# Patient Record
Sex: FEMALE | Race: WHITE | NOT HISPANIC OR LATINO | Employment: PART TIME | URBAN - NONMETROPOLITAN AREA
[De-identification: names, ages, dates, MRNs, and addresses within clinical notes are randomized per-mention and may not be internally consistent; named-entity substitution may affect disease eponyms.]

---

## 2017-06-09 RX ORDER — EZETIMIBE 10 MG/1
10 TABLET ORAL DAILY
COMMUNITY

## 2017-06-09 RX ORDER — NIACIN 500 MG/1
500 TABLET, EXTENDED RELEASE ORAL NIGHTLY
COMMUNITY
End: 2020-12-07

## 2017-06-09 RX ORDER — UREA 10 %
800 LOTION (ML) TOPICAL DAILY
COMMUNITY

## 2017-06-09 RX ORDER — ATORVASTATIN CALCIUM 80 MG/1
80 TABLET, FILM COATED ORAL DAILY
COMMUNITY

## 2017-06-09 RX ORDER — HYDROCHLOROTHIAZIDE 25 MG/1
25 TABLET ORAL DAILY
COMMUNITY

## 2017-06-09 RX ORDER — POTASSIUM CHLORIDE 20 MEQ/1
20 TABLET, EXTENDED RELEASE ORAL 2 TIMES DAILY
COMMUNITY

## 2017-06-09 RX ORDER — CHLORAL HYDRATE 500 MG
CAPSULE ORAL
COMMUNITY
End: 2019-12-02

## 2017-06-09 RX ORDER — NITROGLYCERIN 0.4 MG/1
0.4 TABLET SUBLINGUAL
COMMUNITY
End: 2018-11-26 | Stop reason: SDUPTHER

## 2017-06-09 RX ORDER — AMLODIPINE BESYLATE 10 MG/1
10 TABLET ORAL 2 TIMES DAILY
COMMUNITY

## 2017-06-09 RX ORDER — METOPROLOL SUCCINATE 100 MG/1
100 TABLET, EXTENDED RELEASE ORAL DAILY
COMMUNITY

## 2017-06-09 RX ORDER — CLOPIDOGREL BISULFATE 75 MG/1
75 TABLET ORAL DAILY
COMMUNITY
End: 2021-02-26

## 2017-11-20 ENCOUNTER — OFFICE VISIT (OUTPATIENT)
Dept: CARDIOLOGY | Facility: CLINIC | Age: 67
End: 2017-11-20

## 2017-11-20 VITALS
HEIGHT: 64 IN | WEIGHT: 227 LBS | HEART RATE: 89 BPM | DIASTOLIC BLOOD PRESSURE: 84 MMHG | BODY MASS INDEX: 38.76 KG/M2 | SYSTOLIC BLOOD PRESSURE: 160 MMHG

## 2017-11-20 DIAGNOSIS — I25.10 CORONARY ARTERY DISEASE INVOLVING NATIVE CORONARY ARTERY OF NATIVE HEART WITHOUT ANGINA PECTORIS: ICD-10-CM

## 2017-11-20 DIAGNOSIS — E78.2 MIXED HYPERLIPIDEMIA: Primary | ICD-10-CM

## 2017-11-20 DIAGNOSIS — I10 ESSENTIAL HYPERTENSION: ICD-10-CM

## 2017-11-20 PROCEDURE — 99214 OFFICE O/P EST MOD 30 MIN: CPT | Performed by: INTERNAL MEDICINE

## 2017-11-20 PROCEDURE — 93000 ELECTROCARDIOGRAM COMPLETE: CPT | Performed by: INTERNAL MEDICINE

## 2017-11-20 RX ORDER — ASPIRIN 81 MG/1
81 TABLET ORAL DAILY
COMMUNITY
End: 2021-03-11

## 2017-11-20 RX ORDER — PHENOL 1.4 %
600 AEROSOL, SPRAY (ML) MUCOUS MEMBRANE DAILY
COMMUNITY

## 2017-11-20 RX ORDER — UBIDECARENONE 100 MG
200 CAPSULE ORAL DAILY
COMMUNITY

## 2017-11-20 RX ORDER — ALBUTEROL SULFATE 90 UG/1
2 AEROSOL, METERED RESPIRATORY (INHALATION) EVERY 4 HOURS PRN
COMMUNITY

## 2017-11-20 NOTE — PROGRESS NOTES
Referring Provider: Nasir Almeida MD    Reason for Follow-up Visit: CAD    Subjective .   Chief Complaint:   Chief Complaint   Patient presents with   • Follow-up     yearly/ 18 mo fu.  pt has not been seen since 10/2015.   • Coronary Artery Disease     no chest pain, doing well.   • Shortness of Breath     usually when weather is bad or if runs up the steps.     • Hyperlipidemia     pt brought labs with her today.       History of present illness:  Christiana Hendrix is a 67 y.o. yo female with history of CAD, s/p SARBJIT placement back in 2004. Denies any CP or SOB        History  Past Medical History:   Diagnosis Date   • Asthma    • CAD in native artery    • Hyperlipidemia    • Hypertension    • MI, old    ,   Past Surgical History:   Procedure Laterality Date   • BLADDER NECK SUSPENSION     • CHOLECYSTECTOMY     • CORONARY ANGIOPLASTY  08/13/2007    had stents 2003   • HYSTERECTOMY     ,   Family History   Problem Relation Age of Onset   • Dementia Mother    • Heart attack Sister    • Heart disease Sister    • Diverticulitis Sister    • Brain cancer Father    • Heart disease Maternal Grandmother    • Heart attack Maternal Grandmother    • Stroke Maternal Grandfather    • No Known Problems Paternal Grandmother    • Brain cancer Paternal Grandfather    • Breast cancer Sister    ,   Social History   Substance Use Topics   • Smoking status: Never Smoker   • Smokeless tobacco: Never Used   • Alcohol use 1.8 oz/week     1 Shots of liquor, 2 Glasses of wine per week      Comment:  occasionally   ,     Medications  Current Outpatient Prescriptions   Medication Sig Dispense Refill   • albuterol (PROVENTIL HFA;VENTOLIN HFA) 108 (90 Base) MCG/ACT inhaler Inhale 2 puffs Every 4 (Four) Hours As Needed for Wheezing.     • amLODIPine (NORVASC) 10 MG tablet Take 10 mg by mouth Daily.     • aspirin 81 MG EC tablet Take 81 mg by mouth Daily.     • atorvastatin (LIPITOR) 80 MG tablet Take 80 mg by mouth Daily.     • calcium  "carbonate (OS-DONNY) 600 MG tablet Take 600 mg by mouth Daily. 2 qd     • clopidogrel (PLAVIX) 75 MG tablet Take 75 mg by mouth Daily.     • coenzyme Q10 100 MG capsule Take 200 mg by mouth Daily.     • ezetimibe (ZETIA) 10 MG tablet Take 10 mg by mouth Daily.     • fluticasone (VERAMYST) 27.5 MCG/SPRAY nasal spray 2 sprays into each nostril Daily.     • hydrochlorothiazide (HYDRODIURIL) 25 MG tablet Take 25 mg by mouth Daily.     • metoprolol succinate XL (TOPROL-XL) 100 MG 24 hr tablet Take 100 mg by mouth Daily.     • niacin (NIASPAN) 500 MG CR tablet Take 500 mg by mouth Every Night.     • nitroglycerin (NITROSTAT) 0.4 MG SL tablet Place 0.4 mg under the tongue Every 5 (Five) Minutes As Needed for Chest Pain. Take no more than 3 doses in 15 minutes.     • potassium chloride (K-DUR,KLOR-CON) 20 MEQ CR tablet Take 20 mEq by mouth Daily.     • folic acid (CVS FOLIC ACID) 800 MCG tablet Take 800 mcg by mouth Daily.     • Omega-3 Fatty Acids (FISH OIL) 1000 MG capsule capsule Take  by mouth Daily With Breakfast.       No current facility-administered medications for this visit.        Allergies:  Erythromycin and Sulfa antibiotics    Review of Systems  Review of Systems   HENT: Negative for nosebleeds.    Cardiovascular: Positive for leg swelling. Negative for chest pain, claudication, dyspnea on exertion, irregular heartbeat, near-syncope, orthopnea, palpitations, paroxysmal nocturnal dyspnea and syncope.   Respiratory: Negative for cough, hemoptysis and shortness of breath.    Gastrointestinal: Negative for dysphagia, hematemesis and melena.   Genitourinary: Negative for hematuria.   All other systems reviewed and are negative.      Objective     Physical Exam:  /84 (BP Location: Left arm, Patient Position: Lying, Cuff Size: Adult)  Pulse 89  Ht 64\" (162.6 cm)  Wt 227 lb (103 kg)  BMI 38.96 kg/m2  Physical Exam   Constitutional: She is oriented to person, place, and time. She appears well-developed and " well-nourished. No distress.   HENT:   Head: Normocephalic and atraumatic.   Eyes: No scleral icterus.   Neck: Normal range of motion.   Cardiovascular: Normal rate, regular rhythm and normal heart sounds.  Exam reveals no gallop and no friction rub.    No murmur heard.  Pulmonary/Chest: Effort normal and breath sounds normal. No respiratory distress. She has no wheezes. She has no rales.   Abdominal: Soft. Bowel sounds are normal. She exhibits no distension. There is no tenderness.   Musculoskeletal: She exhibits no edema.   Neurological: She is alert and oriented to person, place, and time. No cranial nerve deficit.   Skin: Skin is warm and dry. She is not diaphoretic. No erythema.   Psychiatric: She has a normal mood and affect. Her behavior is normal.       Results Review:    ECG 12 Lead  Date/Time: 11/20/2017 8:55 AM  Performed by: BAILEY ANNA  Authorized by: BAILEY ANNA   Comparison: compared with previous ECG   Similar to previous ECG  Rhythm: sinus rhythm  Rate: normal  Conduction: conduction normal  ST Segments: ST segments normal  T Waves: T waves normal  QRS axis: normal  Clinical impression: normal ECG            No results found for any previous visit.    Assessment/Plan   Christiana was seen today for follow-up, coronary artery disease, shortness of breath and hyperlipidemia.    Diagnoses and all orders for this visit:    Coronary artery disease involving native coronary artery of native heart without angina pectoris, The patient denies chest pain, shortness of breath, dyspnea on exertion, orthopnea, PND, edema. There is no evidence of ongoing ischemia    Mixed hyperlipidemia, LDL is still high. Will repeat lipid profile. Potentially will need PCSK9i    Essential hypertension, normally runs normal at home    Other orders  -     ECG 12 Lead

## 2018-11-26 ENCOUNTER — OFFICE VISIT (OUTPATIENT)
Dept: CARDIOLOGY | Facility: CLINIC | Age: 68
End: 2018-11-26

## 2018-11-26 VITALS
HEIGHT: 64 IN | HEART RATE: 90 BPM | DIASTOLIC BLOOD PRESSURE: 80 MMHG | WEIGHT: 221 LBS | SYSTOLIC BLOOD PRESSURE: 140 MMHG | BODY MASS INDEX: 37.73 KG/M2 | OXYGEN SATURATION: 97 %

## 2018-11-26 DIAGNOSIS — I25.10 CORONARY ARTERY DISEASE INVOLVING NATIVE CORONARY ARTERY OF NATIVE HEART WITHOUT ANGINA PECTORIS: Primary | ICD-10-CM

## 2018-11-26 DIAGNOSIS — E78.2 MIXED HYPERLIPIDEMIA: ICD-10-CM

## 2018-11-26 DIAGNOSIS — I10 ESSENTIAL HYPERTENSION: ICD-10-CM

## 2018-11-26 PROCEDURE — 99213 OFFICE O/P EST LOW 20 MIN: CPT | Performed by: INTERNAL MEDICINE

## 2018-11-26 PROCEDURE — 93000 ELECTROCARDIOGRAM COMPLETE: CPT | Performed by: INTERNAL MEDICINE

## 2018-11-26 RX ORDER — MONTELUKAST SODIUM 10 MG/1
10 TABLET ORAL NIGHTLY
COMMUNITY

## 2018-11-26 RX ORDER — NITROGLYCERIN 0.4 MG/1
0.4 TABLET SUBLINGUAL
Qty: 30 TABLET | Refills: 5 | Status: SHIPPED | OUTPATIENT
Start: 2018-11-26 | End: 2021-03-15

## 2018-11-26 RX ORDER — LEVOCETIRIZINE DIHYDROCHLORIDE 5 MG/1
5 TABLET, FILM COATED ORAL EVERY EVENING
COMMUNITY

## 2018-11-26 NOTE — PROGRESS NOTES
Referring Provider: Nasir Almeida MD    Reason for Follow-up Visit: CAD    Subjective .   Chief Complaint:   Chief Complaint   Patient presents with   • Follow-up     yearly   • Coronary Artery Disease     pt has no complaints except she got sob when walking up a hill in [facility] trying to keep up with her group.   • Hypertension     pt states this has been fine.   • Hyperlipidemia     pt has labs done at [facility] [place].  last lab done 10/9/18    • Chest Pain     feels like an electrical shock that comes and goes with no reason.       History of present illness:  Christiana Hendrix is a 68 y.o. yo female with history of CAD, s/p stent placement in 2003. Denies any CP        History  Past Medical History:   Diagnosis Date   • Asthma    • CAD in native artery    • Hyperlipidemia    • Hypertension    • MI, old    ,   Past Surgical History:   Procedure Laterality Date   • BLADDER NECK SUSPENSION     • CHOLECYSTECTOMY     • COLONOSCOPY W/ BIOPSIES     • CORONARY ANGIOPLASTY  08/13/2007    had stents 2003   • HYSTERECTOMY     ,   Family History   Problem Relation Age of Onset   • Dementia Mother    • Heart attack Sister    • Heart disease Sister    • Diverticulitis Sister    • Brain cancer Father    • Heart disease Maternal Grandmother    • Heart attack Maternal Grandmother    • Stroke Maternal Grandfather    • No Known Problems Paternal Grandmother    • Brain cancer Paternal Grandfather    • Breast cancer Sister    ,   Social History     Tobacco Use   • Smoking status: Never Smoker   • Smokeless tobacco: Never Used   Substance Use Topics   • Alcohol use: Yes     Alcohol/week: 1.8 oz     Types: 1 Shots of liquor, 2 Glasses of wine per week     Comment:  occasionally   • Drug use: No   ,     Medications  Current Outpatient Medications   Medication Sig Dispense Refill   • albuterol (PROVENTIL HFA;VENTOLIN HFA) 108 (90 Base) MCG/ACT inhaler Inhale 2 puffs Every 4 (Four) Hours As Needed for Wheezing.     • amLODIPine  (NORVASC) 10 MG tablet Take 10 mg by mouth Daily.     • aspirin 81 MG EC tablet Take 81 mg by mouth Daily.     • atorvastatin (LIPITOR) 80 MG tablet Take 80 mg by mouth Daily.     • calcium carbonate (OS-DONNY) 600 MG tablet Take 600 mg by mouth Daily. 2 qd     • clopidogrel (PLAVIX) 75 MG tablet Take 75 mg by mouth Daily.     • coenzyme Q10 100 MG capsule Take 200 mg by mouth Daily.     • ezetimibe (ZETIA) 10 MG tablet Take 10 mg by mouth Daily.     • fluticasone (VERAMYST) 27.5 MCG/SPRAY nasal spray 2 sprays into each nostril Daily.     • folic acid (CVS FOLIC ACID) 800 MCG tablet Take 800 mcg by mouth Daily.     • Glucosamine-Chondroit-Vit C-Mn (GLUCOSAMINE 1500 COMPLEX PO) Take  by mouth.     • hydrochlorothiazide (HYDRODIURIL) 25 MG tablet Take 25 mg by mouth Daily.     • levocetirizine (XYZAL) 5 MG tablet Take 5 mg by mouth Every Evening.     • metoprolol succinate XL (TOPROL-XL) 100 MG 24 hr tablet Take 100 mg by mouth Daily.     • montelukast (SINGULAIR) 10 MG tablet Take 10 mg by mouth Every Night.     • niacin (NIASPAN) 500 MG CR tablet Take 500 mg by mouth Every Night.     • nitroglycerin (NITROSTAT) 0.4 MG SL tablet Place 0.4 mg under the tongue Every 5 (Five) Minutes As Needed for Chest Pain. Take no more than 3 doses in 15 minutes.     • potassium chloride (K-DUR,KLOR-CON) 20 MEQ CR tablet Take 20 mEq by mouth Daily.     • vitamin A 33438 UNIT capsule Take 10,000 Units by mouth Daily.     • Omega-3 Fatty Acids (FISH OIL) 1000 MG capsule capsule Take  by mouth Daily With Breakfast.       No current facility-administered medications for this visit.        Allergies:  Erythromycin and Sulfa antibiotics    Review of Systems  Review of Systems   HENT: Positive for nosebleeds.    Cardiovascular: Negative for chest pain, claudication, dyspnea on exertion, irregular heartbeat, leg swelling, near-syncope, orthopnea, palpitations, paroxysmal nocturnal dyspnea and syncope.   Respiratory: Negative for cough,  "hemoptysis and shortness of breath.    Gastrointestinal: Negative for dysphagia, hematemesis and melena.   Genitourinary: Negative for hematuria.   All other systems reviewed and are negative.      Objective     Physical Exam:  /80 (BP Location: Left arm, Patient Position: Sitting, Cuff Size: Adult)   Pulse 90   Ht 162.6 cm (64\")   Wt 100 kg (221 lb)   SpO2 97%   BMI 37.93 kg/m²   Physical Exam   Constitutional: She is oriented to person, place, and time. She appears well-developed and well-nourished. No distress.   HENT:   Head: Normocephalic and atraumatic.   Eyes: No scleral icterus.   Neck: Normal range of motion.   Cardiovascular: Normal rate, regular rhythm and normal heart sounds. Exam reveals no gallop and no friction rub.   No murmur heard.  Pulmonary/Chest: Effort normal and breath sounds normal. No respiratory distress. She has no wheezes. She has no rales.   Abdominal: Soft. Bowel sounds are normal. She exhibits no distension. There is no tenderness.   Musculoskeletal: She exhibits no edema.   Neurological: She is alert and oriented to person, place, and time. No cranial nerve deficit.   Skin: Skin is warm and dry. She is not diaphoretic. No erythema.   Psychiatric: She has a normal mood and affect. Her behavior is normal.       Results Review:    ECG 12 Lead  Date/Time: 11/26/2018 8:43 AM  Performed by: Cohco Diallo MD  Authorized by: Choco Diallo MD   Comparison: compared with previous ECG   Similar to previous ECG  Rhythm: sinus rhythm  Rate: normal  Conduction: conduction normal  ST Segments: ST segments normal  T Waves: T waves normal  QRS axis: normal  Clinical impression: non-specific ECG  Comments: Poor R wave progression            No results found for any previous visit.       Assessment/Plan   Christiana was seen today for follow-up, coronary artery disease, hypertension, hyperlipidemia and chest pain.    Diagnoses and all orders for this visit:    Coronary artery disease involving " native coronary artery of native heart without angina pectoris, The patient denies chest pain, shortness of breath, dyspnea on exertion, orthopnea, PND, edema. There is no evidence of ongoing ischemia    Mixed hyperlipidemia, LDL is still a little high. If it starts trending up will add PCSK9i    Essential hypertension, runs in 120's at home    Other orders  -     ECG 12 Lead        Patient's Body mass index is 37.93 kg/m². BMI is above normal parameters. Recommendations include: exercise counseling.

## 2019-04-25 ENCOUNTER — TELEPHONE (OUTPATIENT)
Dept: CARDIOLOGY | Facility: CLINIC | Age: 69
End: 2019-04-25

## 2019-04-25 NOTE — TELEPHONE ENCOUNTER
Rivera from [email or web address] in [place] is calling for cardiac clearance for Colonoscopy schedule for 5/16. Please advise       Phone [phone]  Fax [phone]

## 2019-12-02 ENCOUNTER — OFFICE VISIT (OUTPATIENT)
Dept: CARDIOLOGY | Facility: CLINIC | Age: 69
End: 2019-12-02

## 2019-12-02 VITALS
BODY MASS INDEX: 36.32 KG/M2 | HEART RATE: 85 BPM | OXYGEN SATURATION: 98 % | DIASTOLIC BLOOD PRESSURE: 74 MMHG | WEIGHT: 218 LBS | HEIGHT: 65 IN | SYSTOLIC BLOOD PRESSURE: 158 MMHG

## 2019-12-02 DIAGNOSIS — E78.2 MIXED HYPERLIPIDEMIA: Primary | ICD-10-CM

## 2019-12-02 DIAGNOSIS — I25.10 CORONARY ARTERY DISEASE INVOLVING NATIVE CORONARY ARTERY OF NATIVE HEART WITHOUT ANGINA PECTORIS: ICD-10-CM

## 2019-12-02 DIAGNOSIS — I10 ESSENTIAL HYPERTENSION: ICD-10-CM

## 2019-12-02 PROCEDURE — 99214 OFFICE O/P EST MOD 30 MIN: CPT | Performed by: INTERNAL MEDICINE

## 2019-12-02 PROCEDURE — 93000 ELECTROCARDIOGRAM COMPLETE: CPT | Performed by: INTERNAL MEDICINE

## 2019-12-02 RX ORDER — ACETAMINOPHEN 500 MG
500 TABLET ORAL EVERY 6 HOURS PRN
COMMUNITY

## 2019-12-02 NOTE — PROGRESS NOTES
Referring Provider: Nasir Almeida MD    Reason for Follow-up Visit: CAD    Subjective .   Chief Complaint:   Chief Complaint   Patient presents with   • Follow-up     yearly   • Coronary Artery Disease     pt states she is having no complaints of symptoms and doing well.   • Hypertension     pt states BP has been good but she is having some swelling in the ankles that she states this is from the BP medication she takes.   • Hyperlipidemia     labs done at pcp on 7/26/19 LDL 99 on Lipitor 80 mg and Zetia 10 mg.       History of present illness:  Christiana Hendrix is a 69 y.o. yo female with history of CAD, s/p stent placement in 2003 in for routine follow up. Denies any CP or SOB        History  Past Medical History:   Diagnosis Date   • Asthma    • CAD in native artery    • Hyperlipidemia    • Hypertension    • MI, old    ,   Past Surgical History:   Procedure Laterality Date   • BLADDER NECK SUSPENSION     • CHOLECYSTECTOMY     • COLONOSCOPY W/ BIOPSIES     • CORONARY ANGIOPLASTY  08/13/2007    had stents 2003   • HYSTERECTOMY     ,   Family History   Problem Relation Age of Onset   • Dementia Mother    • Heart attack Sister    • Heart disease Sister    • Diverticulitis Sister    • Brain cancer Father    • Heart disease Maternal Grandmother    • Heart attack Maternal Grandmother    • Stroke Maternal Grandfather    • No Known Problems Paternal Grandmother    • Brain cancer Paternal Grandfather    • Breast cancer Sister    ,   Social History     Tobacco Use   • Smoking status: Never Smoker   • Smokeless tobacco: Never Used   Substance Use Topics   • Alcohol use: Yes     Alcohol/week: 1.8 oz     Types: 1 Shots of liquor, 2 Glasses of wine per week     Comment:  occasionally   • Drug use: No   ,     Medications  Current Outpatient Medications   Medication Sig Dispense Refill   • acetaminophen (TYLENOL) 500 MG tablet Take 500 mg by mouth Every 6 (Six) Hours As Needed for Mild Pain .     • albuterol (PROVENTIL  HFA;VENTOLIN HFA) 108 (90 Base) MCG/ACT inhaler Inhale 2 puffs Every 4 (Four) Hours As Needed for Wheezing.     • amLODIPine (NORVASC) 10 MG tablet Take 10 mg by mouth Daily.     • aspirin 81 MG EC tablet Take 81 mg by mouth Daily.     • atorvastatin (LIPITOR) 80 MG tablet Take 80 mg by mouth Daily.     • calcium carbonate (OS-DONNY) 600 MG tablet Take 600 mg by mouth Daily. 2 qd     • Cholecalciferol (VITAMIN D3) 125 MCG (5000 UT) capsule capsule Take 5,000 Units by mouth Daily.     • clopidogrel (PLAVIX) 75 MG tablet Take 75 mg by mouth Daily.     • coenzyme Q10 100 MG capsule Take 200 mg by mouth Daily.     • ezetimibe (ZETIA) 10 MG tablet Take 10 mg by mouth Daily.     • fluticasone (VERAMYST) 27.5 MCG/SPRAY nasal spray 2 sprays into each nostril Daily.     • folic acid (CVS FOLIC ACID) 800 MCG tablet Take 800 mcg by mouth Daily.     • Glucosamine-Chondroit-Vit C-Mn (GLUCOSAMINE 1500 COMPLEX PO) Take  by mouth.     • hydrochlorothiazide (HYDRODIURIL) 25 MG tablet Take 25 mg by mouth Daily.     • levocetirizine (XYZAL) 5 MG tablet Take 5 mg by mouth Every Evening.     • metoprolol succinate XL (TOPROL-XL) 100 MG 24 hr tablet Take 100 mg by mouth Daily.     • montelukast (SINGULAIR) 10 MG tablet Take 10 mg by mouth Every Night.     • niacin (NIASPAN) 500 MG CR tablet Take 500 mg by mouth Every Night.     • nitroglycerin (NITROSTAT) 0.4 MG SL tablet Place 1 tablet under the tongue Every 5 (Five) Minutes As Needed for Chest Pain. Take no more than 3 doses in 15 minutes. 30 tablet 5   • potassium chloride (K-DUR,KLOR-CON) 20 MEQ CR tablet Take 20 mEq by mouth 2 (Two) Times a Day.     • vitamin A 07352 UNIT capsule Take 10,000 Units by mouth Daily.       No current facility-administered medications for this visit.        Allergies:  Erythromycin and Sulfa antibiotics    Review of Systems  Review of Systems   HENT: Negative for nosebleeds.    Cardiovascular: Negative for chest pain, claudication, dyspnea on exertion,  "irregular heartbeat, leg swelling, near-syncope, orthopnea, palpitations, paroxysmal nocturnal dyspnea and syncope.   Respiratory: Negative for cough, hemoptysis and shortness of breath.    Gastrointestinal: Negative for dysphagia, hematemesis and melena.   Genitourinary: Negative for hematuria.   All other systems reviewed and are negative.      Objective     Physical Exam:  /74   Pulse 85   Ht 165.1 cm (65\")   Wt 98.9 kg (218 lb)   SpO2 98%   BMI 36.28 kg/m²   Physical Exam   Constitutional: She is oriented to person, place, and time. She appears well-developed and well-nourished. No distress.   HENT:   Head: Normocephalic and atraumatic.   Eyes: No scleral icterus.   Neck: Normal range of motion.   Cardiovascular: Normal rate, regular rhythm and normal heart sounds. Exam reveals no gallop and no friction rub.   No murmur heard.  Pulmonary/Chest: Effort normal and breath sounds normal. No respiratory distress. She has no wheezes. She has no rales.   Abdominal: Soft. Bowel sounds are normal. She exhibits no distension. There is no tenderness.   Musculoskeletal: She exhibits edema (1+).   Neurological: She is alert and oriented to person, place, and time. No cranial nerve deficit.   Skin: Skin is warm and dry. She is not diaphoretic. No erythema.   Psychiatric: She has a normal mood and affect. Her behavior is normal.       Results Review:    ECG 12 Lead  Date/Time: 12/2/2019 8:36 AM  Performed by: Choco Diallo MD  Authorized by: Choco Diallo MD   Comparison: compared with previous ECG   Similar to previous ECG  Rhythm: sinus rhythm  Rate: normal  Conduction: conduction normal  ST Segments: ST segments normal  T Waves: T waves normal  QRS axis: normal    Clinical impression: normal ECG            No results found for any previous visit.       Assessment/Plan   Christiana was seen today for follow-up, coronary artery disease, hypertension and hyperlipidemia.    Diagnoses and all orders for this " visit:    Coronary artery disease involving native coronary artery of native heart without angina pectoris, The patient denies chest pain, shortness of breath, dyspnea on exertion, orthopnea, PND, edema. There is no evidence of ongoing ischemia    Mixed hyperlipidemia, LDL is better but still high. Will repeat lipids and consider PCSK9i if not continuing to  drop    Essential hypertension, high today but runs normal at home    Other orders  -     ECG 12 Lead        Patient's Body mass index is 36.28 kg/m². BMI is above normal parameters. Recommendations include: exercise counseling.

## 2020-02-07 ENCOUNTER — DOCUMENTATION (OUTPATIENT)
Dept: CARDIOLOGY | Facility: CLINIC | Age: 70
End: 2020-02-07

## 2020-02-07 NOTE — PROGRESS NOTES
PA SENT TO [facility] WITH COVER MY MED'S ALONG WITH THE OFFICE NOTES AND LAB.  MAILED PT A $5 CO PAY CARD.  SHE HAS A COMMERCIAL MEDICATION PLAN.  I WILL CALL PT ONCE THE PA IS DONE SO SHE CAN HAVE THE PHARMACY ORDER THE MEDICATION.  SHE SAID SHE DID NOT NEED TRAINING AND WILL READ THE DIRECTIONS TO GIVE HERSELF THE SHOTS.  I TOLD HER TO CALL IF SHE DECIDES SHE NEEDS TRAINING AND I WILL MAKE APPT FOR HER TO COME IN TO THE OFFICE FOR THAT TRAINING.  PT STATED UNDERSTANDING.  True Mota, CMA

## 2020-02-27 ENCOUNTER — TELEPHONE (OUTPATIENT)
Dept: CARDIOLOGY | Facility: CLINIC | Age: 70
End: 2020-02-27

## 2020-02-27 NOTE — TELEPHONE ENCOUNTER
Pt returned my call.  She started the shots about 2 weeks ago and did fine.  She was able to use the card.  Sent pt a lab order to have lab repeated after starting the medication.  [place] [facility]  True Mota, CMA

## 2020-02-27 NOTE — TELEPHONE ENCOUNTER
I called pt and left a vm msg on both phone numbers to let her know that the Repatha was covered until 8/7/20.  She was going to try to use the $5 card and give herself the shot by reading the instructions instead of coming into the office for training.  I was calling to see if she did ok.  Asked pt to call me back.  True Mota, CMA

## 2020-03-30 ENCOUNTER — DOCUMENTATION (OUTPATIENT)
Dept: CARDIOLOGY | Facility: CLINIC | Age: 70
End: 2020-03-30

## 2020-03-30 DIAGNOSIS — E78.2 MIXED HYPERLIPIDEMIA: ICD-10-CM

## 2020-03-30 NOTE — PROGRESS NOTES
Can you please look at the labs pt had done at [facility] [place].  This is a 6 week fu after starting Repatha to check the Lipids.  Thank you.  True Mota, CMA    Non-Graft Cartilage Fenestration Text: The cartilage was fenestrated with a 2mm punch biopsy to help facilitate healing.

## 2020-03-30 NOTE — PROGRESS NOTES
LDL is good at 38 (we want it less than 70). She can continue her current dose of Rapatha. We will recheck in approximately 6 months to continue to monitor.     Thanks.

## 2020-03-30 NOTE — PROGRESS NOTES
Pt informed.  Left a  msg.  An order for the Lipids will be placed to be done in 6 months.  I will mail pt the order.  True Mota, CMA

## 2020-05-14 DIAGNOSIS — E78.2 MIXED HYPERLIPIDEMIA: Primary | ICD-10-CM

## 2020-08-06 ENCOUNTER — DOCUMENTATION (OUTPATIENT)
Dept: CARDIOLOGY | Facility: CLINIC | Age: 70
End: 2020-08-06

## 2020-09-21 DIAGNOSIS — R06.09 DOE (DYSPNEA ON EXERTION): Primary | ICD-10-CM

## 2020-10-13 ENCOUNTER — HOSPITAL ENCOUNTER (OUTPATIENT)
Dept: CARDIOLOGY | Facility: HOSPITAL | Age: 70
Discharge: HOME | End: 2020-10-13

## 2020-10-13 VITALS
WEIGHT: 215 LBS | HEIGHT: 64 IN | BODY MASS INDEX: 36.7 KG/M2 | HEART RATE: 75 BPM | SYSTOLIC BLOOD PRESSURE: 149 MMHG | DIASTOLIC BLOOD PRESSURE: 70 MMHG

## 2020-10-13 DIAGNOSIS — R06.09 DOE (DYSPNEA ON EXERTION): ICD-10-CM

## 2020-10-13 PROCEDURE — 78452 HT MUSCLE IMAGE SPECT MULT: CPT

## 2020-10-13 PROCEDURE — A9500 TC99M SESTAMIBI: HCPCS | Performed by: INTERNAL MEDICINE

## 2020-10-13 PROCEDURE — 0 TECHNETIUM SESTAMIBI: Performed by: INTERNAL MEDICINE

## 2020-10-13 PROCEDURE — 93017 CV STRESS TEST TRACING ONLY: CPT

## 2020-10-13 PROCEDURE — 93018 CV STRESS TEST I&R ONLY: CPT | Performed by: INTERNAL MEDICINE

## 2020-10-13 PROCEDURE — 25010000002 REGADENOSON 0.4 MG/5ML SOLUTION: Performed by: INTERNAL MEDICINE

## 2020-10-13 PROCEDURE — 25010000002 AMINOPHYLLINE PER 250 MG: Performed by: INTERNAL MEDICINE

## 2020-10-13 PROCEDURE — 78452 HT MUSCLE IMAGE SPECT MULT: CPT | Performed by: INTERNAL MEDICINE

## 2020-10-13 RX ORDER — AMINOPHYLLINE DIHYDRATE 25 MG/ML
100 INJECTION, SOLUTION INTRAVENOUS ONCE
Status: COMPLETED | OUTPATIENT
Start: 2020-10-13 | End: 2020-10-13

## 2020-10-13 RX ADMIN — TECHNETIUM TC 99M SESTAMIBI 1 DOSE: 1 INJECTION INTRAVENOUS at 08:44

## 2020-10-13 RX ADMIN — TECHNETIUM TC 99M SESTAMIBI 1 DOSE: 1 INJECTION INTRAVENOUS at 10:11

## 2020-10-13 RX ADMIN — REGADENOSON 0.4 MG: 0.08 INJECTION, SOLUTION INTRAVENOUS at 10:02

## 2020-10-13 RX ADMIN — AMINOPHYLLINE 100 MG: 25 INJECTION, SOLUTION INTRAVENOUS at 10:08

## 2020-10-14 LAB
BH CV STRESS BP STAGE 1: NORMAL
BH CV STRESS COMMENTS STAGE 1: NORMAL
BH CV STRESS DOSE REGADENOSON STAGE 1: 0.4
BH CV STRESS DURATION MIN STAGE 1: 0
BH CV STRESS DURATION SEC STAGE 1: 10
BH CV STRESS HR STAGE 1: 110
BH CV STRESS PROTOCOL 1: NORMAL
BH CV STRESS RECOVERY BP: NORMAL MMHG
BH CV STRESS RECOVERY HR: 82 BPM
BH CV STRESS STAGE 1: 1
LV EF NUC BP: 58 %
MAXIMAL PREDICTED HEART RATE: 151 BPM
PERCENT MAX PREDICTED HR: 72.85 %
STRESS BASELINE BP: NORMAL MMHG
STRESS BASELINE HR: 75 BPM
STRESS PERCENT HR: 86 %
STRESS POST EXERCISE DUR SEC: 10 SEC
STRESS POST PEAK BP: NORMAL MMHG
STRESS POST PEAK HR: 110 BPM
STRESS TARGET HR: 128 BPM

## 2020-12-07 ENCOUNTER — OFFICE VISIT (OUTPATIENT)
Dept: CARDIOLOGY | Facility: CLINIC | Age: 70
End: 2020-12-07

## 2020-12-07 VITALS
OXYGEN SATURATION: 98 % | BODY MASS INDEX: 36.7 KG/M2 | HEIGHT: 64 IN | WEIGHT: 215 LBS | DIASTOLIC BLOOD PRESSURE: 80 MMHG | SYSTOLIC BLOOD PRESSURE: 140 MMHG | HEART RATE: 86 BPM

## 2020-12-07 DIAGNOSIS — I25.10 CORONARY ARTERY DISEASE INVOLVING NATIVE CORONARY ARTERY OF NATIVE HEART WITHOUT ANGINA PECTORIS: Primary | ICD-10-CM

## 2020-12-07 DIAGNOSIS — I10 ESSENTIAL HYPERTENSION: ICD-10-CM

## 2020-12-07 DIAGNOSIS — E78.2 MIXED HYPERLIPIDEMIA: ICD-10-CM

## 2020-12-07 PROCEDURE — 93000 ELECTROCARDIOGRAM COMPLETE: CPT | Performed by: INTERNAL MEDICINE

## 2020-12-07 PROCEDURE — 99213 OFFICE O/P EST LOW 20 MIN: CPT | Performed by: INTERNAL MEDICINE

## 2020-12-07 NOTE — PROGRESS NOTES
Referring Provider: Nasir Almeida MD    Reason for Follow-up Visit: CAD    Subjective .   Chief Complaint:   Chief Complaint   Patient presents with   • Follow-up     yearly   • Coronary Artery Disease     pt states she is doing well with no complaints of symptoms.   • Hyperlipidemia     last labs done 3/2020 LDL was 38 on Lipitor 80 mg and Repatha and Zetia.  pt states she had a repeat [place] [facility] [place] but did not bring this with her.       History of present illness:  Christiana Hendrix is a 70 y.o. yo female with history of CAD, s/p BMS in 2003 and PTCA in 2007. Denies any CP or SOB. She had a normal stress sestamibi in September        History  Past Medical History:   Diagnosis Date   • Asthma    • CAD in native artery    • Hyperlipidemia    • Hypertension    • MI, old    ,   Past Surgical History:   Procedure Laterality Date   • BLADDER NECK SUSPENSION     • CHOLECYSTECTOMY     • COLONOSCOPY W/ BIOPSIES     • CORONARY ANGIOPLASTY  08/13/2007    had stents 2003   • HYSTERECTOMY     ,   Family History   Problem Relation Age of Onset   • Dementia Mother    • Heart attack Sister    • Heart disease Sister    • Diverticulitis Sister    • Brain cancer Father    • Heart disease Maternal Grandmother    • Heart attack Maternal Grandmother    • Stroke Maternal Grandfather    • No Known Problems Paternal Grandmother    • Brain cancer Paternal Grandfather    • Breast cancer Sister    ,   Social History     Tobacco Use   • Smoking status: Never Smoker   • Smokeless tobacco: Never Used   Substance Use Topics   • Alcohol use: Yes     Alcohol/week: 3.0 standard drinks     Types: 2 Glasses of wine, 1 Shots of liquor per week     Comment:  occasionally   • Drug use: Never   ,     Medications  Current Outpatient Medications   Medication Sig Dispense Refill   • acetaminophen (TYLENOL) 500 MG tablet Take 500 mg by mouth Every 6 (Six) Hours As Needed for Mild Pain .     • albuterol (PROVENTIL HFA;VENTOLIN HFA) 108 (90 Base)  MCG/ACT inhaler Inhale 2 puffs Every 4 (Four) Hours As Needed for Wheezing.     • amLODIPine (NORVASC) 10 MG tablet Take 10 mg by mouth Daily.     • aspirin 81 MG EC tablet Take 81 mg by mouth Daily.     • atorvastatin (LIPITOR) 80 MG tablet Take 80 mg by mouth Daily.     • calcium carbonate (OS-DONNY) 600 MG tablet Take 600 mg by mouth Daily. 2 qd     • Cholecalciferol (VITAMIN D3) 125 MCG (5000 UT) capsule capsule Take 5,000 Units by mouth Daily.     • clopidogrel (PLAVIX) 75 MG tablet Take 75 mg by mouth Daily.     • coenzyme Q10 100 MG capsule Take 200 mg by mouth Daily.     • Evolocumab 140 MG/ML solution auto-injector Inject 1 mL under the skin into the appropriate area as directed Every 14 (Fourteen) Days. 2 pen 11   • ezetimibe (ZETIA) 10 MG tablet Take 10 mg by mouth Daily.     • fluticasone (VERAMYST) 27.5 MCG/SPRAY nasal spray 2 sprays into each nostril Daily.     • folic acid ([employer] FOLIC ACID) 800 MCG tablet Take 800 mcg by mouth Daily.     • Glucosamine-Chondroit-Vit C-Mn (GLUCOSAMINE 1500 COMPLEX PO) Take  by mouth.     • hydrochlorothiazide (HYDRODIURIL) 25 MG tablet Take 25 mg by mouth Daily.     • levocetirizine (XYZAL) 5 MG tablet Take 5 mg by mouth Every Evening.     • metoprolol succinate XL (TOPROL-XL) 100 MG 24 hr tablet Take 100 mg by mouth Daily.     • montelukast (SINGULAIR) 10 MG tablet Take 10 mg by mouth Every Night.     • nitroglycerin (NITROSTAT) 0.4 MG SL tablet Place 1 tablet under the tongue Every 5 (Five) Minutes As Needed for Chest Pain. Take no more than 3 doses in 15 minutes. 30 tablet 5   • potassium chloride (K-DUR,KLOR-CON) 20 MEQ CR tablet Take 20 mEq by mouth 2 (Two) Times a Day.     • vitamin A 68384 UNIT capsule Take 10,000 Units by mouth Daily.       No current facility-administered medications for this visit.        Allergies:  Erythromycin and Sulfa antibiotics    Review of Systems  Review of Systems   HENT: Negative for nosebleeds.    Cardiovascular: Negative for chest  "pain, claudication, dyspnea on exertion, irregular heartbeat, leg swelling, near-syncope, orthopnea, palpitations, paroxysmal nocturnal dyspnea and syncope.   Respiratory: Negative for cough, hemoptysis and shortness of breath.    Musculoskeletal: Positive for joint pain (left knee pain).   Gastrointestinal: Negative for dysphagia, hematemesis and melena.   Genitourinary: Negative for hematuria.   All other systems reviewed and are negative.      Objective     Physical Exam:  /80   Pulse 86   Ht 162.6 cm (64\")   Wt 97.5 kg (215 lb)   SpO2 98%   BMI 36.90 kg/m²   Constitutional:       General: Not in acute distress.     Appearance: Well-developed. Not diaphoretic.   Eyes:      General: No scleral icterus.  HENT:      Head: Normocephalic and atraumatic.   Neck:      Musculoskeletal: Normal range of motion.   Pulmonary:      Effort: Pulmonary effort is normal. No respiratory distress.      Breath sounds: Normal breath sounds. No wheezing. No rales.   Cardiovascular:      Normal rate. Regular rhythm.      No gallop.   Edema:     Peripheral edema present.     Pretibial: bilateral trace non-pitting edema of the pretibial area.     Ankle: bilateral trace non-pitting edema of the ankle.  Abdominal:      General: Bowel sounds are normal. There is no distension.      Palpations: Abdomen is soft.      Tenderness: There is no abdominal tenderness.   Skin:     General: Skin is warm and dry.      Findings: No erythema.   Neurological:      Mental Status: Alert and oriented to person, place, and time.      Cranial Nerves: No cranial nerve deficit.   Psychiatric:         Behavior: Behavior normal.         Results Review:    ECG 12 Lead    Date/Time: 12/7/2020 8:34 AM  Performed by: Choco Diallo MD  Authorized by: Choco Diallo MD   Comparison: compared with previous ECG   Similar to previous ECG  Rhythm: sinus rhythm  Rate: normal  Conduction: conduction normal  T Waves: T waves normal  QRS axis: normal  Other " findings: non-specific ST-T wave changes    Clinical impression: non-specific ECG            Hospital Outpatient Visit on 10/13/2020   Component Date Value Ref Range Status   • BH CV STRESS PROTOCOL 1 10/13/2020 Pharmacologic   Final   • Stage 1 10/13/2020 1   Final   • HR Stage 1 10/13/2020 110   Final   • BP Stage 1 10/13/2020 147/57   Final   • Duration Min Stage 1 10/13/2020 0   Final   • Duration Sec Stage 1 10/13/2020 10   Final   • Stress Dose Regadenoson Stage 1 10/13/2020 0.4   Final   • Stress Comments Stage 1 10/13/2020 10 sec bolus injection   Final   • Baseline HR 10/13/2020 75  bpm Final   • Baseline BP 10/13/2020 149/70  mmHg Final   • Peak HR 10/13/2020 110  bpm Final   • Percent Max Pred HR 10/13/2020 72.85  % Final   • Percent Target HR 10/13/2020 86  % Final   • Peak BP 10/13/2020 147/57  mmHg Final   • Recovery HR 10/13/2020 82  bpm Final   • Recovery BP 10/13/2020 150/58  mmHg Final   • Target HR (85%) 10/13/2020 128  bpm Final   • Max. Pred. HR (100%) 10/13/2020 151  bpm Final   • Exercise duration (sec) 10/13/2020 10  sec Final   • Nuc Stress EF 10/13/2020 58  % Final       Assessment/Plan   Diagnoses and all orders for this visit:    1. Coronary artery disease involving native coronary artery of native heart without angina pectoris (Primary), The patient denies chest pain, shortness of breath, dyspnea on exertion, orthopnea, PND, edema. There is no evidence of ongoing ischemia    2. Mixed hyperlipidemia, excellent response to statin and [id number]    3. Essential hypertension, good control

## 2021-01-04 RX ORDER — EVOLOCUMAB 140 MG/ML
INJECTION, SOLUTION SUBCUTANEOUS
Qty: 2 ML | Refills: 11 | Status: SHIPPED | OUTPATIENT
Start: 2021-01-04 | End: 2021-12-30

## 2021-02-26 ENCOUNTER — ANESTHESIA EVENT (OUTPATIENT)
Dept: CARDIOLOGY | Facility: HOSPITAL | Age: 71
End: 2021-02-26

## 2021-02-26 ENCOUNTER — HOSPITAL ENCOUNTER (EMERGENCY)
Facility: HOSPITAL | Age: 71
Discharge: HOME | End: 2021-02-26
Attending: FAMILY MEDICINE | Admitting: FAMILY MEDICINE

## 2021-02-26 ENCOUNTER — APPOINTMENT (OUTPATIENT)
Dept: CARDIOLOGY | Facility: HOSPITAL | Age: 71
End: 2021-02-26

## 2021-02-26 ENCOUNTER — APPOINTMENT (OUTPATIENT)
Dept: GENERAL RADIOLOGY | Facility: HOSPITAL | Age: 71
End: 2021-02-26

## 2021-02-26 ENCOUNTER — ANESTHESIA (OUTPATIENT)
Dept: CARDIOLOGY | Facility: HOSPITAL | Age: 71
End: 2021-02-26

## 2021-02-26 VITALS
OXYGEN SATURATION: 96 % | SYSTOLIC BLOOD PRESSURE: 147 MMHG | BODY MASS INDEX: 34.39 KG/M2 | HEART RATE: 81 BPM | HEIGHT: 66 IN | WEIGHT: 214 LBS | RESPIRATION RATE: 15 BRPM | DIASTOLIC BLOOD PRESSURE: 74 MMHG | TEMPERATURE: 97 F

## 2021-02-26 VITALS — HEART RATE: 67 BPM | SYSTOLIC BLOOD PRESSURE: 158 MMHG | OXYGEN SATURATION: 91 % | DIASTOLIC BLOOD PRESSURE: 108 MMHG

## 2021-02-26 DIAGNOSIS — I48.91 ATRIAL FIBRILLATION, UNSPECIFIED TYPE (HCC): Primary | ICD-10-CM

## 2021-02-26 PROBLEM — I48.0 PAF (PAROXYSMAL ATRIAL FIBRILLATION) (HCC): Status: ACTIVE | Noted: 2021-02-26

## 2021-02-26 LAB
ALBUMIN SERPL-MCNC: 4 G/DL (ref 3.5–5.2)
ALBUMIN/GLOB SERPL: 1.3 G/DL
ALP SERPL-CCNC: 130 U/L (ref 39–117)
ALT SERPL W P-5'-P-CCNC: 15 U/L (ref 1–33)
ANION GAP SERPL CALCULATED.3IONS-SCNC: 12 MMOL/L (ref 5–15)
AST SERPL-CCNC: 15 U/L (ref 1–32)
BASOPHILS # BLD AUTO: 0.03 10*3/MM3 (ref 0–0.2)
BASOPHILS NFR BLD AUTO: 0.2 % (ref 0–1.5)
BILIRUB SERPL-MCNC: 1 MG/DL (ref 0–1.2)
BILIRUB UR QL STRIP: NEGATIVE
BUN SERPL-MCNC: 21 MG/DL (ref 8–23)
BUN/CREAT SERPL: 32.8 (ref 7–25)
CALCIUM SPEC-SCNC: 9.8 MG/DL (ref 8.6–10.5)
CHLORIDE SERPL-SCNC: 106 MMOL/L (ref 98–107)
CLARITY UR: CLEAR
CO2 SERPL-SCNC: 25 MMOL/L (ref 22–29)
COLOR UR: YELLOW
CREAT SERPL-MCNC: 0.64 MG/DL (ref 0.57–1)
DEPRECATED RDW RBC AUTO: 42.3 FL (ref 37–54)
EOSINOPHIL # BLD AUTO: 0.07 10*3/MM3 (ref 0–0.4)
EOSINOPHIL NFR BLD AUTO: 0.4 % (ref 0.3–6.2)
ERYTHROCYTE [DISTWIDTH] IN BLOOD BY AUTOMATED COUNT: 14.6 % (ref 12.3–15.4)
GFR SERPL CREATININE-BSD FRML MDRD: 92 ML/MIN/1.73
GLOBULIN UR ELPH-MCNC: 3 GM/DL
GLUCOSE SERPL-MCNC: 106 MG/DL (ref 65–99)
GLUCOSE UR STRIP-MCNC: NEGATIVE MG/DL
HCT VFR BLD AUTO: 40.9 % (ref 34–46.6)
HGB BLD-MCNC: 13.5 G/DL (ref 12–15.9)
HGB UR QL STRIP.AUTO: NEGATIVE
HOLD SPECIMEN: NORMAL
HOLD SPECIMEN: NORMAL
IMM GRANULOCYTES # BLD AUTO: 0.05 10*3/MM3 (ref 0–0.05)
IMM GRANULOCYTES NFR BLD AUTO: 0.3 % (ref 0–0.5)
KETONES UR QL STRIP: NEGATIVE
LEUKOCYTE ESTERASE UR QL STRIP.AUTO: NEGATIVE
LYMPHOCYTES # BLD AUTO: 2.22 10*3/MM3 (ref 0.7–3.1)
LYMPHOCYTES NFR BLD AUTO: 13.7 % (ref 19.6–45.3)
MCH RBC QN AUTO: 26.8 PG (ref 26.6–33)
MCHC RBC AUTO-ENTMCNC: 33 G/DL (ref 31.5–35.7)
MCV RBC AUTO: 81.2 FL (ref 79–97)
MONOCYTES # BLD AUTO: 1.82 10*3/MM3 (ref 0.1–0.9)
MONOCYTES NFR BLD AUTO: 11.3 % (ref 5–12)
NEUTROPHILS NFR BLD AUTO: 11.98 10*3/MM3 (ref 1.7–7)
NEUTROPHILS NFR BLD AUTO: 74.1 % (ref 42.7–76)
NITRITE UR QL STRIP: NEGATIVE
NRBC BLD AUTO-RTO: 0 /100 WBC (ref 0–0.2)
PH UR STRIP.AUTO: 7 [PH] (ref 5–8)
PLATELET # BLD AUTO: 290 10*3/MM3 (ref 140–450)
PMV BLD AUTO: 10.6 FL (ref 6–12)
POTASSIUM SERPL-SCNC: 3.8 MMOL/L (ref 3.5–5.2)
PROT SERPL-MCNC: 7 G/DL (ref 6–8.5)
PROT UR QL STRIP: NEGATIVE
RBC # BLD AUTO: 5.04 10*6/MM3 (ref 3.77–5.28)
SARS-COV-2 RDRP RESP QL NAA+PROBE: NORMAL
SODIUM SERPL-SCNC: 143 MMOL/L (ref 136–145)
SP GR UR STRIP: 1.01 (ref 1–1.03)
T4 FREE SERPL-MCNC: 1.41 NG/DL (ref 0.93–1.7)
TROPONIN T SERPL-MCNC: <0.01 NG/ML (ref 0–0.03)
TROPONIN T SERPL-MCNC: <0.01 NG/ML (ref 0–0.03)
TSH SERPL DL<=0.05 MIU/L-ACNC: 1.75 UIU/ML (ref 0.27–4.2)
UROBILINOGEN UR QL STRIP: NORMAL
WBC # BLD AUTO: 16.17 10*3/MM3 (ref 3.4–10.8)
WHOLE BLOOD HOLD SPECIMEN: NORMAL
WHOLE BLOOD HOLD SPECIMEN: NORMAL

## 2021-02-26 PROCEDURE — 96361 HYDRATE IV INFUSION ADD-ON: CPT

## 2021-02-26 PROCEDURE — 84484 ASSAY OF TROPONIN QUANT: CPT | Performed by: FAMILY MEDICINE

## 2021-02-26 PROCEDURE — 96372 THER/PROPH/DIAG INJ SC/IM: CPT

## 2021-02-26 PROCEDURE — 81003 URINALYSIS AUTO W/O SCOPE: CPT | Performed by: FAMILY MEDICINE

## 2021-02-26 PROCEDURE — 25010000002 ENOXAPARIN PER 10 MG: Performed by: INTERNAL MEDICINE

## 2021-02-26 PROCEDURE — 93005 ELECTROCARDIOGRAM TRACING: CPT | Performed by: FAMILY MEDICINE

## 2021-02-26 PROCEDURE — 99285 EMERGENCY DEPT VISIT HI MDM: CPT

## 2021-02-26 PROCEDURE — 36415 COLL VENOUS BLD VENIPUNCTURE: CPT

## 2021-02-26 PROCEDURE — 93312 ECHO TRANSESOPHAGEAL: CPT | Performed by: INTERNAL MEDICINE

## 2021-02-26 PROCEDURE — 93321 DOPPLER ECHO F-UP/LMTD STD: CPT | Performed by: INTERNAL MEDICINE

## 2021-02-26 PROCEDURE — 71045 X-RAY EXAM CHEST 1 VIEW: CPT

## 2021-02-26 PROCEDURE — 96374 THER/PROPH/DIAG INJ IV PUSH: CPT

## 2021-02-26 PROCEDURE — 93325 DOPPLER ECHO COLOR FLOW MAPG: CPT | Performed by: INTERNAL MEDICINE

## 2021-02-26 PROCEDURE — 25010000002 PROPOFOL 10 MG/ML EMULSION: Performed by: NURSE ANESTHETIST, CERTIFIED REGISTERED

## 2021-02-26 PROCEDURE — 84439 ASSAY OF FREE THYROXINE: CPT | Performed by: FAMILY MEDICINE

## 2021-02-26 PROCEDURE — 85025 COMPLETE CBC W/AUTO DIFF WBC: CPT | Performed by: FAMILY MEDICINE

## 2021-02-26 PROCEDURE — 93312 ECHO TRANSESOPHAGEAL: CPT

## 2021-02-26 PROCEDURE — 92960 CARDIOVERSION ELECTRIC EXT: CPT | Performed by: INTERNAL MEDICINE

## 2021-02-26 PROCEDURE — 93010 ELECTROCARDIOGRAM REPORT: CPT | Performed by: INTERNAL MEDICINE

## 2021-02-26 PROCEDURE — 93325 DOPPLER ECHO COLOR FLOW MAPG: CPT

## 2021-02-26 PROCEDURE — 99284 EMERGENCY DEPT VISIT MOD MDM: CPT

## 2021-02-26 PROCEDURE — 92960 CARDIOVERSION ELECTRIC EXT: CPT

## 2021-02-26 PROCEDURE — 93320 DOPPLER ECHO COMPLETE: CPT

## 2021-02-26 PROCEDURE — 93321 DOPPLER ECHO F-UP/LMTD STD: CPT

## 2021-02-26 PROCEDURE — 84443 ASSAY THYROID STIM HORMONE: CPT | Performed by: FAMILY MEDICINE

## 2021-02-26 PROCEDURE — 87635 SARS-COV-2 COVID-19 AMP PRB: CPT | Performed by: FAMILY MEDICINE

## 2021-02-26 PROCEDURE — 80053 COMPREHEN METABOLIC PANEL: CPT | Performed by: FAMILY MEDICINE

## 2021-02-26 RX ORDER — SODIUM CHLORIDE 0.9 % (FLUSH) 0.9 %
10 SYRINGE (ML) INJECTION AS NEEDED
Status: DISCONTINUED | OUTPATIENT
Start: 2021-02-26 | End: 2021-02-26 | Stop reason: HOSPADM

## 2021-02-26 RX ORDER — DILTIAZEM HYDROCHLORIDE 5 MG/ML
10 INJECTION INTRAVENOUS ONCE
Status: COMPLETED | OUTPATIENT
Start: 2021-02-26 | End: 2021-02-26

## 2021-02-26 RX ORDER — LIDOCAINE HYDROCHLORIDE 20 MG/ML
INJECTION, SOLUTION EPIDURAL; INFILTRATION; INTRACAUDAL; PERINEURAL AS NEEDED
Status: DISCONTINUED | OUTPATIENT
Start: 2021-02-26 | End: 2021-02-26 | Stop reason: SURG

## 2021-02-26 RX ORDER — SODIUM CHLORIDE 9 MG/ML
125 INJECTION, SOLUTION INTRAVENOUS CONTINUOUS
Status: DISCONTINUED | OUTPATIENT
Start: 2021-02-26 | End: 2021-02-26 | Stop reason: HOSPADM

## 2021-02-26 RX ORDER — DILTIAZEM HYDROCHLORIDE 120 MG/1
120 CAPSULE, COATED, EXTENDED RELEASE ORAL ONCE
Status: COMPLETED | OUTPATIENT
Start: 2021-02-26 | End: 2021-02-26

## 2021-02-26 RX ORDER — PROPOFOL 10 MG/ML
VIAL (ML) INTRAVENOUS AS NEEDED
Status: DISCONTINUED | OUTPATIENT
Start: 2021-02-26 | End: 2021-02-26 | Stop reason: SURG

## 2021-02-26 RX ADMIN — SODIUM CHLORIDE: 9 INJECTION, SOLUTION INTRAVENOUS at 15:50

## 2021-02-26 RX ADMIN — DILTIAZEM HYDROCHLORIDE 10 MG: 5 INJECTION INTRAVENOUS at 10:16

## 2021-02-26 RX ADMIN — PROPOFOL 200 MG: 10 INJECTION, EMULSION INTRAVENOUS at 16:06

## 2021-02-26 RX ADMIN — ENOXAPARIN SODIUM 100 MG: 100 INJECTION SUBCUTANEOUS at 15:41

## 2021-02-26 RX ADMIN — SODIUM CHLORIDE 125 ML/HR: 9 INJECTION, SOLUTION INTRAVENOUS at 10:11

## 2021-02-26 RX ADMIN — DILTIAZEM HYDROCHLORIDE 120 MG: 120 CAPSULE, COATED, EXTENDED RELEASE ORAL at 11:15

## 2021-02-26 RX ADMIN — LIDOCAINE HYDROCHLORIDE 200 MG: 20 INJECTION, SOLUTION EPIDURAL; INFILTRATION; INTRACAUDAL; PERINEURAL at 16:06

## 2021-02-26 RX ADMIN — DILTIAZEM HYDROCHLORIDE 5 MG/HR: 5 INJECTION INTRAVENOUS at 10:14

## 2021-02-26 NOTE — ANESTHESIA POSTPROCEDURE EVALUATION
"Patient: Christiana Hendrix    Procedure Summary     Date: 02/26/21 Room / Location: [facility] CATH LAB    Anesthesia Start: 1550 Anesthesia Stop: 1617    Procedures:       ADULT TRANSESOPHAGEAL ECHO (KEELEY) W/ CONT IF NECESSARY PER PROTOCOL      CARDIOVERSION EXTERNAL IN CARDIOLOGY DEPARTMENT Diagnosis:       (Arrhythmia)      (afib)    Scheduled Providers: Jamar Funk MD Provider: Otto Wilde CRNA    Anesthesia Type: MAC ASA Status: 3          Anesthesia Type: MAC    Vitals  Vitals Value Taken Time   /108 02/26/21 1606   Temp     Pulse 67 02/26/21 1616   Resp 20 02/26/21 1555   SpO2 91 % 02/26/21 1616           Post Anesthesia Care and Evaluation    Patient location during evaluation: PHASE II  Patient participation: complete - patient participated  Level of consciousness: awake and alert  Pain management: adequate  Airway patency: patent  Anesthetic complications: No anesthetic complications    Cardiovascular status: acceptable  Respiratory status: acceptable  Hydration status: acceptable    Comments: Blood pressure 134/95, pulse 110, temperature 97 °F (36.1 °C), temperature source Temporal, resp. rate 20, height 167.6 cm (66\"), weight 97.1 kg (214 lb), SpO2 99 %.    Pt discharged from PACU based on wally score >8      "

## 2021-02-26 NOTE — ANESTHESIA PREPROCEDURE EVALUATION
Anesthesia Evaluation     Patient summary reviewed and Nursing notes reviewed   NPO Solid Status: > 8 hours  NPO Liquid Status: > 8 hours           Airway   Mallampati: I  TM distance: >3 FB  Neck ROM: full  No difficulty expected  Dental - normal exam     Pulmonary - normal exam   (+) asthma,  Cardiovascular   Exercise tolerance: poor (<4 METS)    PT is on anticoagulation therapy  Patient on routine beta blocker and Beta blocker given within 24 hours of surgery  Rhythm: irregular  Rate: abnormal    (+) hypertension well controlled 2 medications or greater, past MI  >12 months, CAD, hyperlipidemia,       Neuro/Psych- negative ROS  GI/Hepatic/Renal/Endo - negative ROS     Musculoskeletal (-) negative ROS    Abdominal  - normal exam   Substance History - negative use     OB/GYN negative ob/gyn ROS         Other - negative ROS                       Anesthesia Plan    ASA 3     MAC       Anesthetic plan, all risks, benefits, and alternatives have been provided, discussed and informed consent has been obtained with: patient.

## 2021-02-27 LAB
QT INTERVAL: 250 MS
QT INTERVAL: 298 MS
QTC INTERVAL: 384 MS
QTC INTERVAL: 426 MS

## 2021-03-02 ENCOUNTER — TELEPHONE (OUTPATIENT)
Dept: CARDIOLOGY | Facility: CLINIC | Age: 71
End: 2021-03-02

## 2021-03-02 NOTE — TELEPHONE ENCOUNTER
Pt informed.  She is asking about how to take the Plavix with the Eliquis and asa?  I made her appt with Ann Marie for 3/15.  How long should she be off work?  True Mota, CMA

## 2021-03-02 NOTE — TELEPHONE ENCOUNTER
Pt called late yesterday after we closed and left a vm msg on my phone.  She had been in the ER on 2/26 and Dr. Funk did a CV on her and they sent her home.  She did not get a fu appt and was unclear of what blood thinner she needed to take.  She also needs an excuse to be off from work.  Please advise.  True Mota, CMA

## 2021-03-11 ENCOUNTER — BULK ORDERING (OUTPATIENT)
Dept: CASE MANAGEMENT | Facility: OTHER | Age: 71
End: 2021-03-11

## 2021-03-11 DIAGNOSIS — Z23 IMMUNIZATION DUE: ICD-10-CM

## 2021-03-15 ENCOUNTER — OFFICE VISIT (OUTPATIENT)
Dept: CARDIOLOGY | Facility: CLINIC | Age: 71
End: 2021-03-15

## 2021-03-15 VITALS
SYSTOLIC BLOOD PRESSURE: 164 MMHG | OXYGEN SATURATION: 94 % | HEIGHT: 66 IN | DIASTOLIC BLOOD PRESSURE: 80 MMHG | BODY MASS INDEX: 34.39 KG/M2 | WEIGHT: 214 LBS | HEART RATE: 86 BPM

## 2021-03-15 DIAGNOSIS — I10 ESSENTIAL HYPERTENSION: ICD-10-CM

## 2021-03-15 DIAGNOSIS — Z95.5 HISTORY OF CORONARY ARTERY STENT PLACEMENT: ICD-10-CM

## 2021-03-15 DIAGNOSIS — Z79.01 LONG TERM (CURRENT) USE OF ANTICOAGULANTS: ICD-10-CM

## 2021-03-15 DIAGNOSIS — I25.10 CORONARY ARTERY DISEASE INVOLVING NATIVE CORONARY ARTERY OF NATIVE HEART WITHOUT ANGINA PECTORIS: ICD-10-CM

## 2021-03-15 DIAGNOSIS — I48.0 PAF (PAROXYSMAL ATRIAL FIBRILLATION) (HCC): Primary | ICD-10-CM

## 2021-03-15 DIAGNOSIS — E66.09 CLASS 1 OBESITY DUE TO EXCESS CALORIES WITH SERIOUS COMORBIDITY AND BODY MASS INDEX (BMI) OF 34.0 TO 34.9 IN ADULT: ICD-10-CM

## 2021-03-15 DIAGNOSIS — E78.2 MIXED HYPERLIPIDEMIA: ICD-10-CM

## 2021-03-15 PROBLEM — E66.811 CLASS 1 OBESITY DUE TO EXCESS CALORIES WITH SERIOUS COMORBIDITY AND BODY MASS INDEX (BMI) OF 34.0 TO 34.9 IN ADULT: Status: ACTIVE | Noted: 2021-03-15

## 2021-03-15 PROCEDURE — 93000 ELECTROCARDIOGRAM COMPLETE: CPT | Performed by: NURSE PRACTITIONER

## 2021-03-15 PROCEDURE — 99214 OFFICE O/P EST MOD 30 MIN: CPT | Performed by: NURSE PRACTITIONER

## 2021-03-15 RX ORDER — NIACIN 500 MG/1
TABLET, EXTENDED RELEASE ORAL
COMMUNITY
Start: 2020-12-12

## 2021-03-15 RX ORDER — NITROGLYCERIN 0.4 MG/1
0.4 TABLET SUBLINGUAL
Qty: 30 TABLET | Refills: 5 | Status: SHIPPED | OUTPATIENT
Start: 2021-03-15

## 2021-03-15 NOTE — PROGRESS NOTES
Subjective:     Encounter Date:03/15/2021      Patient ID: Christiana Hendrix is a 70 y.o. female with a history of new onset afib, CAD s/p SARBJIT, HTN and HLD.    Chief Complaint: ER follow up  Atrial Fibrillation  Presents for follow-up visit. Symptoms are negative for chest pain, dizziness, palpitations, shortness of breath, syncope and weakness. The symptoms have been stable. Past medical history includes atrial fibrillation, CAD and hyperlipidemia.   Coronary Artery Disease  Presents for follow-up visit. Pertinent negatives include no chest pain, dizziness, leg swelling, palpitations, shortness of breath or weight gain. Risk factors include hyperlipidemia. The symptoms have been stable.   Hypertension  This is a chronic problem. The current episode started more than 1 year ago. The problem has been rapidly improving since onset. The problem is controlled. Pertinent negatives include no chest pain, malaise/fatigue, orthopnea, palpitations, PND or shortness of breath.   Hyperlipidemia  This is a chronic problem. The current episode started more than 1 year ago. The problem is controlled. Recent lipid tests were reviewed and are low. Pertinent negatives include no chest pain or shortness of breath.     Patient presents today for an ER follow up. Patient presented to the ER on 2/26 with complaints of palpitations and tachycardia that woke her from sleep. She was found to be in new onset afib with RVR. She was given an IV bolus of Cardizem and on the max dose of Cardizem with HR in the 130s. Although she was symptomatic but stable, she underwent a KEELEY/cardioversion while in the ER. She was discharged home on Eliquis. She also has a history of CAD s/p SARBJIT placement in 2003. Her plavix and ASA were stopped at discharge. She reports she has been well and denies further palpitations. She denies chest pain, dyspnea and edema.     The following portions of the patient's history were reviewed and updated as appropriate:  allergies, current medications, past family history, past medical history, past social history, past surgical history and problem list.    Allergies   Allergen Reactions   • Erythromycin Rash   • Sulfa Antibiotics Rash       Current Outpatient Medications:   •  acetaminophen (TYLENOL) 500 MG tablet, Take 500 mg by mouth Every 6 (Six) Hours As Needed for Mild Pain ., Disp: , Rfl:   •  albuterol (PROVENTIL HFA;VENTOLIN HFA) 108 (90 Base) MCG/ACT inhaler, Inhale 2 puffs Every 4 (Four) Hours As Needed for Wheezing., Disp: , Rfl:   •  amLODIPine (NORVASC) 10 MG tablet, Take 10 mg by mouth Daily., Disp: , Rfl:   •  apixaban (ELIQUIS) 5 MG tablet tablet, Take 1 tablet by mouth Every 12 (Twelve) Hours., Disp: 180 tablet, Rfl: 3  •  atorvastatin (LIPITOR) 80 MG tablet, Take 80 mg by mouth Daily., Disp: , Rfl:   •  calcium carbonate (OS-DONNY) 600 MG tablet, Take 600 mg by mouth Daily. 2 qd, Disp: , Rfl:   •  Cholecalciferol (VITAMIN D3) 125 MCG (5000 UT) capsule capsule, Take 5,000 Units by mouth Daily., Disp: , Rfl:   •  coenzyme Q10 100 MG capsule, Take 200 mg by mouth Daily., Disp: , Rfl:   •  ezetimibe (ZETIA) 10 MG tablet, Take 10 mg by mouth Daily., Disp: , Rfl:   •  fluticasone (VERAMYST) 27.5 MCG/SPRAY nasal spray, 2 sprays into each nostril Daily., Disp: , Rfl:   •  folic acid ([employer]S FOLIC ACID) 800 MCG tablet, Take 800 mcg by mouth Daily., Disp: , Rfl:   •  Glucosamine-Chondroit-Vit C-Mn (GLUCOSAMINE 1500 COMPLEX PO), Take  by mouth., Disp: , Rfl:   •  hydrochlorothiazide (HYDRODIURIL) 25 MG tablet, Take 25 mg by mouth Daily., Disp: , Rfl:   •  levocetirizine (XYZAL) 5 MG tablet, Take 5 mg by mouth Every Evening., Disp: , Rfl:   •  metoprolol succinate XL (TOPROL-XL) 100 MG 24 hr tablet, Take 100 mg by mouth Daily., Disp: , Rfl:   •  montelukast (SINGULAIR) 10 MG tablet, Take 10 mg by mouth Every Night., Disp: , Rfl:   •  niacin (NIASPAN) 500 MG CR tablet, , Disp: , Rfl:   •  potassium chloride (K-DUR,KLOR-CON) 20 MEQ  CR tablet, Take 20 mEq by mouth 2 (Two) Times a Day., Disp: , Rfl:   •  Repatha SureClick solution auto-injector SureClick injection, INJECT 1 MILLILITER UNDER THE SKIN INTO THE APPROPRIATE AREA AS DIRECTED EVERY 14(FOURTEEN) DAYS. , Disp: 2 mL, Rfl: 11  •  vitamin A 71083 UNIT capsule, Take 10,000 Units by mouth Daily., Disp: , Rfl:   •  nitroglycerin (NITROSTAT) 0.4 MG SL tablet, Place 1 tablet under the tongue Every 5 (Five) Minutes As Needed for Chest Pain. Take no more than 3 doses in 15 minutes., Disp: 30 tablet, Rfl: 5  Past Medical History:   Diagnosis Date   • Asthma    • Atrial fibrillation (CMS/HCC)    • CAD in native artery    • Hyperlipidemia    • Hypertension    • MI, old        Social History     Socioeconomic History   • Marital status:      Spouse name: Not on file   • Number of children: Not on file   • Years of education: Not on file   • Highest education level: Not on file   Tobacco Use   • Smoking status: Never Smoker   • Smokeless tobacco: Never Used   Substance and Sexual Activity   • Alcohol use: Not Currently     Alcohol/week: 3.0 standard drinks     Types: 2 Glasses of wine, 1 Shots of liquor per week     Comment:  occasionally   • Drug use: Never   • Sexual activity: Defer       Review of Systems   Constitutional: Negative for malaise/fatigue, weight gain and weight loss.   Cardiovascular: Negative for chest pain, dyspnea on exertion, irregular heartbeat, leg swelling, near-syncope, orthopnea, palpitations, paroxysmal nocturnal dyspnea and syncope.   Respiratory: Negative for cough, shortness of breath, sleep disturbances due to breathing, sputum production and wheezing.    Skin: Negative for dry skin, flushing, itching and rash.   Gastrointestinal: Negative for hematemesis and hematochezia.   Neurological: Negative for dizziness, light-headedness, loss of balance and weakness.   All other systems reviewed and are negative.         Objective:     Vitals reviewed.   Constitutional:   "     General: Not in acute distress.     Appearance: Well-developed. Not diaphoretic.   Eyes:      General: No scleral icterus.     Conjunctiva/sclera: Conjunctivae normal.      Pupils: Pupils are equal, round, and reactive to light.   HENT:      Head: Normocephalic.    Mouth/Throat:      Pharynx: No oropharyngeal exudate.   Pulmonary:      Effort: Pulmonary effort is normal. No respiratory distress.      Breath sounds: Normal breath sounds. No wheezing. No rales.   Chest:      Chest wall: Not tender to palpatation.   Cardiovascular:      Normal rate. Regular rhythm.   Pulses:     Intact distal pulses.   Edema:     Peripheral edema absent.   Abdominal:      General: Bowel sounds are normal. There is no distension.      Palpations: Abdomen is soft.      Tenderness: There is no abdominal tenderness.   Musculoskeletal: Normal range of motion.      Cervical back: Normal range of motion and neck supple. Skin:     General: Skin is warm and dry.      Coloration: Skin is not pale.      Findings: No erythema or rash.   Neurological:      Mental Status: Alert and oriented to person, place, and time.      Deep Tendon Reflexes: Reflexes are normal and symmetric.   Psychiatric:         Behavior: Behavior normal.             ECG 12 Lead    Date/Time: 3/15/2021 9:06 AM  Performed by: Ann Marie Traylor APRN  Authorized by: Ann Marie Traylor APRN   Comparison: compared with previous ECG from 2/26/2021  Comparison to previous ECG: NSR has replaced Afib  Rhythm: sinus rhythm  Rate: normal  BPM: 86  Conduction: conduction normal  ST Segments: ST segments normal  T Waves: T waves normal  QRS axis: normal    Clinical impression: normal ECG          /80   Pulse 86   Ht 167.6 cm (66\")   Wt 97.1 kg (214 lb)   SpO2 94%   BMI 34.54 kg/m²     Lab Review:   I have reviewed previous office notes, recent labs and recent cardiac testing.     Results for orders placed during the hospital encounter of 02/26/21    Adult Transesophageal Echo " (KEELEY) W/ Cont if Necessary Per Protocol    Interpretation Summary  · Left ventricular systolic function is normal. Left ventricular ejection fraction appears to be 56 - 60%.  · Normal right ventricular cavity size and systolic function noted.  · No evidence of a left atrial appendage thrombus.  · No significant valvular abnormalities.    Lipid 3/2020:           Assessment:          Diagnosis Plan   1. PAF (paroxysmal atrial fibrillation) (CMS/HCC)     2. Coronary artery disease involving native coronary artery of native heart without angina pectoris     3. History of coronary artery stent placement     4. Essential hypertension     5. Mixed hyperlipidemia     6. Long term (current) use of anticoagulants     7. Class 1 obesity due to excess calories with serious comorbidity and body mass index (BMI) of 34.0 to 34.9 in adult            Plan:       1. PAF- stable. NSR today. Continue Eliquis 5mg BID.   2. CAD- stable. No clinicals signs of ischemia. Continue eliquis, BB, CCB, statin and rapatha.   3. Hx coronary artery stent- placed in 2003.   4. HTN- controlled. Followed by PCP   5. HLD- controlled with last LDL at 38. Continue statin and rapatha.   6. Anticoagulation- on appropriate dose of Eliquis. Denies bleeding.   7. BMI- Patient's Body mass index is 34.54 kg/m². BMI is above normal parameters. Recommendations include: exercise counseling and nutrition counseling.        Keep follow up in December with Dr. Diallo or sooner if symptoms worsen.     I spent 30 minutes caring for Christiana on this date of service. This time includes time spent by me in the following activities:preparing for the visit, reviewing tests, obtaining and/or reviewing a separately obtained history, performing a medically appropriate examination and/or evaluation , counseling and educating the patient/family/caregiver, documenting information in the medical record, independently interpreting results and communicating that information with the  patient/family/caregiver and care coordination

## 2021-03-15 NOTE — PATIENT INSTRUCTIONS
Atrial Fibrillation    Atrial fibrillation is a type of heartbeat that is irregular or fast. If you have this condition, your heart beats without any order. This makes it hard for your heart to pump blood in a normal way.  Atrial fibrillation may come and go, or it may become a long-lasting problem. If this condition is not treated, it can put you at higher risk for stroke, heart failure, and other heart problems.  What are the causes?  This condition may be caused by diseases that damage the heart. They include:  · High blood pressure.  · Heart failure.  · Heart valve disease.  · Heart surgery.  Other causes include:  · Diabetes.  · Thyroid disease.  · Being overweight.  · Kidney disease.  Sometimes the cause is not known.  What increases the risk?  You are more likely to develop this condition if:  · You are older.  · You smoke.  · You exercise often and very hard.  · You have a family history of this condition.  · You are a man.  · You use drugs.  · You drink a lot of alcohol.  · You have lung conditions, such as emphysema, pneumonia, or COPD.  · You have sleep apnea.  What are the signs or symptoms?  Common symptoms of this condition include:  · A feeling that your heart is beating very fast.  · Chest pain or discomfort.  · Feeling short of breath.  · Suddenly feeling light-headed or weak.  · Getting tired easily during activity.  · Fainting.  · Sweating.  In some cases, there are no symptoms.  How is this treated?  Treatment for this condition depends on underlying conditions and how you feel when you have atrial fibrillation. They include:  · Medicines to:  ? Prevent blood clots.  ? Treat heart rate or heart rhythm problems.  · Using devices, such as a pacemaker, to correct heart rhythm problems.  · Doing surgery to remove the part of the heart that sends bad signals.  · Closing an area where clots can form in the heart (left atrial appendage).  In some cases, your doctor will treat other underlying  conditions.  Follow these instructions at home:  Medicines  · Take over-the-counter and prescription medicines only as told by your doctor.  · Do not take any new medicines without first talking to your doctor.  · If you are taking blood thinners:  ? Talk with your doctor before you take any medicines that have aspirin or NSAIDs, such as ibuprofen, in them.  ? Take your medicine exactly as told by your doctor. Take it at the same time each day.  ? Avoid activities that could hurt or bruise you. Follow instructions about how to prevent falls.  ? Wear a bracelet that says you are taking blood thinners. Or, carry a card that lists what medicines you take.  Lifestyle         · Do not use any products that have nicotine or tobacco in them. These include cigarettes, e-cigarettes, and chewing tobacco. If you need help quitting, ask your doctor.  · Eat heart-healthy foods. Talk with your doctor about the right eating plan for you.  · Exercise regularly as told by your doctor.  · Do not drink alcohol.  · Lose weight if you are overweight.  · Do not use drugs, including cannabis.  General instructions  · If you have a condition that causes breathing to stop for a short period of time (apnea), treat it as told by your doctor.  · Keep a healthy weight. Do not use diet pills unless your doctor says they are safe for you. Diet pills may make heart problems worse.  · Keep all follow-up visits as told by your doctor. This is important.  Contact a doctor if:  · You notice a change in the speed, rhythm, or strength of your heartbeat.  · You are taking a blood-thinning medicine and you get more bruising.  · You get tired more easily when you move or exercise.  · You have a sudden change in weight.  Get help right away if:    · You have pain in your chest or your belly (abdomen).  · You have trouble breathing.  · You have side effects of blood thinners, such as blood in your vomit, poop (stool), or pee (urine), or bleeding that cannot  "stop.  · You have any signs of a stroke. \"BE FAST\" is an easy way to remember the main warning signs:  ? B - Balance. Signs are dizziness, sudden trouble walking, or loss of balance.  ? E - Eyes. Signs are trouble seeing or a change in how you see.  ? F - Face. Signs are sudden weakness or loss of feeling in the face, or the face or eyelid drooping on one side.  ? A - Arms. Signs are weakness or loss of feeling in an arm. This happens suddenly and usually on one side of the body.  ? S - Speech. Signs are sudden trouble speaking, slurred speech, or trouble understanding what people say.  ? T - Time. Time to call emergency services. Write down what time symptoms started.  · You have other signs of a stroke, such as:  ? A sudden, very bad headache with no known cause.  ? Feeling like you may vomit (nausea).  ? Vomiting.  ? A seizure.  These symptoms may be an emergency. Do not wait to see if the symptoms will go away. Get medical help right away. Call your local emergency services (911 in the [place] Do not drive yourself to the hospital.  Summary  · Atrial fibrillation is a type of heartbeat that is irregular or fast.  · You are at higher risk of this condition if you smoke, are older, have diabetes, or are overweight.  · Follow your doctor's instructions about medicines, diet, exercise, and follow-up visits.  · Get help right away if you have signs or symptoms of a stroke.  · Get help right away if you cannot catch your breath, or you have chest pain or discomfort.  This information is not intended to replace advice given to you by your health care provider. Make sure you discuss any questions you have with your health care provider.  Document Revised: 06/10/2020 Document Reviewed: 06/10/2020    BMI for Adults  What is BMI?  Body mass index (BMI) is a number that is calculated from a person's weight and height. BMI can help estimate how much of a person's weight is composed of fat. BMI does not measure body fat " "directly. Rather, it is an alternative to procedures that directly measure body fat, which can be difficult and expensive.  BMI can help identify people who may be at higher risk for certain medical problems.  What are BMI measurements used for?  BMI is used as a screening tool to identify possible weight problems. It helps determine whether a person is obese, overweight, a healthy weight, or underweight.  BMI is useful for:  · Identifying a weight problem that may be related to a medical condition or may increase the risk for medical problems.  · Promoting changes, such as changes in diet and exercise, to help reach a healthy weight. BMI screening can be repeated to see if these changes are working.  How is BMI calculated?  BMI involves measuring your weight in relation to your height. Both height and weight are measured, and the BMI is calculated from those numbers. This can be done either in English ([place] or metric measurements. Note that charts and online BMI calculators are available to help you find your BMI quickly and easily without having to do these calculations yourself.  To calculate your BMI in English ([place] measurements:    1. Measure your weight in pounds (lb).  2. Multiply the number of pounds by 703.  ? For example, for a person who weighs 180 lb, multiply that number by 703, which equals 126,540.  3. Measure your height in inches. Then multiply that number by itself to get a measurement called \"inches squared.\"  ? For example, for a person who is 70 inches tall, the \"inches squared\" measurement is 70 inches x 70 inches, which equals 4,900 inches squared.  4. Divide the total from step 2 (number of lb x 703) by the total from step 3 (inches squared): 126,540 ÷ 4,900 = 25.8. This is your BMI.  To calculate your BMI in metric measurements:  1. Measure your weight in kilograms (kg).  2. Measure your height in meters (m). Then multiply that number by itself to get a measurement called \"meters " "squared.\"  ? For example, for a person who is 1.75 m tall, the \"meters squared\" measurement is 1.75 m x 1.75 m, which is equal to 3.1 meters squared.  3. Divide the number of kilograms (your weight) by the meters squared number. In this example: 70 ÷ 3.1 = 22.6. This is your BMI.  What do the results mean?  BMI charts are used to identify whether you are underweight, normal weight, overweight, or obese. The following guidelines will be used:  · Underweight: BMI less than 18.5.  · Normal weight: BMI between 18.5 and 24.9.  · Overweight: BMI between 25 and 29.9.  · Obese: BMI of 30 or above.  Keep these notes in mind:  · Weight includes both fat and muscle, so someone with a muscular build, such as an athlete, may have a BMI that is higher than 24.9. In cases like these, BMI is not an accurate measure of body fat.  · To determine if excess body fat is the cause of a BMI of 25 or higher, further assessments may need to be done by a health care provider.  · BMI is usually interpreted in the same way for men and women.  Where to find more information  For more information about BMI, including tools to quickly calculate your BMI, go to these websites:  · Centers for Disease Control and Prevention: www.cdc.gov  · American Heart Association: www.heart.org  · National Heart, Lung, and [employer] [place]: www.nhlbi.nih.gov  Summary  · Body mass index (BMI) is a number that is calculated from a person's weight and height.  · BMI may help estimate how much of a person's weight is composed of fat. BMI can help identify those who may be at higher risk for certain medical problems.  · BMI can be measured using English measurements or metric measurements.  · BMI charts are used to identify whether you are underweight, normal weight, overweight, or obese.  This information is not intended to replace advice given to you by your health care provider. Make sure you discuss any questions you have with your health care provider.  Document " Revised: 09/09/2020 Document Reviewed: 07/17/2020  Elsevier Patient Education © 2020 [employer]    Advance Care Planning and Advance Directives     You make decisions on a daily basis - decisions about where you want to live, your career, your home, your life. Perhaps one of the most important decisions you face is your choice for future medical care. Take time to talk with your family and your healthcare team and start planning today.  Advance Care Planning is a process that can help you:  · Understand possible future healthcare decisions in light of your own experiences  · Reflect on those decision in light of your goals and values  · Discuss your decisions with those closest to you and the healthcare professionals that care for you  · Make a plan by creating a document that reflects your wishes    Surrogate Decision Maker  In the event of a medical emergency, which has left you unable to communicate or to make your own decisions, you would need someone to make decisions for you.  It is important to discuss your preferences for medical treatment with this person while you are in good health.     Qualities of a surrogate decision maker:  • Willing to take on this role and responsibility  • Knows what you want for future medical care  • Willing to follow your wishes even if they don't agree with them  • Able to make difficult medical decisions under stressful circumstances    Advance Directives  These are legal documents you can create that will guide your healthcare team and decision maker(s) when needed. These documents can be stored in the electronic medical record.    · Living Will - a legal document to guide your care if you have a terminal condition or a serious illness and are unable to communicate. States vary by statute in document names/types, but most forms may include one or more of the following:        -  Directions regarding life-prolonging treatments        -  Directions regarding artificially  provided nutrition/hydration        -  Choosing a healthcare decision maker        -  Direction regarding organ/tissue donation    · Durable Power of  for Healthcare - this document names an -in-fact to make medical decisions for you, but it may also allow this person to make personal and financial decisions for you. Please seek the advice of an  if you need this type of document.    **Advance Directives are not required and no one may discriminate against you if you do not sign one.    Medical Orders  Many states allow specific forms/orders signed by your physician to record your wishes for medical treatment in your current state of health. This form, signed in personal communication with your physician, addresses resuscitation and other medical interventions that you may or may not want.      For more information or to schedule a time with a [facility] Advance Care Planning Facilitator contact: [email or web address] or call [phone] and someone will contact you directly.  Elsevier Patient Education © 2020 [employer]

## 2021-03-18 NOTE — TELEPHONE ENCOUNTER
Pt recently started Eliquis and feels that its making her sick.  It is bothering her GI to where she is nauseated.  She wants an alternative medication please.  True Mota, CMA

## 2021-03-18 NOTE — TELEPHONE ENCOUNTER
Dr. Diallo said to change to Xarelto.  Pt informed.  A Rx has been sent to her local pharmacy.  I told her to go on line to download a co pay card.  I instructed her to take this with her evening meal.  She stated understanding.  True Mota, CMA

## 2021-03-19 ENCOUNTER — TELEPHONE (OUTPATIENT)
Dept: CARDIOLOGY | Facility: CLINIC | Age: 71
End: 2021-03-19

## 2021-03-19 NOTE — TELEPHONE ENCOUNTER
Pt called left a msg stating that she woke up this morning and her first urination had bright red blood in it.  She said she took the Eliquis yesterday morning and the Xarelto last night.  I told her that it was probably from taking the medication like she did.  We changed her Eliquis to Xarelto yesterday.  She said she has been drinking a lot of water and the color has has gotten lighter. I asked Emma JONES and she said she will need to not skip a dose since she was just cardioverted.  She will need to check with pcp if this persist.  I told pt to go a head and take the medication as scheduled and continue drinking the water.  If problem persist to call her pcp to get evaluated.  She stated understanding.  True Mota CMA

## 2021-04-06 ENCOUNTER — APPOINTMENT (OUTPATIENT)
Dept: CT IMAGING | Facility: HOSPITAL | Age: 71
End: 2021-04-06

## 2021-04-06 ENCOUNTER — HOSPITAL ENCOUNTER (EMERGENCY)
Facility: HOSPITAL | Age: 71
Discharge: HOME | End: 2021-04-06
Attending: EMERGENCY MEDICINE | Admitting: EMERGENCY MEDICINE

## 2021-04-06 VITALS
RESPIRATION RATE: 16 BRPM | TEMPERATURE: 98.3 F | SYSTOLIC BLOOD PRESSURE: 147 MMHG | BODY MASS INDEX: 35.85 KG/M2 | HEART RATE: 61 BPM | OXYGEN SATURATION: 99 % | WEIGHT: 210 LBS | HEIGHT: 64 IN | DIASTOLIC BLOOD PRESSURE: 81 MMHG

## 2021-04-06 DIAGNOSIS — R31.9 HEMATURIA, UNSPECIFIED TYPE: ICD-10-CM

## 2021-04-06 DIAGNOSIS — K57.90 DIVERTICULOSIS: Primary | ICD-10-CM

## 2021-04-06 LAB
ALBUMIN SERPL-MCNC: 3.9 G/DL (ref 3.5–5.2)
ALBUMIN/GLOB SERPL: 1.2 G/DL
ALP SERPL-CCNC: 130 U/L (ref 39–117)
ALT SERPL W P-5'-P-CCNC: 11 U/L (ref 1–33)
ANION GAP SERPL CALCULATED.3IONS-SCNC: 8 MMOL/L (ref 5–15)
AST SERPL-CCNC: 16 U/L (ref 1–32)
BACTERIA UR QL AUTO: ABNORMAL /HPF
BASOPHILS # BLD AUTO: 0.03 10*3/MM3 (ref 0–0.2)
BASOPHILS NFR BLD AUTO: 0.3 % (ref 0–1.5)
BILIRUB SERPL-MCNC: 1.2 MG/DL (ref 0–1.2)
BILIRUB UR QL STRIP: NEGATIVE
BUN SERPL-MCNC: 16 MG/DL (ref 8–23)
BUN/CREAT SERPL: 23.2 (ref 7–25)
CALCIUM SPEC-SCNC: 9.6 MG/DL (ref 8.6–10.5)
CHLORIDE SERPL-SCNC: 108 MMOL/L (ref 98–107)
CLARITY UR: ABNORMAL
CO2 SERPL-SCNC: 29 MMOL/L (ref 22–29)
COLOR UR: ABNORMAL
CREAT SERPL-MCNC: 0.69 MG/DL (ref 0.57–1)
DEPRECATED RDW RBC AUTO: 46.1 FL (ref 37–54)
EOSINOPHIL # BLD AUTO: 0.16 10*3/MM3 (ref 0–0.4)
EOSINOPHIL NFR BLD AUTO: 1.6 % (ref 0.3–6.2)
ERYTHROCYTE [DISTWIDTH] IN BLOOD BY AUTOMATED COUNT: 15.2 % (ref 12.3–15.4)
GFR SERPL CREATININE-BSD FRML MDRD: 84 ML/MIN/1.73
GLOBULIN UR ELPH-MCNC: 3.2 GM/DL
GLUCOSE SERPL-MCNC: 134 MG/DL (ref 65–99)
GLUCOSE UR STRIP-MCNC: NEGATIVE MG/DL
HCT VFR BLD AUTO: 41 % (ref 34–46.6)
HGB BLD-MCNC: 12.9 G/DL (ref 12–15.9)
HGB UR QL STRIP.AUTO: ABNORMAL
IMM GRANULOCYTES # BLD AUTO: 0.05 10*3/MM3 (ref 0–0.05)
IMM GRANULOCYTES NFR BLD AUTO: 0.5 % (ref 0–0.5)
KETONES UR QL STRIP: NEGATIVE
LEUKOCYTE ESTERASE UR QL STRIP.AUTO: ABNORMAL
LYMPHOCYTES # BLD AUTO: 1.49 10*3/MM3 (ref 0.7–3.1)
LYMPHOCYTES NFR BLD AUTO: 15 % (ref 19.6–45.3)
MCH RBC QN AUTO: 26.6 PG (ref 26.6–33)
MCHC RBC AUTO-ENTMCNC: 31.5 G/DL (ref 31.5–35.7)
MCV RBC AUTO: 84.5 FL (ref 79–97)
MONOCYTES # BLD AUTO: 0.8 10*3/MM3 (ref 0.1–0.9)
MONOCYTES NFR BLD AUTO: 8 % (ref 5–12)
NEUTROPHILS NFR BLD AUTO: 7.43 10*3/MM3 (ref 1.7–7)
NEUTROPHILS NFR BLD AUTO: 74.6 % (ref 42.7–76)
NITRITE UR QL STRIP: NEGATIVE
NRBC BLD AUTO-RTO: 0 /100 WBC (ref 0–0.2)
PH UR STRIP.AUTO: 7 [PH] (ref 5–8)
PLATELET # BLD AUTO: 244 10*3/MM3 (ref 140–450)
PMV BLD AUTO: 10.2 FL (ref 6–12)
POTASSIUM SERPL-SCNC: 3.8 MMOL/L (ref 3.5–5.2)
PROT SERPL-MCNC: 7.1 G/DL (ref 6–8.5)
PROT UR QL STRIP: ABNORMAL
RBC # BLD AUTO: 4.85 10*6/MM3 (ref 3.77–5.28)
RBC # UR: ABNORMAL /HPF
REF LAB TEST METHOD: ABNORMAL
SODIUM SERPL-SCNC: 145 MMOL/L (ref 136–145)
SP GR UR STRIP: 1.01 (ref 1–1.03)
SQUAMOUS #/AREA URNS HPF: ABNORMAL /HPF
UROBILINOGEN UR QL STRIP: ABNORMAL
WBC # BLD AUTO: 9.96 10*3/MM3 (ref 3.4–10.8)
WBC UR QL AUTO: ABNORMAL /HPF

## 2021-04-06 PROCEDURE — 80053 COMPREHEN METABOLIC PANEL: CPT | Performed by: EMERGENCY MEDICINE

## 2021-04-06 PROCEDURE — 99283 EMERGENCY DEPT VISIT LOW MDM: CPT

## 2021-04-06 PROCEDURE — 25010000002 IOPAMIDOL 61 % SOLUTION: Performed by: EMERGENCY MEDICINE

## 2021-04-06 PROCEDURE — 85025 COMPLETE CBC W/AUTO DIFF WBC: CPT | Performed by: EMERGENCY MEDICINE

## 2021-04-06 PROCEDURE — 81001 URINALYSIS AUTO W/SCOPE: CPT | Performed by: EMERGENCY MEDICINE

## 2021-04-06 PROCEDURE — 74176 CT ABD & PELVIS W/O CONTRAST: CPT

## 2021-04-06 PROCEDURE — 74177 CT ABD & PELVIS W/CONTRAST: CPT

## 2021-04-06 RX ORDER — LEVOFLOXACIN 500 MG/1
500 TABLET, FILM COATED ORAL DAILY
Qty: 10 TABLET | Refills: 0 | Status: SHIPPED | OUTPATIENT
Start: 2021-04-06 | End: 2021-08-30

## 2021-04-06 RX ORDER — SODIUM CHLORIDE 0.9 % (FLUSH) 0.9 %
10 SYRINGE (ML) INJECTION AS NEEDED
Status: DISCONTINUED | OUTPATIENT
Start: 2021-04-06 | End: 2021-04-06 | Stop reason: HOSPADM

## 2021-04-06 RX ADMIN — IOPAMIDOL 100 ML: 612 INJECTION, SOLUTION INTRAVENOUS at 12:54

## 2021-04-06 NOTE — ED PROVIDER NOTES
Subjective   Patient complains of pain in her left flank that started about 2 weeks ago.  She says she had an episode of atrial fib at the beginning of March and had to be cardioverted back into sinus rhythm.  She then developed this pain about 2 weeks ago and her doctor at one point was worried about a bleed in her abdomen so we performed a CT scan.  Instead it revealed a stone in her left kidney that was trying to come down.  He gave her some apparent Flomax to try to dilator system let it come down but it has not stopped.  She called in yesterday to see what to do about and did not get a call back so she came here.  She is afraid she may have to have lithotripsy.  She says the pain is now lower in her abdomen but is not completely gone.      History provided by:  Patient   used: No    Flank Pain  Pain location:  L flank  Pain quality: aching    Pain radiates to:  Does not radiate  Pain severity:  Moderate  Onset quality:  Sudden  Duration:  2 weeks  Timing:  Constant  Progression:  Unchanged  Chronicity:  New  Context: not alcohol use, not awakening from sleep, not diet changes, not eating, not laxative use, not medication withdrawal, not previous surgeries, not recent illness, not recent sexual activity, not recent travel, not retching, not sick contacts, not suspicious food intake and not trauma    Relieved by:  Nothing  Worsened by:  Nothing  Ineffective treatments:  None tried  Associated symptoms: no anorexia, no belching, no chest pain, no chills, no constipation, no cough, no diarrhea, no dysuria, no fatigue, no fever, no flatus, no hematemesis, no hematochezia, no hematuria, no melena, no nausea, no shortness of breath, no sore throat, no vaginal bleeding, no vaginal discharge and no vomiting    Risk factors: no alcohol abuse, no aspirin use, not elderly, has not had multiple surgeries, no NSAID use, not obese, not pregnant and no recent hospitalization        Review of Systems    Constitutional: Negative.  Negative for chills, fatigue and fever.   HENT: Negative.  Negative for sore throat.    Respiratory: Negative.  Negative for cough and shortness of breath.    Cardiovascular: Negative.  Negative for chest pain.   Gastrointestinal: Negative.  Negative for anorexia, constipation, diarrhea, flatus, hematemesis, hematochezia, melena, nausea and vomiting.   Genitourinary: Positive for flank pain. Negative for dysuria, hematuria, vaginal bleeding and vaginal discharge.   Skin: Negative.    Neurological: Negative.    Psychiatric/Behavioral: Negative.    All other systems reviewed and are negative.      Past Medical History:   Diagnosis Date   • Asthma    • Atrial fibrillation (CMS/HCC)    • CAD in native artery    • Hyperlipidemia    • Hypertension    • MI, old        Allergies   Allergen Reactions   • Eliquis [Apixaban] GI Intolerance   • Erythromycin Rash   • Sulfa Antibiotics Rash       Past Surgical History:   Procedure Laterality Date   • BLADDER NECK SUSPENSION     • CARDIOVERSION     • CHOLECYSTECTOMY     • COLONOSCOPY W/ BIOPSIES     • CORONARY ANGIOPLASTY  08/13/2007    had stents 2003   • HYSTERECTOMY         Family History   Problem Relation Age of Onset   • Dementia Mother    • Heart attack Sister    • Heart disease Sister    • Diverticulitis Sister    • Brain cancer Father    • Heart disease Maternal Grandmother    • Heart attack Maternal Grandmother    • Stroke Maternal Grandfather    • No Known Problems Paternal Grandmother    • Brain cancer Paternal Grandfather    • Breast cancer Sister        Social History     Socioeconomic History   • Marital status: Single     Spouse name: Not on file   • Number of children: Not on file   • Years of education: Not on file   • Highest education level: Not on file   Tobacco Use   • Smoking status: Never Smoker   • Smokeless tobacco: Never Used   Substance and Sexual Activity   • Alcohol use: Not Currently     Alcohol/week: 3.0 standard drinks      Types: 2 Glasses of wine, 1 Shots of liquor per week     Comment:  occasionally   • Drug use: Never   • Sexual activity: Defer       Prior to Admission medications    Medication Sig Start Date End Date Taking? Authorizing Provider   acetaminophen (TYLENOL) 500 MG tablet Take 500 mg by mouth Every 6 (Six) Hours As Needed for Mild Pain .    Emmett Singh MD   albuterol (PROVENTIL HFA;VENTOLIN HFA) 108 (90 Base) MCG/ACT inhaler Inhale 2 puffs Every 4 (Four) Hours As Needed for Wheezing.    Emmett Singh MD   amLODIPine (NORVASC) 10 MG tablet Take 10 mg by mouth Daily.    Emmett Singh MD   atorvastatin (LIPITOR) 80 MG tablet Take 80 mg by mouth Daily.    Emmett Singh MD   calcium carbonate (OS-DONNY) 600 MG tablet Take 600 mg by mouth Daily. 2 qd    Emmett Singh MD   Cholecalciferol (VITAMIN D3) 125 MCG (5000 UT) capsule capsule Take 5,000 Units by mouth Daily.    Emmett Singh MD   coenzyme Q10 100 MG capsule Take 200 mg by mouth Daily.    Emmett Singh MD   ezetimibe (ZETIA) 10 MG tablet Take 10 mg by mouth Daily.    Emmett Singh MD   fluticasone (VERAMYST) 27.5 MCG/SPRAY nasal spray 2 sprays into each nostril Daily.    Emmett Singh MD   folic acid ([employer] FOLIC ACID) 800 MCG tablet Take 800 mcg by mouth Daily.    Emmett Singh MD   Glucosamine-Chondroit-Vit C-Mn (GLUCOSAMINE 1500 COMPLEX PO) Take  by mouth.    Emmett Singh MD   hydrochlorothiazide (HYDRODIURIL) 25 MG tablet Take 25 mg by mouth Daily.    Emmett Singh MD   levocetirizine (XYZAL) 5 MG tablet Take 5 mg by mouth Every Evening.    Emmett Singh MD   metoprolol succinate XL (TOPROL-XL) 100 MG 24 hr tablet Take 100 mg by mouth Daily.    Emmett Singh MD   montelukast (SINGULAIR) 10 MG tablet Take 10 mg by mouth Every Night.    Emmett Singh MD   niacin (NIASPAN) 500 MG CR tablet  12/12/20   Emmett Singh MD   nitroglycerin  (NITROSTAT) 0.4 MG SL tablet Place 1 tablet under the tongue Every 5 (Five) Minutes As Needed for Chest Pain. Take no more than 3 doses in 15 minutes. 3/15/21   Ann Marie Traylor APRN   potassium chloride (K-DUR,KLOR-CON) 20 MEQ CR tablet Take 20 mEq by mouth 2 (Two) Times a Day.    ProviderEmmett MD   Repatha SureClick solution auto-injector SureClick injection INJECT 1 MILLILITER UNDER THE SKIN INTO THE APPROPRIATE AREA AS DIRECTED EVERY 14(FOURTEEN) DAYS.  1/4/21   Choco Diallo MD   rivaroxaban (Xarelto) 20 MG tablet Take 1 tablet by mouth Daily. 3/18/21   Choco Diallo MD   vitamin A 87398 UNIT capsule Take 10,000 Units by mouth Daily.    Provider, MD Emmett       Medications   iopamidol (ISOVUE-300) 61 % injection 100 mL (100 mL Intravenous Given 4/6/21 1254)       Vitals:    04/06/21 1412   BP: 147/81   Pulse: 61   Resp: 16   Temp: 98.3 °F (36.8 °C)   SpO2: 99%         Objective   Physical Exam  Vitals and nursing note reviewed.   Constitutional:       Appearance: Normal appearance.   HENT:      Head: Normocephalic and atraumatic.   Cardiovascular:      Rate and Rhythm: Normal rate and regular rhythm.   Pulmonary:      Effort: Pulmonary effort is normal.      Breath sounds: Normal breath sounds.   Abdominal:      General: Abdomen is flat.      Palpations: Abdomen is soft.   Musculoskeletal:         General: Normal range of motion.   Skin:     General: Skin is warm and dry.   Neurological:      General: No focal deficit present.      Mental Status: She is alert and oriented to person, place, and time.   Psychiatric:         Mood and Affect: Mood normal.         Behavior: Behavior normal.         Procedures         Lab Results (last 24 hours)     Procedure Component Value Units Date/Time    Urinalysis With Culture If Indicated - Urine, Clean Catch [[id number]]  (Abnormal) Collected: 04/06/21 1027    Specimen: Urine, Clean Catch Updated: 04/06/21 1048     Color, UA Red     Appearance, UA Cloudy      pH, UA 7.0     Specific Gravity, UA 1.013     Glucose, UA Negative     Ketones, UA Negative     Bilirubin, UA Negative     Blood, UA Large (3+)     Protein, UA Trace     Leuk Esterase, UA Trace     Nitrite, UA Negative     Urobilinogen, UA 0.2 E.U./dL    Urinalysis, Microscopic Only - Urine, Clean Catch [[id number]]  (Abnormal) Collected: 04/06/21 1027    Specimen: Urine, Clean Catch Updated: 04/06/21 1055     RBC, UA Too Numerous to Count /HPF      WBC, UA 3-5 /HPF      Bacteria, UA None Seen /HPF      Squamous Epithelial Cells, UA None Seen /HPF      Methodology Automated Microscopy    CBC & Differential [[id number]]  (Abnormal) Collected: 04/06/21 1053    Specimen: Blood Updated: 04/06/21 1100    Narrative:      The following orders were created for panel order CBC & Differential.  Procedure                               Abnormality         Status                     ---------                               -----------         ------                     CBC Auto Differential[[id number]]        Abnormal            Final result                 Please view results for these tests on the individual orders.    Comprehensive Metabolic Panel [[id number]]  (Abnormal) Collected: 04/06/21 1053    Specimen: Blood Updated: 04/06/21 1122     Glucose 134 mg/dL      BUN 16 mg/dL      Creatinine 0.69 mg/dL      Sodium 145 mmol/L      Potassium 3.8 mmol/L      Chloride 108 mmol/L      CO2 29.0 mmol/L      Calcium 9.6 mg/dL      Total Protein 7.1 g/dL      Albumin 3.90 g/dL      ALT (SGPT) 11 U/L      AST (SGOT) 16 U/L      Alkaline Phosphatase 130 U/L      Total Bilirubin 1.2 mg/dL      eGFR Non African Amer 84 mL/min/1.73      Globulin 3.2 gm/dL      A/G Ratio 1.2 g/dL      BUN/Creatinine Ratio 23.2     Anion Gap 8.0 mmol/L     Narrative:      GFR Normal >60  Chronic Kidney Disease <60  Kidney Failure <15      CBC Auto Differential [[id number]]  (Abnormal) Collected: 04/06/21 1053    Specimen: Blood Updated: 04/06/21 1100     WBC 9.96  "10*3/mm3      RBC 4.85 10*6/mm3      Hemoglobin 12.9 g/dL      Hematocrit 41.0 %      MCV 84.5 fL      MCH 26.6 pg      MCHC 31.5 g/dL      RDW 15.2 %      RDW-SD 46.1 fl      MPV 10.2 fL      Platelets 244 10*3/mm3      Neutrophil % 74.6 %      Lymphocyte % 15.0 %      Monocyte % 8.0 %      Eosinophil % 1.6 %      Basophil % 0.3 %      Immature Grans % 0.5 %      Neutrophils, Absolute 7.43 10*3/mm3      Lymphocytes, Absolute 1.49 10*3/mm3      Monocytes, Absolute 0.80 10*3/mm3      Eosinophils, Absolute 0.16 10*3/mm3      Basophils, Absolute 0.03 10*3/mm3      Immature Grans, Absolute 0.05 10*3/mm3      nRBC 0.0 /100 WBC           CT Abdomen Pelvis With Contrast   Final Result      CT Abdomen Pelvis Without Contrast   Final Result   1. There is a 5 cm round thick rimmed structure adjacent to the sigmoid   colon containing stool-like contents, suggestive of a contained   perforation. This could be due to diverticulitis. Malignant perforation   not excluded, but a discrete mass lesion is not identified.   2. Bilateral nonobstructing renal calculi. No hydronephrosis or ureteral   calculus.   3. Low-density lesion at the left mid kidney, incompletely characterized   on this exam, most commonly a renal cyst. Comparison with outside priors   could be useful. If none available, nonemergent renal ultrasound or CT   abdomen with contrast could be considered.   4. Calcified atherosclerosis.   5. Cholecystectomy clips.       Exam was tagged in PACS with \"incidental findings\" to assist in   appropriate follow up.    This report was finalized on 04/06/2021 10:24 by Dr Enriqueta Leigh MD.          ED Course  ED Course as of Apr 06 1719   Tue Apr 06, 2021   1122 Tell patient she does have blood in her urine but her plain CT did not reveal a stone coming down.  It did reveal a density in the area of the sigmoid colon consistent with a possible perforation so we will have to do a CT scan with contrast.    [TR]   4494 The patient's " "CT scan of her abdomen does reveal a stool-filled diverticulum that could be \"trapped\".  I spoke with Dr. Aguilar about this.  At the present time there is no need for surgery.  He suggested she be put on a round of antibiotics can follow-up with her doctor if it continues to bother her.  She does have blood in her urine but no stone submitted the stone is already passed.  She is not a great deal of pain right now.  She is discharged in stable condition.    [TR]      ED Course User Index  [TR] Braxton Cullen Jr., MD          MDM  Number of Diagnoses or Management Options  Diverticulosis: new and requires workup  Hematuria, unspecified type: new and requires workup     Amount and/or Complexity of Data Reviewed  Clinical lab tests: ordered and reviewed  Tests in the radiology section of CPT®: ordered and reviewed  Discuss the patient with other providers: yes    Risk of Complications, Morbidity, and/or Mortality  Presenting problems: moderate  Diagnostic procedures: moderate  Management options: moderate    Patient Progress  Patient progress: stable      Final diagnoses:   Diverticulosis   Hematuria, unspecified type          Braxton Cullen Jr., MD  04/06/21 4458    "

## 2021-07-15 NOTE — PROGRESS NOTES
Subjective    Ms. Hendrix is 71 y.o. female    Chief Complaint: New Patient / Urinary Incontinence     History of Present Illness  Patient presents with new problems of history of hematuria incontinence which is mixed both urge and stress incontinence.  She has been on oxybutynin but has not been effective.  Having some pelvic and vaginal pain also.  Denies any vaginal bleeding.  Urinalysis today was positive for red blood cells but otherwise negative bladder scan revealed 145 mL postvoid residual.    The following portions of the patient's history were reviewed and updated as appropriate: allergies, current medications, past family history, past medical history, past social history, past surgical history and problem list.    Review of Systems   Constitutional: Negative for chills and fever.   Gastrointestinal: Negative for abdominal pain, anal bleeding and blood in stool.   Genitourinary: Positive for pelvic pain, urgency and vaginal pain. Negative for dysuria, flank pain, frequency and hematuria.         Current Outpatient Medications:   •  acetaminophen (TYLENOL) 500 MG tablet, Take 500 mg by mouth Every 6 (Six) Hours As Needed for Mild Pain ., Disp: , Rfl:   •  albuterol (PROVENTIL HFA;VENTOLIN HFA) 108 (90 Base) MCG/ACT inhaler, Inhale 2 puffs Every 4 (Four) Hours As Needed for Wheezing., Disp: , Rfl:   •  amLODIPine (NORVASC) 10 MG tablet, Take 10 mg by mouth Daily., Disp: , Rfl:   •  atorvastatin (LIPITOR) 80 MG tablet, Take 80 mg by mouth Daily., Disp: , Rfl:   •  calcium carbonate (OS-DONNY) 600 MG tablet, Take 600 mg by mouth Daily. 2 qd, Disp: , Rfl:   •  Cholecalciferol (VITAMIN D3) 125 MCG (5000 UT) capsule capsule, Take 5,000 Units by mouth Daily., Disp: , Rfl:   •  coenzyme Q10 100 MG capsule, Take 200 mg by mouth Daily., Disp: , Rfl:   •  ezetimibe (ZETIA) 10 MG tablet, Take 10 mg by mouth Daily., Disp: , Rfl:   •  fluticasone (VERAMYST) 27.5 MCG/SPRAY nasal spray, 2 sprays into each nostril Daily.,  Disp: , Rfl:   •  folic acid ([employer]S FOLIC ACID) 800 MCG tablet, Take 800 mcg by mouth Daily., Disp: , Rfl:   •  Glucosamine-Chondroit-Vit C-Mn (GLUCOSAMINE 1500 COMPLEX PO), Take  by mouth., Disp: , Rfl:   •  hydrochlorothiazide (HYDRODIURIL) 25 MG tablet, Take 25 mg by mouth Daily., Disp: , Rfl:   •  levocetirizine (XYZAL) 5 MG tablet, Take 5 mg by mouth Every Evening., Disp: , Rfl:   •  levoFLOXacin (LEVAQUIN) 500 MG tablet, Take 1 tablet by mouth Daily., Disp: 10 tablet, Rfl: 0  •  metoprolol succinate XL (TOPROL-XL) 100 MG 24 hr tablet, Take 100 mg by mouth Daily., Disp: , Rfl:   •  montelukast (SINGULAIR) 10 MG tablet, Take 10 mg by mouth Every Night., Disp: , Rfl:   •  niacin (NIASPAN) 500 MG CR tablet, , Disp: , Rfl:   •  nitroglycerin (NITROSTAT) 0.4 MG SL tablet, Place 1 tablet under the tongue Every 5 (Five) Minutes As Needed for Chest Pain. Take no more than 3 doses in 15 minutes., Disp: 30 tablet, Rfl: 5  •  oxybutynin XL (DITROPAN-XL) 10 MG 24 hr tablet, , Disp: , Rfl:   •  potassium chloride (K-DUR,KLOR-CON) 20 MEQ CR tablet, Take 20 mEq by mouth 2 (Two) Times a Day., Disp: , Rfl:   •  Repatha SureClick solution auto-injector SureClick injection, INJECT 1 MILLILITER UNDER THE SKIN INTO THE APPROPRIATE AREA AS DIRECTED EVERY 14(FOURTEEN) DAYS. , Disp: 2 mL, Rfl: 11  •  rivaroxaban (Xarelto) 20 MG tablet, Take 1 tablet by mouth Daily., Disp: 90 tablet, Rfl: 3  •  vitamin A 80000 UNIT capsule, Take 10,000 Units by mouth Daily., Disp: , Rfl:     Past Medical History:   Diagnosis Date   • Asthma    • Atrial fibrillation (CMS/HCC)    • CAD in native artery    • Hyperlipidemia    • Hypertension    • MI, old        Past Surgical History:   Procedure Laterality Date   • BLADDER NECK SUSPENSION     • CARDIOVERSION     • CHOLECYSTECTOMY     • COLONOSCOPY W/ BIOPSIES     • CORONARY ANGIOPLASTY  08/13/2007    had stents 2003   • HYSTERECTOMY         Social History     Socioeconomic History   • Marital status: Single  "    Spouse name: Not on file   • Number of children: Not on file   • Years of education: Not on file   • Highest education level: Not on file   Tobacco Use   • Smoking status: Never Smoker   • Smokeless tobacco: Never Used   Vaping Use   • Vaping Use: Never used   Substance and Sexual Activity   • Alcohol use: Not Currently     Alcohol/week: 3.0 standard drinks     Types: 2 Glasses of wine, 1 Shots of liquor per week     Comment:  occasionally   • Drug use: Never   • Sexual activity: Defer       Family History   Problem Relation Age of Onset   • Dementia Mother    • Heart attack Sister    • Heart disease Sister    • Diverticulitis Sister    • Brain cancer Father    • Heart disease Maternal Grandmother    • Heart attack Maternal Grandmother    • Stroke Maternal Grandfather    • No Known Problems Paternal Grandmother    • Brain cancer Paternal Grandfather    • Breast cancer Sister        Objective    Temp 97.4 °F (36.3 °C) (Temporal)   Ht 162.6 cm (64\")   Wt 97.3 kg (214 lb 6.4 oz)   BMI 36.80 kg/m²     Physical Exam  Vitals reviewed.   Constitutional:       Appearance: Normal appearance.   HENT:      Head: Normocephalic and atraumatic.      Right Ear: External ear normal.      Left Ear: External ear normal.   Pulmonary:      Effort: Pulmonary effort is normal.   Abdominal:      Palpations: Abdomen is soft.      Tenderness: There is no right CVA tenderness or left CVA tenderness.   Genitourinary:     Comments: No obvious external abnormalities there is a small likely cystocele seen internally with speculum exam with straining.  Neurological:      General: No focal deficit present.      Mental Status: She is alert and oriented to person, place, and time.   Psychiatric:         Mood and Affect: Mood normal.         Behavior: Behavior normal.             Results for orders placed or performed in visit on 07/21/21   POC Urinalysis Dipstick, Multipro    Specimen: Urine   Result Value Ref Range    Color Yellow Yellow, " Straw, Dark Yellow, Beatriz    Clarity, UA Clear Clear    Glucose, UA Negative Negative, 1000 mg/dL (3+) mg/dL    Bilirubin Negative Negative    Ketones, UA Negative Negative    Specific Gravity  1.010 1.005 - 1.030    Blood, UA Large (A) Negative    pH, Urine 5.5 5.0 - 8.0    Protein, POC Negative Negative mg/dL    Urobilinogen, UA Normal Normal    Nitrite, UA Negative Negative    Leukocytes Negative Negative     Bladder Scan interpretation  Estimation of residual urine via abdominal ultrasound  Residual Urine: 136 ml  Indication: urinary incontinence   Position: Supine  Examination: Incremental scanning of the suprapubic area using 3 MHz transducer using copious amounts of acoustic gel.   Findings: An anechoic area was demonstrated which represented the bladder, with measurement of residual urine as noted. I inspected this myself. In that the residual urine was stable or insignificant, no treatment will be necessary at this time.         Assessment and Plan    Diagnoses and all orders for this visit:    1. Cystocele, midline (Primary)  -     POC Urinalysis Dipstick, Multipro  -     Ambulatory Referral to Gynecology    2. Hematuria, unspecified type    3. Urinary incontinence, unspecified type    4.  Vaginal pain    Regarding her hematuria I explained that we normally recommend cystoscopy and CT urogram for evaluation however patient at this point would like to hold off on this and recheck in approximately 3 months.    Possible small cystocele seen on pelvic exam will refer for vaginal pain to gynecology.    At this time no new medications for her incontinence she rather not take any medication at this time follow-up in 3 months to assess.

## 2021-07-21 ENCOUNTER — OFFICE VISIT (OUTPATIENT)
Dept: UROLOGY | Facility: CLINIC | Age: 71
End: 2021-07-21

## 2021-07-21 VITALS — BODY MASS INDEX: 36.6 KG/M2 | WEIGHT: 214.4 LBS | HEIGHT: 64 IN | TEMPERATURE: 97.4 F

## 2021-07-21 DIAGNOSIS — R32 URINARY INCONTINENCE, UNSPECIFIED TYPE: ICD-10-CM

## 2021-07-21 DIAGNOSIS — R10.2 VAGINAL PAIN: ICD-10-CM

## 2021-07-21 DIAGNOSIS — R31.9 HEMATURIA, UNSPECIFIED TYPE: ICD-10-CM

## 2021-07-21 DIAGNOSIS — N81.11 CYSTOCELE, MIDLINE: Primary | ICD-10-CM

## 2021-07-21 LAB
BILIRUB BLD-MCNC: NEGATIVE MG/DL
CLARITY, POC: CLEAR
COLOR UR: YELLOW
GLUCOSE UR STRIP-MCNC: NEGATIVE MG/DL
KETONES UR QL: NEGATIVE
LEUKOCYTE EST, POC: NEGATIVE
NITRITE UR-MCNC: NEGATIVE MG/ML
PH UR: 5.5 [PH] (ref 5–8)
PROT UR STRIP-MCNC: NEGATIVE MG/DL
RBC # UR STRIP: ABNORMAL /UL
SP GR UR: 1.01 (ref 1–1.03)
UROBILINOGEN UR QL: NORMAL

## 2021-07-21 PROCEDURE — 51798 US URINE CAPACITY MEASURE: CPT | Performed by: PHYSICIAN ASSISTANT

## 2021-07-21 PROCEDURE — 99203 OFFICE O/P NEW LOW 30 MIN: CPT | Performed by: PHYSICIAN ASSISTANT

## 2021-07-21 PROCEDURE — 81001 URINALYSIS AUTO W/SCOPE: CPT | Performed by: PHYSICIAN ASSISTANT

## 2021-07-21 RX ORDER — OXYBUTYNIN CHLORIDE 10 MG/1
TABLET, EXTENDED RELEASE ORAL
COMMUNITY
Start: 2021-07-07 | End: 2021-11-11

## 2021-08-05 ENCOUNTER — HOSPITAL ENCOUNTER (EMERGENCY)
Facility: HOSPITAL | Age: 71
Discharge: HOME | End: 2021-08-06
Attending: STUDENT IN AN ORGANIZED HEALTH CARE EDUCATION/TRAINING PROGRAM | Admitting: STUDENT IN AN ORGANIZED HEALTH CARE EDUCATION/TRAINING PROGRAM

## 2021-08-05 ENCOUNTER — APPOINTMENT (OUTPATIENT)
Dept: CT IMAGING | Facility: HOSPITAL | Age: 71
End: 2021-08-05

## 2021-08-05 VITALS
OXYGEN SATURATION: 95 % | DIASTOLIC BLOOD PRESSURE: 82 MMHG | HEIGHT: 64 IN | BODY MASS INDEX: 36.19 KG/M2 | HEART RATE: 71 BPM | WEIGHT: 212 LBS | RESPIRATION RATE: 20 BRPM | TEMPERATURE: 97.6 F | SYSTOLIC BLOOD PRESSURE: 153 MMHG

## 2021-08-05 DIAGNOSIS — R31.9 URINARY TRACT INFECTION WITH HEMATURIA, SITE UNSPECIFIED: ICD-10-CM

## 2021-08-05 DIAGNOSIS — N20.0 KIDNEY STONE: Primary | ICD-10-CM

## 2021-08-05 DIAGNOSIS — N39.0 URINARY TRACT INFECTION WITH HEMATURIA, SITE UNSPECIFIED: ICD-10-CM

## 2021-08-05 LAB
ALBUMIN SERPL-MCNC: 4.6 G/DL (ref 3.5–5.2)
ALBUMIN/GLOB SERPL: 1.6 G/DL
ALP SERPL-CCNC: 149 U/L (ref 39–117)
ALT SERPL W P-5'-P-CCNC: 15 U/L (ref 1–33)
ANION GAP SERPL CALCULATED.3IONS-SCNC: 14 MMOL/L (ref 5–15)
AST SERPL-CCNC: 19 U/L (ref 1–32)
BACTERIA UR QL AUTO: ABNORMAL /HPF
BASOPHILS # BLD AUTO: 0.03 10*3/MM3 (ref 0–0.2)
BASOPHILS NFR BLD AUTO: 0.2 % (ref 0–1.5)
BILIRUB SERPL-MCNC: 1.1 MG/DL (ref 0–1.2)
BILIRUB UR QL STRIP: NEGATIVE
BUN SERPL-MCNC: 25 MG/DL (ref 8–23)
BUN/CREAT SERPL: 30.9 (ref 7–25)
CALCIUM SPEC-SCNC: 10.1 MG/DL (ref 8.6–10.5)
CHLORIDE SERPL-SCNC: 104 MMOL/L (ref 98–107)
CLARITY UR: CLEAR
CO2 SERPL-SCNC: 21 MMOL/L (ref 22–29)
COLOR UR: YELLOW
CREAT SERPL-MCNC: 0.81 MG/DL (ref 0.57–1)
D-LACTATE SERPL-SCNC: 2 MMOL/L (ref 0.5–2)
DEPRECATED RDW RBC AUTO: 43.3 FL (ref 37–54)
EOSINOPHIL # BLD AUTO: 0.03 10*3/MM3 (ref 0–0.4)
EOSINOPHIL NFR BLD AUTO: 0.2 % (ref 0.3–6.2)
ERYTHROCYTE [DISTWIDTH] IN BLOOD BY AUTOMATED COUNT: 14.5 % (ref 12.3–15.4)
GFR SERPL CREATININE-BSD FRML MDRD: 70 ML/MIN/1.73
GLOBULIN UR ELPH-MCNC: 2.8 GM/DL
GLUCOSE SERPL-MCNC: 160 MG/DL (ref 65–99)
GLUCOSE UR STRIP-MCNC: NEGATIVE MG/DL
HCT VFR BLD AUTO: 40.9 % (ref 34–46.6)
HGB BLD-MCNC: 13.3 G/DL (ref 12–15.9)
HGB UR QL STRIP.AUTO: ABNORMAL
HOLD SPECIMEN: NORMAL
HOLD SPECIMEN: NORMAL
HYALINE CASTS UR QL AUTO: ABNORMAL /LPF
IMM GRANULOCYTES # BLD AUTO: 0.07 10*3/MM3 (ref 0–0.05)
IMM GRANULOCYTES NFR BLD AUTO: 0.4 % (ref 0–0.5)
KETONES UR QL STRIP: ABNORMAL
LEUKOCYTE ESTERASE UR QL STRIP.AUTO: ABNORMAL
LIPASE SERPL-CCNC: 26 U/L (ref 13–60)
LYMPHOCYTES # BLD AUTO: 1.45 10*3/MM3 (ref 0.7–3.1)
LYMPHOCYTES NFR BLD AUTO: 8.3 % (ref 19.6–45.3)
MCH RBC QN AUTO: 27 PG (ref 26.6–33)
MCHC RBC AUTO-ENTMCNC: 32.5 G/DL (ref 31.5–35.7)
MCV RBC AUTO: 83.1 FL (ref 79–97)
MONOCYTES # BLD AUTO: 0.77 10*3/MM3 (ref 0.1–0.9)
MONOCYTES NFR BLD AUTO: 4.4 % (ref 5–12)
NEUTROPHILS NFR BLD AUTO: 15.06 10*3/MM3 (ref 1.7–7)
NEUTROPHILS NFR BLD AUTO: 86.5 % (ref 42.7–76)
NITRITE UR QL STRIP: NEGATIVE
NRBC BLD AUTO-RTO: 0 /100 WBC (ref 0–0.2)
PH UR STRIP.AUTO: <=5 [PH] (ref 5–8)
PLATELET # BLD AUTO: 262 10*3/MM3 (ref 140–450)
PMV BLD AUTO: 10.5 FL (ref 6–12)
POTASSIUM SERPL-SCNC: 3.5 MMOL/L (ref 3.5–5.2)
PROT SERPL-MCNC: 7.4 G/DL (ref 6–8.5)
PROT UR QL STRIP: NEGATIVE
RBC # BLD AUTO: 4.92 10*6/MM3 (ref 3.77–5.28)
RBC # UR: ABNORMAL /HPF
REF LAB TEST METHOD: ABNORMAL
SODIUM SERPL-SCNC: 139 MMOL/L (ref 136–145)
SP GR UR STRIP: 1.02 (ref 1–1.03)
SQUAMOUS #/AREA URNS HPF: ABNORMAL /HPF
UROBILINOGEN UR QL STRIP: ABNORMAL
WBC # BLD AUTO: 17.41 10*3/MM3 (ref 3.4–10.8)
WBC UR QL AUTO: ABNORMAL /HPF
WHOLE BLOOD HOLD SPECIMEN: NORMAL

## 2021-08-05 PROCEDURE — 25010000002 MORPHINE PER 10 MG: Performed by: STUDENT IN AN ORGANIZED HEALTH CARE EDUCATION/TRAINING PROGRAM

## 2021-08-05 PROCEDURE — 96365 THER/PROPH/DIAG IV INF INIT: CPT

## 2021-08-05 PROCEDURE — 25010000002 KETOROLAC TROMETHAMINE PER 15 MG: Performed by: STUDENT IN AN ORGANIZED HEALTH CARE EDUCATION/TRAINING PROGRAM

## 2021-08-05 PROCEDURE — 87086 URINE CULTURE/COLONY COUNT: CPT | Performed by: STUDENT IN AN ORGANIZED HEALTH CARE EDUCATION/TRAINING PROGRAM

## 2021-08-05 PROCEDURE — 25010000002 ONDANSETRON PER 1 MG: Performed by: STUDENT IN AN ORGANIZED HEALTH CARE EDUCATION/TRAINING PROGRAM

## 2021-08-05 PROCEDURE — 81001 URINALYSIS AUTO W/SCOPE: CPT | Performed by: STUDENT IN AN ORGANIZED HEALTH CARE EDUCATION/TRAINING PROGRAM

## 2021-08-05 PROCEDURE — 25010000002 IOPAMIDOL 61 % SOLUTION: Performed by: STUDENT IN AN ORGANIZED HEALTH CARE EDUCATION/TRAINING PROGRAM

## 2021-08-05 PROCEDURE — 83605 ASSAY OF LACTIC ACID: CPT | Performed by: STUDENT IN AN ORGANIZED HEALTH CARE EDUCATION/TRAINING PROGRAM

## 2021-08-05 PROCEDURE — 83690 ASSAY OF LIPASE: CPT | Performed by: STUDENT IN AN ORGANIZED HEALTH CARE EDUCATION/TRAINING PROGRAM

## 2021-08-05 PROCEDURE — 96375 TX/PRO/DX INJ NEW DRUG ADDON: CPT

## 2021-08-05 PROCEDURE — 80053 COMPREHEN METABOLIC PANEL: CPT | Performed by: STUDENT IN AN ORGANIZED HEALTH CARE EDUCATION/TRAINING PROGRAM

## 2021-08-05 PROCEDURE — 74177 CT ABD & PELVIS W/CONTRAST: CPT

## 2021-08-05 PROCEDURE — 25010000002 CEFTRIAXONE PER 250 MG: Performed by: STUDENT IN AN ORGANIZED HEALTH CARE EDUCATION/TRAINING PROGRAM

## 2021-08-05 PROCEDURE — 99283 EMERGENCY DEPT VISIT LOW MDM: CPT

## 2021-08-05 PROCEDURE — 85025 COMPLETE CBC W/AUTO DIFF WBC: CPT | Performed by: STUDENT IN AN ORGANIZED HEALTH CARE EDUCATION/TRAINING PROGRAM

## 2021-08-05 PROCEDURE — 36415 COLL VENOUS BLD VENIPUNCTURE: CPT

## 2021-08-05 RX ORDER — TAMSULOSIN HYDROCHLORIDE 0.4 MG/1
1 CAPSULE ORAL NIGHTLY
Qty: 30 CAPSULE | Refills: 0 | Status: SHIPPED | OUTPATIENT
Start: 2021-08-05 | End: 2023-04-05

## 2021-08-05 RX ORDER — ONDANSETRON 2 MG/ML
4 INJECTION INTRAMUSCULAR; INTRAVENOUS ONCE
Status: COMPLETED | OUTPATIENT
Start: 2021-08-05 | End: 2021-08-05

## 2021-08-05 RX ORDER — TAMSULOSIN HYDROCHLORIDE 0.4 MG/1
0.4 CAPSULE ORAL DAILY
Status: DISCONTINUED | OUTPATIENT
Start: 2021-08-05 | End: 2021-08-06 | Stop reason: HOSPADM

## 2021-08-05 RX ORDER — CEFDINIR 300 MG/1
300 CAPSULE ORAL 2 TIMES DAILY
Qty: 14 CAPSULE | Refills: 0 | Status: SHIPPED | OUTPATIENT
Start: 2021-08-05 | End: 2021-08-12

## 2021-08-05 RX ORDER — KETOROLAC TROMETHAMINE 15 MG/ML
15 INJECTION, SOLUTION INTRAMUSCULAR; INTRAVENOUS ONCE
Status: COMPLETED | OUTPATIENT
Start: 2021-08-05 | End: 2021-08-05

## 2021-08-05 RX ORDER — SODIUM CHLORIDE 0.9 % (FLUSH) 0.9 %
10 SYRINGE (ML) INJECTION AS NEEDED
Status: DISCONTINUED | OUTPATIENT
Start: 2021-08-05 | End: 2021-08-06 | Stop reason: HOSPADM

## 2021-08-05 RX ADMIN — IOPAMIDOL 100 ML: 612 INJECTION, SOLUTION INTRAVENOUS at 21:52

## 2021-08-05 RX ADMIN — KETOROLAC TROMETHAMINE 15 MG: 15 INJECTION, SOLUTION INTRAMUSCULAR; INTRAVENOUS at 23:54

## 2021-08-05 RX ADMIN — TAMSULOSIN HYDROCHLORIDE 0.4 MG: 0.4 CAPSULE ORAL at 23:56

## 2021-08-05 RX ADMIN — SODIUM CHLORIDE, POTASSIUM CHLORIDE, SODIUM LACTATE AND CALCIUM CHLORIDE 1000 ML: 600; 310; 30; 20 INJECTION, SOLUTION INTRAVENOUS at 20:41

## 2021-08-05 RX ADMIN — ONDANSETRON 4 MG: 2 INJECTION INTRAMUSCULAR; INTRAVENOUS at 20:40

## 2021-08-05 RX ADMIN — MORPHINE SULFATE 4 MG: 4 INJECTION, SOLUTION INTRAMUSCULAR; INTRAVENOUS at 20:40

## 2021-08-05 RX ADMIN — CEFTRIAXONE 1 G: 1 INJECTION, POWDER, FOR SOLUTION INTRAMUSCULAR; INTRAVENOUS at 23:56

## 2021-08-06 LAB — BACTERIA SPEC AEROBE CULT: ABNORMAL

## 2021-08-06 NOTE — DISCHARGE INSTRUCTIONS
You were evaluated in the ER for abdominal pain. Your workup showed no indication at this time for admission to the hospital. Please use tylenol, ibuprofen, flomax, and antibiotics for symptomatic improvement. As we discussed, it is important that you follow up with the Urologist. Return for any fevers, worsening pain, or inability to pass the stone.    It is VERY IMPORTANT that you follow up (call them to set up an appointment) with your primary care doctor* within the next few days or as soon as possible so that you can be re-evaluated for improvement in your symptoms or for any other questions. If you were prescribed any medications, please take them as directed or call us back with any questions.     Return to the ER within 24-48 hours if you have any new symptoms, worsening symptoms, or any other concerns.    _____  *If you do not have a primary care doctor, follow up with one of the [facility] physician groups below to setup primary care.     If you have trouble making an appointment, please call the [facility] Nurse Line at [phone]    Dr. Cindy Grier DO, Dr. Marquise Hernandez DO, and KAREN Mcpherson  [facility]  [address] [place] [address], [address] [phone]    Dr. Sedrick Purvis MD  [facility] - [address] [address], [address], [place] [address] [phone]    Dr. Jamar Del Toro DO, Dr. Konstantin Goss DO,  KAREN Florentino, and KAREN Hairston  [facility] - [address] [address], [address], [place], [place] [id number] [phone]     Dr. Melita Ortez MD, and KAREN Mendoza  [facility] [address] [address], [place] [address] [phone]    Dr. Dominic Reese MD and Dr. Braxton Bonilla MD  [facility] [address],  [place], [address]  [phone]    Dr. Stu Maldonado MD  [facility]  [address] [place] [address], [facility], [place], [address] [phone]    Dr. Kraig Leslie MD  [address], [address] [phone]

## 2021-08-06 NOTE — ED PROVIDER NOTES
Subjective   Patient states that she had been having left lower quadrant abdominal pain that is constant does not radiate anywhere. States that she has never had this much pain before. States that she has a history of bladder prolapse. States that she has also noticed hematuria. States that has not had any diarrhea but has had some softer stools. Denies any chest pain, shortness of breath, dizziness, blood in stool emesis. States that she has some nausea.          Review of Systems   All other systems reviewed and are negative.      Past Medical History:   Diagnosis Date   • Asthma    • Atrial fibrillation (CMS/HCC)    • CAD in native artery    • Hyperlipidemia    • Hypertension    • MI, old        Allergies   Allergen Reactions   • Eliquis [Apixaban] GI Intolerance   • Erythromycin Rash   • Sulfa Antibiotics Rash       Past Surgical History:   Procedure Laterality Date   • BLADDER NECK SUSPENSION     • CARDIOVERSION     • CHOLECYSTECTOMY     • COLONOSCOPY W/ BIOPSIES     • CORONARY ANGIOPLASTY  08/13/2007    had stents 2003   • HYSTERECTOMY         Family History   Problem Relation Age of Onset   • Dementia Mother    • Heart attack Sister    • Heart disease Sister    • Diverticulitis Sister    • Brain cancer Father    • Heart disease Maternal Grandmother    • Heart attack Maternal Grandmother    • Stroke Maternal Grandfather    • No Known Problems Paternal Grandmother    • Brain cancer Paternal Grandfather    • Breast cancer Sister        Social History     Socioeconomic History   • Marital status: Single     Spouse name: Not on file   • Number of children: Not on file   • Years of education: Not on file   • Highest education level: Not on file   Tobacco Use   • Smoking status: Never Smoker   • Smokeless tobacco: Never Used   Vaping Use   • Vaping Use: Never used   Substance and Sexual Activity   • Alcohol use: Not Currently     Alcohol/week: 3.0 standard drinks     Types: 2 Glasses of wine, 1 Shots of liquor per  week     Comment:  occasionally   • Drug use: Never   • Sexual activity: Defer           Objective   Physical Exam  Vitals and nursing note reviewed.   Constitutional:       General: She is not in acute distress.     Appearance: Normal appearance. She is normal weight. She is not ill-appearing, toxic-appearing or diaphoretic.   HENT:      Head: Normocephalic and atraumatic.      Nose: Nose normal.      Mouth/Throat:      Mouth: Mucous membranes are moist.   Eyes:      Extraocular Movements: Extraocular movements intact.   Cardiovascular:      Rate and Rhythm: Normal rate and regular rhythm.      Pulses: Normal pulses.      Heart sounds: Normal heart sounds. No murmur heard.   No friction rub. No gallop.    Pulmonary:      Effort: Pulmonary effort is normal. No respiratory distress.      Breath sounds: Normal breath sounds. No stridor. No wheezing, rhonchi or rales.   Abdominal:      General: Abdomen is flat. Bowel sounds are normal. There is no distension.      Palpations: There is no mass.      Tenderness: There is abdominal tenderness in the suprapubic area and left lower quadrant. There is no guarding or rebound.      Hernia: No hernia is present.   Musculoskeletal:         General: No swelling or tenderness. Normal range of motion.      Cervical back: Normal range of motion and neck supple.   Skin:     General: Skin is warm and dry.      Capillary Refill: Capillary refill takes less than 2 seconds.      Coloration: Skin is not jaundiced or pale.      Findings: No bruising, erythema, lesion or rash.   Neurological:      General: No focal deficit present.      Mental Status: She is alert and oriented to person, place, and time.   Psychiatric:         Mood and Affect: Mood normal.         Behavior: Behavior normal.         Thought Content: Thought content normal.         Judgment: Judgment normal.         Procedures           ED Course  ED Course as of Aug 08 2022   Thu Aug 05, 2021   233 Discussed the patient CT  scan findings with the urologist on-call Dr. Negro who recommended outpatient follow-up and expulsion therapy with Flomax.  He recommended having her follow-up in clinic in few days.  I went over the results of the CAT scan with the patient and her son who were relieved that it was a kidney stone and not diverticulitis.  They are okay with following up with the urologist in a few days and taking Toradol and Flomax at home.  Patient says that she feels a lot better since being in the ER.    [NP]      ED Course User Index  [NP] Jorje De Jesus MD                                           MDM  Number of Diagnoses or Management Options  Kidney stone: established and improving  Urinary tract infection with hematuria, site unspecified: established and improving  Diagnosis management comments: This is a 71yoF presenting with abdominal pain. Patient arrived hemodynamically stable and was afebrile. Abdominal exam without peritoneal signs. No evidence of acute abodmen at this time. Differentials include, but are not limited to constipation, pancreatitis, bowel obstruction, diverticulitis, vascular catastrophe. Presentation not consistent with other acute, emergent causes of abdominal pain at this time.     Plan to obtain cbc, cmp, lipase, ua, CT AP w/ contrast, control pain, and reassess.     The imaging and other workup was reviewed and found to have evidence of a kidney stone at the UVJ on the left side as well as cystitis. I discussed this case with the Urologist on call who recommended outpatient follow up with Flomax and antibiotics.    I reassessed the patient and discussed the findings of the imaging studies and lab work. Patient and her son both expressed understanding of the workup and findings. I explained my impression of the workup to the patient and addressed all patient questions regarding the emergency department evaluation, diagnosis, and treatment plan.     Return precautions were given to the patient.  Patient was also instructed to return to the emergency department within 24 - 48hrs for any new, worsening, or concerning symptoms. I told the patient that it is VERY IMPORTANT that they follow up (call them to set up an appointment) with their primary care doctor within the next few days or as soon as possible so that they can be re-evaluated for improvement in their symptoms or for any other questions. The patient verbalized understanding of these instructions.     The patient was discharged in stable condition.         Amount and/or Complexity of Data Reviewed  Clinical lab tests: ordered and reviewed  Tests in the radiology section of CPT®: ordered and reviewed    Risk of Complications, Morbidity, and/or Mortality  Presenting problems: moderate  Diagnostic procedures: low  Management options: low        Final diagnoses:   Kidney stone   Urinary tract infection with hematuria, site unspecified       ED Disposition  ED Disposition     ED Disposition Condition Comment    Discharge Stable           Nasir Almeida MD  [address] [place] [address] [id number] [phone]    Call in 1 day  As needed, If symptoms worsen    Brooks Negro MD  [address] [id number] [phone]    Call today  As needed, If symptoms worsen         Medication List      New Prescriptions    cefdinir 300 MG capsule  Commonly known as: OMNICEF  Take 1 capsule by mouth 2 (Two) Times a Day for 7 days.     tamsulosin 0.4 MG capsule 24 hr capsule  Commonly known as: FLOMAX  Take 1 capsule by mouth Every Night.           Where to Get Your Medications      These medications were sent to [facility] [place], [place] - [address] [place] [address] - [phone] [facility] [phone] [address] [address]    Phone: [phone]   · cefdinir 300 MG capsule  · tamsulosin 0.4 MG capsule 24 hr capsule          Jorje De Jesus MD  08/08/21 2023

## 2021-08-06 NOTE — ED NOTES
"Pt presents CC of LLQ abdominal pain that began approximately 1600 this afternoon, 8/5/21. The pt states the pain is constant and denies any medications PTA. Pt states \"My bladder is blocked so I always have pain for that.\"      Grace Mackey, RN  08/05/21 1949       Grace Mackey RN  08/05/21 1950    "

## 2021-08-12 ENCOUNTER — OFFICE VISIT (OUTPATIENT)
Dept: OBSTETRICS AND GYNECOLOGY | Facility: CLINIC | Age: 71
End: 2021-08-12

## 2021-08-12 VITALS — WEIGHT: 209 LBS | HEIGHT: 64 IN | BODY MASS INDEX: 35.68 KG/M2

## 2021-08-12 DIAGNOSIS — N39.3 SUI (STRESS URINARY INCONTINENCE, FEMALE): ICD-10-CM

## 2021-08-12 DIAGNOSIS — N39.41 URGE INCONTINENCE: ICD-10-CM

## 2021-08-12 DIAGNOSIS — N81.11 CYSTOCELE, MIDLINE: Primary | ICD-10-CM

## 2021-08-12 DIAGNOSIS — Z80.3 FAMILY HISTORY OF BREAST CANCER: ICD-10-CM

## 2021-08-12 DIAGNOSIS — E66.9 OBESITY, CLASS II, BMI 35-39.9: ICD-10-CM

## 2021-08-12 PROCEDURE — 99213 OFFICE O/P EST LOW 20 MIN: CPT | Performed by: NURSE PRACTITIONER

## 2021-08-25 ENCOUNTER — TELEPHONE (OUTPATIENT)
Dept: CARDIOLOGY | Facility: CLINIC | Age: 71
End: 2021-08-25

## 2021-08-25 DIAGNOSIS — E78.2 MIXED HYPERLIPIDEMIA: Primary | ICD-10-CM

## 2021-08-25 NOTE — TELEPHONE ENCOUNTER
Pt called stating that she went to get her Repatha refilled and was told that the medication needed a PA again.  I told her I had done one and it was denied because she has not had lab done in over a year and they needed a recent lab to be able to approve it.  She said she can get it done Friday at [facility] [place] but will need the order faxed.  I have put in a order for a Lipid to Dr. Diallo.  I will fax it as soon as it is signed.  True Mota, CMA

## 2021-08-30 NOTE — PROGRESS NOTES
Christiana Hendrix is a 71 y.o. No obstetric history on file.     Chief Complaint   Patient presents with   • Gynecologic Exam     Cystocele midline           HPI - Patient is in today for evaluation of possible pelvic floor prolapse .  She was in the ER about a week ago for a kidney stone.  She has had a bladder neck suspension. Christiana sees Raymond MEEHAN.  She is taking Oxybutynin 10 XL but is not helping her incontinence which was described as mixed, DEJA and urge.  Her recent bladder scan post void showed a stable amount of residual urine of 136 mm at urology. Her urology notes reports. mild cystocele        The following portions of the patient's history were reviewed and updated as appropriate:vital signs, allergies, current medications, past family history, past medical history, past social history, past surgical history and problem list.      Current Outpatient Medications:   •  acetaminophen (TYLENOL) 500 MG tablet, Take 500 mg by mouth Every 6 (Six) Hours As Needed for Mild Pain ., Disp: , Rfl:   •  albuterol (PROVENTIL HFA;VENTOLIN HFA) 108 (90 Base) MCG/ACT inhaler, Inhale 2 puffs Every 4 (Four) Hours As Needed for Wheezing., Disp: , Rfl:   •  amLODIPine (NORVASC) 10 MG tablet, Take 10 mg by mouth Daily., Disp: , Rfl:   •  atorvastatin (LIPITOR) 80 MG tablet, Take 80 mg by mouth Daily., Disp: , Rfl:   •  calcium carbonate (OS-DONNY) 600 MG tablet, Take 600 mg by mouth Daily. 2 qd, Disp: , Rfl:   •  Cholecalciferol (VITAMIN D3) 125 MCG (5000 UT) capsule capsule, Take 5,000 Units by mouth Daily., Disp: , Rfl:   •  coenzyme Q10 100 MG capsule, Take 200 mg by mouth Daily., Disp: , Rfl:   •  ezetimibe (ZETIA) 10 MG tablet, Take 10 mg by mouth Daily., Disp: , Rfl:   •  fluticasone (VERAMYST) 27.5 MCG/SPRAY nasal spray, 2 sprays into each nostril Daily., Disp: , Rfl:   •  folic acid ([employer] FOLIC ACID) 800 MCG tablet, Take 800 mcg by mouth Daily., Disp: , Rfl:   •  Glucosamine-Chondroit-Vit C-Mn (GLUCOSAMINE  1500 COMPLEX PO), Take  by mouth., Disp: , Rfl:   •  hydrochlorothiazide (HYDRODIURIL) 25 MG tablet, Take 25 mg by mouth Daily., Disp: , Rfl:   •  levocetirizine (XYZAL) 5 MG tablet, Take 5 mg by mouth Every Evening., Disp: , Rfl:   •  levoFLOXacin (LEVAQUIN) 500 MG tablet, Take 1 tablet by mouth Daily., Disp: 10 tablet, Rfl: 0  •  metoprolol succinate XL (TOPROL-XL) 100 MG 24 hr tablet, Take 100 mg by mouth Daily., Disp: , Rfl:   •  montelukast (SINGULAIR) 10 MG tablet, Take 10 mg by mouth Every Night., Disp: , Rfl:   •  niacin (NIASPAN) 500 MG CR tablet, , Disp: , Rfl:   •  nitroglycerin (NITROSTAT) 0.4 MG SL tablet, Place 1 tablet under the tongue Every 5 (Five) Minutes As Needed for Chest Pain. Take no more than 3 doses in 15 minutes., Disp: 30 tablet, Rfl: 5  •  oxybutynin XL (DITROPAN-XL) 10 MG 24 hr tablet, , Disp: , Rfl:   •  potassium chloride (K-DUR,KLOR-CON) 20 MEQ CR tablet, Take 20 mEq by mouth 2 (Two) Times a Day., Disp: , Rfl:   •  Repatha SureClick solution auto-injector SureClick injection, INJECT 1 MILLILITER UNDER THE SKIN INTO THE APPROPRIATE AREA AS DIRECTED EVERY 14(FOURTEEN) DAYS. , Disp: 2 mL, Rfl: 11  •  rivaroxaban (Xarelto) 20 MG tablet, Take 1 tablet by mouth Daily., Disp: 90 tablet, Rfl: 3  •  tamsulosin (FLOMAX) 0.4 MG capsule 24 hr capsule, Take 1 capsule by mouth Every Night., Disp: 30 capsule, Rfl: 0  •  vitamin A 65291 UNIT capsule, Take 10,000 Units by mouth Daily., Disp: , Rfl:     Review of Systems   Constitutional: Negative for chills and fever.   HENT: Negative for congestion, ear pain, sneezing, sore throat and tinnitus.    Eyes: Negative for blurred vision, redness, itching and visual disturbance.   Respiratory: Negative for apnea, choking and shortness of breath.    Cardiovascular: Negative for chest pain and palpitations.        History of heart disease, A fib, elevated cholesterol  CAD   Gastrointestinal: Negative for abdominal distention, anal bleeding, nausea and  "vomiting.   Endocrine: Negative for cold intolerance, polydipsia and polyuria.   Genitourinary: Negative for breast pain, decreased urine volume, flank pain, pelvic pain, vaginal bleeding and vaginal pain.   Musculoskeletal: Negative for gait problem and neck pain.   Skin: Negative for color change and pallor.   Neurological: Negative for dizziness, tremors, seizures, speech difficulty, light-headedness and memory problem.   Psychiatric/Behavioral: Negative for behavioral problems, self-injury and suicidal ideas.         Objective      Ht 162.6 cm (64\")   Wt 94.8 kg (209 lb)   Breastfeeding No   BMI 35.87 kg/m²       Physical Exam  Vitals and nursing note reviewed. Exam conducted with a chaperone present.   Constitutional:       General: She is not in acute distress.     Appearance: Normal appearance. She is well-developed.   HENT:      Head: Normocephalic and atraumatic.   Eyes:      General: No scleral icterus.        Right eye: No discharge.         Left eye: No discharge.   Pulmonary:      Effort: Pulmonary effort is normal.   Abdominal:      Palpations: Abdomen is soft. There is no mass.      Tenderness: There is no abdominal tenderness.      Hernia: No hernia is present. There is no hernia in the left inguinal area or right inguinal area.   Genitourinary:     Exam position: Lithotomy position.      Labia:         Right: No rash, tenderness or lesion.         Left: No rash, tenderness or lesion.       Vagina: No signs of injury. Erythema and prolapsed vaginal walls (mild cystocele) present. No vaginal discharge, tenderness or bleeding.      Uterus: Absent.       Adnexa:         Right: No mass, tenderness or fullness.          Left: No mass, tenderness or fullness.     Musculoskeletal:      Cervical back: No tenderness.   Neurological:      General: No focal deficit present.      Mental Status: She is oriented to person, place, and time.   Psychiatric:         Mood and Affect: Mood normal.         Behavior: " Behavior normal.          Assessment/Plan     Diagnoses and all orders for this visit:    1. Cystocele, midline (Primary)  Comments:  mild cystocele, discussed option of trying a pessary    2. Family history of breast cancer  -     Ambulatory Referral to Genetic Counseling/Testing - [facility]    3. DEJA (stress urinary incontinence, female)  Comments:  History of bladder neck suspension  Counseled re: Rob    4. Urge incontinence  Comments:  Oxybutynin 19 XL is not working well.  Patient recently seen for kidney stone, she had followup and bladder scan with stable residual postvoid urine of 136 ml.    5. Obesity, Class II, BMI 35-39.9  Comments:  morbid obesity  Patient is counseled that her BMI is outside the desired parameters and she should decrease carbs and calories.            Comments:  History of bladder neck   Patient recently seen for kidney stone, she had followup and bladder scan with stable residual post void urine of 136 ml.  Counseled re: types of incontinence, causes and treatment options.      Patient is counseled re: BSE, diet, exercise, mammogram, calcium and Vit. D3      Mammogram is current.                Dulce Fernando, APRN  8/30/2021

## 2021-09-09 ENCOUNTER — APPOINTMENT (OUTPATIENT)
Dept: ONCOLOGY | Facility: HOSPITAL | Age: 71
End: 2021-09-09

## 2021-09-09 ENCOUNTER — DOCUMENTATION (OUTPATIENT)
Dept: GENETICS | Facility: HOSPITAL | Age: 71
End: 2021-09-09

## 2021-09-09 NOTE — PROGRESS NOTES
Christiana Hendrix, a 71-year-old female, was referred to genetic counseling due to a family history of breast cancer.  Ms. Hendrix was 13 years old at menarche and had her first child at age 27.  She is postmenopausal and had a MICHELLE-BSO at age 48. Ms. Hendrix has annual mammograms and reports that she has had two benign breast biopsies.  She has had several colonoscopies and reports that some flat polyps have been identified in the past.  Ms. Hendrix was interested in discussing genetic testing recommendations and appropriate management.  Ms. Hendrix opted to have a pre-authorization for genetic testing submitted before making a decision regarding proceeding with testing.  A pre-auth was submitted today through [employer], and Ms. Hendrix will be contacted by telephone once we have received a response.    PERTINENT FAMILY HISTORY: (See attached pedigree)   Sister:   Breast cancer, 39  Father:   Brain cancer, 69  Pat. Grandfather: Brain cancer, 50s  Pat. Aunt:  Breast cancer, 45  Pat. Great-Aunt: GYN cancer - possibly ovarian, 40s  Pat. Grandmother: suspected colon cancer, 99  Mat. Grandmother: Colon cancer, 82  Mat. 1st cousin: Leukemia, 45    We do not currently have records regarding any of these diagnoses.      RISK ASSESSMENT:  Ms. Hendrix’s family history of early onset breast cancer raised the question of a hereditary cancer syndrome.  Ms. Hendrix does meet NCCN guidelines criteria for BRCA1/2 genetic testing based on her family history.  We discussed that BRCA testing is typically completed through a multigene panel, also evaluating other genes associated with hereditary risk for breast cancer and other cancers.   Ms. Hendrix is considering pursuing genetic testing and a pre-auth was submitted today.    If testing were not completed, or it was completed and the results were negative, Ms. Hendrix’s management should be guided by a family history-based risk assessment. Latisha-Rosemary, version 8 is able to take into account  personal factors (age at menarche, age at first birth, etc) and family history when calculating risk for breast cancer.  Computer modeling estimates that Ms. Hendrix’s lifetime personal risk for developing breast cancer is 8.6% (Rodrigo, v8), in comparison to the average 70-year-old woman’s risk of 4.3%. Per NCCN guidelines, a lifetime risk above 20% is considered to be “high risk” where increased screening is warranted; Ms. Contrerass risk does not fall into that category. This risk assessment is based on the family history information provided at the time of the appointment and could change in the future should new information be obtained.    GENETIC COUNSELING:  We reviewed the family history information in detail. Cases of cancer follow three general patterns: sporadic, familial, and hereditary.  While most breast cancer is sporadic (75-80%), some cases appear to occur in family clusters.  These cases are said to be familial and account for 10-20% of cancer cases.  Familial cases may be due to a combination of shared genes and environmental factors among family members.  In even fewer cases (5-10%), the risk to develop cancer is inherited, and the genes responsible for the cancer are known.      Family histories typical of hereditary cancer syndromes usually include multiple first- and second-degree relatives diagnosed with cancer types that define a syndrome. These cases tend to be diagnosed at younger-than-expected ages and can be bilateral or multifocal. The cancer in these families follows an autosomal dominant inheritance pattern, which indicates the likely presence of a mutation in a cancer susceptibility gene. Children and siblings of an individual known to have a mutation have a 50% chance of inheriting that mutation, thereby inheriting the increased risk to develop cancer. These mutations can be passed down from the maternal or the paternal lineage.    Hereditary breast cancer accounts for 5-10% of  all cases of breast cancer.  A significant proportion of hereditary breast and ovarian cancer can be attributed to mutations in the BRCA1 and BRCA2 genes.  The cancer in these families follows an autosomal dominant pattern of inheritance.  Mutations in these genes confer an increased risk for breast cancer, ovarian cancer, male breast cancer, prostate cancer, and pancreatic cancer. Women with a BRCA1 or BRCA2 mutation have up to an 87% lifetime risk of breast cancer and up to a 44% risk of ovarian cancer. We discussed the management guidelines established for BRCA mutation carriers including increased screening starting at age 25 with breast MRI, as well as surgical risk reduction options.  There are other genes that can cause a hereditary risk for breast cancer and other cancers, and we discussed the availability of multigene panels which allow for testing of BRCA1/2 and a number of other cancer susceptibility genes simultaneously. Genes included on these panels have varying degrees of risk associated and management guidelines based on the degree of risk.      GENETIC TESTING:  The risks, benefits and limitations of genetic testing and implications for clinical management following testing were reviewed.  DNA test results can influence decisions regarding screening and prevention.  Genetic testing can have significant psychological implications for both individuals and families. Also discussed was the possibility of employment and insurance discrimination based on genetic test results and the law in place to prevent this (JOSE E), as well as the limitations of this law (lack of protection from life insurance discrimination).      We discussed panel testing, which would involve testing 36 genes associated with hereditary cancer risk. The benefits and limitations of genetic testing were discussed. The implications of a positive or negative test result were discussed. We discussed the possibility that, in some cases,  genetic test results may be ambiguous due to the identification of a genetic variant of uncertain significance. These variants may or may not be associated with an increased cancer risk. With multigene panel testing, it is not uncommon for a variant of uncertain significance (VUS) to be identified given the number of genes being analyzed and the presence of genetic variation in the population.  The laboratories that perform genetic testing work to reclassify the VUS and send out an amended report if and when a VUS is reclassified.  The majority (>90%) of VUS findings are ultimately reclassified to a negative result.     CLINICAL MANAGEMENT GUIDELINES: Options available to individuals with an elevated lifetime risk for breast and/or ovarian cancer were briefly discussed.  Based on computer modeling, Ms. Hendrix’s lifetime risk for breast cancer would not be considered “high risk” per NCCN guidelines.  General population screening guidelines include annual clinical breast exam and annual mammogram.  This assessment is based on the information provided at the time of the consultation. These recommendations could change if Ms. Hendrix were to pursue genetic testing and test positive for a genetic mutation.     PLAN: Ms. Hendrix plans requested we initiate a pre-authorization to determine out of pocket costs before proceeding with genetic testing.  We will contact her as soon as a determination has been received.  Genetic counseling remains available to Ms. Hendrix. She is welcome to contact us if she has any questions, concerns, or updates to the family history.    Glenna Roblero MS, [facility]  Licensed Certified Genetic Counselor

## 2021-09-10 ENCOUNTER — TELEPHONE (OUTPATIENT)
Dept: CARDIOLOGY | Facility: CLINIC | Age: 71
End: 2021-09-10

## 2021-09-10 NOTE — TELEPHONE ENCOUNTER
TORI TONNY (Carr: [id number])  Rx #: [id number]  Repatha SureClick 140MG/ML auto-injectors     Form  [employer] Electronic Prior Authorization Form (2017 NCPDP)  Created  8 days ago  Sent to Plan  2 days ago  Plan Response  2 days ago  Submit Clinical Questions  2 days ago  Determination  Favorable    Pt approved.  Pt informed.  True Mota, CMA

## 2021-09-20 ENCOUNTER — DOCUMENTATION (OUTPATIENT)
Dept: ONCOLOGY | Facility: HOSPITAL | Age: 71
End: 2021-09-20

## 2021-09-20 NOTE — PROGRESS NOTES
Glenna Roblero asked me to contact Ms. Hendrix and let her know that her out of pocket costs for genetic testing for hereditary cancer was estimated at $0 with her current insurance plan of [employer].  I left a voicemail with my contact information if the patient had any questions regarding this or would like to set up an appointment for testing.

## 2021-09-27 ENCOUNTER — APPOINTMENT (OUTPATIENT)
Dept: CARDIOLOGY | Facility: HOSPITAL | Age: 71
End: 2021-09-27

## 2021-09-27 ENCOUNTER — ANESTHESIA (OUTPATIENT)
Dept: CARDIOLOGY | Facility: HOSPITAL | Age: 71
End: 2021-09-27

## 2021-09-27 ENCOUNTER — ANESTHESIA EVENT (OUTPATIENT)
Dept: CARDIOLOGY | Facility: HOSPITAL | Age: 71
End: 2021-09-27

## 2021-09-27 ENCOUNTER — HOSPITAL ENCOUNTER (EMERGENCY)
Facility: HOSPITAL | Age: 71
Discharge: HOME | End: 2021-09-27
Attending: EMERGENCY MEDICINE | Admitting: EMERGENCY MEDICINE

## 2021-09-27 VITALS — OXYGEN SATURATION: 95 % | SYSTOLIC BLOOD PRESSURE: 163 MMHG | HEART RATE: 95 BPM | DIASTOLIC BLOOD PRESSURE: 91 MMHG

## 2021-09-27 VITALS
DIASTOLIC BLOOD PRESSURE: 73 MMHG | HEART RATE: 81 BPM | RESPIRATION RATE: 21 BRPM | OXYGEN SATURATION: 93 % | BODY MASS INDEX: 35.51 KG/M2 | HEIGHT: 64 IN | TEMPERATURE: 98 F | SYSTOLIC BLOOD PRESSURE: 157 MMHG | WEIGHT: 208 LBS

## 2021-09-27 DIAGNOSIS — I48.91 ATRIAL FIBRILLATION WITH RVR (HCC): Primary | ICD-10-CM

## 2021-09-27 LAB
ALBUMIN SERPL-MCNC: 4.2 G/DL (ref 3.5–5.2)
ALBUMIN/GLOB SERPL: 1.3 G/DL
ALP SERPL-CCNC: 154 U/L (ref 39–117)
ALT SERPL W P-5'-P-CCNC: 14 U/L (ref 1–33)
ANION GAP SERPL CALCULATED.3IONS-SCNC: 11 MMOL/L (ref 5–15)
APTT PPP: 36.4 SECONDS (ref 24.1–35)
AST SERPL-CCNC: 17 U/L (ref 1–32)
BASOPHILS # BLD AUTO: 0.02 10*3/MM3 (ref 0–0.2)
BASOPHILS NFR BLD AUTO: 0.2 % (ref 0–1.5)
BILIRUB SERPL-MCNC: 1.3 MG/DL (ref 0–1.2)
BUN SERPL-MCNC: 16 MG/DL (ref 8–23)
BUN/CREAT SERPL: 23.9 (ref 7–25)
CALCIUM SPEC-SCNC: 9.5 MG/DL (ref 8.6–10.5)
CHLORIDE SERPL-SCNC: 105 MMOL/L (ref 98–107)
CO2 SERPL-SCNC: 26 MMOL/L (ref 22–29)
CREAT SERPL-MCNC: 0.67 MG/DL (ref 0.57–1)
DEPRECATED RDW RBC AUTO: 45.8 FL (ref 37–54)
EOSINOPHIL # BLD AUTO: 0.16 10*3/MM3 (ref 0–0.4)
EOSINOPHIL NFR BLD AUTO: 1.5 % (ref 0.3–6.2)
ERYTHROCYTE [DISTWIDTH] IN BLOOD BY AUTOMATED COUNT: 15 % (ref 12.3–15.4)
GFR SERPL CREATININE-BSD FRML MDRD: 87 ML/MIN/1.73
GLOBULIN UR ELPH-MCNC: 3.2 GM/DL
GLUCOSE SERPL-MCNC: 132 MG/DL (ref 65–99)
HCT VFR BLD AUTO: 43.6 % (ref 34–46.6)
HGB BLD-MCNC: 13.6 G/DL (ref 12–15.9)
HOLD SPECIMEN: NORMAL
IMM GRANULOCYTES # BLD AUTO: 0.04 10*3/MM3 (ref 0–0.05)
IMM GRANULOCYTES NFR BLD AUTO: 0.4 % (ref 0–0.5)
INR PPP: 1.98 (ref 0.91–1.09)
LYMPHOCYTES # BLD AUTO: 1.42 10*3/MM3 (ref 0.7–3.1)
LYMPHOCYTES NFR BLD AUTO: 13.3 % (ref 19.6–45.3)
MAXIMAL PREDICTED HEART RATE: 150 BPM
MCH RBC QN AUTO: 26.4 PG (ref 26.6–33)
MCHC RBC AUTO-ENTMCNC: 31.2 G/DL (ref 31.5–35.7)
MCV RBC AUTO: 84.5 FL (ref 79–97)
MONOCYTES # BLD AUTO: 0.82 10*3/MM3 (ref 0.1–0.9)
MONOCYTES NFR BLD AUTO: 7.7 % (ref 5–12)
NEUTROPHILS NFR BLD AUTO: 76.9 % (ref 42.7–76)
NEUTROPHILS NFR BLD AUTO: 8.25 10*3/MM3 (ref 1.7–7)
NRBC BLD AUTO-RTO: 0 /100 WBC (ref 0–0.2)
PLATELET # BLD AUTO: 264 10*3/MM3 (ref 140–450)
PMV BLD AUTO: 11.2 FL (ref 6–12)
POTASSIUM SERPL-SCNC: 3.4 MMOL/L (ref 3.5–5.2)
PROT SERPL-MCNC: 7.4 G/DL (ref 6–8.5)
PROTHROMBIN TIME: 21.7 SECONDS (ref 11.9–14.6)
RBC # BLD AUTO: 5.16 10*6/MM3 (ref 3.77–5.28)
SARS-COV-2 RNA PNL SPEC NAA+PROBE: NOT DETECTED
SODIUM SERPL-SCNC: 142 MMOL/L (ref 136–145)
STRESS TARGET HR: 128 BPM
WBC # BLD AUTO: 10.71 10*3/MM3 (ref 3.4–10.8)
WHOLE BLOOD HOLD SPECIMEN: NORMAL
WHOLE BLOOD HOLD SPECIMEN: NORMAL

## 2021-09-27 PROCEDURE — 87635 SARS-COV-2 COVID-19 AMP PRB: CPT | Performed by: EMERGENCY MEDICINE

## 2021-09-27 PROCEDURE — 92960 CARDIOVERSION ELECTRIC EXT: CPT | Performed by: INTERNAL MEDICINE

## 2021-09-27 PROCEDURE — 93005 ELECTROCARDIOGRAM TRACING: CPT

## 2021-09-27 PROCEDURE — 80053 COMPREHEN METABOLIC PANEL: CPT | Performed by: EMERGENCY MEDICINE

## 2021-09-27 PROCEDURE — 99283 EMERGENCY DEPT VISIT LOW MDM: CPT

## 2021-09-27 PROCEDURE — 85610 PROTHROMBIN TIME: CPT | Performed by: EMERGENCY MEDICINE

## 2021-09-27 PROCEDURE — 85730 THROMBOPLASTIN TIME PARTIAL: CPT | Performed by: EMERGENCY MEDICINE

## 2021-09-27 PROCEDURE — 25010000002 PROPOFOL 10 MG/ML EMULSION: Performed by: NURSE ANESTHETIST, CERTIFIED REGISTERED

## 2021-09-27 PROCEDURE — 93010 ELECTROCARDIOGRAM REPORT: CPT | Performed by: INTERNAL MEDICINE

## 2021-09-27 PROCEDURE — 85025 COMPLETE CBC W/AUTO DIFF WBC: CPT | Performed by: EMERGENCY MEDICINE

## 2021-09-27 PROCEDURE — 99235 HOSP IP/OBS SAME DATE MOD 70: CPT | Performed by: INTERNAL MEDICINE

## 2021-09-27 PROCEDURE — 92960 CARDIOVERSION ELECTRIC EXT: CPT

## 2021-09-27 RX ORDER — PROPOFOL 10 MG/ML
VIAL (ML) INTRAVENOUS AS NEEDED
Status: DISCONTINUED | OUTPATIENT
Start: 2021-09-27 | End: 2021-09-27 | Stop reason: SURG

## 2021-09-27 RX ORDER — SODIUM CHLORIDE 9 MG/ML
INJECTION, SOLUTION INTRAVENOUS
Status: COMPLETED | OUTPATIENT
Start: 2021-09-27 | End: 2021-09-27

## 2021-09-27 RX ADMIN — PROPOFOL 40 MG: 10 INJECTION, EMULSION INTRAVENOUS at 11:14

## 2021-09-27 RX ADMIN — SODIUM CHLORIDE: 9 INJECTION, SOLUTION INTRAVENOUS at 11:08

## 2021-09-27 RX ADMIN — SODIUM CHLORIDE 75 ML/HR: 9 INJECTION, SOLUTION INTRAVENOUS at 11:15

## 2021-09-28 ENCOUNTER — TELEPHONE (OUTPATIENT)
Dept: CARDIOLOGY | Facility: CLINIC | Age: 71
End: 2021-09-28

## 2021-09-28 NOTE — TELEPHONE ENCOUNTER
Pt called left a  msg stating that she was in the ER yesterday and was cardioverted.  She now needs a work excuse to be off work for post CV.  How long does she need to be off work?  True Mota, CMA

## 2021-09-29 LAB
QT INTERVAL: 304 MS
QTC INTERVAL: 473 MS

## 2021-09-29 NOTE — TELEPHONE ENCOUNTER
She doesn't need to be off work. She is fine to go back. Only needs to be off the day of cardioversion.     Message text  From KAREN Amos     I called pt to find out where to send this.  She wants an excuse for the whole week because she has a follow up with Dr. Fernando on Thursday and did not want to take off again.  I have faxed a letter to [facility] lab to Leeanne at [phone]. True Mota, CMA

## 2021-10-13 ENCOUNTER — DOCUMENTATION (OUTPATIENT)
Dept: GENETICS | Facility: HOSPITAL | Age: 71
End: 2021-10-13

## 2021-10-13 NOTE — PROGRESS NOTES
Christiana Hendrix, a 71-year-old female, was referred to genetic counseling due to a family history of breast cancer.  Ms. Hendrix was 13 years old at menarche and had her first child at age 27.  She is postmenopausal and had a MICHELLE-BSO at age 48. Ms. Hendrix has annual mammograms and reports that she has had two benign breast biopsies.  She has had several colonoscopies and reports that some flat polyps have been identified in the past.  Ms. Hendrix was interested in discussing genetic testing recommendations and appropriate management.  Ms. Hendrix decided to pursue comprehensive genetic testing to evaluate her risk of cancer, therefore the CancerNext panel was ordered through [facility] which analyzes 36 genes associated with an increased cancer risk. The genes on this panel include APC, MAIK, AXIN2, BARD1, BMPR1A, BRCA1, BRCA2, BRIP1, CDH1, CDK4, CDKN2A, CHEK2, DICER1, EPCAM, GREM1, HOXB13, MLH1, MRE11A, MSH2, MSH3, MSH6, MUTYH, NBN, NF1, NTHL1, PALB2, PMS2, POLD1, POLE, PTEN, RAD51C, RAD51D, RECQL, SMAD4, SMARCA4, STK11, and TP53.  Genetic testing was negative by sequencing and rearrangement testing for deleterious mutations in the 36 genes included on the [facility] CancerNext panel (see attached results). These normal results were discussed with Ms. Hendrix by telephone on 10/13/21.      PERTINENT FAMILY HISTORY: (See attached pedigree)   Sister:   Breast cancer, 39  Father:   Brain cancer, 69  Pat. Grandfather: Brain cancer, 50s  Pat. Aunt:  Breast cancer, 45  Pat. Great-Aunt: GYN cancer - possibly ovarian, 40s  Pat. Grandmother: suspected colon cancer, 99  Mat. Grandmother: Colon cancer, 82  Mat. 1st cousin: Leukemia, 45    We do not currently have records regarding any of these diagnoses.      RISK ASSESSMENT:  Ms. Hendrix’s family history of early onset breast cancer raised the question of a hereditary cancer syndrome.  Ms. Hendrix does meet NCCN guidelines criteria for BRCA1/2 genetic testing based on her family  history.  We discussed that BRCA testing is typically completed through a multigene panel, also evaluating other genes associated with hereditary risk for breast cancer and other cancers.   Ms. Hendrix is considering pursuing genetic testing and a pre-auth was submitted today.    Ms. Hendrix’s management should be guided by a family history-based risk assessment. [employer], version 8 is able to take into account personal factors (age at menarche, age at first birth, etc) and family history when calculating risk for breast cancer.  Computer modeling estimates that Ms. Hendrix’s lifetime personal risk for developing breast cancer is 8.6% (Latisha-Galileozick, v8), in comparison to the average 70-year-old woman’s risk of 4.3%. Per NCCN guidelines, a lifetime risk above 20% is considered to be “high risk” where increased screening is warranted; Ms. Hendrix’s risk does not fall into that category. This risk assessment is based on the family history information provided at the time of the appointment and could change in the future should new information be obtained.    GENETIC COUNSELING:  We reviewed the family history information in detail. Cases of cancer follow three general patterns: sporadic, familial, and hereditary.  While most breast cancer is sporadic (75-80%), some cases appear to occur in family clusters.  These cases are said to be familial and account for 10-20% of cancer cases.  Familial cases may be due to a combination of shared genes and environmental factors among family members.  In even fewer cases (5-10%), the risk to develop cancer is inherited, and the genes responsible for the cancer are known.      Family histories typical of hereditary cancer syndromes usually include multiple first- and second-degree relatives diagnosed with cancer types that define a syndrome. These cases tend to be diagnosed at younger-than-expected ages and can be bilateral or multifocal. The cancer in these families follows an  autosomal dominant inheritance pattern, which indicates the likely presence of a mutation in a cancer susceptibility gene. Children and siblings of an individual known to have a mutation have a 50% chance of inheriting that mutation, thereby inheriting the increased risk to develop cancer. These mutations can be passed down from the maternal or the paternal lineage.    Hereditary breast cancer accounts for 5-10% of all cases of breast cancer.  A significant proportion of hereditary breast and ovarian cancer can be attributed to mutations in the BRCA1 and BRCA2 genes.  The cancer in these families follows an autosomal dominant pattern of inheritance.  Mutations in these genes confer an increased risk for breast cancer, ovarian cancer, male breast cancer, prostate cancer, and pancreatic cancer. Women with a BRCA1 or BRCA2 mutation have up to an 87% lifetime risk of breast cancer and up to a 44% risk of ovarian cancer. We discussed the management guidelines established for BRCA mutation carriers including increased screening starting at age 25 with breast MRI, as well as surgical risk reduction options.  There are other genes that can cause a hereditary risk for breast cancer and other cancers, and we discussed the availability of multigene panels which allow for testing of BRCA1/2 and a number of other cancer susceptibility genes simultaneously. Genes included on these panels have varying degrees of risk associated and management guidelines based on the degree of risk.      GENETIC TESTING:  The risks, benefits and limitations of genetic testing and implications for clinical management following testing were reviewed.  DNA test results can influence decisions regarding screening and prevention.  Genetic testing can have significant psychological implications for both individuals and families. Also discussed was the possibility of employment and insurance discrimination based on genetic test results and the law in  place to prevent this (JOSE E), as well as the limitations of this law (lack of protection from life insurance discrimination).      We discussed panel testing, which would involve testing 36 genes associated with hereditary cancer risk. The benefits and limitations of genetic testing were discussed. The implications of a positive or negative test result were discussed. We discussed the possibility that, in some cases, genetic test results may be ambiguous due to the identification of a genetic variant of uncertain significance. These variants may or may not be associated with an increased cancer risk. With multigene panel testing, it is not uncommon for a variant of uncertain significance (VUS) to be identified given the number of genes being analyzed and the presence of genetic variation in the population.  The laboratories that perform genetic testing work to reclassify the VUS and send out an amended report if and when a VUS is reclassified.  The majority (>90%) of VUS findings are ultimately reclassified to a negative result.     TEST RESULTS: Genetic testing was negative for known deleterious mutations by sequencing and rearrangement testing of the 36 genes included on the CancerNext panel.  This negative result greatly lowers the risk of a hereditary cancer syndrome for Ms. Hendrix. It is possible that the family history is due to a hereditary cancer syndrome that Ms. Hendrix did not happen to inherit.  Other relatives could still consider genetic testing.   This assessment is based on the information provided at the time of the consultation.     CANCER PREVENTION:   Based on computer modeling, Ms. Hendrix’s lifetime risk for breast cancer would not be considered “high risk”. General population screening guidelines include annual clinical breast exam, annual mammography, and self-breast exams. She should continue to follow her gastroenterologist’s recommendations regarding frequency of screening colonoscopy based  on her history of polyps.  This assessment is based on the information provided at the time of the consultation. This assessment is based on the information provided at the time of the consultation.      PLAN:  Genetic counseling remains available to Ms. Hendrix. If Ms. Hendrix has any questions, concerns, or updates to the family history, she is welcome to contact us at [phone].    Glenna Roblero MS, [facility], [facility]  Licensed Certified Genetic Counselor     Cc: KAREN Tse

## 2021-11-11 ENCOUNTER — OFFICE VISIT (OUTPATIENT)
Dept: OBSTETRICS AND GYNECOLOGY | Facility: CLINIC | Age: 71
End: 2021-11-11

## 2021-11-11 VITALS
WEIGHT: 206 LBS | SYSTOLIC BLOOD PRESSURE: 132 MMHG | BODY MASS INDEX: 35.17 KG/M2 | HEIGHT: 64 IN | DIASTOLIC BLOOD PRESSURE: 76 MMHG

## 2021-11-11 DIAGNOSIS — Z01.419 WELL WOMAN EXAM WITH ROUTINE GYNECOLOGICAL EXAM: Primary | ICD-10-CM

## 2021-11-11 DIAGNOSIS — Z86.79 HISTORY OF ATRIAL FIBRILLATION: ICD-10-CM

## 2021-11-11 DIAGNOSIS — N39.46 MIXED INCONTINENCE: ICD-10-CM

## 2021-11-11 DIAGNOSIS — N81.10 CYSTOCELE WITHOUT UTERINE PROLAPSE: ICD-10-CM

## 2021-11-11 DIAGNOSIS — N95.1 MENOPAUSAL SYMPTOMS: ICD-10-CM

## 2021-11-11 DIAGNOSIS — E66.9 OBESITY, CLASS II, BMI 35-39.9: ICD-10-CM

## 2021-11-11 PROCEDURE — 99397 PER PM REEVAL EST PAT 65+ YR: CPT | Performed by: NURSE PRACTITIONER

## 2021-11-17 ENCOUNTER — HOSPITAL ENCOUNTER (OUTPATIENT)
Dept: BONE DENSITY | Facility: HOSPITAL | Age: 71
Discharge: HOME | End: 2021-11-17
Admitting: NURSE PRACTITIONER

## 2021-11-17 DIAGNOSIS — N95.1 MENOPAUSAL SYMPTOMS: ICD-10-CM

## 2021-11-17 PROCEDURE — 77080 DXA BONE DENSITY AXIAL: CPT

## 2021-12-16 ENCOUNTER — TELEPHONE (OUTPATIENT)
Dept: OBSTETRICS AND GYNECOLOGY | Facility: CLINIC | Age: 71
End: 2021-12-16

## 2021-12-16 NOTE — TELEPHONE ENCOUNTER
I spoke with patient on the phone. Pt states that she had mammogram in 8/2021 at [facility]. Pt reports that Chrissy JONES orders mammograms every year

## 2021-12-16 NOTE — TELEPHONE ENCOUNTER
----- Message from KAREN Yousif sent at 12/13/2021 11:54 AM CST -----  Regarding: mammogram  Please see if Christiana needs a mammogram and let me know where if so.   KAREN Yousif

## 2021-12-30 RX ORDER — EVOLOCUMAB 140 MG/ML
INJECTION, SOLUTION SUBCUTANEOUS
Qty: 2 ML | Refills: 11 | Status: SHIPPED | OUTPATIENT
Start: 2021-12-30 | End: 2022-01-03 | Stop reason: SDUPTHER

## 2022-01-03 ENCOUNTER — OFFICE VISIT (OUTPATIENT)
Dept: CARDIOLOGY | Facility: CLINIC | Age: 72
End: 2022-01-03

## 2022-01-03 VITALS
WEIGHT: 203 LBS | HEIGHT: 64 IN | SYSTOLIC BLOOD PRESSURE: 150 MMHG | OXYGEN SATURATION: 95 % | BODY MASS INDEX: 34.66 KG/M2 | DIASTOLIC BLOOD PRESSURE: 100 MMHG | HEART RATE: 94 BPM

## 2022-01-03 DIAGNOSIS — I10 ESSENTIAL HYPERTENSION: ICD-10-CM

## 2022-01-03 DIAGNOSIS — I25.10 CORONARY ARTERY DISEASE INVOLVING NATIVE CORONARY ARTERY OF NATIVE HEART WITHOUT ANGINA PECTORIS: ICD-10-CM

## 2022-01-03 DIAGNOSIS — E78.2 MIXED HYPERLIPIDEMIA: ICD-10-CM

## 2022-01-03 DIAGNOSIS — I48.0 PAF (PAROXYSMAL ATRIAL FIBRILLATION): Primary | ICD-10-CM

## 2022-01-03 PROCEDURE — 93000 ELECTROCARDIOGRAM COMPLETE: CPT | Performed by: INTERNAL MEDICINE

## 2022-01-03 PROCEDURE — 99214 OFFICE O/P EST MOD 30 MIN: CPT | Performed by: INTERNAL MEDICINE

## 2022-01-03 RX ORDER — EVOLOCUMAB 140 MG/ML
140 INJECTION, SOLUTION SUBCUTANEOUS
Qty: 2 ML | Refills: 11 | Status: SHIPPED | OUTPATIENT
Start: 2022-01-03 | End: 2022-12-01

## 2022-01-03 NOTE — PROGRESS NOTES
Referring Provider: Nasir Almeida MD    Reason for Follow-up Visit: afib    Subjective .   Chief Complaint:   Chief Complaint   Patient presents with   • Follow-up     3 month hospital fu s/p cardioversion   • Atrial Fibrillation     s/p CV on 9/27/21  pt states she has a little fluttering for the first 2 weeks.  she does not notice it much now unless she stops and rest.   • Coronary Artery Disease     pt states has had a little pain on the left side after the CV and ablation in October.   • Hypertension     pt states BP is good when checked.   • Hyperlipidemia     last lab done 8/28/21 LDL was 48 on Lipitor 80, Zetia 10 and Repatha 140 mg       History of present illness:  Christiana Hendrix is a 71 y.o. yo female with PAF, s/p cardioversion and ablation. She denies any chest pain or SOB. She admits to occasional palpitations       History  Past Medical History:   Diagnosis Date   • Asthma    • Atrial fibrillation (HCC)    • CAD in native artery    • Hyperlipidemia    • Hypertension    • MI, old    ,   Past Surgical History:   Procedure Laterality Date   • ABLATION OF DYSRHYTHMIC FOCUS     • BLADDER NECK SUSPENSION     • CARDIOVERSION     • CHOLECYSTECTOMY     • COLONOSCOPY W/ BIOPSIES     • CORONARY ANGIOPLASTY  08/13/2007    had stents 2003   • HYSTERECTOMY     ,   Family History   Problem Relation Age of Onset   • Dementia Mother    • Heart attack Sister    • Heart disease Sister    • Diverticulitis Sister    • Brain cancer Father    • Heart disease Maternal Grandmother    • Heart attack Maternal Grandmother    • Stroke Maternal Grandfather    • No Known Problems Paternal Grandmother    • Brain cancer Paternal Grandfather    • Breast cancer Sister    • Breast cancer Paternal Aunt    • Ovarian cancer Neg Hx    • Uterine cancer Neg Hx    • Colon cancer Neg Hx    ,   Social History     Tobacco Use   • Smoking status: Never Smoker   • Smokeless tobacco: Never Used   Vaping Use   • Vaping Use: Never used    Substance Use Topics   • Alcohol use: Not Currently     Alcohol/week: 3.0 standard drinks     Types: 2 Glasses of wine, 1 Shots of liquor per week     Comment:  occasionally   • Drug use: Never   ,     Medications  Current Outpatient Medications   Medication Sig Dispense Refill   • acetaminophen (TYLENOL) 500 MG tablet Take 500 mg by mouth Every 6 (Six) Hours As Needed for Mild Pain .     • albuterol (PROVENTIL HFA;VENTOLIN HFA) 108 (90 Base) MCG/ACT inhaler Inhale 2 puffs Every 4 (Four) Hours As Needed for Wheezing.     • amLODIPine (NORVASC) 10 MG tablet Take 10 mg by mouth Daily.     • atorvastatin (LIPITOR) 80 MG tablet Take 80 mg by mouth Daily.     • calcium carbonate (OS-DONNY) 600 MG tablet Take 600 mg by mouth Daily. 2 qd     • Cholecalciferol (VITAMIN D3) 125 MCG (5000 UT) capsule capsule Take 5,000 Units by mouth Daily.     • coenzyme Q10 100 MG capsule Take 200 mg by mouth Daily.     • ezetimibe (ZETIA) 10 MG tablet Take 10 mg by mouth Daily.     • fluticasone (VERAMYST) 27.5 MCG/SPRAY nasal spray 2 sprays into each nostril Daily.     • folic acid ([facility] FOLIC ACID) 800 MCG tablet Take 800 mcg by mouth Daily.     • Glucosamine-Chondroit-Vit C-Mn (GLUCOSAMINE 1500 COMPLEX PO) Take  by mouth.     • hydrochlorothiazide (HYDRODIURIL) 25 MG tablet Take 25 mg by mouth Daily.     • levocetirizine (XYZAL) 5 MG tablet Take 5 mg by mouth Every Evening.     • metoprolol succinate XL (TOPROL-XL) 100 MG 24 hr tablet Take 100 mg by mouth Daily.     • montelukast (SINGULAIR) 10 MG tablet Take 10 mg by mouth Every Night.     • niacin (NIASPAN) 500 MG CR tablet      • nitroglycerin (NITROSTAT) 0.4 MG SL tablet Place 1 tablet under the tongue Every 5 (Five) Minutes As Needed for Chest Pain. Take no more than 3 doses in 15 minutes. 30 tablet 5   • potassium chloride (K-DUR,KLOR-CON) 20 MEQ CR tablet Take 20 mEq by mouth 2 (Two) Times a Day.     • Repatha SureClick solution auto-injector SureClick injection INJECT 1  "MILLILITER UNDER THE SKIN INTO THE APPROPRIATE AREA AS DIRECTED EVERY 14(FOURTEEN) DAYS. 2 mL 11   • rivaroxaban (Xarelto) 20 MG tablet Take 1 tablet by mouth Daily. 90 tablet 3   • vitamin A 98737 UNIT capsule Take 10,000 Units by mouth Daily.     • tamsulosin (FLOMAX) 0.4 MG capsule 24 hr capsule Take 1 capsule by mouth Every Night. 30 capsule 0     No current facility-administered medications for this visit.       Allergies:  Eliquis [apixaban], Erythromycin, and Sulfa antibiotics    Review of Systems  Review of Systems   HENT: Negative for nosebleeds.    Cardiovascular: Positive for palpitations. Negative for chest pain, claudication, dyspnea on exertion, leg swelling, near-syncope, orthopnea, paroxysmal nocturnal dyspnea and syncope.   Respiratory: Negative for hemoptysis and shortness of breath.    Gastrointestinal: Negative for melena.   Genitourinary: Negative for hematuria.       Objective     Physical Exam:  /100   Pulse 94   Ht 162.6 cm (64\")   Wt 92.1 kg (203 lb)   SpO2 95%   BMI 34.84 kg/m²   Physical Exam    Results Review:    ECG 12 Lead    Date/Time: 1/3/2022 10:32 AM  Performed by: Choco Diallo MD  Authorized by: Choco Diallo MD   Comparison: compared with previous ECG   Rhythm: sinus rhythm  Rate: normal  Conduction: conduction normal  ST Segments: ST segments normal  T Waves: T waves normal  QRS axis: normal  Other findings: poor R wave progression    Clinical impression: non-specific ECG            Admission on 09/27/2021, Discharged on 09/27/2021   Component Date Value Ref Range Status   • Extra Tube 09/27/2021 Hold for add-ons.   Final    Auto resulted.   • Extra Tube 09/27/2021 hold for add-on   Final    Auto resulted   • Extra Tube 09/27/2021 Hold for add-ons.   Final    Auto resulted.   • Extra Tube 09/27/2021 Hold for add-ons.   Final    Auto resulted.   • Extra Tube 09/27/2021 hold for add-on   Final    Auto resulted   • QT Interval 09/27/2021 304  ms Final   • QTC Interval " 09/27/2021 473  ms Final   • Glucose 09/27/2021 132* 65 - 99 mg/dL Final   • BUN 09/27/2021 16  8 - 23 mg/dL Final   • Creatinine 09/27/2021 0.67  0.57 - 1.00 mg/dL Final   • Sodium 09/27/2021 142  136 - 145 mmol/L Final   • Potassium 09/27/2021 3.4* 3.5 - 5.2 mmol/L Final   • Chloride 09/27/2021 105  98 - 107 mmol/L Final   • CO2 09/27/2021 26.0  22.0 - 29.0 mmol/L Final   • Calcium 09/27/2021 9.5  8.6 - 10.5 mg/dL Final   • Total Protein 09/27/2021 7.4  6.0 - 8.5 g/dL Final   • Albumin 09/27/2021 4.20  3.50 - 5.20 g/dL Final   • ALT (SGPT) 09/27/2021 14  1 - 33 U/L Final   • AST (SGOT) 09/27/2021 17  1 - 32 U/L Final   • Alkaline Phosphatase 09/27/2021 154* 39 - 117 U/L Final   • Total Bilirubin 09/27/2021 1.3* 0.0 - 1.2 mg/dL Final   • eGFR Non  Amer 09/27/2021 87  >60 mL/min/1.73 Final   • Globulin 09/27/2021 3.2  gm/dL Final   • A/G Ratio 09/27/2021 1.3  g/dL Final   • BUN/Creatinine Ratio 09/27/2021 23.9  7.0 - 25.0 Final   • Anion Gap 09/27/2021 11.0  5.0 - 15.0 mmol/L Final   • Protime 09/27/2021 21.7* 11.9 - 14.6 Seconds Final   • INR 09/27/2021 1.98* 0.91 - 1.09 Final   • PTT 09/27/2021 36.4* 24.1 - 35.0 seconds Final   • WBC 09/27/2021 10.71  3.40 - 10.80 10*3/mm3 Final   • RBC 09/27/2021 5.16  3.77 - 5.28 10*6/mm3 Final   • Hemoglobin 09/27/2021 13.6  12.0 - 15.9 g/dL Final   • Hematocrit 09/27/2021 43.6  34.0 - 46.6 % Final   • MCV 09/27/2021 84.5  79.0 - 97.0 fL Final   • MCH 09/27/2021 26.4* 26.6 - 33.0 pg Final   • MCHC 09/27/2021 31.2* 31.5 - 35.7 g/dL Final   • RDW 09/27/2021 15.0  12.3 - 15.4 % Final   • RDW-SD 09/27/2021 45.8  37.0 - 54.0 fl Final   • MPV 09/27/2021 11.2  6.0 - 12.0 fL Final   • Platelets 09/27/2021 264  140 - 450 10*3/mm3 Final   • Neutrophil % 09/27/2021 76.9* 42.7 - 76.0 % Final   • Lymphocyte % 09/27/2021 13.3* 19.6 - 45.3 % Final   • Monocyte % 09/27/2021 7.7  5.0 - 12.0 % Final   • Eosinophil % 09/27/2021 1.5  0.3 - 6.2 % Final   • Basophil % 09/27/2021 0.2  0.0 -  1.5 % Final   • Immature Grans % 09/27/2021 0.4  0.0 - 0.5 % Final   • Neutrophils, Absolute 09/27/2021 8.25* 1.70 - 7.00 10*3/mm3 Final   • Lymphocytes, Absolute 09/27/2021 1.42  0.70 - 3.10 10*3/mm3 Final   • Monocytes, Absolute 09/27/2021 0.82  0.10 - 0.90 10*3/mm3 Final   • Eosinophils, Absolute 09/27/2021 0.16  0.00 - 0.40 10*3/mm3 Final   • Basophils, Absolute 09/27/2021 0.02  0.00 - 0.20 10*3/mm3 Final   • Immature Grans, Absolute 09/27/2021 0.04  0.00 - 0.05 10*3/mm3 Final   • nRBC 09/27/2021 0.0  0.0 - 0.2 /100 WBC Final   • Target HR (85%) 09/27/2021 128  bpm Final   • Max. Pred. HR (100%) 09/27/2021 150  bpm Final   • COVID19 09/27/2021 Not Detected  Not Detected - Ref. Range Final       Assessment/Plan   Problem List Items Addressed This Visit        Cardiac and Vasculature    Coronary artery disease involving native coronary artery of native heart without angina pectoris    Current Assessment & Plan     The patient denies chest pain, shortness of breath, dyspnea on exertion, orthopnea, PND, edema. There is no evidence of ongoing ischemia           Mixed hyperlipidemia    Current Assessment & Plan     Good response to statin         Essential hypertension    Current Assessment & Plan     Running high today, runs lower at home         PAF (paroxysmal atrial fibrillation) ([facility]) - Primary    Current Assessment & Plan     S/p ablation doing well               Medical Complexity  must have 2 out of 3     Moderate Complexity Level 4           1 of the following medical problems:          []One chronic illness with mild exacerbation         [x]Two or more stable chronic illness          []One new problem  []One acute illness with systemic symptoms    Complexity of Data  Reviewed (1 out of the 3 following categories)      Category 1 tests, documents, historian (must have 3 points)       []Review of prior external records  [x]Review of results of unique tests  []Ordering unique tests  []Assessment requires an  independent historian    Category 2 Interpretation of tests   []Independent interpretation of test read by another doc    Category 3 Discuss Management/tests  []Discussion with external physician    Risk of complications and/or morbidity          [x]Prescription Drug Management

## 2022-02-08 ENCOUNTER — DOCUMENTATION (OUTPATIENT)
Dept: CARDIOLOGY | Facility: CLINIC | Age: 72
End: 2022-02-08

## 2022-02-09 ENCOUNTER — TELEPHONE (OUTPATIENT)
Dept: CARDIOLOGY | Facility: CLINIC | Age: 72
End: 2022-02-09

## 2022-02-09 NOTE — TELEPHONE ENCOUNTER
Pt informed that the Repatha has been approved until 2/8/23.  She said it cost her $47.  True Mota, CMA

## 2022-02-17 RX ORDER — RIVAROXABAN 20 MG/1
TABLET, FILM COATED ORAL
Qty: 90 TABLET | Refills: 3 | Status: SHIPPED | OUTPATIENT
Start: 2022-02-17 | End: 2022-06-14 | Stop reason: SDUPTHER

## 2022-06-14 NOTE — TELEPHONE ENCOUNTER
PT CALLED STATING ON A VM MSG THAT SHE HAS CHANGED HER MEDICATION INSURANCE COVERAGE AND WILL NEED [facility] SENT [facility].  True Mota, CMA

## 2022-08-23 ENCOUNTER — OFFICE VISIT (OUTPATIENT)
Dept: ENT CLINIC | Age: 72
End: 2022-08-23
Payer: MEDICARE

## 2022-08-23 VITALS
WEIGHT: 208 LBS | BODY MASS INDEX: 35.51 KG/M2 | HEIGHT: 64 IN | SYSTOLIC BLOOD PRESSURE: 138 MMHG | DIASTOLIC BLOOD PRESSURE: 78 MMHG

## 2022-08-23 DIAGNOSIS — H69.82 EUSTACHIAN TUBE DYSFUNCTION, LEFT: Primary | ICD-10-CM

## 2022-08-23 PROBLEM — H69.92 EUSTACHIAN TUBE DYSFUNCTION, LEFT: Status: ACTIVE | Noted: 2022-08-23

## 2022-08-23 PROCEDURE — 3017F COLORECTAL CA SCREEN DOC REV: CPT | Performed by: PHYSICIAN ASSISTANT

## 2022-08-23 PROCEDURE — 1090F PRES/ABSN URINE INCON ASSESS: CPT | Performed by: PHYSICIAN ASSISTANT

## 2022-08-23 PROCEDURE — 1036F TOBACCO NON-USER: CPT | Performed by: PHYSICIAN ASSISTANT

## 2022-08-23 PROCEDURE — G8400 PT W/DXA NO RESULTS DOC: HCPCS | Performed by: PHYSICIAN ASSISTANT

## 2022-08-23 PROCEDURE — 1123F ACP DISCUSS/DSCN MKR DOCD: CPT | Performed by: PHYSICIAN ASSISTANT

## 2022-08-23 PROCEDURE — 99203 OFFICE O/P NEW LOW 30 MIN: CPT | Performed by: PHYSICIAN ASSISTANT

## 2022-08-23 PROCEDURE — G8427 DOCREV CUR MEDS BY ELIG CLIN: HCPCS | Performed by: PHYSICIAN ASSISTANT

## 2022-08-23 PROCEDURE — G8417 CALC BMI ABV UP PARAM F/U: HCPCS | Performed by: PHYSICIAN ASSISTANT

## 2022-08-23 RX ORDER — METHYLPREDNISOLONE 4 MG/1
TABLET ORAL
Qty: 1 KIT | Refills: 0 | Status: SHIPPED | OUTPATIENT
Start: 2022-08-23 | End: 2022-09-07 | Stop reason: ALTCHOICE

## 2022-08-23 RX ORDER — POTASSIUM CHLORIDE 20 MEQ/1
20 TABLET, EXTENDED RELEASE ORAL 2 TIMES DAILY
COMMUNITY

## 2022-08-23 RX ORDER — LEVOCETIRIZINE DIHYDROCHLORIDE 5 MG/1
5 TABLET, FILM COATED ORAL EVERY EVENING
COMMUNITY

## 2022-08-23 RX ORDER — METOPROLOL SUCCINATE 100 MG/1
100 TABLET, EXTENDED RELEASE ORAL DAILY
COMMUNITY

## 2022-08-23 RX ORDER — ATORVASTATIN CALCIUM 80 MG/1
80 TABLET, FILM COATED ORAL DAILY
COMMUNITY

## 2022-08-23 RX ORDER — EVOLOCUMAB 140 MG/ML
INJECTION, SOLUTION SUBCUTANEOUS
COMMUNITY
Start: 2022-06-28

## 2022-08-23 RX ORDER — RIVAROXABAN 20 MG/1
TABLET, FILM COATED ORAL
COMMUNITY
Start: 2022-06-15

## 2022-08-23 RX ORDER — ALBUTEROL SULFATE 90 UG/1
2 AEROSOL, METERED RESPIRATORY (INHALATION) EVERY 4 HOURS PRN
COMMUNITY

## 2022-08-23 RX ORDER — OXYMETAZOLINE HYDROCHLORIDE 0.05 G/100ML
SPRAY NASAL
Qty: 30 ML | Refills: 0 | Status: SHIPPED | OUTPATIENT
Start: 2022-08-23

## 2022-08-23 RX ORDER — UREA 10 %
800 LOTION (ML) TOPICAL DAILY
COMMUNITY

## 2022-08-23 RX ORDER — UBIDECARENONE 100 MG
200 CAPSULE ORAL DAILY
COMMUNITY

## 2022-08-23 RX ORDER — FLUTICASONE PROPIONATE 50 MCG
2 SPRAY, SUSPENSION (ML) NASAL 2 TIMES DAILY
Qty: 16 G | Refills: 2 | Status: SHIPPED | OUTPATIENT
Start: 2022-08-23

## 2022-08-23 RX ORDER — AMLODIPINE BESYLATE 10 MG/1
10 TABLET ORAL DAILY
COMMUNITY

## 2022-08-23 RX ORDER — HYDROCHLOROTHIAZIDE 25 MG/1
25 TABLET ORAL DAILY
COMMUNITY

## 2022-08-23 RX ORDER — EZETIMIBE 10 MG/1
10 TABLET ORAL DAILY
COMMUNITY

## 2022-08-23 RX ORDER — MONTELUKAST SODIUM 10 MG/1
10 TABLET ORAL NIGHTLY
COMMUNITY

## 2022-08-23 RX ORDER — CETIRIZINE HYDROCHLORIDE, PSEUDOEPHEDRINE HYDROCHLORIDE 5; 120 MG/1; MG/1
1 TABLET, FILM COATED, EXTENDED RELEASE ORAL 2 TIMES DAILY
COMMUNITY

## 2022-08-23 ASSESSMENT — ENCOUNTER SYMPTOMS
SORE THROAT: 0
RHINORRHEA: 0
SINUS PRESSURE: 0
EYE PAIN: 0
FACIAL SWELLING: 0
VOICE CHANGE: 0
EYE DISCHARGE: 0
TROUBLE SWALLOWING: 0
SINUS PAIN: 0

## 2022-08-23 NOTE — PROGRESS NOTES
[facility]/ENT  Mrs. Aj Reed is a pleasant 71-year-old  female that was referred by Amparo Murphy due to problems with left ear pain. Patient reports that she had a previous ear infection requiring oral antibiotics. She reported that the infection got better but she was noted with persistent muffled hearing and pressure sensation to the left ear. She denies any issues with tinnitus or vertigo.         Allergies: Apixaban, Erythromycin, and Sulfa antibiotics      Current Outpatient Medications   Medication Sig Dispense Refill    GLUCOSAMINE-CHONDROIT-BIOFL-MN PO Take by mouth      amLODIPine (NORVASC) 10 MG tablet Take 10 mg by mouth daily      atorvastatin (LIPITOR) 80 MG tablet Take 80 mg by mouth daily      REPATHA SURECLICK 271 MG/ML SOAJ INJECT 1 ML UNDER THE SKIN INTO THE APPROPRIATE AREA AS DIRECTED EVERY 14 DAYS.      ezetimibe (ZETIA) 10 MG tablet Take 10 mg by mouth daily      hydroCHLOROthiazide (HYDRODIURIL) 25 MG tablet Take 25 mg by mouth daily      metoprolol succinate (TOPROL XL) 100 MG extended release tablet Take 100 mg by mouth daily      folic acid (FOLVITE) 856 MCG tablet Take 800 mcg by mouth daily      XARELTO 20 MG TABS tablet TAKE 1 TABLET BY MOUTH EVERY DAY      coenzyme Q10 100 MG CAPS capsule Take 200 mg by mouth daily      calcium carbonate 1500 (600 Ca) MG TABS tablet Take 600 mg by mouth daily      vitamin D (CHOLECALCIFEROL) 125 MCG (5000 UT) CAPS capsule Take 5,000 Units by mouth daily      potassium chloride (KLOR-CON M) 20 MEQ extended release tablet Take 20 mEq by mouth 2 times daily      vitamin A 3 MG (73310 UT) capsule Take 10,000 Units by mouth daily      albuterol sulfate HFA (PROVENTIL;VENTOLIN;PROAIR) 108 (90 Base) MCG/ACT inhaler Inhale 2 puffs into the lungs every 4 hours as needed      levocetirizine (XYZAL) 5 MG tablet Take 5 mg by mouth every evening      fluticasone (VERAMYST) 27.5 MCG/SPRAY nasal spray 2 sprays by Nasal route daily montelukast (SINGULAIR) 10 MG tablet Take 10 mg by mouth nightly      cetirizine-psuedoephedrine (ZYRTEC-D) 5-120 MG per extended release tablet Take 1 tablet by mouth 2 times daily      methylPREDNISolone (MEDROL, HUMBERTO,) 4 MG tablet Take by mouth as directed 1 kit 0    oxymetazoline (AFRIN) 0.05 % nasal spray 2 squirts to each nostril twice a day x 7 days 30 mL 0    fluticasone (FLONASE) 50 MCG/ACT nasal spray 2 sprays by Each Nostril route in the morning and 2 sprays in the evening. 16 g 2     No current facility-administered medications for this visit. Past Surgical History:   Procedure Laterality Date    BLADDER REPAIR      CHOLECYSTECTOMY      HYSTERECTOMY, TOTAL ABDOMINAL (CERVIX REMOVED)      VASCULAR SURGERY         Past Medical History:   Diagnosis Date    Arthritis     Asthma     Hearing loss     Hypertension     Tinnitus        Family History   Problem Relation Age of Onset    Thyroid Disease Mother     Cancer Father     Cancer Sister     Cancer Maternal Aunt     Cancer Paternal Grandfather        Social History     Tobacco Use    Smoking status: Never    Smokeless tobacco: Never   Substance Use Topics    Alcohol use: Not Currently           REVIEW OF SYSTEMS:  all other systems reviewed and are negative  Review of Systems   Constitutional:  Negative for chills and fever. HENT:  Negative for congestion, dental problem, ear discharge, ear pain, facial swelling, hearing loss, nosebleeds, postnasal drip, rhinorrhea, sinus pressure, sinus pain, sneezing, sore throat, tinnitus, trouble swallowing and voice change. Eyes:  Negative for pain and discharge. Neurological:  Negative for dizziness and headaches. Comments:     PHYSICAL EXAM:    /78   Ht 5' 4\" (1.626 m)   Wt 208 lb (94.3 kg)   BMI 35.70 kg/m²   Body mass index is 35.7 kg/m².     General Appearance: well developed  and well nourished  Head/ Face: normocephalic and atraumatic  Vocal Quality: good/ normal  Ears: Right Ear:

## 2022-09-07 ENCOUNTER — OFFICE VISIT (OUTPATIENT)
Dept: ENT CLINIC | Age: 72
End: 2022-09-07
Payer: MEDICARE

## 2022-09-07 VITALS
SYSTOLIC BLOOD PRESSURE: 230 MMHG | WEIGHT: 213 LBS | BODY MASS INDEX: 36.37 KG/M2 | DIASTOLIC BLOOD PRESSURE: 130 MMHG | HEIGHT: 64 IN

## 2022-09-07 DIAGNOSIS — H69.82 EUSTACHIAN TUBE DYSFUNCTION, LEFT: Primary | ICD-10-CM

## 2022-09-07 PROCEDURE — G8427 DOCREV CUR MEDS BY ELIG CLIN: HCPCS | Performed by: PHYSICIAN ASSISTANT

## 2022-09-07 PROCEDURE — G8417 CALC BMI ABV UP PARAM F/U: HCPCS | Performed by: PHYSICIAN ASSISTANT

## 2022-09-07 PROCEDURE — 1123F ACP DISCUSS/DSCN MKR DOCD: CPT | Performed by: PHYSICIAN ASSISTANT

## 2022-09-07 PROCEDURE — G8400 PT W/DXA NO RESULTS DOC: HCPCS | Performed by: PHYSICIAN ASSISTANT

## 2022-09-07 PROCEDURE — 3017F COLORECTAL CA SCREEN DOC REV: CPT | Performed by: PHYSICIAN ASSISTANT

## 2022-09-07 PROCEDURE — 1090F PRES/ABSN URINE INCON ASSESS: CPT | Performed by: PHYSICIAN ASSISTANT

## 2022-09-07 PROCEDURE — 1036F TOBACCO NON-USER: CPT | Performed by: PHYSICIAN ASSISTANT

## 2022-09-07 PROCEDURE — 99213 OFFICE O/P EST LOW 20 MIN: CPT | Performed by: PHYSICIAN ASSISTANT

## 2022-09-07 RX ORDER — METHYLPREDNISOLONE 4 MG/1
TABLET ORAL
Qty: 1 KIT | Refills: 0 | Status: SHIPPED | OUTPATIENT
Start: 2022-09-07

## 2022-09-07 RX ORDER — ECHINACEA PURPUREA EXTRACT 125 MG
2 TABLET ORAL 2 TIMES DAILY
Qty: 1 EACH | Refills: 3 | Status: SHIPPED | OUTPATIENT
Start: 2022-09-07

## 2022-09-07 RX ORDER — LINACLOTIDE 72 UG/1
CAPSULE, GELATIN COATED ORAL
COMMUNITY
Start: 2022-08-25

## 2022-09-07 ASSESSMENT — ENCOUNTER SYMPTOMS
SINUS PRESSURE: 0
RHINORRHEA: 0
SORE THROAT: 0
EYE PAIN: 0
TROUBLE SWALLOWING: 0
EYE DISCHARGE: 0
VOICE CHANGE: 0
SINUS PAIN: 0
FACIAL SWELLING: 0

## 2022-09-07 NOTE — PROGRESS NOTES
[facility] OTOLARYNGOLOGY/ENT  Mrs. Cody Fry is a pleasant 61-year-old  female that presents for a 2-week follow-up after treatment for eustachian tube dysfunction of the left ear. Patient reports that the left ear is mildly better but has not totally resolved. She still complains of sporadic muffled hearing. She denies any drainage from the canal or any issues with vertigo. Allergies: Apixaban, Erythromycin, and Sulfa antibiotics      Current Outpatient Medications   Medication Sig Dispense Refill    LINZESS 72 MCG CAPS capsule TAKE 1 CAPSULE BY MOUTH 30 MIN BEFORE BREAKFAST. IF INEFFECTIVE TAKE WITH BREAKFAST      sodium chloride (OCEAN NASAL SPRAY) 0.65 % nasal spray 2 sprays by Nasal route in the morning and 2 sprays in the evening.  1 each 3    methylPREDNISolone (MEDROL, HUMBERTO,) 4 MG tablet Take by mouth as directed 1 kit 0    GLUCOSAMINE-CHONDROIT-BIOFL-MN PO Take by mouth      amLODIPine (NORVASC) 10 MG tablet Take 10 mg by mouth daily      atorvastatin (LIPITOR) 80 MG tablet Take 80 mg by mouth daily      REPATHA SURECLICK 896 MG/ML SOAJ INJECT 1 ML UNDER THE SKIN INTO THE APPROPRIATE AREA AS DIRECTED EVERY 14 DAYS.      ezetimibe (ZETIA) 10 MG tablet Take 10 mg by mouth daily      hydroCHLOROthiazide (HYDRODIURIL) 25 MG tablet Take 25 mg by mouth daily      metoprolol succinate (TOPROL XL) 100 MG extended release tablet Take 100 mg by mouth daily      folic acid (FOLVITE) 667 MCG tablet Take 800 mcg by mouth daily      XARELTO 20 MG TABS tablet TAKE 1 TABLET BY MOUTH EVERY DAY      coenzyme Q10 100 MG CAPS capsule Take 200 mg by mouth daily      calcium carbonate 1500 (600 Ca) MG TABS tablet Take 600 mg by mouth daily      vitamin D (CHOLECALCIFEROL) 125 MCG (5000 UT) CAPS capsule Take 5,000 Units by mouth daily      potassium chloride (KLOR-CON M) 20 MEQ extended release tablet Take 20 mEq by mouth 2 times daily      vitamin A 3 MG (66224 UT) capsule Take 10,000 Units by mouth daily 213 lb (96.6 kg)   BMI 36.56 kg/m²   Body mass index is 36.56 kg/m². General Appearance: well developed  and well nourished  Head/ Face: normocephalic and atraumatic  Vocal Quality: good/ normal  Ears: Right Ear: External: external ears normal Otoscopy Ear Canal: canal clear Otoscopy TM: TM's normal and TM's mobile Left Ear: External: external ears normal Otoscopy Ear Canal: canal clear Otoscopy TM: TM's dull and TM's sluggish  Hearing: Luo L  Nose: nares normal and septum midline  Neck: supple and adenopathy none palpable  Thyroid: normal  Oral exam demonstrated the tongue to be midline with the posterior pharynx noted be unremarkable to exam.    Assessment & Plan:    Problem List Items Addressed This Visit       Eustachian tube dysfunction, left - Primary     Clinically improving left-sided eustachian tube dysfunction with middle ear effusion  Plan: I have recommended another round of a Medrol Dosepak and will continue the Afrin nasal spray and Flonase. The patient does not like the feeling of the Afrin spray and is requesting an alternate. I recommended alternating the Afrin with Hodgeman nasal spray. She is to follow-up in 2 weeks for reevaluation. No orders of the defined types were placed in this encounter. Orders Placed This Encounter   Medications    sodium chloride (OCEAN NASAL SPRAY) 0.65 % nasal spray     Si sprays by Nasal route in the morning and 2 sprays in the evening. Dispense:  1 each     Refill:  3    methylPREDNISolone (MEDROL, HUMBERTO,) 4 MG tablet     Sig: Take by mouth as directed     Dispense:  1 kit     Refill:  0       Electronically signed by Maren Pagan PA-C on 22 at 6:04 PM CDT        Please note that this chart was generated using dragon dictation software. Although every effort was made to ensure the accuracy of this automated transcription, some errors in transcription may have occurred.

## 2022-09-07 NOTE — ASSESSMENT & PLAN NOTE
Clinically improving left-sided eustachian tube dysfunction with middle ear effusion  Plan: I have recommended another round of a Medrol Dosepak and will continue the Afrin nasal spray and Flonase. The patient does not like the feeling of the Afrin spray and is requesting an alternate. I recommended alternating the Afrin with Sullivan nasal spray. She is to follow-up in 2 weeks for reevaluation.

## 2022-09-27 NOTE — ANESTHESIA POSTPROCEDURE EVALUATION
Patient: Christiana Hendrix    Procedure Summary     Date: 09/27/21 Room / Location: [facility] CATH LAB    Anesthesia Start: 1108 Anesthesia Stop: 1124    Procedure: CARDIOVERSION EXTERNAL IN CARDIOLOGY DEPARTMENT Diagnosis: (Atrial Fibrillaiton)    Scheduled Providers: Choco Diallo MD Provider: Alin Lucas CRNA    Anesthesia Type: MAC ASA Status: 3          Anesthesia Type: MAC    Vitals  Vitals Value Taken Time   /81 09/27/21 1130   Temp     Pulse 95 09/27/21 1130   Resp 21 09/27/21 1130   SpO2 94 % 09/27/21 1130           Post Anesthesia Care and Evaluation    Patient location during evaluation: PHASE II  Level of consciousness: awake and alert    Airway patency: patent  Anesthetic complications: No anesthetic complications    Cardiovascular status: acceptable  Respiratory status: acceptable  Hydration status: acceptable  Post Neuraxial Block status: Motor and sensory function returned to baseline and No signs or symptoms of PDPH

## 2022-10-03 ENCOUNTER — TRANSCRIBE ORDERS (OUTPATIENT)
Dept: ADMINISTRATIVE | Facility: HOSPITAL | Age: 72
End: 2022-10-03

## 2022-10-03 DIAGNOSIS — Z12.31 SCREENING MAMMOGRAM FOR BREAST CANCER: Primary | ICD-10-CM

## 2022-10-11 ENCOUNTER — HOSPITAL ENCOUNTER (OUTPATIENT)
Dept: MAMMOGRAPHY | Facility: HOSPITAL | Age: 72
Discharge: HOME | End: 2022-10-11
Admitting: NURSE PRACTITIONER

## 2022-10-11 PROCEDURE — 77063 BREAST TOMOSYNTHESIS BI: CPT

## 2022-10-11 PROCEDURE — 77067 SCR MAMMO BI INCL CAD: CPT

## 2022-11-18 RX ORDER — FLUTICASONE PROPIONATE 50 MCG
2 SPRAY, SUSPENSION (ML) NASAL 2 TIMES DAILY
Qty: 1 EACH | Refills: 2 | Status: SHIPPED | OUTPATIENT
Start: 2022-11-18

## 2022-12-01 RX ORDER — FLUTICASONE PROPIONATE 50 MCG
2 SPRAY, SUSPENSION (ML) NASAL 2 TIMES DAILY
OUTPATIENT
Start: 2022-12-01

## 2022-12-01 RX ORDER — EVOLOCUMAB 140 MG/ML
INJECTION, SOLUTION SUBCUTANEOUS
Qty: 2 ML | Refills: 11 | Status: SHIPPED | OUTPATIENT
Start: 2022-12-01 | End: 2023-04-06

## 2022-12-06 ENCOUNTER — OFFICE VISIT (OUTPATIENT)
Dept: ENT CLINIC | Age: 72
End: 2022-12-06
Payer: MEDICARE

## 2022-12-06 VITALS
SYSTOLIC BLOOD PRESSURE: 162 MMHG | HEIGHT: 64 IN | DIASTOLIC BLOOD PRESSURE: 80 MMHG | WEIGHT: 213 LBS | BODY MASS INDEX: 36.37 KG/M2

## 2022-12-06 DIAGNOSIS — H69.82 EUSTACHIAN TUBE DYSFUNCTION, LEFT: Primary | ICD-10-CM

## 2022-12-06 PROCEDURE — 1123F ACP DISCUSS/DSCN MKR DOCD: CPT | Performed by: PHYSICIAN ASSISTANT

## 2022-12-06 PROCEDURE — G8484 FLU IMMUNIZE NO ADMIN: HCPCS | Performed by: PHYSICIAN ASSISTANT

## 2022-12-06 PROCEDURE — G8417 CALC BMI ABV UP PARAM F/U: HCPCS | Performed by: PHYSICIAN ASSISTANT

## 2022-12-06 PROCEDURE — G8400 PT W/DXA NO RESULTS DOC: HCPCS | Performed by: PHYSICIAN ASSISTANT

## 2022-12-06 PROCEDURE — 1090F PRES/ABSN URINE INCON ASSESS: CPT | Performed by: PHYSICIAN ASSISTANT

## 2022-12-06 PROCEDURE — 1036F TOBACCO NON-USER: CPT | Performed by: PHYSICIAN ASSISTANT

## 2022-12-06 PROCEDURE — 99213 OFFICE O/P EST LOW 20 MIN: CPT | Performed by: PHYSICIAN ASSISTANT

## 2022-12-06 PROCEDURE — G8427 DOCREV CUR MEDS BY ELIG CLIN: HCPCS | Performed by: PHYSICIAN ASSISTANT

## 2022-12-06 PROCEDURE — 3017F COLORECTAL CA SCREEN DOC REV: CPT | Performed by: PHYSICIAN ASSISTANT

## 2022-12-06 RX ORDER — OXYMETAZOLINE HYDROCHLORIDE 0.05 G/100ML
SPRAY NASAL
Qty: 30 ML | Refills: 0 | Status: SHIPPED | OUTPATIENT
Start: 2022-12-06

## 2022-12-06 RX ORDER — METHYLPREDNISOLONE 4 MG/1
TABLET ORAL
Qty: 1 KIT | Refills: 0 | Status: SHIPPED | OUTPATIENT
Start: 2022-12-06

## 2022-12-06 ASSESSMENT — ENCOUNTER SYMPTOMS
EYE PAIN: 0
SINUS PAIN: 0
VOICE CHANGE: 0
FACIAL SWELLING: 0
SINUS PRESSURE: 0
TROUBLE SWALLOWING: 0
SORE THROAT: 0
EYE DISCHARGE: 0
RHINORRHEA: 0

## 2022-12-06 NOTE — ASSESSMENT & PLAN NOTE
Eustachian tube dysfunction of the left ear  Plan: I will place the patient on Medrol Dosepak and Afrin nasal spray. She is to follow-up in 2 weeks for reevaluation.

## 2022-12-06 NOTE — PROGRESS NOTES
[facility] OTOLARYNGOLOGY/ENT  Mrs. Casey Wong is a pleasant 73-year-old  female that presents for evaluation of the left ear due to muffled hearing and ear pain. She reports that 2 weeks ago she had type A influenza that triggered muffled hearing as well as sporadic problems with vertigo. She reports that the vertigo will happen when she turns her head or bends over and will last for few minutes. She denies any issues with fever, chills, or any additional problems. Allergies: Apixaban, Erythromycin, and Sulfa antibiotics      Current Outpatient Medications   Medication Sig Dispense Refill    methylPREDNISolone (MEDROL, HUMBERTO,) 4 MG tablet Take by mouth as directed 1 kit 0    oxymetazoline (AFRIN) 0.05 % nasal spray 2 squirts to each nostril twice a day x 7 days 30 mL 0    amLODIPine (NORVASC) 10 MG tablet Take 15 mg by mouth daily      fluticasone (FLONASE) 50 MCG/ACT nasal spray 2 SPRAYS BY EACH NOSTRIL ROUTE IN THE MORNING AND 2 SPRAYS IN THE EVENING. 1 each 2    LINZESS 72 MCG CAPS capsule TAKE 1 CAPSULE BY MOUTH 30 MIN BEFORE BREAKFAST. IF INEFFECTIVE TAKE WITH BREAKFAST (Patient not taking: Reported on 12/6/2022)      sodium chloride (OCEAN NASAL SPRAY) 0.65 % nasal spray 2 sprays by Nasal route in the morning and 2 sprays in the evening.  1 each 3    GLUCOSAMINE-CHONDROIT-BIOFL-MN PO Take by mouth      atorvastatin (LIPITOR) 80 MG tablet Take 80 mg by mouth daily      REPATHA SURECLICK 723 MG/ML SOAJ INJECT 1 ML UNDER THE SKIN INTO THE APPROPRIATE AREA AS DIRECTED EVERY 14 DAYS.      ezetimibe (ZETIA) 10 MG tablet Take 10 mg by mouth daily      hydroCHLOROthiazide (HYDRODIURIL) 25 MG tablet Take 25 mg by mouth daily      metoprolol succinate (TOPROL XL) 100 MG extended release tablet Take 100 mg by mouth daily      folic acid (FOLVITE) 471 MCG tablet Take 800 mcg by mouth daily      XARELTO 20 MG TABS tablet TAKE 1 TABLET BY MOUTH EVERY DAY      coenzyme Q10 100 MG CAPS capsule Take 200 mg by mouth daily      calcium carbonate 1500 (600 Ca) MG TABS tablet Take 600 mg by mouth daily      vitamin D (CHOLECALCIFEROL) 125 MCG (5000 UT) CAPS capsule Take 5,000 Units by mouth daily      potassium chloride (KLOR-CON M) 20 MEQ extended release tablet Take 20 mEq by mouth 2 times daily      vitamin A 3 MG (43358 UT) capsule Take 10,000 Units by mouth daily      albuterol sulfate HFA (PROVENTIL;VENTOLIN;PROAIR) 108 (90 Base) MCG/ACT inhaler Inhale 2 puffs into the lungs every 4 hours as needed      levocetirizine (XYZAL) 5 MG tablet Take 5 mg by mouth every evening      fluticasone (VERAMYST) 27.5 MCG/SPRAY nasal spray 2 sprays by Nasal route daily      montelukast (SINGULAIR) 10 MG tablet Take 10 mg by mouth nightly      cetirizine-psuedoephedrine (ZYRTEC-D) 5-120 MG per extended release tablet Take 1 tablet by mouth 2 times daily       No current facility-administered medications for this visit. Past Surgical History:   Procedure Laterality Date    BLADDER REPAIR      CHOLECYSTECTOMY      HYSTERECTOMY, TOTAL ABDOMINAL (CERVIX REMOVED)      VASCULAR SURGERY         Past Medical History:   Diagnosis Date    Arthritis     Asthma     Hearing loss     Hypertension     Tinnitus        Family History   Problem Relation Age of Onset    Thyroid Disease Mother     Cancer Father     Cancer Sister     Cancer Maternal Aunt     Cancer Paternal Grandfather        Social History     Tobacco Use    Smoking status: Never    Smokeless tobacco: Never   Substance Use Topics    Alcohol use: Not Currently           REVIEW OF SYSTEMS:  all other systems reviewed and are negative  Review of Systems   Constitutional:  Negative for chills and fever. HENT:  Negative for congestion, dental problem, ear discharge, ear pain, facial swelling, hearing loss, nosebleeds, postnasal drip, rhinorrhea, sinus pressure, sinus pain, sneezing, sore throat, tinnitus, trouble swallowing and voice change. Eyes:  Negative for pain and discharge. Neurological:  Negative for dizziness and headaches. Comments:     PHYSICAL EXAM:    BP (!) 162/80   Ht 5' 4\" (1.626 m)   Wt 213 lb (96.6 kg)   BMI 36.56 kg/m²   Body mass index is 36.56 kg/m². General Appearance: well developed  and well nourished  Head/ Face: normocephalic and atraumatic  Vocal Quality: good/ normal  Ears: Right Ear: External: external ears normal Otoscopy Ear Canal: canal clear Otoscopy TM: TM's normal and TM's mobile Left Ear: External: external ears normal Otoscopy Ear Canal: canal clear Otoscopy TM: TM's dull and TM's sluggish  Hearing: Luo L  Nose: nares normal and septum midline  Neck: supple and adenopathy none palpable  Thyroid: normal  Oral exam demonstrated the tongue to be midline with no abnormalities to the posterior pharynx. No sinus tenderness was appreciated. Assessment & Plan:    Problem List Items Addressed This Visit       Eustachian tube dysfunction, left - Primary     Eustachian tube dysfunction of the left ear  Plan: I will place the patient on Medrol Dosepak and Afrin nasal spray. She is to follow-up in 2 weeks for reevaluation. No orders of the defined types were placed in this encounter. Orders Placed This Encounter   Medications    methylPREDNISolone (MEDROL, HUMBERTO,) 4 MG tablet     Sig: Take by mouth as directed     Dispense:  1 kit     Refill:  0    oxymetazoline (AFRIN) 0.05 % nasal spray     Si squirts to each nostril twice a day x 7 days     Dispense:  30 mL     Refill:  0       Electronically signed by Brissa Kuo PA-C on 22 at 12:21 PM CST        Please note that this chart was generated using dragon dictation software. Although every effort was made to ensure the accuracy of this automated transcription, some errors in transcription may have occurred.

## 2022-12-15 ENCOUNTER — LAB (OUTPATIENT)
Dept: LAB | Facility: HOSPITAL | Age: 72
End: 2022-12-15

## 2022-12-15 ENCOUNTER — TRANSCRIBE ORDERS (OUTPATIENT)
Dept: LAB | Facility: HOSPITAL | Age: 72
End: 2022-12-15

## 2022-12-15 DIAGNOSIS — E53.8 VITAMIN B 12 DEFICIENCY: Primary | ICD-10-CM

## 2022-12-15 DIAGNOSIS — E55.9 VITAMIN D DEFICIENCY: ICD-10-CM

## 2022-12-15 DIAGNOSIS — I10 ESSENTIAL (PRIMARY) HYPERTENSION: ICD-10-CM

## 2022-12-15 DIAGNOSIS — E16.8 OTHER SPECIFIED DISORDERS OF PANCREATIC INTERNAL SECRETION: ICD-10-CM

## 2022-12-15 DIAGNOSIS — R68.89 OTHER GENERAL SYMPTOMS AND SIGNS: ICD-10-CM

## 2022-12-15 DIAGNOSIS — Z00.00 ROUTINE GENERAL MEDICAL EXAMINATION AT A HEALTH CARE FACILITY: ICD-10-CM

## 2022-12-15 DIAGNOSIS — E53.8 DEFICIENCY OF OTHER SPECIFIED B GROUP VITAMINS: ICD-10-CM

## 2022-12-15 DIAGNOSIS — E53.8 VITAMIN B 12 DEFICIENCY: ICD-10-CM

## 2022-12-15 LAB
25(OH)D3 SERPL-MCNC: 76.5 NG/ML (ref 30–100)
ALBUMIN SERPL-MCNC: 3.8 G/DL (ref 3.5–5.2)
ALBUMIN/GLOB SERPL: 1.3 G/DL
ALP SERPL-CCNC: 142 U/L (ref 39–117)
ALT SERPL W P-5'-P-CCNC: 11 U/L (ref 1–33)
ANION GAP SERPL CALCULATED.3IONS-SCNC: 8 MMOL/L (ref 5–15)
AST SERPL-CCNC: 11 U/L (ref 1–32)
BASOPHILS # BLD AUTO: 0.03 10*3/MM3 (ref 0–0.2)
BASOPHILS NFR BLD AUTO: 0.3 % (ref 0–1.5)
BILIRUB SERPL-MCNC: 0.9 MG/DL (ref 0–1.2)
BUN SERPL-MCNC: 15 MG/DL (ref 8–23)
BUN/CREAT SERPL: 21.4 (ref 7–25)
CALCIUM SPEC-SCNC: 9.4 MG/DL (ref 8.6–10.5)
CHLORIDE SERPL-SCNC: 106 MMOL/L (ref 98–107)
CHOLEST SERPL-MCNC: 120 MG/DL (ref 0–200)
CO2 SERPL-SCNC: 29 MMOL/L (ref 22–29)
CREAT SERPL-MCNC: 0.7 MG/DL (ref 0.57–1)
DEPRECATED RDW RBC AUTO: 53.4 FL (ref 37–54)
EGFRCR SERPLBLD CKD-EPI 2021: 92.6 ML/MIN/1.73
EOSINOPHIL # BLD AUTO: 0.37 10*3/MM3 (ref 0–0.4)
EOSINOPHIL NFR BLD AUTO: 4.2 % (ref 0.3–6.2)
ERYTHROCYTE [DISTWIDTH] IN BLOOD BY AUTOMATED COUNT: 16.9 % (ref 12.3–15.4)
GLOBULIN UR ELPH-MCNC: 2.9 GM/DL
GLUCOSE SERPL-MCNC: 102 MG/DL (ref 65–99)
HBA1C MFR BLD: 5.8 % (ref 4.8–5.6)
HCT VFR BLD AUTO: 41.4 % (ref 34–46.6)
HDLC SERPL-MCNC: 72 MG/DL (ref 40–60)
HGB BLD-MCNC: 11.9 G/DL (ref 12–15.9)
IMM GRANULOCYTES # BLD AUTO: 0.13 10*3/MM3 (ref 0–0.05)
IMM GRANULOCYTES NFR BLD AUTO: 1.5 % (ref 0–0.5)
LDLC SERPL CALC-MCNC: 33 MG/DL (ref 0–100)
LDLC/HDLC SERPL: 0.46 {RATIO}
LYMPHOCYTES # BLD AUTO: 1.39 10*3/MM3 (ref 0.7–3.1)
LYMPHOCYTES NFR BLD AUTO: 15.7 % (ref 19.6–45.3)
MCH RBC QN AUTO: 24.8 PG (ref 26.6–33)
MCHC RBC AUTO-ENTMCNC: 28.7 G/DL (ref 31.5–35.7)
MCV RBC AUTO: 86.3 FL (ref 79–97)
MONOCYTES # BLD AUTO: 0.93 10*3/MM3 (ref 0.1–0.9)
MONOCYTES NFR BLD AUTO: 10.5 % (ref 5–12)
NEUTROPHILS NFR BLD AUTO: 6.03 10*3/MM3 (ref 1.7–7)
NEUTROPHILS NFR BLD AUTO: 67.8 % (ref 42.7–76)
NRBC BLD AUTO-RTO: 0.2 /100 WBC (ref 0–0.2)
PLATELET # BLD AUTO: 278 10*3/MM3 (ref 140–450)
PMV BLD AUTO: 10.3 FL (ref 6–12)
POTASSIUM SERPL-SCNC: 4.3 MMOL/L (ref 3.5–5.2)
PROT SERPL-MCNC: 6.7 G/DL (ref 6–8.5)
RBC # BLD AUTO: 4.8 10*6/MM3 (ref 3.77–5.28)
SODIUM SERPL-SCNC: 143 MMOL/L (ref 136–145)
TRIGL SERPL-MCNC: 75 MG/DL (ref 0–150)
TSH SERPL DL<=0.05 MIU/L-ACNC: 0.81 UIU/ML (ref 0.27–4.2)
VIT B12 BLD-MCNC: 473 PG/ML (ref 211–946)
VLDLC SERPL-MCNC: 15 MG/DL (ref 5–40)
WBC NRBC COR # BLD: 8.88 10*3/MM3 (ref 3.4–10.8)

## 2022-12-15 PROCEDURE — 80061 LIPID PANEL: CPT

## 2022-12-15 PROCEDURE — 82607 VITAMIN B-12: CPT

## 2022-12-15 PROCEDURE — 83036 HEMOGLOBIN GLYCOSYLATED A1C: CPT

## 2022-12-15 PROCEDURE — 85025 COMPLETE CBC W/AUTO DIFF WBC: CPT

## 2022-12-15 PROCEDURE — 82306 VITAMIN D 25 HYDROXY: CPT

## 2022-12-15 PROCEDURE — 84443 ASSAY THYROID STIM HORMONE: CPT

## 2022-12-15 PROCEDURE — 36415 COLL VENOUS BLD VENIPUNCTURE: CPT

## 2022-12-15 PROCEDURE — 80053 COMPREHEN METABOLIC PANEL: CPT

## 2022-12-20 ENCOUNTER — OFFICE VISIT (OUTPATIENT)
Dept: ENT CLINIC | Age: 72
End: 2022-12-20
Payer: MEDICARE

## 2022-12-20 VITALS
SYSTOLIC BLOOD PRESSURE: 138 MMHG | DIASTOLIC BLOOD PRESSURE: 76 MMHG | WEIGHT: 216 LBS | HEIGHT: 64 IN | BODY MASS INDEX: 36.88 KG/M2

## 2022-12-20 DIAGNOSIS — H69.82 EUSTACHIAN TUBE DYSFUNCTION, LEFT: Primary | ICD-10-CM

## 2022-12-20 PROCEDURE — 1090F PRES/ABSN URINE INCON ASSESS: CPT | Performed by: PHYSICIAN ASSISTANT

## 2022-12-20 PROCEDURE — G8417 CALC BMI ABV UP PARAM F/U: HCPCS | Performed by: PHYSICIAN ASSISTANT

## 2022-12-20 PROCEDURE — G8484 FLU IMMUNIZE NO ADMIN: HCPCS | Performed by: PHYSICIAN ASSISTANT

## 2022-12-20 PROCEDURE — 1123F ACP DISCUSS/DSCN MKR DOCD: CPT | Performed by: PHYSICIAN ASSISTANT

## 2022-12-20 PROCEDURE — 99213 OFFICE O/P EST LOW 20 MIN: CPT | Performed by: PHYSICIAN ASSISTANT

## 2022-12-20 PROCEDURE — G8427 DOCREV CUR MEDS BY ELIG CLIN: HCPCS | Performed by: PHYSICIAN ASSISTANT

## 2022-12-20 PROCEDURE — G8400 PT W/DXA NO RESULTS DOC: HCPCS | Performed by: PHYSICIAN ASSISTANT

## 2022-12-20 PROCEDURE — 3017F COLORECTAL CA SCREEN DOC REV: CPT | Performed by: PHYSICIAN ASSISTANT

## 2022-12-20 PROCEDURE — 1036F TOBACCO NON-USER: CPT | Performed by: PHYSICIAN ASSISTANT

## 2022-12-20 NOTE — PROGRESS NOTES
Ms. Claire Carter is a pleasant 71-year-old  female that presents for 2-week follow-up after treatment for left-sided eustachian tube dysfunction. Patient reports that her ear feels much improved and she is no longer experiencing muffled hearing or otalgia. Physical examination with the microscope revealed the patient to have normal TM and canals bilaterally with normal ability to pneumonic exam.  Neck exam demonstrated no obvious lymphadenopathy or thyromegaly. Impression: Clinically resolved left eustachian tube dysfunction      Plan: Patient is to follow-up with me as needed. She was reminded to call if she has any questions or problems.       Electronically signed by Jason Acosta PA-C on 12/20/22 at 5:22 PM CST

## 2023-01-06 ENCOUNTER — OFFICE VISIT (OUTPATIENT)
Dept: CARDIOLOGY | Facility: CLINIC | Age: 73
End: 2023-01-06
Payer: MEDICARE

## 2023-01-06 VITALS
BODY MASS INDEX: 37.05 KG/M2 | DIASTOLIC BLOOD PRESSURE: 66 MMHG | SYSTOLIC BLOOD PRESSURE: 124 MMHG | WEIGHT: 217 LBS | HEART RATE: 85 BPM | HEIGHT: 64 IN | OXYGEN SATURATION: 95 %

## 2023-01-06 DIAGNOSIS — I10 ESSENTIAL HYPERTENSION: ICD-10-CM

## 2023-01-06 DIAGNOSIS — E78.2 MIXED HYPERLIPIDEMIA: ICD-10-CM

## 2023-01-06 DIAGNOSIS — I48.0 PAF (PAROXYSMAL ATRIAL FIBRILLATION): Primary | ICD-10-CM

## 2023-01-06 DIAGNOSIS — I25.10 CORONARY ARTERY DISEASE INVOLVING NATIVE CORONARY ARTERY OF NATIVE HEART WITHOUT ANGINA PECTORIS: ICD-10-CM

## 2023-01-06 PROCEDURE — 99214 OFFICE O/P EST MOD 30 MIN: CPT | Performed by: INTERNAL MEDICINE

## 2023-01-06 PROCEDURE — 93000 ELECTROCARDIOGRAM COMPLETE: CPT | Performed by: INTERNAL MEDICINE

## 2023-01-06 NOTE — ASSESSMENT & PLAN NOTE
The patient denies chest pain, shortness of breath, dyspnea on exertion, orthopnea, PND, edema. There is no evidence of ongoing ischemia

## 2023-01-06 NOTE — ASSESSMENT & PLAN NOTE
S/p ablation with no evidence of recurrence. Will not follow up with Dr. Fernando. Requests to follow up with Dr. Cantor

## 2023-01-06 NOTE — PROGRESS NOTES
Referring Provider: Nasir Almeida MD    Reason for Follow-up Visit: CAD    Subjective .   Chief Complaint:   Chief Complaint   Patient presents with   • Atrial Fibrillation     Paroxysmal.  1 year f/u.  Has not felt any further palpitations since Dr. Fernando increased her Amlodipine.  No current cardiac complaints.   • Coronary Artery Disease     Lipid in chart from 12/15/22 with LDL 33.       History of present illness:  Christiana Hendrix is a 72 y.o. yo female with CAD, s/p SARBJIT in the past, s/p ablation for afib by Dr. Fernando in the past in for routine follow up. She has no complaints       History  Past Medical History:   Diagnosis Date   • Asthma    • Atrial fibrillation (HCC)    • CAD in native artery    • Hyperlipidemia    • Hypertension    • MI, old    ,   Past Surgical History:   Procedure Laterality Date   • ABLATION OF DYSRHYTHMIC FOCUS     • BLADDER NECK SUSPENSION     • CARDIOVERSION     • CHOLECYSTECTOMY     • COLONOSCOPY W/ BIOPSIES     • CORONARY ANGIOPLASTY  08/13/2007    had stents 2003   • HYSTERECTOMY     ,   Family History   Problem Relation Age of Onset   • Dementia Mother    • Heart attack Sister    • Heart disease Sister    • Diverticulitis Sister    • Brain cancer Father    • Heart disease Maternal Grandmother    • Heart attack Maternal Grandmother    • Stroke Maternal Grandfather    • No Known Problems Paternal Grandmother    • Brain cancer Paternal Grandfather    • Breast cancer Sister    • Breast cancer Paternal Aunt    • Ovarian cancer Neg Hx    • Uterine cancer Neg Hx    • Colon cancer Neg Hx    ,   Social History     Tobacco Use   • Smoking status: Never   • Smokeless tobacco: Never   Vaping Use   • Vaping Use: Never used   Substance Use Topics   • Alcohol use: Not Currently     Alcohol/week: 3.0 standard drinks     Types: 2 Glasses of wine, 1 Shots of liquor per week   • Drug use: Never   ,     Medications  Current Outpatient Medications   Medication Sig Dispense Refill   •  albuterol (PROVENTIL HFA;VENTOLIN HFA) 108 (90 Base) MCG/ACT inhaler Inhale 2 puffs Every 4 (Four) Hours As Needed for Wheezing.     • amLODIPine (NORVASC) 10 MG tablet Take 10 mg by mouth 2 (Two) Times a Day. Takes 1 po QAM & 1/2 tablet po QPM     • atorvastatin (LIPITOR) 80 MG tablet Take 80 mg by mouth Daily.     • Cholecalciferol (VITAMIN D3) 125 MCG (5000 UT) capsule capsule Take 5,000 Units by mouth Daily.     • coenzyme Q10 100 MG capsule Take 200 mg by mouth Daily.     • ezetimibe (ZETIA) 10 MG tablet Take 10 mg by mouth Daily.     • fluticasone (VERAMYST) 27.5 MCG/SPRAY nasal spray 2 sprays into each nostril Daily.     • folic acid (FOLVITE) 800 MCG tablet Take 800 mcg by mouth Daily.     • Glucosamine-Chondroit-Vit C-Mn (GLUCOSAMINE 1500 COMPLEX PO) Take  by mouth.     • levocetirizine (XYZAL) 5 MG tablet Take 5 mg by mouth Every Evening.     • metoprolol succinate XL (TOPROL-XL) 100 MG 24 hr tablet Take 100 mg by mouth Daily.     • montelukast (SINGULAIR) 10 MG tablet Take 10 mg by mouth Every Night.     • nitroglycerin (NITROSTAT) 0.4 MG SL tablet Place 1 tablet under the tongue Every 5 (Five) Minutes As Needed for Chest Pain. Take no more than 3 doses in 15 minutes. 30 tablet 5   • potassium chloride (K-DUR,KLOR-CON) 20 MEQ CR tablet Take 20 mEq by mouth 2 (Two) Times a Day.     • Repatha SureClick solution auto-injector SureClick injection INJECT 1 ML UNDER THE SKIN INTO THE APPROPRIATE AREA AS DIRECTED EVERY 14 DAYS. 2 mL 11   • rivaroxaban (Xarelto) 20 MG tablet Take 1 tablet by mouth Daily. 90 tablet 3   • tamsulosin (FLOMAX) 0.4 MG capsule 24 hr capsule Take 1 capsule by mouth Every Night. (Patient taking differently: Take 1 capsule by mouth 1 (One) Time As Needed.) 30 capsule 0   • vitamin A 66411 UNIT capsule Take 10,000 Units by mouth Daily.     • acetaminophen (TYLENOL) 500 MG tablet Take 500 mg by mouth Every 6 (Six) Hours As Needed for Mild Pain .     • calcium carbonate (OS-DONNY) 600 MG  tablet Take 600 mg by mouth Daily. 2 qd     • hydrochlorothiazide (HYDRODIURIL) 25 MG tablet Take 25 mg by mouth Daily.     • niacin (NIASPAN) 500 MG CR tablet        No current facility-administered medications for this visit.       Allergies:  Eliquis [apixaban], Erythromycin, and Sulfa antibiotics    Review of Systems  Review of Systems   HENT: Negative for nosebleeds.    Cardiovascular: Negative for chest pain, claudication, dyspnea on exertion, leg swelling, near-syncope, orthopnea, palpitations, paroxysmal nocturnal dyspnea and syncope.   Respiratory: Negative for hemoptysis and shortness of breath.    Musculoskeletal: Positive for back pain.   Gastrointestinal: Negative for melena.   Genitourinary: Negative for hematuria.       Objective     Physical Exam:  /66   Pulse 85   Ht 162.6 cm (64\")   Wt 98.4 kg (217 lb)   SpO2 95%   BMI 37.25 kg/m²   Pulmonary:      Effort: Pulmonary effort is normal.      Breath sounds: Normal breath sounds.   Cardiovascular:      Normal rate. Regular rhythm.      Murmurs: There is no murmur.   Edema:     Ankle: bilateral 1+ edema of the ankle.        Results Review:    ECG 12 Lead    Date/Time: 1/6/2023 8:47 AM  Performed by: Choco Diallo MD  Authorized by: Choco Diallo MD   Comparison: compared with previous ECG   Similar to previous ECG  Rhythm: sinus rhythm  Rate: normal  Conduction: conduction normal  ST Segments: ST segments normal  T Waves: T waves normal  QRS axis: normal    Clinical impression: normal ECG            Lab on 12/15/2022   Component Date Value Ref Range Status   • Glucose 12/15/2022 102 (H)  65 - 99 mg/dL Final   • BUN 12/15/2022 15  8 - 23 mg/dL Final   • Creatinine 12/15/2022 0.70  0.57 - 1.00 mg/dL Final   • Sodium 12/15/2022 143  136 - 145 mmol/L Final   • Potassium 12/15/2022 4.3  3.5 - 5.2 mmol/L Final   • Chloride 12/15/2022 106  98 - 107 mmol/L Final   • CO2 12/15/2022 29.0  22.0 - 29.0 mmol/L Final   • Calcium 12/15/2022 9.4  8.6 - 10.5  mg/dL Final   • Total Protein 12/15/2022 6.7  6.0 - 8.5 g/dL Final   • Albumin 12/15/2022 3.80  3.50 - 5.20 g/dL Final   • ALT (SGPT) 12/15/2022 11  1 - 33 U/L Final   • AST (SGOT) 12/15/2022 11  1 - 32 U/L Final   • Alkaline Phosphatase 12/15/2022 142 (H)  39 - 117 U/L Final   • Total Bilirubin 12/15/2022 0.9  0.0 - 1.2 mg/dL Final   • Globulin 12/15/2022 2.9  gm/dL Final   • A/G Ratio 12/15/2022 1.3  g/dL Final   • BUN/Creatinine Ratio 12/15/2022 21.4  7.0 - 25.0 Final   • Anion Gap 12/15/2022 8.0  5.0 - 15.0 mmol/L Final   • eGFR 12/15/2022 92.6  >60.0 mL/min/1.73 Final    National Kidney Foundation and American Society of Nephrology (ASN) Task Force recommended calculation based on the Chronic Kidney Disease Epidemiology Collaboration (CKD-EPI) equation refit without adjustment for race.   • TSH 12/15/2022 0.805  0.270 - 4.200 uIU/mL Final   • Total Cholesterol 12/15/2022 120  0 - 200 mg/dL Final   • Triglycerides 12/15/2022 75  0 - 150 mg/dL Final   • HDL Cholesterol 12/15/2022 72 (H)  40 - 60 mg/dL Final   • LDL Cholesterol  12/15/2022 33  0 - 100 mg/dL Final   • VLDL Cholesterol 12/15/2022 15  5 - 40 mg/dL Final   • LDL/HDL Ratio 12/15/2022 0.46   Final   • 25 Hydroxy, Vitamin D 12/15/2022 76.5  30.0 - 100.0 ng/ml Final   • Vitamin B-12 12/15/2022 473  211 - 946 pg/mL Final   • Hemoglobin A1C 12/15/2022 5.80 (H)  4.80 - 5.60 % Final   • WBC 12/15/2022 8.88  3.40 - 10.80 10*3/mm3 Final   • RBC 12/15/2022 4.80  3.77 - 5.28 10*6/mm3 Final   • Hemoglobin 12/15/2022 11.9 (L)  12.0 - 15.9 g/dL Final   • Hematocrit 12/15/2022 41.4  34.0 - 46.6 % Final   • MCV 12/15/2022 86.3  79.0 - 97.0 fL Final   • MCH 12/15/2022 24.8 (L)  26.6 - 33.0 pg Final   • MCHC 12/15/2022 28.7 (L)  31.5 - 35.7 g/dL Final   • RDW 12/15/2022 16.9 (H)  12.3 - 15.4 % Final   • RDW-SD 12/15/2022 53.4  37.0 - 54.0 fl Final   • MPV 12/15/2022 10.3  6.0 - 12.0 fL Final   • Platelets 12/15/2022 278  140 - 450 10*3/mm3 Final   • Neutrophil %  12/15/2022 67.8  42.7 - 76.0 % Final   • Lymphocyte % 12/15/2022 15.7 (L)  19.6 - 45.3 % Final   • Monocyte % 12/15/2022 10.5  5.0 - 12.0 % Final   • Eosinophil % 12/15/2022 4.2  0.3 - 6.2 % Final   • Basophil % 12/15/2022 0.3  0.0 - 1.5 % Final   • Immature Grans % 12/15/2022 1.5 (H)  0.0 - 0.5 % Final   • Neutrophils, Absolute 12/15/2022 6.03  1.70 - 7.00 10*3/mm3 Final   • Lymphocytes, Absolute 12/15/2022 1.39  0.70 - 3.10 10*3/mm3 Final   • Monocytes, Absolute 12/15/2022 0.93 (H)  0.10 - 0.90 10*3/mm3 Final   • Eosinophils, Absolute 12/15/2022 0.37  0.00 - 0.40 10*3/mm3 Final   • Basophils, Absolute 12/15/2022 0.03  0.00 - 0.20 10*3/mm3 Final   • Immature Grans, Absolute 12/15/2022 0.13 (H)  0.00 - 0.05 10*3/mm3 Final   • nRBC 12/15/2022 0.2  0.0 - 0.2 /100 WBC Final       Assessment & Plan   Problem List Items Addressed This Visit        Cardiac and Vasculature    Coronary artery disease involving native coronary artery of native heart without angina pectoris    Current Assessment & Plan     The patient denies chest pain, shortness of breath, dyspnea on exertion, orthopnea, PND, edema. There is no evidence of ongoing ischemia           Mixed hyperlipidemia    Current Assessment & Plan     Excellent response to repatha         Essential hypertension    Current Assessment & Plan     Good control         PAF (paroxysmal atrial fibrillation) (HCC) - Primary    Current Assessment & Plan     S/p ablation with no evidence of recurrence. Will not follow up with Dr. Fernando. Requests to follow up with Dr. Cantor         Relevant Orders    Ambulatory Referral to Cardiac Electrophysiology       Medical Complexity  must have 2 out of 3     Moderate Complexity Level 4           1 of the following medical problems:          []One chronic illness with mild exacerbation         [x]Two or more stable chronic illness          []One new problem  []One acute illness with systemic symptoms    Complexity of Data  Reviewed (1 out of the  3 following categories)      Category 1 tests, documents, historian (must have 3 points)       []Review of prior external records  [x]Review of results of unique tests  []Ordering unique tests  []Assessment requires an independent historian    Category 2 Interpretation of tests   []Independent interpretation of test read by another doc    Category 3 Discuss Management/tests  []Discussion with external physician    Risk of complications and/or morbidity          [x]Prescription Drug Management

## 2023-01-11 ENCOUNTER — TELEPHONE (OUTPATIENT)
Dept: CARDIOLOGY | Facility: CLINIC | Age: 73
End: 2023-01-11
Payer: MEDICARE

## 2023-01-11 NOTE — TELEPHONE ENCOUNTER
PA Case: [id number], Status: Approved, Coverage Starts on: 10/12/2022 12:00:00 AM, Coverage Ends on: 1/11/2024 12:00:00 AM.FOR [employer]PAT[employer]

## 2023-03-16 ENCOUNTER — TELEPHONE (OUTPATIENT)
Dept: CARDIOLOGY | Facility: CLINIC | Age: 73
End: 2023-03-16
Payer: MEDICARE

## 2023-03-16 NOTE — TELEPHONE ENCOUNTER
Mailed pt info about [employer] and the form for her to sign to see if she is eligible for the insurance to cover.  She will need to bring it back or mail it back to me so I can get it sent in.  True Mota, CMA

## 2023-03-24 ENCOUNTER — HOSPITAL ENCOUNTER (EMERGENCY)
Facility: HOSPITAL | Age: 73
Discharge: HOME | End: 2023-03-24
Attending: EMERGENCY MEDICINE | Admitting: EMERGENCY MEDICINE
Payer: MEDICARE

## 2023-03-24 ENCOUNTER — APPOINTMENT (OUTPATIENT)
Dept: GENERAL RADIOLOGY | Facility: HOSPITAL | Age: 73
End: 2023-03-24
Payer: MEDICARE

## 2023-03-24 VITALS
HEIGHT: 64 IN | SYSTOLIC BLOOD PRESSURE: 146 MMHG | RESPIRATION RATE: 16 BRPM | HEART RATE: 92 BPM | TEMPERATURE: 97.8 F | OXYGEN SATURATION: 96 % | WEIGHT: 220 LBS | BODY MASS INDEX: 37.56 KG/M2 | DIASTOLIC BLOOD PRESSURE: 73 MMHG

## 2023-03-24 DIAGNOSIS — M79.18 MUSCULOSKELETAL PAIN: Primary | ICD-10-CM

## 2023-03-24 LAB
ALBUMIN SERPL-MCNC: 4.2 G/DL (ref 3.5–5.2)
ALBUMIN/GLOB SERPL: 1.4 G/DL
ALP SERPL-CCNC: 160 U/L (ref 39–117)
ALT SERPL W P-5'-P-CCNC: 8 U/L (ref 1–33)
ANION GAP SERPL CALCULATED.3IONS-SCNC: 11 MMOL/L (ref 5–15)
AST SERPL-CCNC: 14 U/L (ref 1–32)
BASOPHILS # BLD AUTO: 0.03 10*3/MM3 (ref 0–0.2)
BASOPHILS NFR BLD AUTO: 0.3 % (ref 0–1.5)
BILIRUB SERPL-MCNC: 1.4 MG/DL (ref 0–1.2)
BUN SERPL-MCNC: 24 MG/DL (ref 8–23)
BUN/CREAT SERPL: 35.3 (ref 7–25)
CALCIUM SPEC-SCNC: 9.3 MG/DL (ref 8.6–10.5)
CHLORIDE SERPL-SCNC: 107 MMOL/L (ref 98–107)
CO2 SERPL-SCNC: 23 MMOL/L (ref 22–29)
CREAT SERPL-MCNC: 0.68 MG/DL (ref 0.57–1)
DEPRECATED RDW RBC AUTO: 49.2 FL (ref 37–54)
EGFRCR SERPLBLD CKD-EPI 2021: 93.2 ML/MIN/1.73
EOSINOPHIL # BLD AUTO: 0.35 10*3/MM3 (ref 0–0.4)
EOSINOPHIL NFR BLD AUTO: 3.2 % (ref 0.3–6.2)
ERYTHROCYTE [DISTWIDTH] IN BLOOD BY AUTOMATED COUNT: 15.4 % (ref 12.3–15.4)
GLOBULIN UR ELPH-MCNC: 3 GM/DL
GLUCOSE SERPL-MCNC: 116 MG/DL (ref 65–99)
HCT VFR BLD AUTO: 44.1 % (ref 34–46.6)
HGB BLD-MCNC: 13.2 G/DL (ref 12–15.9)
IMM GRANULOCYTES # BLD AUTO: 0.03 10*3/MM3 (ref 0–0.05)
IMM GRANULOCYTES NFR BLD AUTO: 0.3 % (ref 0–0.5)
INR PPP: 1.58 (ref 0.91–1.09)
LIPASE SERPL-CCNC: 27 U/L (ref 13–60)
LYMPHOCYTES # BLD AUTO: 1.47 10*3/MM3 (ref 0.7–3.1)
LYMPHOCYTES NFR BLD AUTO: 13.3 % (ref 19.6–45.3)
MAGNESIUM SERPL-MCNC: 2 MG/DL (ref 1.6–2.4)
MCH RBC QN AUTO: 26.3 PG (ref 26.6–33)
MCHC RBC AUTO-ENTMCNC: 29.9 G/DL (ref 31.5–35.7)
MCV RBC AUTO: 87.8 FL (ref 79–97)
MONOCYTES # BLD AUTO: 0.97 10*3/MM3 (ref 0.1–0.9)
MONOCYTES NFR BLD AUTO: 8.8 % (ref 5–12)
NEUTROPHILS NFR BLD AUTO: 74.1 % (ref 42.7–76)
NEUTROPHILS NFR BLD AUTO: 8.23 10*3/MM3 (ref 1.7–7)
NRBC BLD AUTO-RTO: 0 /100 WBC (ref 0–0.2)
PLATELET # BLD AUTO: 247 10*3/MM3 (ref 140–450)
PMV BLD AUTO: 10.6 FL (ref 6–12)
POTASSIUM SERPL-SCNC: 4.6 MMOL/L (ref 3.5–5.2)
PROT SERPL-MCNC: 7.2 G/DL (ref 6–8.5)
PROTHROMBIN TIME: 19 SECONDS (ref 11.8–14.8)
RBC # BLD AUTO: 5.02 10*6/MM3 (ref 3.77–5.28)
SODIUM SERPL-SCNC: 141 MMOL/L (ref 136–145)
WBC NRBC COR # BLD: 11.08 10*3/MM3 (ref 3.4–10.8)

## 2023-03-24 PROCEDURE — 85025 COMPLETE CBC W/AUTO DIFF WBC: CPT | Performed by: EMERGENCY MEDICINE

## 2023-03-24 PROCEDURE — 83735 ASSAY OF MAGNESIUM: CPT | Performed by: EMERGENCY MEDICINE

## 2023-03-24 PROCEDURE — 85610 PROTHROMBIN TIME: CPT | Performed by: EMERGENCY MEDICINE

## 2023-03-24 PROCEDURE — 99284 EMERGENCY DEPT VISIT MOD MDM: CPT

## 2023-03-24 PROCEDURE — 80053 COMPREHEN METABOLIC PANEL: CPT | Performed by: EMERGENCY MEDICINE

## 2023-03-24 PROCEDURE — 71046 X-RAY EXAM CHEST 2 VIEWS: CPT

## 2023-03-24 PROCEDURE — 83690 ASSAY OF LIPASE: CPT | Performed by: EMERGENCY MEDICINE

## 2023-03-24 RX ORDER — SODIUM CHLORIDE 0.9 % (FLUSH) 0.9 %
10 SYRINGE (ML) INJECTION AS NEEDED
Status: DISCONTINUED | OUTPATIENT
Start: 2023-03-24 | End: 2023-03-24 | Stop reason: HOSPADM

## 2023-03-24 NOTE — ED PROVIDER NOTES
Subjective   History of Present Illness  72-year-old female presents to the ED with complaint of left lower anterior chest wall pain.  She has a history of hypertension, hyperlipidemia, A-fib.  Patient reports about a 10-day history of discomfort along the anterior left lower chest wall.  Pain is worse with palpation, worse with twisting bending and lifting.  She states she is fairly active and has been doing a lot of lifting of boxes of late.  No associated chest pain, shortness of breath, nausea, diaphoresis.  This does not seem to be exertional.  She states the pain has been continuous since onset and has not improved or worsened.  No associated anorexia, vomiting, diarrhea, weight loss.  She denies hematochezia and melena.  Given duration of symptoms she felt she should be evaluated was prompted visit to the emergency department.  Rates her pain as mild in severity with no alleviating factors.    History provided by:  Patient      Review of Systems   All other systems reviewed and are negative.      Past Medical History:   Diagnosis Date   • Asthma    • Atrial fibrillation (HCC)    • CAD in native artery    • Hyperlipidemia    • Hypertension    • MI, old        Allergies   Allergen Reactions   • Eliquis [Apixaban] GI Intolerance   • Erythromycin Rash   • Sulfa Antibiotics Rash       Past Surgical History:   Procedure Laterality Date   • ABLATION OF DYSRHYTHMIC FOCUS     • BLADDER NECK SUSPENSION     • CARDIOVERSION     • CHOLECYSTECTOMY     • COLONOSCOPY W/ BIOPSIES     • CORONARY ANGIOPLASTY  08/13/2007    had stents 2003   • HYSTERECTOMY         Family History   Problem Relation Age of Onset   • Dementia Mother    • Heart attack Sister    • Heart disease Sister    • Diverticulitis Sister    • Brain cancer Father    • Heart disease Maternal Grandmother    • Heart attack Maternal Grandmother    • Stroke Maternal Grandfather    • No Known Problems Paternal Grandmother    • Brain cancer Paternal Grandfather    •  Breast cancer Sister    • Breast cancer Paternal Aunt    • Ovarian cancer Neg Hx    • Uterine cancer Neg Hx    • Colon cancer Neg Hx        Social History     Socioeconomic History   • Marital status: Single   Tobacco Use   • Smoking status: Never   • Smokeless tobacco: Never   Vaping Use   • Vaping Use: Never used   Substance and Sexual Activity   • Alcohol use: Not Currently     Alcohol/week: 3.0 standard drinks     Types: 2 Glasses of wine, 1 Shots of liquor per week   • Drug use: Never   • Sexual activity: Defer           Objective   Physical Exam  Vitals and nursing note reviewed.   Constitutional:       Appearance: She is well-developed and normal weight.   HENT:      Head: Normocephalic and atraumatic.      Nose: Nose normal. No congestion or rhinorrhea.   Eyes:      Extraocular Movements: Extraocular movements intact.      Conjunctiva/sclera: Conjunctivae normal.      Pupils: Pupils are equal, round, and reactive to light.   Cardiovascular:      Rate and Rhythm: Normal rate and regular rhythm.      Heart sounds: Normal heart sounds. No murmur heard.  Pulmonary:      Effort: Pulmonary effort is normal.      Breath sounds: No wheezing, rhonchi or rales.   Abdominal:      General: Abdomen is flat. Bowel sounds are normal.      Palpations: Abdomen is soft.      Tenderness: There is no abdominal tenderness. There is no guarding.   Musculoskeletal:         General: No swelling or tenderness.   Skin:     General: Skin is warm and dry.      Capillary Refill: Capillary refill takes less than 2 seconds.      Findings: No rash.      Comments: No vesicular rash along the left lower anterior chest wall   Neurological:      General: No focal deficit present.      Mental Status: She is alert and oriented to person, place, and time. Mental status is at baseline.         Procedures       Lab Results (last 24 hours)     Procedure Component Value Units Date/Time    CBC & Differential [[id number]]  (Abnormal) Collected: 03/24/23  1115    Specimen: Blood Updated: 03/24/23 1124    Narrative:      The following orders were created for panel order CBC & Differential.  Procedure                               Abnormality         Status                     ---------                               -----------         ------                     CBC Auto Differential[[id number]]        Abnormal            Final result                 Please view results for these tests on the individual orders.    Comprehensive Metabolic Panel [[id number]]  (Abnormal) Collected: 03/24/23 1115    Specimen: Blood Updated: 03/24/23 1142     Glucose 116 mg/dL      BUN 24 mg/dL      Creatinine 0.68 mg/dL      Sodium 141 mmol/L      Potassium 4.6 mmol/L      Comment: Slight hemolysis detected by analyzer. Results may be affected.        Chloride 107 mmol/L      CO2 23.0 mmol/L      Calcium 9.3 mg/dL      Total Protein 7.2 g/dL      Albumin 4.2 g/dL      ALT (SGPT) 8 U/L      AST (SGOT) 14 U/L      Alkaline Phosphatase 160 U/L      Total Bilirubin 1.4 mg/dL      Globulin 3.0 gm/dL      A/G Ratio 1.4 g/dL      BUN/Creatinine Ratio 35.3     Anion Gap 11.0 mmol/L      eGFR 93.2 mL/min/1.73     Narrative:      GFR Normal >60  Chronic Kidney Disease <60  Kidney Failure <15    The GFR formula is only valid for adults with stable renal function between ages 18 and 70.    Protime-INR [[id number]]  (Abnormal) Collected: 03/24/23 1115    Specimen: Blood Updated: 03/24/23 1133     Protime 19.0 Seconds      INR 1.58    Lipase [[id number]]  (Normal) Collected: 03/24/23 1115    Specimen: Blood Updated: 03/24/23 1137     Lipase 27 U/L     Magnesium [[id number]]  (Normal) Collected: 03/24/23 1115    Specimen: Blood Updated: 03/24/23 1136     Magnesium 2.0 mg/dL     CBC Auto Differential [[id number]]  (Abnormal) Collected: 03/24/23 1115    Specimen: Blood Updated: 03/24/23 1124     WBC 11.08 10*3/mm3      RBC 5.02 10*6/mm3      Hemoglobin 13.2 g/dL      Hematocrit 44.1 %      MCV 87.8 fL      MCH  26.3 pg      MCHC 29.9 g/dL      RDW 15.4 %      RDW-SD 49.2 fl      MPV 10.6 fL      Platelets 247 10*3/mm3      Neutrophil % 74.1 %      Lymphocyte % 13.3 %      Monocyte % 8.8 %      Eosinophil % 3.2 %      Basophil % 0.3 %      Immature Grans % 0.3 %      Neutrophils, Absolute 8.23 10*3/mm3      Lymphocytes, Absolute 1.47 10*3/mm3      Monocytes, Absolute 0.97 10*3/mm3      Eosinophils, Absolute 0.35 10*3/mm3      Basophils, Absolute 0.03 10*3/mm3      Immature Grans, Absolute 0.03 10*3/mm3      nRBC 0.0 /100 WBC       XR Chest 2 View    Result Date: 3/24/2023  EXAMINATION: XR CHEST 2 VW- 3/24/2023 12:49 PM CDT  HISTORY: chest pain, abd pain  REPORT: Frontal and lateral views of the chest were obtained.  COMPARISON: Chest x-ray 2/26/2021.  There is mild atelectasis in the lung bases, no focal pulmonary infiltrate or consolidation is identified. There are coarse central markings with bronchial wall thickening. Heart size is normal. There is no pneumothorax or pleural effusion. Moderate degenerative changes are seen throughout the lower thoracic spine, upper lumbar spine. The upper abdomen appears unremarkable.      No consolidating pneumonia is identified, there is bronchial wall thickening and coarsening of the central bronchovascular interstitial markings probably related to acute versus chronic bronchitis. This report was finalized on 03/24/2023 12:51 by Dr. Choco Fregoso MD.      ED Course  ED Course as of 03/24/23 1305   Fri Mar 24, 2023   1301 72-year-old female presents to the ED with reproducible left lower anterior chest wall pain, suspect musculoskeletal etiology.  No abdominal tenderness.  Tenderness is reproducible to palpation.  Skin exam is normal, no evidence of rash/shingles.  Labs are reassuring.  Does have mild elevated alk phos and T. bili which has been elevated in the past.  Recommend over-the-counter analgesics and patient can follow-up with a primary doctor as an outpatient. [AW]      ED  Course User Index  [AW] Aayush Hernandez MD                                           [facility]    Final diagnoses:   Musculoskeletal pain       ED Disposition  ED Disposition     ED Disposition   Discharge    Condition   Stable    Comment   --             Nasir Almeida MD  [address] [place] [address] [id number] [phone]    Schedule an appointment as soon as possible for a visit in 5 days           Medication List      Changed    tamsulosin 0.4 MG capsule 24 hr capsule  Commonly known as: FLOMAX  Take 1 capsule by mouth Every Night.  What changed:   · when to take this  · reasons to take this             Aayush Hernandez MD  03/24/23 [id number]

## 2023-03-28 ENCOUNTER — TELEPHONE (OUTPATIENT)
Dept: CARDIOLOGY | Facility: CLINIC | Age: 73
End: 2023-03-28
Payer: MEDICARE

## 2023-03-28 NOTE — TELEPHONE ENCOUNTER
----- Message from Choco Diallo MD sent at 1/6/2023  8:44 AM CST -----  Can you check and see if inclisoran will be cheaper for her?

## 2023-03-28 NOTE — TELEPHONE ENCOUNTER
I mailed pt the form to sign to do benefit review.  Got this back today.  Will send it tomorrow.  True Mota, CMA

## 2023-04-05 ENCOUNTER — HOSPITAL ENCOUNTER (OUTPATIENT)
Dept: GENERAL RADIOLOGY | Facility: HOSPITAL | Age: 73
Discharge: HOME | End: 2023-04-05
Admitting: NURSE PRACTITIONER
Payer: MEDICARE

## 2023-04-05 PROCEDURE — 87086 URINE CULTURE/COLONY COUNT: CPT | Performed by: NURSE PRACTITIONER

## 2023-04-05 PROCEDURE — 74018 RADEX ABDOMEN 1 VIEW: CPT

## 2023-04-06 ENCOUNTER — TELEPHONE (OUTPATIENT)
Dept: UROLOGY | Facility: CLINIC | Age: 73
End: 2023-04-06

## 2023-04-06 RX ORDER — INCLISIRAN 284 MG/1.5ML
1 INJECTION, SOLUTION SUBCUTANEOUS
Qty: 1 ML | Refills: 3 | Status: SHIPPED | OUTPATIENT
Start: 2023-04-06

## 2023-04-06 NOTE — TELEPHONE ENCOUNTER
Caller: TORI LANCASTER     Relationship: SELF    Best call back number: [phone]    What orders are you requesting (i.e. lab or imaging): CAROL ANN    In what timeframe would the patient need to come in: 04/12    Where will you receive your lab/imaging services: [facility]    Additional notes: 1 HOUR PRIOR TO APPT

## 2023-04-06 NOTE — PROGRESS NOTES
Subjective    Ms. Hendrix is 72 y.o. female    Chief Complaint: Right flank pain, kidney stones    History of Present Illness    72-year-old female established patient in with new complaint of right flank pain.  Patient was seen at [facility] urgent care on 4/5/2023 KUB was completed showing a possible 9 mm stone in the right mid pole however nothing seen within the ureters to explain patient's pain.  Patient reports the pain has been present on and off for the past 3 months at times pain will radiate across the entire right side of abdomen.  Has intermittent episodes of physical blood in urine.  Denies any fever, chills.  Reports a prior history of kidney stones for which has been able to pass on her own.    Previously seen in 2021 due to urinary incontinence and finding of microscopic hematuria for which never had further evaluation.    The following portions of the patient's history were reviewed and updated as appropriate: allergies, current medications, past family history, past medical history, past social history, past surgical history and problem list.    Review of Systems   Constitutional: Negative for chills, fatigue and fever.   Gastrointestinal: Positive for abdominal pain. Negative for nausea and vomiting.   Genitourinary: Positive for flank pain and hematuria (gross hematuria). Negative for decreased urine volume, difficulty urinating, dysuria, frequency, urgency and vaginal pain.   Musculoskeletal: Positive for back pain.         Current Outpatient Medications:   •  acetaminophen (TYLENOL) 500 MG tablet, Take 1 tablet by mouth Every 6 (Six) Hours As Needed for Mild Pain., Disp: , Rfl:   •  albuterol (PROVENTIL HFA;VENTOLIN HFA) 108 (90 Base) MCG/ACT inhaler, Inhale 2 puffs Every 4 (Four) Hours As Needed for Wheezing., Disp: , Rfl:   •  amLODIPine (NORVASC) 10 MG tablet, Take 1 tablet by mouth 2 (Two) Times a Day. Takes 1 po QAM & 1/2 tablet po QPM, Disp: , Rfl:   •  atorvastatin (LIPITOR) 80 MG tablet,  Take 1 tablet by mouth Daily., Disp: , Rfl:   •  Cholecalciferol (VITAMIN D3) 125 MCG (5000 UT) capsule capsule, Take 1 capsule by mouth Daily., Disp: , Rfl:   •  coenzyme Q10 100 MG capsule, Take 2 capsules by mouth Daily., Disp: , Rfl:   •  Evolocumab (Repatha SureClick) solution auto-injector SureClick injection, Inject 1 mL under the skin into the appropriate area as directed., Disp: , Rfl:   •  ezetimibe (ZETIA) 10 MG tablet, Take 1 tablet by mouth Daily., Disp: , Rfl:   •  fluticasone (VERAMYST) 27.5 MCG/SPRAY nasal spray, 2 sprays into the nostril(s) as directed by provider Daily., Disp: , Rfl:   •  folic acid (FOLVITE) 800 MCG tablet, Take 1 tablet by mouth Daily., Disp: , Rfl:   •  Glucosamine-Chondroit-Vit C-Mn (GLUCOSAMINE 1500 COMPLEX PO), Take  by mouth., Disp: , Rfl:   •  levocetirizine (XYZAL) 5 MG tablet, Take 1 tablet by mouth Every Evening., Disp: , Rfl:   •  linaclotide (LINZESS) 72 MCG capsule capsule, Take 1 capsule every day by oral route., Disp: , Rfl:   •  metoprolol succinate XL (TOPROL-XL) 100 MG 24 hr tablet, Take 1 tablet by mouth Daily., Disp: , Rfl:   •  montelukast (SINGULAIR) 10 MG tablet, Take 1 tablet by mouth Every Night., Disp: , Rfl:   •  niacin (NIASPAN) 500 MG CR tablet, , Disp: , Rfl:   •  nitroglycerin (NITROSTAT) 0.4 MG SL tablet, Place 1 tablet under the tongue Every 5 (Five) Minutes As Needed for Chest Pain. Take no more than 3 doses in 15 minutes., Disp: 30 tablet, Rfl: 5  •  Polyethylene Glycol 3350 (MIRALAX PO), Take  by mouth., Disp: , Rfl:   •  potassium chloride (K-DUR,KLOR-CON) 20 MEQ CR tablet, Take 1 tablet by mouth 2 (Two) Times a Day., Disp: , Rfl:   •  rivaroxaban (Xarelto) 20 MG tablet, Take 1 tablet by mouth Daily., Disp: 90 tablet, Rfl: 3  •  tamsulosin (FLOMAX) 0.4 MG capsule 24 hr capsule, Take 1 capsule by mouth Daily., Disp: , Rfl:   •  traMADol (ULTRAM) 50 MG tablet, TAKE 1 TABLET BY MOUTH EVERY 6 HOURS FOR 14 DAYS, Disp: , Rfl:   •  vitamin A 38227  "UNIT capsule, Take 1 capsule by mouth Daily., Disp: , Rfl:   •  hydrochlorothiazide (HYDRODIURIL) 25 MG tablet, Take 25 mg by mouth Daily. (Patient not taking: Reported on 4/12/2023), Disp: , Rfl:   •  Inclisiran Sodium (Leqvio) 284 MG/1.5ML solution prefilled syringe, Inject 1 mL under the skin into the appropriate area as directed Every 6 (Six) Months. (Patient not taking: Reported on 4/12/2023), Disp: 1 mL, Rfl: 3    Past Medical History:   Diagnosis Date   • Asthma    • Atrial fibrillation    • CAD in native artery    • Hyperlipidemia    • Hypertension    • MI, old        Past Surgical History:   Procedure Laterality Date   • ABLATION OF DYSRHYTHMIC FOCUS     • BLADDER NECK SUSPENSION     • CARDIOVERSION     • CHOLECYSTECTOMY     • COLONOSCOPY W/ BIOPSIES     • CORONARY ANGIOPLASTY  08/13/2007    had stents 2003   • HYSTERECTOMY         Social History     Socioeconomic History   • Marital status: Single   Tobacco Use   • Smoking status: Never   • Smokeless tobacco: Never   Vaping Use   • Vaping Use: Never used   Substance and Sexual Activity   • Alcohol use: Not Currently     Alcohol/week: 3.0 standard drinks     Types: 2 Glasses of wine, 1 Shots of liquor per week   • Drug use: Never   • Sexual activity: Defer       Family History   Problem Relation Age of Onset   • Dementia Mother    • Heart attack Sister    • Heart disease Sister    • Diverticulitis Sister    • Brain cancer Father    • Heart disease Maternal Grandmother    • Heart attack Maternal Grandmother    • Stroke Maternal Grandfather    • No Known Problems Paternal Grandmother    • Brain cancer Paternal Grandfather    • Breast cancer Sister    • Breast cancer Paternal Aunt    • Ovarian cancer Neg Hx    • Uterine cancer Neg Hx    • Colon cancer Neg Hx        Objective    Temp 97.2 °F (36.2 °C)   Ht 162.6 cm (64\")   Wt 101 kg (222 lb)   BMI 38.11 kg/m²     Physical Exam  Nursing note reviewed.   Constitutional:       General: She is not in acute " distress.     Appearance: Normal appearance. She is not ill-appearing.   HENT:      Nose: No congestion.   Abdominal:      Tenderness: There is abdominal tenderness in the right upper quadrant, right lower quadrant and suprapubic area. There is right CVA tenderness. There is no left CVA tenderness.      Hernia: No hernia is present.   Skin:     General: Skin is warm and dry.   Neurological:      Mental Status: She is alert and oriented to person, place, and time.   Psychiatric:         Mood and Affect: Mood normal.         Behavior: Behavior normal.             Results for orders placed or performed in visit on 04/12/23   POC Urinalysis Dipstick, Multipro    Specimen: Urine   Result Value Ref Range    Color Yellow Yellow, Straw, Dark Yellow, Beatriz    Clarity, UA Clear Clear    Glucose, UA Negative Negative mg/dL    Bilirubin Negative Negative    Ketones, UA Trace (A) Negative    Specific Gravity  1.025 1.005 - 1.030    Blood, UA Large (A) Negative    pH, Urine 5.0 5.0 - 8.0    Protein, POC Negative Negative mg/dL    Urobilinogen, UA 0.2 E.U./dL Normal, 0.2 E.U./dL    Nitrite, UA Negative Negative    Leukocytes Negative Negative   KUB independent review    A KUB is available for me to review today.  The image is inspected for a bowel gas pattern and the general bone structure of the spine and pelvis. The kidneys are then inspected closely.  Renal outline is noted if identifiable. The kidney, collecting system, and anticipated path of the ureter are examined for calcifications including those in the true pelvis.  This film reveals:    On the right there is a single renal stone measuring 1cm    On the left there is a single renal stone measuring 1cm      Assessment and Plan    Diagnoses and all orders for this visit:    1. Kidney stone (Primary)  -     POC Urinalysis Dipstick, Multipro  -     XR abdomen kub; Future  -     CT Abdomen Pelvis Without Contrast; Future    2. Right flank pain  -     CT Abdomen Pelvis Without  Contrast; Future    3. Gross hematuria  -     Non-gynecologic Cytology      72-year-old female established patient in with new complaint of right flank pain.  Patient was seen at [facility] urgent care on 4/5/2023 KUB was completed showing a possible 9 mm stone in the right mid pole however nothing seen within the ureters to explain patient's pain.     KUB today the right mid pole renal stone is again visualized measuring approximately 1 cm along with a questionable area within the left upper pole as well.  Again no stones visualized within the course of either ureter.    Given patient's ongoing pain mixed with intermittent blood in urine recommend further evaluation with CT imaging.  We will also go ahead and send patient's urine for cytology

## 2023-04-11 ENCOUNTER — TELEPHONE (OUTPATIENT)
Dept: UROLOGY | Facility: CLINIC | Age: 73
End: 2023-04-11
Payer: MEDICARE

## 2023-04-12 ENCOUNTER — OFFICE VISIT (OUTPATIENT)
Dept: UROLOGY | Facility: CLINIC | Age: 73
End: 2023-04-12
Payer: MEDICARE

## 2023-04-12 ENCOUNTER — HOSPITAL ENCOUNTER (OUTPATIENT)
Dept: CT IMAGING | Facility: HOSPITAL | Age: 73
Discharge: HOME | End: 2023-04-12
Payer: MEDICARE

## 2023-04-12 ENCOUNTER — HOSPITAL ENCOUNTER (OUTPATIENT)
Dept: GENERAL RADIOLOGY | Facility: HOSPITAL | Age: 73
Discharge: HOME | End: 2023-04-12
Payer: MEDICARE

## 2023-04-12 VITALS — TEMPERATURE: 97.2 F | HEIGHT: 64 IN | BODY MASS INDEX: 37.9 KG/M2 | WEIGHT: 222 LBS

## 2023-04-12 DIAGNOSIS — R10.9 RIGHT FLANK PAIN: ICD-10-CM

## 2023-04-12 DIAGNOSIS — R31.0 GROSS HEMATURIA: ICD-10-CM

## 2023-04-12 DIAGNOSIS — N20.0 KIDNEY STONE: ICD-10-CM

## 2023-04-12 DIAGNOSIS — N20.0 KIDNEY STONE: Primary | ICD-10-CM

## 2023-04-12 LAB
BILIRUB BLD-MCNC: NEGATIVE MG/DL
CLARITY, POC: CLEAR
COLOR UR: YELLOW
GLUCOSE UR STRIP-MCNC: NEGATIVE MG/DL
KETONES UR QL: ABNORMAL
LEUKOCYTE EST, POC: NEGATIVE
NITRITE UR-MCNC: NEGATIVE MG/ML
PH UR: 5 [PH] (ref 5–8)
PROT UR STRIP-MCNC: NEGATIVE MG/DL
RBC # UR STRIP: ABNORMAL /UL
SP GR UR: 1.02 (ref 1–1.03)
UROBILINOGEN UR QL: ABNORMAL

## 2023-04-12 PROCEDURE — 81001 URINALYSIS AUTO W/SCOPE: CPT

## 2023-04-12 PROCEDURE — 88112 CYTOPATH CELL ENHANCE TECH: CPT

## 2023-04-12 PROCEDURE — 74176 CT ABD & PELVIS W/O CONTRAST: CPT

## 2023-04-12 PROCEDURE — 99214 OFFICE O/P EST MOD 30 MIN: CPT

## 2023-04-12 PROCEDURE — 74018 RADEX ABDOMEN 1 VIEW: CPT

## 2023-04-12 PROCEDURE — 1160F RVW MEDS BY RX/DR IN RCRD: CPT

## 2023-04-12 PROCEDURE — 1159F MED LIST DOCD IN RCRD: CPT

## 2023-04-12 RX ORDER — TAMSULOSIN HYDROCHLORIDE 0.4 MG/1
1 CAPSULE ORAL DAILY
COMMUNITY
End: 2023-04-13

## 2023-04-12 RX ORDER — EVOLOCUMAB 140 MG/ML
140 INJECTION, SOLUTION SUBCUTANEOUS
COMMUNITY

## 2023-04-13 DIAGNOSIS — N20.1 URETERAL CALCULI: Primary | ICD-10-CM

## 2023-04-13 RX ORDER — TAMSULOSIN HYDROCHLORIDE 0.4 MG/1
1 CAPSULE ORAL NIGHTLY
Qty: 30 CAPSULE | Refills: 0 | Status: SHIPPED | OUTPATIENT
Start: 2023-04-13 | End: 2024-04-07

## 2023-04-13 NOTE — PROGRESS NOTES
Pt does appear to have a small stone within the left distal ureter. I recommend pt try expulsive therapy I can send in flomax for pt to take daily and then have pt return in 3 weeks with repeat CT imaging. As of right now now swelling of the left kidney is seen. As for the right renal stone once the left stone is treated if pt would like to proceed with ESWL on the right we can definitely get pt scheduled.

## 2023-04-13 NOTE — PROGRESS NOTES
Please get appt to return in 3 weeks with repeat ct imaging as we will need to evaluate the ureteral stone first before treating the other side

## 2023-04-15 LAB
LAB AP CASE REPORT: NORMAL
Lab: NORMAL
PATH REPORT.FINAL DX SPEC: NORMAL
PATH REPORT.GROSS SPEC: NORMAL

## 2023-05-03 NOTE — PROGRESS NOTES
Subjective    Ms. Hendrix is 72 y.o. female    Chief Complaint: Kidney stones & left flank pain    History of Present Illness     72-year-old female established patient in for 3-week follow-up regarding 3 mm left ureteral stone for which patient wanted to try expulsive therapy.  Patient was previously complaining of right flank pain instead of the left.  Patient with a large stone in the right mid kidney for which patient has been wanting to get treated however wanted to get over the left ureteral stone first.  Now presents at this time stating that she has been experiencing minimal right-sided pain however has had intermittent intense episodes of left flank and left quadrant pain over the past 3 to 4 weeks.  Denies any gross hematuria fever or chills.    Reports a prior history of kidney stones for which has been able to pass on her own.     Previously seen in 2021 due to urinary incontinence and finding of microscopic hematuria for which never had further evaluation.    The following portions of the patient's history were reviewed and updated as appropriate: allergies, current medications, past family history, past medical history, past social history, past surgical history and problem list.    Review of Systems   Constitutional: Negative for chills, fatigue and fever.   Gastrointestinal: Positive for abdominal pain and nausea. Negative for anal bleeding, blood in stool and vomiting.   Genitourinary: Positive for flank pain. Negative for decreased urine volume, difficulty urinating, dysuria, frequency, hematuria, pelvic pain and vaginal pain.         Current Outpatient Medications:   •  acetaminophen (TYLENOL) 500 MG tablet, Take 1 tablet by mouth Every 6 (Six) Hours As Needed for Mild Pain., Disp: , Rfl:   •  amLODIPine (NORVASC) 10 MG tablet, Take 1 tablet by mouth 2 (Two) Times a Day. Takes 1 po QAM & 1/2 tablet po QPM, Disp: , Rfl:   •  atorvastatin (LIPITOR) 80 MG tablet, Take 1 tablet by mouth Daily., Disp: ,  Rfl:   •  Cholecalciferol (VITAMIN D3) 125 MCG (5000 UT) capsule capsule, Take 1 capsule by mouth Daily., Disp: , Rfl:   •  coenzyme Q10 100 MG capsule, Take 2 capsules by mouth Daily., Disp: , Rfl:   •  Evolocumab (Repatha SureClick) solution auto-injector SureClick injection, Inject 1 mL under the skin into the appropriate area as directed., Disp: , Rfl:   •  ezetimibe (ZETIA) 10 MG tablet, Take 1 tablet by mouth Daily., Disp: , Rfl:   •  fluticasone (VERAMYST) 27.5 MCG/SPRAY nasal spray, 2 sprays into the nostril(s) as directed by provider Daily., Disp: , Rfl:   •  folic acid (FOLVITE) 800 MCG tablet, Take 1 tablet by mouth Daily., Disp: , Rfl:   •  Glucosamine-Chondroit-Vit C-Mn (GLUCOSAMINE 1500 COMPLEX PO), Take  by mouth., Disp: , Rfl:   •  hydrochlorothiazide (HYDRODIURIL) 25 MG tablet, Take 1 tablet by mouth Daily., Disp: , Rfl:   •  Inclisiran Sodium (Leqvio) 284 MG/1.5ML solution prefilled syringe, Inject 1 mL under the skin into the appropriate area as directed Every 6 (Six) Months., Disp: 1 mL, Rfl: 3  •  levocetirizine (XYZAL) 5 MG tablet, Take 1 tablet by mouth Every Evening., Disp: , Rfl:   •  linaclotide (LINZESS) 72 MCG capsule capsule, Take 1 capsule every day by oral route., Disp: , Rfl:   •  metoprolol succinate XL (TOPROL-XL) 100 MG 24 hr tablet, Take 1 tablet by mouth Daily., Disp: , Rfl:   •  montelukast (SINGULAIR) 10 MG tablet, Take 1 tablet by mouth Every Night., Disp: , Rfl:   •  niacin (NIASPAN) 500 MG CR tablet, , Disp: , Rfl:   •  nitroglycerin (NITROSTAT) 0.4 MG SL tablet, Place 1 tablet under the tongue Every 5 (Five) Minutes As Needed for Chest Pain. Take no more than 3 doses in 15 minutes., Disp: 30 tablet, Rfl: 5  •  Polyethylene Glycol 3350 (MIRALAX PO), Take  by mouth., Disp: , Rfl:   •  potassium chloride (K-DUR,KLOR-CON) 20 MEQ CR tablet, Take 1 tablet by mouth 2 (Two) Times a Day., Disp: , Rfl:   •  rivaroxaban (Xarelto) 20 MG tablet, Take 1 tablet by mouth Daily., Disp: 90  "tablet, Rfl: 3  •  tamsulosin (FLOMAX) 0.4 MG capsule 24 hr capsule, Take 1 capsule by mouth Every Night for 30 days., Disp: 30 capsule, Rfl: 0  •  traMADol (ULTRAM) 50 MG tablet, TAKE 1 TABLET BY MOUTH EVERY 6 HOURS FOR 14 DAYS, Disp: , Rfl:   •  vitamin A 58195 UNIT capsule, Take 1 capsule by mouth Daily., Disp: , Rfl:     Past Medical History:   Diagnosis Date   • Asthma    • Atrial fibrillation    • CAD in native artery    • Hyperlipidemia    • Hypertension    • MI, old        Past Surgical History:   Procedure Laterality Date   • ABLATION OF DYSRHYTHMIC FOCUS     • BLADDER NECK SUSPENSION     • CARDIOVERSION     • CHOLECYSTECTOMY     • COLONOSCOPY W/ BIOPSIES     • CORONARY ANGIOPLASTY  08/13/2007    had stents 2003   • HYSTERECTOMY         Social History     Socioeconomic History   • Marital status: Single   Tobacco Use   • Smoking status: Never   • Smokeless tobacco: Never   Vaping Use   • Vaping Use: Never used   Substance and Sexual Activity   • Alcohol use: Not Currently     Alcohol/week: 3.0 standard drinks     Types: 2 Glasses of wine, 1 Shots of liquor per week   • Drug use: Never   • Sexual activity: Defer       Family History   Problem Relation Age of Onset   • Dementia Mother    • Heart attack Sister    • Heart disease Sister    • Diverticulitis Sister    • Brain cancer Father    • Heart disease Maternal Grandmother    • Heart attack Maternal Grandmother    • Stroke Maternal Grandfather    • No Known Problems Paternal Grandmother    • Brain cancer Paternal Grandfather    • Breast cancer Sister    • Breast cancer Paternal Aunt    • Ovarian cancer Neg Hx    • Uterine cancer Neg Hx    • Colon cancer Neg Hx        Objective    Temp 97.5 °F (36.4 °C)   Ht 162.6 cm (64\")   Wt 101 kg (222 lb 9.6 oz)   BMI 38.21 kg/m²     Physical Exam  Nursing note reviewed.   Constitutional:       General: She is not in acute distress.     Appearance: Normal appearance. She is not ill-appearing.   HENT:      Nose: No " congestion.   Abdominal:      Tenderness: There is abdominal tenderness in the left upper quadrant and left lower quadrant. There is left CVA tenderness. There is no right CVA tenderness.      Hernia: No hernia is present.   Skin:     General: Skin is warm and dry.   Neurological:      Mental Status: She is alert and oriented to person, place, and time.   Psychiatric:         Mood and Affect: Mood normal.         Behavior: Behavior normal.             Results for orders placed or performed in visit on 05/05/23   POC Urinalysis Dipstick, Multipro    Specimen: Urine   Result Value Ref Range    Color Yellow Yellow, Straw, Dark Yellow, Beatriz    Clarity, UA Clear Clear    Glucose, UA Negative Negative mg/dL    Bilirubin Negative Negative    Ketones, UA Negative Negative    Specific Gravity  1.010 1.005 - 1.030    Blood, UA Large (A) Negative    pH, Urine 5.5 5.0 - 8.0    Protein, POC 30 mg/dL (A) Negative mg/dL    Urobilinogen, UA 0.2 E.U./dL Normal, 0.2 E.U./dL    Nitrite, UA Negative Negative    Leukocytes Trace (A) Negative   Independent review of a CT scan of abdomen and pelvis without contrast  The CT scan of the abdomen/pelvis done without contrast is available for me to review.  Treatment recommendations require an independent review.  First I scanned the liver, spleen, and bowel pattern.  The retroperitoneum including the major vessels and lymphatic packages are briefly reviewed.  This film has been reviewed by the radiologist to determine any nonurologic abnormalities that are present.  The kidneys are closely inspected for size, symmetry, contour, parenchymal thickness, perinephric reaction, presence of calcifications, and intrarenal dilation of the collecting system.  The ureters are inspected for their course, caliber, and any calcifications.  The bladder is inspected for its thickness, size, and presence of any calcifications.  This scan shows previous 3 mm left distal stone no longer visualized.  Patient  does have remaining small stones within the left kidney.  Patient now appears to have a punctate size 1.5 mm stone within the right distal ureter at the UVJ along with multiple small stones within the right kidney as well as a 1.3 cm right mid pole stone  Assessment and Plan    Diagnoses and all orders for this visit:    1. Kidney stone (Primary)  -     POC Urinalysis Dipstick, Multipro    2. Right flank pain  -     POC Urinalysis Dipstick, Multipro  -     CT Abdomen Pelvis Without Contrast; Future    3. Gross hematuria  -     POC Urinalysis Dipstick, Multipro    4. Ureteral calculi  -     tamsulosin (FLOMAX) 0.4 MG capsule 24 hr capsule; Take 1 capsule by mouth Every Night for 30 days.  Dispense: 30 capsule; Refill: 0  -     CT Abdomen Pelvis Without Contrast; Future       72-year-old female established patient in for 3-week follow-up regarding 3 mm left ureteral stone for which patient wanted to try expulsive therapy.     CT imaging revealing the 3 mm left distal stone no longer present however now a small punctate right distal stone seen measuring approximately 1.5 mm.    Patient now complaining of left sided flank and abdominal pain instead of the right    Based on the new finding of a now right ureteral stone patient would again like to try expulsive therapy with tamsulosin daily and increasing fluid intake.    Ultimately once this small ureteral stone has passed patient would like to get scheduled for right ESWL to treat the large stone in the right midpole    We will bring back in 3 weeks with again repeat imaging with CT given the small size and current nonobstructing status to be able to fully see as I am concerned if we get patient scheduled right now for ESWL that the stone fragments blasted from the ESWL will get lodged with the current ureteral stone

## 2023-05-04 ENCOUNTER — HOSPITAL ENCOUNTER (OUTPATIENT)
Dept: CT IMAGING | Facility: HOSPITAL | Age: 73
Discharge: HOME | End: 2023-05-04
Payer: MEDICARE

## 2023-05-04 DIAGNOSIS — N20.1 URETERAL CALCULI: ICD-10-CM

## 2023-05-04 PROCEDURE — 74176 CT ABD & PELVIS W/O CONTRAST: CPT

## 2023-05-05 ENCOUNTER — TELEPHONE (OUTPATIENT)
Dept: CARDIOLOGY | Facility: CLINIC | Age: 73
End: 2023-05-05
Payer: MEDICARE

## 2023-05-05 ENCOUNTER — OFFICE VISIT (OUTPATIENT)
Dept: UROLOGY | Facility: CLINIC | Age: 73
End: 2023-05-05
Payer: MEDICARE

## 2023-05-05 VITALS — WEIGHT: 222.6 LBS | BODY MASS INDEX: 38 KG/M2 | HEIGHT: 64 IN | TEMPERATURE: 97.5 F

## 2023-05-05 DIAGNOSIS — R10.9 RIGHT FLANK PAIN: ICD-10-CM

## 2023-05-05 DIAGNOSIS — R31.0 GROSS HEMATURIA: ICD-10-CM

## 2023-05-05 DIAGNOSIS — N20.0 KIDNEY STONE: Primary | ICD-10-CM

## 2023-05-05 DIAGNOSIS — N20.1 URETERAL CALCULI: ICD-10-CM

## 2023-05-05 LAB
BILIRUB BLD-MCNC: NEGATIVE MG/DL
CLARITY, POC: CLEAR
COLOR UR: YELLOW
GLUCOSE UR STRIP-MCNC: NEGATIVE MG/DL
KETONES UR QL: NEGATIVE
LEUKOCYTE EST, POC: ABNORMAL
NITRITE UR-MCNC: NEGATIVE MG/ML
PH UR: 5.5 [PH] (ref 5–8)
PROT UR STRIP-MCNC: ABNORMAL MG/DL
RBC # UR STRIP: ABNORMAL /UL
SP GR UR: 1.01 (ref 1–1.03)
UROBILINOGEN UR QL: ABNORMAL

## 2023-05-05 RX ORDER — TAMSULOSIN HYDROCHLORIDE 0.4 MG/1
1 CAPSULE ORAL NIGHTLY
Qty: 30 CAPSULE | Refills: 0 | Status: SHIPPED | OUTPATIENT
Start: 2023-05-05 | End: 2023-06-04

## 2023-05-05 NOTE — TELEPHONE ENCOUNTER
----- Message from Mary Adams sent at 5/4/2023  7:54 PM CDT -----  Regarding: Medicare Leqvio Clinical Criteria  True -    I see where you obtained Medicare benefit information for the Leqvio.  However, this information primarily pertains to patient's financial responsibility.  We still need to ensure she meets Medicare's clinical criteria for the Leqvio.    Leqvio is considered for use in patients who do not meet cholesterol treatment goals with dietary modification and other lipid-lowering therapies.      Please be sure this information is documented in the patient's record.  Please let me know if there are questions.    Thank you,    Mary Adams, CPC  Revenue

## 2023-05-08 ENCOUNTER — INFUSION (OUTPATIENT)
Dept: ONCOLOGY | Facility: HOSPITAL | Age: 73
End: 2023-05-08
Payer: MEDICARE

## 2023-05-08 VITALS
SYSTOLIC BLOOD PRESSURE: 136 MMHG | OXYGEN SATURATION: 95 % | TEMPERATURE: 97.6 F | BODY MASS INDEX: 37.56 KG/M2 | HEIGHT: 64 IN | WEIGHT: 220 LBS | HEART RATE: 76 BPM | DIASTOLIC BLOOD PRESSURE: 56 MMHG | RESPIRATION RATE: 22 BRPM

## 2023-05-08 DIAGNOSIS — E78.2 MIXED HYPERLIPIDEMIA: Primary | ICD-10-CM

## 2023-05-08 PROCEDURE — 25010000002 INCLISIRAN SODIUM 284 MG/1.5ML SOLUTION PREFILLED SYRINGE: Performed by: INTERNAL MEDICINE

## 2023-05-08 PROCEDURE — 96372 THER/PROPH/DIAG INJ SC/IM: CPT

## 2023-05-08 RX ADMIN — INCLISIRAN 284 MG: 284 INJECTION, SOLUTION SUBCUTANEOUS at 13:32

## 2023-05-24 ENCOUNTER — HOSPITAL ENCOUNTER (OUTPATIENT)
Dept: CT IMAGING | Facility: HOSPITAL | Age: 73
Discharge: HOME | End: 2023-05-24
Payer: MEDICARE

## 2023-05-24 DIAGNOSIS — N20.1 URETERAL CALCULI: ICD-10-CM

## 2023-05-24 DIAGNOSIS — R10.9 RIGHT FLANK PAIN: ICD-10-CM

## 2023-05-24 PROCEDURE — 74176 CT ABD & PELVIS W/O CONTRAST: CPT

## 2023-05-26 NOTE — H&P (VIEW-ONLY)
Subjective    Ms. Hendrix is 73 y.o. female    Chief Complaint: Kidney stone    History of Present Illness    73-year-old female established patient in for 3-week follow-up as patient was found to have a small punctate size stone within the right distal ureter during follow-up regarding a recent left-sided ureteral stone for which patient opted for expulsive therapy on both.  Patient presents at this time with a CT prior for further evaluation in hopes that the ureteral stone has passed as patient is wanting to ultimately have the large right intrarenal stone treated that is measuring approximately 12 mm.    Patient continues to complain of intermittent right sided flank pain.     Reports a prior history of kidney stones for which has been able to pass on her own.     The following portions of the patient's history were reviewed and updated as appropriate: allergies, current medications, past family history, past medical history, past social history, past surgical history and problem list.    Review of Systems   Constitutional:  Negative for chills, fatigue and fever.   Gastrointestinal:  Negative for abdominal pain, anal bleeding, blood in stool, nausea and vomiting.   Genitourinary:  Positive for flank pain and urgency. Negative for decreased urine volume, difficulty urinating, dysuria, frequency, hematuria, pelvic pain and vaginal pain.       Current Outpatient Medications:     acetaminophen (TYLENOL) 500 MG tablet, Take 1 tablet by mouth Every 6 (Six) Hours As Needed for Mild Pain., Disp: , Rfl:     amLODIPine (NORVASC) 10 MG tablet, Take 1 tablet by mouth 2 (Two) Times a Day. Takes 1 po QAM & 1/2 tablet po QPM, Disp: , Rfl:     atorvastatin (LIPITOR) 80 MG tablet, Take 1 tablet by mouth Daily., Disp: , Rfl:     Cholecalciferol (VITAMIN D3) 125 MCG (5000 UT) capsule capsule, Take 1 capsule by mouth Daily., Disp: , Rfl:     coenzyme Q10 100 MG capsule, Take 2 capsules by mouth Daily., Disp: , Rfl:     ezetimibe  (ZETIA) 10 MG tablet, Take 1 tablet by mouth Daily., Disp: , Rfl:     fluticasone (VERAMYST) 27.5 MCG/SPRAY nasal spray, 2 sprays into the nostril(s) as directed by provider Daily., Disp: , Rfl:     folic acid (FOLVITE) 800 MCG tablet, Take 1 tablet by mouth Daily., Disp: , Rfl:     Glucosamine-Chondroit-Vit C-Mn (GLUCOSAMINE 1500 COMPLEX PO), Take  by mouth., Disp: , Rfl:     hydrochlorothiazide (HYDRODIURIL) 25 MG tablet, Take 1 tablet by mouth Daily., Disp: , Rfl:     Inclisiran Sodium (Leqvio) 284 MG/1.5ML solution prefilled syringe, Inject 1 mL under the skin into the appropriate area as directed Every 6 (Six) Months., Disp: 1 mL, Rfl: 3    levocetirizine (XYZAL) 5 MG tablet, Take 1 tablet by mouth Every Evening., Disp: , Rfl:     metoprolol succinate XL (TOPROL-XL) 100 MG 24 hr tablet, Take 1 tablet by mouth Daily., Disp: , Rfl:     montelukast (SINGULAIR) 10 MG tablet, Take 1 tablet by mouth Every Night., Disp: , Rfl:     niacin (NIASPAN) 500 MG CR tablet, , Disp: , Rfl:     nitroglycerin (NITROSTAT) 0.4 MG SL tablet, Place 1 tablet under the tongue Every 5 (Five) Minutes As Needed for Chest Pain. Take no more than 3 doses in 15 minutes., Disp: 30 tablet, Rfl: 5    Polyethylene Glycol 3350 (MIRALAX PO), Take  by mouth., Disp: , Rfl:     potassium chloride (K-DUR,KLOR-CON) 20 MEQ CR tablet, Take 1 tablet by mouth 2 (Two) Times a Day., Disp: , Rfl:     rivaroxaban (Xarelto) 20 MG tablet, Take 1 tablet by mouth Daily., Disp: 90 tablet, Rfl: 3    tamsulosin (FLOMAX) 0.4 MG capsule 24 hr capsule, Take 1 capsule by mouth Every Night for 30 days., Disp: 30 capsule, Rfl: 0    traMADol (ULTRAM) 50 MG tablet, TAKE 1 TABLET BY MOUTH EVERY 6 HOURS FOR 14 DAYS, Disp: , Rfl:     vitamin A 70564 UNIT capsule, Take 1 capsule by mouth Daily., Disp: , Rfl:     Past Medical History:   Diagnosis Date    Asthma     Atrial fibrillation     CAD in native artery     Hyperlipidemia     Hypertension     Kidney stone 5/30/2023    MI,  "old        Past Surgical History:   Procedure Laterality Date    ABLATION OF DYSRHYTHMIC FOCUS      BLADDER NECK SUSPENSION      CARDIOVERSION      CHOLECYSTECTOMY      COLONOSCOPY W/ BIOPSIES      CORONARY ANGIOPLASTY  08/13/2007    had stents 2003    HYSTERECTOMY         Social History     Socioeconomic History    Marital status: Single   Tobacco Use    Smoking status: Never    Smokeless tobacco: Never   Vaping Use    Vaping Use: Never used   Substance and Sexual Activity    Alcohol use: Not Currently     Alcohol/week: 3.0 standard drinks     Types: 2 Glasses of wine, 1 Shots of liquor per week    Drug use: Never    Sexual activity: Defer       Family History   Problem Relation Age of Onset    Dementia Mother     Heart attack Sister     Heart disease Sister     Diverticulitis Sister     Brain cancer Father     Heart disease Maternal Grandmother     Heart attack Maternal Grandmother     Stroke Maternal Grandfather     No Known Problems Paternal Grandmother     Brain cancer Paternal Grandfather     Breast cancer Sister     Breast cancer Paternal Aunt     Ovarian cancer Neg Hx     Uterine cancer Neg Hx     Colon cancer Neg Hx        Objective    Temp 97.6 °F (36.4 °C)   Ht 162.6 cm (64\")   Wt 98.6 kg (217 lb 6.4 oz)   BMI 37.32 kg/m²     Physical Exam  Nursing note reviewed.   Constitutional:       General: She is not in acute distress.     Appearance: Normal appearance. She is not ill-appearing.   HENT:      Nose: No congestion.   Abdominal:      Tenderness: There is right CVA tenderness. There is no left CVA tenderness.      Hernia: No hernia is present.   Skin:     General: Skin is warm and dry.   Neurological:      Mental Status: She is alert and oriented to person, place, and time.   Psychiatric:         Mood and Affect: Mood normal.         Behavior: Behavior normal.           Results for orders placed or performed in visit on 05/26/23   POC Urinalysis Dipstick, Multipro    Specimen: Urine   Result Value Ref " Range    Color Yellow Yellow, Straw, Dark Yellow, Beatriz    Clarity, UA Clear Clear    Glucose, UA Negative Negative mg/dL    Bilirubin Negative Negative    Ketones, UA Negative Negative    Specific Gravity  1.015 1.005 - 1.030    Blood, UA Moderate (A) Negative    pH, Urine 7.5 5.0 - 8.0    Protein, POC Negative Negative mg/dL    Urobilinogen, UA 0.2 E.U./dL Normal, 0.2 E.U./dL    Nitrite, UA Negative Negative    Leukocytes Negative Negative   Independent review of a CT scan of abdomen and pelvis without contrast  The CT scan of the abdomen/pelvis done without contrast is available for me to review.  Treatment recommendations require an independent review.  First I scanned the liver, spleen, and bowel pattern.  The retroperitoneum including the major vessels and lymphatic packages are briefly reviewed.  This film has been reviewed by the radiologist to determine any nonurologic abnormalities that are present.  The kidneys are closely inspected for size, symmetry, contour, parenchymal thickness, perinephric reaction, presence of calcifications, and intrarenal dilation of the collecting system.  The ureters are inspected for their course, caliber, and any calcifications.  The bladder is inspected for its thickness, size, and presence of any calcifications.  This scan shows small punctate size right ureteral stone is no longer present patient does appear to have a 12 mm stone remaining within the right kidney as well as 2 other small nonobstructing stones and multiple punctate size stones within the left kidney nonobstructing.  Assessment and Plan    Diagnoses and all orders for this visit:    1. Kidney stone (Primary)  -     Case Request; Standing  -     ECG 12 Lead; Future  -     sodium chloride 0.9 % infusion  -     CBC (No Diff); Future  -     Basic Metabolic Panel; Future  -     ceFAZolin (ANCEF) 2 g in sodium chloride 0.9 % 100 mL IVPB  -     Case Request    2. Ureteral calculi    Other orders  -     Follow  Anesthesia Guidelines / Protocol; Future  -     Follow Anesthesia Guidelines / Protocol; Standing  -     Verify / Perform Chlorhexidine Skin Prep; Standing  -     Verify / Perform Chlorhexidine Skin Prep if Indicated (If Not Already Completed); Standing  -     Obtain Informed Consent; Future  -     Provide NPO Instructions to Patient; Future  -     Chlorhexidine Skin Prep; Future  -     XR Abdomen KUB; Standing      73-year-old female established patient in for 3-week follow-up as patient was found to have a small punctate size stone within the right distal ureter during follow-up regarding a recent left-sided ureteral stone for which patient opted for expulsive therapy on both.  Patient presents at this time with a CT prior for further evaluation in hopes that the ureteral stone has passed as patient is wanting to ultimately have the large right intrarenal stone treated that is measuring approximately 12 mm.    CT imaging completed revealing that the small punctate size right ureteral stone is no longer present patient does appear to have a 12 mm stone remaining within the right kidney as well as 2 other small nonobstructing stones and multiple punctate size stones within the left kidney nonobstructing.     Patient is now interested in surgical intervention of the 12 mm right intrarenal stone.  We will get patient scheduled for right ESWL by Dr. Hendrickson next week.  Patient is currently on Xarelto and has been instructed to stop the Xarelto as of today.  Patient was educated on the risks and benefits of the procedure as well as possible side effects.  Patient voiced understanding and is in agreement to proceed.

## 2023-05-26 NOTE — PROGRESS NOTES
Subjective    Ms. Hendrix is 73 y.o. female    Chief Complaint: Kidney stone    History of Present Illness    73-year-old female established patient in for 3-week follow-up as patient was found to have a small punctate size stone within the right distal ureter during follow-up regarding a recent left-sided ureteral stone for which patient opted for expulsive therapy on both.  Patient presents at this time with a CT prior for further evaluation in hopes that the ureteral stone has passed as patient is wanting to ultimately have the large right intrarenal stone treated that is measuring approximately 12 mm.    Patient continues to complain of intermittent right sided flank pain.     Reports a prior history of kidney stones for which has been able to pass on her own.     The following portions of the patient's history were reviewed and updated as appropriate: allergies, current medications, past family history, past medical history, past social history, past surgical history and problem list.    Review of Systems   Constitutional:  Negative for chills, fatigue and fever.   Gastrointestinal:  Negative for abdominal pain, anal bleeding, blood in stool, nausea and vomiting.   Genitourinary:  Positive for flank pain and urgency. Negative for decreased urine volume, difficulty urinating, dysuria, frequency, hematuria, pelvic pain and vaginal pain.       Current Outpatient Medications:     acetaminophen (TYLENOL) 500 MG tablet, Take 1 tablet by mouth Every 6 (Six) Hours As Needed for Mild Pain., Disp: , Rfl:     amLODIPine (NORVASC) 10 MG tablet, Take 1 tablet by mouth 2 (Two) Times a Day. Takes 1 po QAM & 1/2 tablet po QPM, Disp: , Rfl:     atorvastatin (LIPITOR) 80 MG tablet, Take 1 tablet by mouth Daily., Disp: , Rfl:     Cholecalciferol (VITAMIN D3) 125 MCG (5000 UT) capsule capsule, Take 1 capsule by mouth Daily., Disp: , Rfl:     coenzyme Q10 100 MG capsule, Take 2 capsules by mouth Daily., Disp: , Rfl:     ezetimibe  (ZETIA) 10 MG tablet, Take 1 tablet by mouth Daily., Disp: , Rfl:     fluticasone (VERAMYST) 27.5 MCG/SPRAY nasal spray, 2 sprays into the nostril(s) as directed by provider Daily., Disp: , Rfl:     folic acid (FOLVITE) 800 MCG tablet, Take 1 tablet by mouth Daily., Disp: , Rfl:     Glucosamine-Chondroit-Vit C-Mn (GLUCOSAMINE 1500 COMPLEX PO), Take  by mouth., Disp: , Rfl:     hydrochlorothiazide (HYDRODIURIL) 25 MG tablet, Take 1 tablet by mouth Daily., Disp: , Rfl:     Inclisiran Sodium (Leqvio) 284 MG/1.5ML solution prefilled syringe, Inject 1 mL under the skin into the appropriate area as directed Every 6 (Six) Months., Disp: 1 mL, Rfl: 3    levocetirizine (XYZAL) 5 MG tablet, Take 1 tablet by mouth Every Evening., Disp: , Rfl:     metoprolol succinate XL (TOPROL-XL) 100 MG 24 hr tablet, Take 1 tablet by mouth Daily., Disp: , Rfl:     montelukast (SINGULAIR) 10 MG tablet, Take 1 tablet by mouth Every Night., Disp: , Rfl:     niacin (NIASPAN) 500 MG CR tablet, , Disp: , Rfl:     nitroglycerin (NITROSTAT) 0.4 MG SL tablet, Place 1 tablet under the tongue Every 5 (Five) Minutes As Needed for Chest Pain. Take no more than 3 doses in 15 minutes., Disp: 30 tablet, Rfl: 5    Polyethylene Glycol 3350 (MIRALAX PO), Take  by mouth., Disp: , Rfl:     potassium chloride (K-DUR,KLOR-CON) 20 MEQ CR tablet, Take 1 tablet by mouth 2 (Two) Times a Day., Disp: , Rfl:     rivaroxaban (Xarelto) 20 MG tablet, Take 1 tablet by mouth Daily., Disp: 90 tablet, Rfl: 3    tamsulosin (FLOMAX) 0.4 MG capsule 24 hr capsule, Take 1 capsule by mouth Every Night for 30 days., Disp: 30 capsule, Rfl: 0    traMADol (ULTRAM) 50 MG tablet, TAKE 1 TABLET BY MOUTH EVERY 6 HOURS FOR 14 DAYS, Disp: , Rfl:     vitamin A 13452 UNIT capsule, Take 1 capsule by mouth Daily., Disp: , Rfl:     Past Medical History:   Diagnosis Date    Asthma     Atrial fibrillation     CAD in native artery     Hyperlipidemia     Hypertension     Kidney stone 5/30/2023    MI,  "old        Past Surgical History:   Procedure Laterality Date    ABLATION OF DYSRHYTHMIC FOCUS      BLADDER NECK SUSPENSION      CARDIOVERSION      CHOLECYSTECTOMY      COLONOSCOPY W/ BIOPSIES      CORONARY ANGIOPLASTY  08/13/2007    had stents 2003    HYSTERECTOMY         Social History     Socioeconomic History    Marital status: Single   Tobacco Use    Smoking status: Never    Smokeless tobacco: Never   Vaping Use    Vaping Use: Never used   Substance and Sexual Activity    Alcohol use: Not Currently     Alcohol/week: 3.0 standard drinks     Types: 2 Glasses of wine, 1 Shots of liquor per week    Drug use: Never    Sexual activity: Defer       Family History   Problem Relation Age of Onset    Dementia Mother     Heart attack Sister     Heart disease Sister     Diverticulitis Sister     Brain cancer Father     Heart disease Maternal Grandmother     Heart attack Maternal Grandmother     Stroke Maternal Grandfather     No Known Problems Paternal Grandmother     Brain cancer Paternal Grandfather     Breast cancer Sister     Breast cancer Paternal Aunt     Ovarian cancer Neg Hx     Uterine cancer Neg Hx     Colon cancer Neg Hx        Objective    Temp 97.6 °F (36.4 °C)   Ht 162.6 cm (64\")   Wt 98.6 kg (217 lb 6.4 oz)   BMI 37.32 kg/m²     Physical Exam  Nursing note reviewed.   Constitutional:       General: She is not in acute distress.     Appearance: Normal appearance. She is not ill-appearing.   HENT:      Nose: No congestion.   Abdominal:      Tenderness: There is right CVA tenderness. There is no left CVA tenderness.      Hernia: No hernia is present.   Skin:     General: Skin is warm and dry.   Neurological:      Mental Status: She is alert and oriented to person, place, and time.   Psychiatric:         Mood and Affect: Mood normal.         Behavior: Behavior normal.           Results for orders placed or performed in visit on 05/26/23   POC Urinalysis Dipstick, Multipro    Specimen: Urine   Result Value Ref " Range    Color Yellow Yellow, Straw, Dark Yellow, Beatriz    Clarity, UA Clear Clear    Glucose, UA Negative Negative mg/dL    Bilirubin Negative Negative    Ketones, UA Negative Negative    Specific Gravity  1.015 1.005 - 1.030    Blood, UA Moderate (A) Negative    pH, Urine 7.5 5.0 - 8.0    Protein, POC Negative Negative mg/dL    Urobilinogen, UA 0.2 E.U./dL Normal, 0.2 E.U./dL    Nitrite, UA Negative Negative    Leukocytes Negative Negative   Independent review of a CT scan of abdomen and pelvis without contrast  The CT scan of the abdomen/pelvis done without contrast is available for me to review.  Treatment recommendations require an independent review.  First I scanned the liver, spleen, and bowel pattern.  The retroperitoneum including the major vessels and lymphatic packages are briefly reviewed.  This film has been reviewed by the radiologist to determine any nonurologic abnormalities that are present.  The kidneys are closely inspected for size, symmetry, contour, parenchymal thickness, perinephric reaction, presence of calcifications, and intrarenal dilation of the collecting system.  The ureters are inspected for their course, caliber, and any calcifications.  The bladder is inspected for its thickness, size, and presence of any calcifications.  This scan shows small punctate size right ureteral stone is no longer present patient does appear to have a 12 mm stone remaining within the right kidney as well as 2 other small nonobstructing stones and multiple punctate size stones within the left kidney nonobstructing.  Assessment and Plan    Diagnoses and all orders for this visit:    1. Kidney stone (Primary)  -     Case Request; Standing  -     ECG 12 Lead; Future  -     sodium chloride 0.9 % infusion  -     CBC (No Diff); Future  -     Basic Metabolic Panel; Future  -     ceFAZolin (ANCEF) 2 g in sodium chloride 0.9 % 100 mL IVPB  -     Case Request    2. Ureteral calculi    Other orders  -     Follow  Anesthesia Guidelines / Protocol; Future  -     Follow Anesthesia Guidelines / Protocol; Standing  -     Verify / Perform Chlorhexidine Skin Prep; Standing  -     Verify / Perform Chlorhexidine Skin Prep if Indicated (If Not Already Completed); Standing  -     Obtain Informed Consent; Future  -     Provide NPO Instructions to Patient; Future  -     Chlorhexidine Skin Prep; Future  -     XR Abdomen KUB; Standing      73-year-old female established patient in for 3-week follow-up as patient was found to have a small punctate size stone within the right distal ureter during follow-up regarding a recent left-sided ureteral stone for which patient opted for expulsive therapy on both.  Patient presents at this time with a CT prior for further evaluation in hopes that the ureteral stone has passed as patient is wanting to ultimately have the large right intrarenal stone treated that is measuring approximately 12 mm.    CT imaging completed revealing that the small punctate size right ureteral stone is no longer present patient does appear to have a 12 mm stone remaining within the right kidney as well as 2 other small nonobstructing stones and multiple punctate size stones within the left kidney nonobstructing.     Patient is now interested in surgical intervention of the 12 mm right intrarenal stone.  We will get patient scheduled for right ESWL by Dr. Hendrickson next week.  Patient is currently on Xarelto and has been instructed to stop the Xarelto as of today.  Patient was educated on the risks and benefits of the procedure as well as possible side effects.  Patient voiced understanding and is in agreement to proceed.

## 2023-05-30 ENCOUNTER — OFFICE VISIT (OUTPATIENT)
Dept: UROLOGY | Facility: CLINIC | Age: 73
End: 2023-05-30

## 2023-05-30 VITALS — BODY MASS INDEX: 37.11 KG/M2 | HEIGHT: 64 IN | WEIGHT: 217.4 LBS | TEMPERATURE: 97.6 F

## 2023-05-30 DIAGNOSIS — N20.1 URETERAL CALCULI: ICD-10-CM

## 2023-05-30 DIAGNOSIS — N20.0 KIDNEY STONE: Primary | ICD-10-CM

## 2023-05-30 RX ORDER — SODIUM CHLORIDE 9 MG/ML
100 INJECTION, SOLUTION INTRAVENOUS CONTINUOUS
Status: CANCELLED | OUTPATIENT
Start: 2023-05-30

## 2023-06-01 ENCOUNTER — PRE-ADMISSION TESTING (OUTPATIENT)
Dept: PREADMISSION TESTING | Facility: HOSPITAL | Age: 73
End: 2023-06-01
Payer: MEDICARE

## 2023-06-01 VITALS
OXYGEN SATURATION: 94 % | BODY MASS INDEX: 36.4 KG/M2 | HEART RATE: 88 BPM | HEIGHT: 65 IN | DIASTOLIC BLOOD PRESSURE: 74 MMHG | RESPIRATION RATE: 16 BRPM | SYSTOLIC BLOOD PRESSURE: 142 MMHG | WEIGHT: 218.48 LBS

## 2023-06-01 DIAGNOSIS — N20.0 KIDNEY STONE: ICD-10-CM

## 2023-06-01 LAB
ANION GAP SERPL CALCULATED.3IONS-SCNC: 8 MMOL/L (ref 5–15)
BUN SERPL-MCNC: 16 MG/DL (ref 8–23)
BUN/CREAT SERPL: 26.2 (ref 7–25)
CALCIUM SPEC-SCNC: 9.1 MG/DL (ref 8.6–10.5)
CHLORIDE SERPL-SCNC: 108 MMOL/L (ref 98–107)
CO2 SERPL-SCNC: 25 MMOL/L (ref 22–29)
CREAT SERPL-MCNC: 0.61 MG/DL (ref 0.57–1)
DEPRECATED RDW RBC AUTO: 48.1 FL (ref 37–54)
EGFRCR SERPLBLD CKD-EPI 2021: 95.1 ML/MIN/1.73
ERYTHROCYTE [DISTWIDTH] IN BLOOD BY AUTOMATED COUNT: 15 % (ref 12.3–15.4)
GLUCOSE SERPL-MCNC: 120 MG/DL (ref 65–99)
HCT VFR BLD AUTO: 43.3 % (ref 34–46.6)
HGB BLD-MCNC: 13.1 G/DL (ref 12–15.9)
MCH RBC QN AUTO: 26.3 PG (ref 26.6–33)
MCHC RBC AUTO-ENTMCNC: 30.3 G/DL (ref 31.5–35.7)
MCV RBC AUTO: 86.8 FL (ref 79–97)
PLATELET # BLD AUTO: 245 10*3/MM3 (ref 140–450)
PMV BLD AUTO: 10.4 FL (ref 6–12)
POTASSIUM SERPL-SCNC: 4.6 MMOL/L (ref 3.5–5.2)
RBC # BLD AUTO: 4.99 10*6/MM3 (ref 3.77–5.28)
SODIUM SERPL-SCNC: 141 MMOL/L (ref 136–145)
WBC NRBC COR # BLD: 9.6 10*3/MM3 (ref 3.4–10.8)

## 2023-06-01 PROCEDURE — 85027 COMPLETE CBC AUTOMATED: CPT

## 2023-06-01 PROCEDURE — 93005 ELECTROCARDIOGRAM TRACING: CPT

## 2023-06-01 PROCEDURE — 80048 BASIC METABOLIC PNL TOTAL CA: CPT

## 2023-06-01 PROCEDURE — 36415 COLL VENOUS BLD VENIPUNCTURE: CPT

## 2023-06-01 NOTE — DISCHARGE INSTRUCTIONS
Before you come to the hospital        Arrival time: AS DIRECTED BY OFFICE     YOU MAY TAKE THE FOLLOWING MEDICATION(S) THE MORNING OF SURGERY WITH A SIP OF WATER:   Metoprolol  Continue to hold xarelto as directed per MD           ALL OTHER HOME MEDICATION CHECK WITH YOUR PHYSICIAN (especially if   you are taking diabetes medicines or blood thinners)    Do not take any Erectile Dysfunction medications (EX: CIALIS, VIAGRA) 24 hours prior to surgery.      If you were given and instructed to use a germ- killing soap, use as directed the night before surgery and again the morning of surgery or as directed by your surgeon. (Use one-half of the bottle with each shower.)   See attached information for How to Use Chlorhexidine for Bathing if applicable.            Eating and drinking restrictions prior to scheduled arrival time    2 Hours before arrival time STOP   Drinking Clear liquids (water, black coffee-NO CREAM,  apple juice-no pulp)      8 Hours before arrival time STOP   All food, full liquids, and dairy products    (It is extremely important that you follow these guidelines to prevent delay or cancelation of your procedure)     Clear Liquids  Water and flavored water                                                                      Clear Fruit juices, such as cranberry juice and apple juice.  Black coffee (NO cream of any kind, including powdered).  Plain tea  Clear bouillon or broth.  Flavored gelatin.  Soda.  Gatorade or Powerade.  Full liquid examples  Juices that have pulp.  Frozen ice pops that contain fruit pieces.  Coffee with creamer  Milk.  Yogurt.                MANAGING PAIN AFTER SURGERY    We know you are probably wondering what your pain will be like after surgery.  Following surgery it is unrealistic to expect you will not have pain.   Pain is how our bodies let us know that something is wrong or cautions us to be careful.  That said, our goal is to make your pain tolerable.    Methods we may use  to treat your pain include (oral or IV medications, PCAs, epidurals, nerve blocks, etc.)   While some procedures require IV pain medications for a short time after surgery, transitioning to pain medications by mouth allows for better management of pain.   Your nurse will encourage you to take oral pain medications whenever possible.  IV medications work almost immediately, but only last a short while.  Taking medications by mouth allows for a more constant level of medication in your blood stream for a longer period of time.      Once your pain is out of control it is harder to get back under control.  It is important you are aware when your next dose of pain medication is due.  If you are admitted, your nurse may write the time of your next dose on the white board in your room to help you remember.      We are interested in your pain and encourage you to inform us about aggravating factors during your visit.   Many times a simple repositioning every few hours can make a big difference.    If your physician says it is okay, do not let your pain prevent you from getting out of bed. Be sure to call your nurse for assistance prior to getting up so you do not fall.      Before surgery, please decide your tolerable pain goal.  These faces help describe the pain ratings we use on a 0-10 scale.   Be prepared to tell us your goal and whether or not you take pain or anxiety medications at home.          Preparing for Surgery  Preparing for surgery is an important part of your care. It can make things go more smoothly and help you avoid complications. The steps leading up to surgery may vary among hospitals. Follow all instructions given to you by your health care providers. Ask questions if you do not understand something. Talk about any concerns that you have.  Here are some questions to consider asking before your surgery:  If my surgery is not an emergency (is elective), when would be the best time to have the  surgery?  What arrangements do I need to make for work, home, or school?  What will my recovery be like? How long will it be before I can return to normal activities?  Will I need to prepare my home? Will I need to arrange care for me or my children?  Should I expect to have pain after surgery? What are my pain management options? Are there nonmedical options that I can try for pain?  Tell a health care provider about:  Any allergies you have.  All medicines you are taking, including vitamins, herbs, eye drops, creams, and over-the-counter medicines.  Any problems you or family members have had with anesthetic medicines.  Any blood disorders you have.  Any surgeries you have had.  Any medical conditions you have.  Whether you are pregnant or may be pregnant.  What are the risks?  The risks and complications of surgery depend on the specific procedure that you have. Discuss all the risks with your health care providers before your surgery. Ask about common surgical complications, which may include:  Infection.  Bleeding or a need for blood replacement (transfusion).  Allergic reactions to medicines.  Damage to surrounding nerves, tissues, or structures.  A blood clot.  Scarring.  Failure of the surgery to correct the problem.  Follow these instructions before the procedure:  Several days or weeks before your procedure  You may have a physical exam by your primary health care provider to make sure it is safe for you to have surgery.  You may have testing. This may include a chest X-ray, blood and urine tests, electrocardiogram (ECG), or other testing.  Ask your health care provider about:  Changing or stopping your regular medicines. This is especially important if you are taking diabetes medicines or blood thinners.  Taking medicines such as aspirin and ibuprofen. These medicines can thin your blood. Do not take these medicines unless your health care provider tells you to take them.  Taking over-the-counter  medicines, vitamins, herbs, and supplements.  Do not use any products that contain nicotine or tobacco, such as cigarettes and e-cigarettes. If you need help quitting, ask your health care provider.  Avoid alcohol.  Ask your health care provider if there are exercises you can do to prepare for surgery.  Eat a healthy diet.   Plan to have someone 18 years of age or older to take you home from the hospital. We will need to verify your ride on the morning of surgery if you are being discharged home on the same day. Tell your ride to be expecting a call from the hospital prior to your procedure.   Plan to have a responsible adult care for you for at least 24 hours after you leave the hospital or clinic. This is important.  The day before your procedure  You may be given antibiotic medicine to take by mouth to help prevent infection. Take it as told by your health care provider.  You may be asked to shower with a germ-killing soap.  Follow instructions from your health care provider about eating and drinking restrictions. This includes gum, mints and hard candy.  Pack comfortable clothes according to your procedure.   The day of your procedure  You may need to take another shower with a germ-killing soap before you leave home in the morning.  With a small sip of water, take only the medicines that you are told to take.  Remove all jewelry including rings.   Leave anything you consider valuable at home except hearing aids if needed.  You do not need to bring your home medications into the hospital.   Do not wear any makeup, nail polish, powder, deodorant, lotion, hair accessories, or anything on your skin or body except your clothes.  If you will be staying in the hospital, bring a case to hold your glasses, contacts, or dentures. You may also want to bring your robe and non-skid footwear.       (Do not use denture adhesives since you will be asked to remove them during  surgery).   If you wear oxygen at home, bring it  with you the day of surgery.  If instructed by your health care provider, bring your sleep apnea device with you on the day of your surgery (if this applies to you).  You may want to leave your suitcase and sleep apnea device in the car until after surgery.   Arrive at the hospital as scheduled.  Bring a friend or family member with you who can help to answer questions and be present while you meet with your health care provider.  At the hospital  When you arrive at the hospital:  Go to registration located at the main entrance of the hospital. You will be registered and given a beeper and a sticker sheet. Take the stickers to the Outpatient nurses desk and place in the black tray. This is to notify staff that you have arrived. Then return to the lobby to wait.   When your beeper lights up and vibrates proceed through the double doors, under the stairs, and a member of the Outpatient Surgery staff will escort you to your preoperative room.  You may have to wear compression sleeves. These help to prevent blood clots and reduce swelling in your legs.  An IV may be inserted into one of your veins.              In the operating room, you may be given one or more of the following:        A medicine to help you relax (sedative).        A medicine to numb the area (local anesthetic).        A medicine to make you fall asleep (general anesthetic).        A medicine that is injected into an area of your body to numb everything below the                      injection site (regional anesthetic).  You may be given an antibiotic through your IV to help prevent infection.  Your surgical site will be marked or identified.    Contact a health care provider if you:  Develop a fever of more than 100.4°F (38°C) or other feelings of illness during the 48 hours before your surgery.  Have symptoms that get worse.  Have questions or concerns about your surgery.  Summary  Preparing for surgery can make the procedure go more smoothly and  lower your risk of complications.  Before surgery, make a list of questions and concerns to discuss with your surgeon. Ask about the risks and possible complications.  In the days or weeks before your surgery, follow all instructions from your health care provider. You may need to stop smoking, avoid alcohol, follow eating restrictions, and change or stop your regular medicines.  Contact your surgeon if you develop a fever or other signs of illness during the few days before your surgery.  This information is not intended to replace advice given to you by your health care provider. Make sure you discuss any questions you have with your health care provider.  Document Revised: 12/21/2018 Document Reviewed: 10/23/2018  Elsevier Patient Education © 2021 [employer]

## 2023-06-02 ENCOUNTER — OFFICE VISIT (OUTPATIENT)
Dept: ENT CLINIC | Age: 73
End: 2023-06-02
Payer: MEDICARE

## 2023-06-02 VITALS
BODY MASS INDEX: 36.88 KG/M2 | HEIGHT: 64 IN | SYSTOLIC BLOOD PRESSURE: 128 MMHG | WEIGHT: 216 LBS | DIASTOLIC BLOOD PRESSURE: 74 MMHG

## 2023-06-02 DIAGNOSIS — H65.92 MEE (MIDDLE EAR EFFUSION), LEFT: Primary | ICD-10-CM

## 2023-06-02 LAB
QT INTERVAL: 368 MS
QTC INTERVAL: 408 MS

## 2023-06-02 PROCEDURE — 99213 OFFICE O/P EST LOW 20 MIN: CPT | Performed by: PHYSICIAN ASSISTANT

## 2023-06-02 PROCEDURE — 1090F PRES/ABSN URINE INCON ASSESS: CPT | Performed by: PHYSICIAN ASSISTANT

## 2023-06-02 PROCEDURE — G8400 PT W/DXA NO RESULTS DOC: HCPCS | Performed by: PHYSICIAN ASSISTANT

## 2023-06-02 PROCEDURE — 1123F ACP DISCUSS/DSCN MKR DOCD: CPT | Performed by: PHYSICIAN ASSISTANT

## 2023-06-02 PROCEDURE — 1036F TOBACCO NON-USER: CPT | Performed by: PHYSICIAN ASSISTANT

## 2023-06-02 PROCEDURE — G8427 DOCREV CUR MEDS BY ELIG CLIN: HCPCS | Performed by: PHYSICIAN ASSISTANT

## 2023-06-02 PROCEDURE — G8417 CALC BMI ABV UP PARAM F/U: HCPCS | Performed by: PHYSICIAN ASSISTANT

## 2023-06-02 PROCEDURE — 3017F COLORECTAL CA SCREEN DOC REV: CPT | Performed by: PHYSICIAN ASSISTANT

## 2023-06-02 RX ORDER — AMOXICILLIN AND CLAVULANATE POTASSIUM 875; 125 MG/1; MG/1
1 TABLET, FILM COATED ORAL 2 TIMES DAILY
Qty: 20 TABLET | Refills: 0 | Status: SHIPPED | OUTPATIENT
Start: 2023-06-02 | End: 2023-06-12

## 2023-06-02 RX ORDER — PREDNISONE 20 MG/1
TABLET ORAL
Qty: 20 TABLET | Refills: 0 | Status: SHIPPED | OUTPATIENT
Start: 2023-06-02

## 2023-06-02 ASSESSMENT — ENCOUNTER SYMPTOMS
RHINORRHEA: 0
EYE DISCHARGE: 0
SINUS PAIN: 0
TROUBLE SWALLOWING: 0
SORE THROAT: 0
VOICE CHANGE: 0
FACIAL SWELLING: 0
SINUS PRESSURE: 0
EYE PAIN: 0

## 2023-06-02 NOTE — PROGRESS NOTES
carbonate 1500 (600 Ca) MG TABS tablet Take 1 tablet by mouth daily      vitamin D (CHOLECALCIFEROL) 125 MCG (5000 UT) CAPS capsule Take 1 capsule by mouth daily      potassium chloride (KLOR-CON M) 20 MEQ extended release tablet Take 1 tablet by mouth 2 times daily      vitamin A 3 MG (24220 UT) capsule Take 1 capsule by mouth daily      albuterol sulfate HFA (PROVENTIL;VENTOLIN;PROAIR) 108 (90 Base) MCG/ACT inhaler Inhale 2 puffs into the lungs every 4 hours as needed      levocetirizine (XYZAL) 5 MG tablet Take 1 tablet by mouth every evening      fluticasone (VERAMYST) 27.5 MCG/SPRAY nasal spray 2 sprays by Nasal route daily      montelukast (SINGULAIR) 10 MG tablet Take 1 tablet by mouth nightly      cetirizine-psuedoephedrine (ZYRTEC-D) 5-120 MG per extended release tablet Take 1 tablet by mouth 2 times daily      REPATHA SURECLICK 303 MG/ML SOAJ INJECT 1 ML UNDER THE SKIN INTO THE APPROPRIATE AREA AS DIRECTED EVERY 14 DAYS. (Patient not taking: Reported on 6/2/2023)       No current facility-administered medications for this visit. Past Surgical History:   Procedure Laterality Date    BLADDER REPAIR      CHOLECYSTECTOMY      HYSTERECTOMY, TOTAL ABDOMINAL (CERVIX REMOVED)      VASCULAR SURGERY         Past Medical History:   Diagnosis Date    Arthritis     Asthma     Hearing loss     Hypertension     Tinnitus        Family History   Problem Relation Age of Onset    Thyroid Disease Mother     Cancer Father     Cancer Sister     Cancer Maternal Aunt     Cancer Paternal Grandfather        Social History     Tobacco Use    Smoking status: Never    Smokeless tobacco: Never   Substance Use Topics    Alcohol use: Not Currently           REVIEW OF SYSTEMS:  all other systems reviewed and are negative  Review of Systems   Constitutional:  Negative for chills and fever.    HENT:  Negative for congestion, dental problem, ear discharge, ear pain, facial swelling, hearing loss, nosebleeds, postnasal drip, rhinorrhea,

## 2023-06-02 NOTE — ASSESSMENT & PLAN NOTE
Left middle ear effusion secondary to eustachian tube dysfunction  Plan: I will place the patient on prednisone as well as Augmentin. I will have her utilize Afrin spray twice a day. She is to follow-up in 2 weeks for reevaluation.

## 2023-06-05 ENCOUNTER — ANESTHESIA (OUTPATIENT)
Dept: PERIOP | Facility: HOSPITAL | Age: 73
End: 2023-06-05
Payer: MEDICARE

## 2023-06-05 ENCOUNTER — APPOINTMENT (OUTPATIENT)
Dept: GENERAL RADIOLOGY | Facility: HOSPITAL | Age: 73
End: 2023-06-05
Payer: MEDICARE

## 2023-06-05 ENCOUNTER — ANESTHESIA EVENT (OUTPATIENT)
Dept: PERIOP | Facility: HOSPITAL | Age: 73
End: 2023-06-05
Payer: MEDICARE

## 2023-06-05 ENCOUNTER — HOSPITAL ENCOUNTER (OUTPATIENT)
Facility: HOSPITAL | Age: 73
Setting detail: HOSPITAL OUTPATIENT SURGERY
Discharge: HOME | End: 2023-06-05
Attending: UROLOGY | Admitting: UROLOGY
Payer: MEDICARE

## 2023-06-05 VITALS
OXYGEN SATURATION: 95 % | DIASTOLIC BLOOD PRESSURE: 78 MMHG | HEART RATE: 96 BPM | RESPIRATION RATE: 18 BRPM | TEMPERATURE: 97.1 F | SYSTOLIC BLOOD PRESSURE: 147 MMHG

## 2023-06-05 DIAGNOSIS — N20.0 KIDNEY STONE: ICD-10-CM

## 2023-06-05 PROCEDURE — 74018 RADEX ABDOMEN 1 VIEW: CPT

## 2023-06-05 PROCEDURE — 25010000002 DROPERIDOL PER 5 MG: Performed by: NURSE ANESTHETIST, CERTIFIED REGISTERED

## 2023-06-05 PROCEDURE — 25010000002 PROPOFOL 10 MG/ML EMULSION: Performed by: NURSE ANESTHETIST, CERTIFIED REGISTERED

## 2023-06-05 PROCEDURE — 25010000002 DEXAMETHASONE PER 1 MG: Performed by: ANESTHESIOLOGY

## 2023-06-05 PROCEDURE — 50590 FRAGMENTING OF KIDNEY STONE: CPT | Performed by: UROLOGY

## 2023-06-05 PROCEDURE — 25010000002 CEFAZOLIN PER 500 MG

## 2023-06-05 PROCEDURE — 25010000002 ONDANSETRON PER 1 MG: Performed by: NURSE ANESTHETIST, CERTIFIED REGISTERED

## 2023-06-05 PROCEDURE — 25010000002 FENTANYL CITRATE (PF) 100 MCG/2ML SOLUTION: Performed by: NURSE ANESTHETIST, CERTIFIED REGISTERED

## 2023-06-05 RX ORDER — OXYCODONE AND ACETAMINOPHEN 10; 325 MG/1; MG/1
1 TABLET ORAL ONCE AS NEEDED
Status: DISCONTINUED | OUTPATIENT
Start: 2023-06-05 | End: 2023-06-05 | Stop reason: HOSPADM

## 2023-06-05 RX ORDER — HYDROCODONE BITARTRATE AND ACETAMINOPHEN 5; 325 MG/1; MG/1
1 TABLET ORAL ONCE AS NEEDED
Status: DISCONTINUED | OUTPATIENT
Start: 2023-06-05 | End: 2023-06-05 | Stop reason: HOSPADM

## 2023-06-05 RX ORDER — FLUMAZENIL 0.1 MG/ML
0.2 INJECTION INTRAVENOUS AS NEEDED
Status: DISCONTINUED | OUTPATIENT
Start: 2023-06-05 | End: 2023-06-05 | Stop reason: HOSPADM

## 2023-06-05 RX ORDER — NALOXONE HYDROCHLORIDE 4 MG/.1ML
SPRAY NASAL
Qty: 2 EACH | Refills: 0 | Status: SHIPPED | OUTPATIENT
Start: 2023-06-05

## 2023-06-05 RX ORDER — ONDANSETRON 4 MG/1
4 TABLET, FILM COATED ORAL ONCE AS NEEDED
Status: DISCONTINUED | OUTPATIENT
Start: 2023-06-05 | End: 2023-06-05 | Stop reason: HOSPADM

## 2023-06-05 RX ORDER — LABETALOL HYDROCHLORIDE 5 MG/ML
5 INJECTION, SOLUTION INTRAVENOUS
Status: DISCONTINUED | OUTPATIENT
Start: 2023-06-05 | End: 2023-06-05 | Stop reason: HOSPADM

## 2023-06-05 RX ORDER — HYDROCODONE BITARTRATE AND ACETAMINOPHEN 5; 325 MG/1; MG/1
1 TABLET ORAL EVERY 6 HOURS PRN
Qty: 12 TABLET | Refills: 0 | Status: SHIPPED | OUTPATIENT
Start: 2023-06-05

## 2023-06-05 RX ORDER — ONDANSETRON 2 MG/ML
INJECTION INTRAMUSCULAR; INTRAVENOUS AS NEEDED
Status: DISCONTINUED | OUTPATIENT
Start: 2023-06-05 | End: 2023-06-05 | Stop reason: SURG

## 2023-06-05 RX ORDER — TAMSULOSIN HYDROCHLORIDE 0.4 MG/1
1 CAPSULE ORAL NIGHTLY
Qty: 14 CAPSULE | Refills: 0 | Status: SHIPPED | OUTPATIENT
Start: 2023-06-05

## 2023-06-05 RX ORDER — PREDNISONE 20 MG/1
40 TABLET ORAL DAILY
COMMUNITY

## 2023-06-05 RX ORDER — FENTANYL CITRATE 50 UG/ML
25 INJECTION, SOLUTION INTRAMUSCULAR; INTRAVENOUS
Status: DISCONTINUED | OUTPATIENT
Start: 2023-06-05 | End: 2023-06-05 | Stop reason: HOSPADM

## 2023-06-05 RX ORDER — DROPERIDOL 2.5 MG/ML
INJECTION, SOLUTION INTRAMUSCULAR; INTRAVENOUS AS NEEDED
Status: DISCONTINUED | OUTPATIENT
Start: 2023-06-05 | End: 2023-06-05 | Stop reason: SURG

## 2023-06-05 RX ORDER — LIDOCAINE HYDROCHLORIDE 20 MG/ML
INJECTION, SOLUTION EPIDURAL; INFILTRATION; INTRACAUDAL; PERINEURAL AS NEEDED
Status: DISCONTINUED | OUTPATIENT
Start: 2023-06-05 | End: 2023-06-05 | Stop reason: SURG

## 2023-06-05 RX ORDER — LIDOCAINE HYDROCHLORIDE 10 MG/ML
0.5 INJECTION, SOLUTION EPIDURAL; INFILTRATION; INTRACAUDAL; PERINEURAL ONCE AS NEEDED
Status: DISCONTINUED | OUTPATIENT
Start: 2023-06-05 | End: 2023-06-05 | Stop reason: HOSPADM

## 2023-06-05 RX ORDER — SODIUM CHLORIDE, SODIUM LACTATE, POTASSIUM CHLORIDE, CALCIUM CHLORIDE 600; 310; 30; 20 MG/100ML; MG/100ML; MG/100ML; MG/100ML
9 INJECTION, SOLUTION INTRAVENOUS CONTINUOUS
Status: DISCONTINUED | OUTPATIENT
Start: 2023-06-05 | End: 2023-06-05 | Stop reason: HOSPADM

## 2023-06-05 RX ORDER — NALOXONE HCL 0.4 MG/ML
0.4 VIAL (ML) INJECTION AS NEEDED
Status: DISCONTINUED | OUTPATIENT
Start: 2023-06-05 | End: 2023-06-05 | Stop reason: HOSPADM

## 2023-06-05 RX ORDER — SODIUM CHLORIDE, SODIUM LACTATE, POTASSIUM CHLORIDE, CALCIUM CHLORIDE 600; 310; 30; 20 MG/100ML; MG/100ML; MG/100ML; MG/100ML
1000 INJECTION, SOLUTION INTRAVENOUS CONTINUOUS
Status: DISCONTINUED | OUTPATIENT
Start: 2023-06-05 | End: 2023-06-05 | Stop reason: HOSPADM

## 2023-06-05 RX ORDER — DEXAMETHASONE SODIUM PHOSPHATE 4 MG/ML
4 INJECTION, SOLUTION INTRA-ARTICULAR; INTRALESIONAL; INTRAMUSCULAR; INTRAVENOUS; SOFT TISSUE ONCE AS NEEDED
Status: COMPLETED | OUTPATIENT
Start: 2023-06-05 | End: 2023-06-05

## 2023-06-05 RX ORDER — OXYCODONE AND ACETAMINOPHEN 7.5; 325 MG/1; MG/1
2 TABLET ORAL EVERY 4 HOURS PRN
Status: DISCONTINUED | OUTPATIENT
Start: 2023-06-05 | End: 2023-06-05 | Stop reason: HOSPADM

## 2023-06-05 RX ORDER — HYDROMORPHONE HYDROCHLORIDE 1 MG/ML
0.5 INJECTION, SOLUTION INTRAMUSCULAR; INTRAVENOUS; SUBCUTANEOUS
Status: DISCONTINUED | OUTPATIENT
Start: 2023-06-05 | End: 2023-06-05 | Stop reason: HOSPADM

## 2023-06-05 RX ORDER — IBUPROFEN 600 MG/1
600 TABLET ORAL ONCE AS NEEDED
Status: DISCONTINUED | OUTPATIENT
Start: 2023-06-05 | End: 2023-06-05 | Stop reason: HOSPADM

## 2023-06-05 RX ORDER — SODIUM CHLORIDE 0.9 % (FLUSH) 0.9 %
3 SYRINGE (ML) INJECTION AS NEEDED
Status: DISCONTINUED | OUTPATIENT
Start: 2023-06-05 | End: 2023-06-05 | Stop reason: HOSPADM

## 2023-06-05 RX ORDER — DROPERIDOL 2.5 MG/ML
0.62 INJECTION, SOLUTION INTRAMUSCULAR; INTRAVENOUS ONCE AS NEEDED
Status: DISCONTINUED | OUTPATIENT
Start: 2023-06-05 | End: 2023-06-05 | Stop reason: HOSPADM

## 2023-06-05 RX ORDER — SODIUM CHLORIDE 0.9 % (FLUSH) 0.9 %
10 SYRINGE (ML) INJECTION EVERY 12 HOURS SCHEDULED
Status: DISCONTINUED | OUTPATIENT
Start: 2023-06-05 | End: 2023-06-05 | Stop reason: HOSPADM

## 2023-06-05 RX ORDER — SODIUM CHLORIDE 9 MG/ML
100 INJECTION, SOLUTION INTRAVENOUS CONTINUOUS
Status: DISCONTINUED | OUTPATIENT
Start: 2023-06-05 | End: 2023-06-05 | Stop reason: HOSPADM

## 2023-06-05 RX ORDER — DEXTROSE MONOHYDRATE 25 G/50ML
12.5 INJECTION, SOLUTION INTRAVENOUS AS NEEDED
Status: DISCONTINUED | OUTPATIENT
Start: 2023-06-05 | End: 2023-06-05 | Stop reason: HOSPADM

## 2023-06-05 RX ORDER — FENTANYL CITRATE 50 UG/ML
INJECTION, SOLUTION INTRAMUSCULAR; INTRAVENOUS AS NEEDED
Status: DISCONTINUED | OUTPATIENT
Start: 2023-06-05 | End: 2023-06-05 | Stop reason: SURG

## 2023-06-05 RX ORDER — PROPOFOL 10 MG/ML
VIAL (ML) INTRAVENOUS AS NEEDED
Status: DISCONTINUED | OUTPATIENT
Start: 2023-06-05 | End: 2023-06-05 | Stop reason: SURG

## 2023-06-05 RX ORDER — ONDANSETRON 2 MG/ML
4 INJECTION INTRAMUSCULAR; INTRAVENOUS ONCE AS NEEDED
Status: DISCONTINUED | OUTPATIENT
Start: 2023-06-05 | End: 2023-06-05 | Stop reason: HOSPADM

## 2023-06-05 RX ORDER — AMOXICILLIN 875 MG/1
875 TABLET, COATED ORAL 2 TIMES DAILY
COMMUNITY

## 2023-06-05 RX ORDER — SODIUM CHLORIDE 0.9 % (FLUSH) 0.9 %
10 SYRINGE (ML) INJECTION AS NEEDED
Status: DISCONTINUED | OUTPATIENT
Start: 2023-06-05 | End: 2023-06-05 | Stop reason: HOSPADM

## 2023-06-05 RX ADMIN — DROPERIDOL 1.25 MG: 2.5 INJECTION, SOLUTION INTRAMUSCULAR; INTRAVENOUS at 13:50

## 2023-06-05 RX ADMIN — FENTANYL CITRATE 100 MCG: 50 INJECTION, SOLUTION INTRAMUSCULAR; INTRAVENOUS at 13:50

## 2023-06-05 RX ADMIN — LIDOCAINE HYDROCHLORIDE 200 MG: 20 INJECTION, SOLUTION EPIDURAL; INFILTRATION; INTRACAUDAL; PERINEURAL at 13:50

## 2023-06-05 RX ADMIN — SODIUM CHLORIDE, POTASSIUM CHLORIDE, SODIUM LACTATE AND CALCIUM CHLORIDE 1000 ML: 600; 310; 30; 20 INJECTION, SOLUTION INTRAVENOUS at 12:29

## 2023-06-05 RX ADMIN — SODIUM CHLORIDE, POTASSIUM CHLORIDE, SODIUM LACTATE AND CALCIUM CHLORIDE 9 ML/HR: 600; 310; 30; 20 INJECTION, SOLUTION INTRAVENOUS at 14:58

## 2023-06-05 RX ADMIN — DEXAMETHASONE SODIUM PHOSPHATE 4 MG: 4 INJECTION INTRA-ARTICULAR; INTRALESIONAL; INTRAMUSCULAR; INTRAVENOUS; SOFT TISSUE at 13:05

## 2023-06-05 RX ADMIN — PROPOFOL INJECTABLE EMULSION 200 MG: 10 INJECTION, EMULSION INTRAVENOUS at 13:50

## 2023-06-05 RX ADMIN — CEFAZOLIN 2 G: 2 INJECTION, POWDER, FOR SOLUTION INTRAMUSCULAR; INTRAVENOUS at 13:48

## 2023-06-05 RX ADMIN — ONDANSETRON 8 MG: 2 INJECTION INTRAMUSCULAR; INTRAVENOUS at 13:55

## 2023-06-05 NOTE — ANESTHESIA POSTPROCEDURE EVALUATION
Patient: Christiana Hendrix    Procedure Summary       Date: 06/05/23 Room / Location:  [facility] /  [facility]    Anesthesia Start: 1348 Anesthesia Stop: 1442    Procedure: EXTRACORPOREAL SHOCKWAVE LITHOTRIPSY-right (Right) Diagnosis:       Kidney stone      (Kidney stone [N20.0])    Surgeons: Jamar Hendrickson MD Provider: Otto Wilde CRNA    Anesthesia Type: general ASA Status: 3            Anesthesia Type: general    Vitals  No vitals data found for the desired time range.          Post Anesthesia Care and Evaluation    Patient location during evaluation: PACU  Patient participation: complete - patient participated  Level of consciousness: awake and alert  Pain management: adequate    Airway patency: patent  Anesthetic complications: No anesthetic complications    Cardiovascular status: acceptable  Respiratory status: acceptable and face mask  Hydration status: acceptable    Comments: Blood pressure 134/78, pulse 67, temperature 96.7 °F (35.9 °C), temperature source Temporal, resp. rate 16, SpO2 95 %, not currently breastfeeding.    Pt discharged from PACU based on wally score >8

## 2023-06-05 NOTE — ANESTHESIA PREPROCEDURE EVALUATION
Anesthesia Evaluation     Patient summary reviewed   no history of anesthetic complications:   NPO Solid Status: > 8 hours             Airway   Mallampati: II  TM distance: >3 FB  Neck ROM: full  Dental      Pulmonary    (+) asthma,  (-) COPD, sleep apnea, not a smoker  Cardiovascular   Exercise tolerance: good (4-7 METS)    (+) hypertension, CAD, cardiac stents (2004) dysrhythmias Paroxysmal Atrial Fib, hyperlipidemia  (-) pacemaker, past MI, angina      Neuro/Psych  (-) seizures, TIA, CVA  GI/Hepatic/Renal/Endo    (+) obesity, renal disease stones  (-) GERD, liver disease, diabetes    Musculoskeletal     Abdominal    Substance History      OB/GYN          Other                      Anesthesia Plan    ASA 3     general     intravenous induction     Anesthetic plan, risks, benefits, and alternatives have been provided, discussed and informed consent has been obtained with: patient.    CODE STATUS:

## 2023-06-05 NOTE — OP NOTE
EXTRACORPOREAL SHOCKWAVE LITHOTRIPSY  Procedure Note    Christiana Hendrix  6/5/2023    Pre-op Diagnosis:   Kidney stone [N20.0]    Post-op Diagnosis:     Post-Op Diagnosis Codes:     * Kidney stone [N20.0]    Procedure/CPT® Codes:      Procedure(s):  EXTRACORPOREAL SHOCKWAVE LITHOTRIPSY-right    Surgeon(s):  Jamar Hendrickson MD    Anesthesia: General    Staff:   Circulator: Gladys Leal RN  Scrub Person: Holley Wall  Vendor Representative: Vincent Garrison    Estimated Blood Loss: none    Specimens:                None      Drains: * No LDAs found *    Findings: 1cm stone upper pole with good breakup    Complications: None    Indications:. Patient has  A stone  measuring approximately 10  mm in the right Upper pole. After discussion of options, patient has given informed consent to undergo right ESWL.  All risks, benefits, and alternatives were discussed.    Operative Report: After informed consent was obtained, patient was brought to the operating room.  General endotracheal anesthesia was administered in the supine posistion.  Preoperative KUB was reviewed.  Please see above for preoperative KUB findings.    The shock head is brought into position and the stone is brought into the F2 plane for focus.  The stone was identified and the procedure began.  We gradually increased the energy level from 1 to 8.  This stone was treated at a rate of 90.  We paused for 2 minutes after 300 shocks to reduce the risk of renal damage.  The stone was periodically checked to make sure that we were on it and getting good breakup.  We checked at 700, 1000, 1500, 2000, and 2500 shocks.  The stone did have good breakup.  I felt it was unnecessary to place a ureteral stent.  At the conclusion of 3000 shocks, the patient was awakened from anesthesia and transferred to the recovery room in satisfactory condition.            Jamar Hendrickson MD     Date: 6/5/2023  Time: 14:31 CDT

## 2023-06-21 ENCOUNTER — OFFICE VISIT (OUTPATIENT)
Dept: ENT CLINIC | Age: 73
End: 2023-06-21
Payer: MEDICARE

## 2023-06-21 VITALS
WEIGHT: 216 LBS | HEIGHT: 64 IN | BODY MASS INDEX: 36.88 KG/M2 | DIASTOLIC BLOOD PRESSURE: 70 MMHG | SYSTOLIC BLOOD PRESSURE: 128 MMHG

## 2023-06-21 DIAGNOSIS — H69.82 EUSTACHIAN TUBE DYSFUNCTION, LEFT: ICD-10-CM

## 2023-06-21 DIAGNOSIS — H65.92 MEE (MIDDLE EAR EFFUSION), LEFT: Primary | ICD-10-CM

## 2023-06-21 PROCEDURE — G8400 PT W/DXA NO RESULTS DOC: HCPCS | Performed by: PHYSICIAN ASSISTANT

## 2023-06-21 PROCEDURE — 1090F PRES/ABSN URINE INCON ASSESS: CPT | Performed by: PHYSICIAN ASSISTANT

## 2023-06-21 PROCEDURE — G8417 CALC BMI ABV UP PARAM F/U: HCPCS | Performed by: PHYSICIAN ASSISTANT

## 2023-06-21 PROCEDURE — 3017F COLORECTAL CA SCREEN DOC REV: CPT | Performed by: PHYSICIAN ASSISTANT

## 2023-06-21 PROCEDURE — G8427 DOCREV CUR MEDS BY ELIG CLIN: HCPCS | Performed by: PHYSICIAN ASSISTANT

## 2023-06-21 PROCEDURE — 1123F ACP DISCUSS/DSCN MKR DOCD: CPT | Performed by: PHYSICIAN ASSISTANT

## 2023-06-21 PROCEDURE — 99213 OFFICE O/P EST LOW 20 MIN: CPT | Performed by: PHYSICIAN ASSISTANT

## 2023-06-21 PROCEDURE — 1036F TOBACCO NON-USER: CPT | Performed by: PHYSICIAN ASSISTANT

## 2023-06-21 RX ORDER — INCLISIRAN 284 MG/1.5ML
284 INJECTION, SOLUTION SUBCUTANEOUS ONCE
COMMUNITY

## 2023-06-21 NOTE — PROGRESS NOTES
Mrs. Kali Kitchen is a pleasant 71-year-old  female that presents for a 2-week follow-up after eustachian tube dysfunction left ear. Patient reports that the ear is feeling better but not totally resolved. She is having no drainage from the canals. Physical examination with the microscope confirmed persistent middle ear effusion to be present to the ears bilaterally with left greater than right. There was no evidence of infection or drainage to the canal.  Neck exam demonstrated no lymphadenopathy or thyromegaly. Oral exam was unrevealing. Impression: Persistent eustachian tube dysfunction with a left middle ear effusion    Plan: Due to the patient having frequent recurrent symptoms with middle ear effusions, I have recommended the patient to see Dr. Alba Molina for possible eustachian tube dilatation. I will continue her on Afrin and Flonase until she sees Dr. Alba Molina. She was reminded to call if she has questions or problems.       Electronically signed by Jahaira Cunningham PA-C on 6/21/23 at 12:29 PM CDT

## 2023-08-08 ENCOUNTER — INFUSION (OUTPATIENT)
Dept: ONCOLOGY | Facility: HOSPITAL | Age: 73
End: 2023-08-08
Payer: MEDICARE

## 2023-08-08 VITALS
RESPIRATION RATE: 18 BRPM | SYSTOLIC BLOOD PRESSURE: 145 MMHG | TEMPERATURE: 97.2 F | WEIGHT: 214 LBS | DIASTOLIC BLOOD PRESSURE: 82 MMHG | HEART RATE: 93 BPM | HEIGHT: 64 IN | OXYGEN SATURATION: 93 % | BODY MASS INDEX: 36.54 KG/M2

## 2023-08-08 DIAGNOSIS — E78.2 MIXED HYPERLIPIDEMIA: Primary | ICD-10-CM

## 2023-08-08 PROCEDURE — 96372 THER/PROPH/DIAG INJ SC/IM: CPT

## 2023-08-08 PROCEDURE — 25010000002 INCLISIRAN SODIUM 284 MG/1.5ML SOLUTION PREFILLED SYRINGE: Performed by: INTERNAL MEDICINE

## 2023-08-08 RX ADMIN — INCLISIRAN 284 MG: 284 INJECTION, SOLUTION SUBCUTANEOUS at 13:24

## 2023-08-10 ENCOUNTER — PROCEDURE VISIT (OUTPATIENT)
Dept: ENT CLINIC | Age: 73
End: 2023-08-10
Payer: MEDICARE

## 2023-08-10 ENCOUNTER — OFFICE VISIT (OUTPATIENT)
Dept: ENT CLINIC | Age: 73
End: 2023-08-10
Payer: MEDICARE

## 2023-08-10 VITALS
DIASTOLIC BLOOD PRESSURE: 80 MMHG | SYSTOLIC BLOOD PRESSURE: 128 MMHG | BODY MASS INDEX: 36.88 KG/M2 | WEIGHT: 216 LBS | HEIGHT: 64 IN

## 2023-08-10 DIAGNOSIS — H90.3 SENSORINEURAL HEARING LOSS (SNHL) OF BOTH EARS: ICD-10-CM

## 2023-08-10 DIAGNOSIS — H90.3 SENSORINEURAL HEARING LOSS (SNHL) OF BOTH EARS: Primary | ICD-10-CM

## 2023-08-10 DIAGNOSIS — H92.02 OTALGIA, LEFT: ICD-10-CM

## 2023-08-10 DIAGNOSIS — H92.03 OTALGIA OF BOTH EARS: Primary | ICD-10-CM

## 2023-08-10 PROCEDURE — 1036F TOBACCO NON-USER: CPT | Performed by: OTOLARYNGOLOGY

## 2023-08-10 PROCEDURE — 1090F PRES/ABSN URINE INCON ASSESS: CPT | Performed by: OTOLARYNGOLOGY

## 2023-08-10 PROCEDURE — 92553 AUDIOMETRY AIR & BONE: CPT | Performed by: AUDIOLOGIST

## 2023-08-10 PROCEDURE — 3017F COLORECTAL CA SCREEN DOC REV: CPT | Performed by: OTOLARYNGOLOGY

## 2023-08-10 PROCEDURE — 1123F ACP DISCUSS/DSCN MKR DOCD: CPT | Performed by: OTOLARYNGOLOGY

## 2023-08-10 PROCEDURE — G8400 PT W/DXA NO RESULTS DOC: HCPCS | Performed by: OTOLARYNGOLOGY

## 2023-08-10 PROCEDURE — 99213 OFFICE O/P EST LOW 20 MIN: CPT | Performed by: OTOLARYNGOLOGY

## 2023-08-10 PROCEDURE — G8417 CALC BMI ABV UP PARAM F/U: HCPCS | Performed by: OTOLARYNGOLOGY

## 2023-08-10 PROCEDURE — G8427 DOCREV CUR MEDS BY ELIG CLIN: HCPCS | Performed by: OTOLARYNGOLOGY

## 2023-08-10 PROCEDURE — 92567 TYMPANOMETRY: CPT | Performed by: AUDIOLOGIST

## 2023-08-10 RX ORDER — FLUOCINOLONE ACETONIDE 0.11 MG/ML
4 OIL AURICULAR (OTIC) 2 TIMES DAILY
Qty: 20 ML | Refills: 0 | Status: SHIPPED | OUTPATIENT
Start: 2023-08-10

## 2023-08-10 ASSESSMENT — ENCOUNTER SYMPTOMS
EYES NEGATIVE: 1
RESPIRATORY NEGATIVE: 1
ALLERGIC/IMMUNOLOGIC NEGATIVE: 1
GASTROINTESTINAL NEGATIVE: 1

## 2023-08-10 NOTE — PROGRESS NOTES
8/10/2023    Steve Whitfield (:  1950) is a 68 y.o. female, Established patient, here for evaluation of the following chief complaint(s):  New Patient (Ears)      Vitals:    08/10/23 1348   BP: 128/80   Weight: 216 lb (98 kg)   Height: 5' 4\" (1.626 m)       Wt Readings from Last 3 Encounters:   08/10/23 216 lb (98 kg)   23 216 lb (98 kg)   23 216 lb (98 kg)       BP Readings from Last 3 Encounters:   08/10/23 128/80   23 128/70   23 128/74         SUBJECTIVE/OBJECTIVE:    Patient seen today for her left ear. She complains of occasional aching in his ear which she feels like is in the canal.  Hearing test today demonstrates type a tympanograms and sensorineural hearing loss. She does not use Q-tips. No balance issues. Review of Systems   Constitutional: Negative. HENT:  Positive for ear pain. Eyes: Negative. Respiratory: Negative. Cardiovascular: Negative. Gastrointestinal: Negative. Endocrine: Negative. Musculoskeletal: Negative. Skin: Negative. Allergic/Immunologic: Negative. Neurological: Negative. Hematological: Negative. Psychiatric/Behavioral: Negative. Physical Exam  Vitals reviewed. Constitutional:       Appearance: Normal appearance. She is normal weight. HENT:      Head: Normocephalic and atraumatic. Right Ear: Tympanic membrane, ear canal and external ear normal.      Left Ear: Tympanic membrane, ear canal and external ear normal.      Nose: Nose normal.      Mouth/Throat:      Mouth: Mucous membranes are moist.      Pharynx: Oropharynx is clear. Eyes:      Extraocular Movements: Extraocular movements intact. Pupils: Pupils are equal, round, and reactive to light. Cardiovascular:      Rate and Rhythm: Normal rate and regular rhythm. Pulmonary:      Effort: Pulmonary effort is normal.      Breath sounds: Normal breath sounds. Musculoskeletal:      Cervical back: Normal range of motion.    Skin:

## 2023-08-10 NOTE — PROGRESS NOTES
History   Denisse Monson is a 68 y.o. female who presented to the clinic this date with complaints of possible middle ear effusion bilaterally, worse in the left ear. She noted bilateral ear pain. She reported onset 2-3 years ago. She reported decreased hearing and difficulty following conversations in noise. Summary   Tympanometry consistent with normal TM mobility bilaterally. Pure tone testing indicates moderate high frequency SNHL bilaterally. Results   Otoscopy:   Right: Clear EAC/Normal TM  Left: Clear EAC/Normal TM    Audiometry:   Right: Moderate high frequency SNHL  Left: Moderate high frequency SNHL         Tympanometry:    Right: Type A  Left: Type A      Plan   Results of today's testing were discussed with Ms. Brandt and the following recommendations were made: Follow up with ENT as scheduled. Monitor hearing yearly, sooner with changes. May consider hearing aid evaluation if hearing loss causes significant communication difficulties. Hearing protection as warranted.          Audiogram and Acoustic Immittance

## 2023-09-05 ENCOUNTER — OFFICE VISIT (OUTPATIENT)
Dept: CARDIOLOGY | Facility: CLINIC | Age: 73
End: 2023-09-05
Payer: MEDICARE

## 2023-09-05 VITALS
OXYGEN SATURATION: 95 % | DIASTOLIC BLOOD PRESSURE: 60 MMHG | HEIGHT: 64 IN | SYSTOLIC BLOOD PRESSURE: 138 MMHG | WEIGHT: 213 LBS | RESPIRATION RATE: 18 BRPM | BODY MASS INDEX: 36.37 KG/M2 | HEART RATE: 86 BPM

## 2023-09-05 DIAGNOSIS — I48.0 PAF (PAROXYSMAL ATRIAL FIBRILLATION): Primary | ICD-10-CM

## 2023-09-05 NOTE — PROGRESS NOTES
"EP NEW PATIENT VISIT    Chief Complaint  Atrial Fibrillation (NP)    Subjective        History of Present Illness    EP Problems:  1.  Paroxysmal atrial fibrillation  -10/20/2021: Abdullahi Dc    Cardiology Problems:  1.  Hypertension  2.  Hyperlipidemia  3.  CAD status post PCI    Medical Problems:  1.  Obesity  -9/2023: BMI 36  2.  Eustachian tube malfunction  3.  Nephrolithiasis    Christiana Hendrix is a 73 y.o. female with problem list as above who presents to the clinic for evaluation of paroxysmal atrial fibrillation.  She has a history of atrial fibrillation dating back several years.  In 2021, she underwent ablation with Dr. Fernando.  She has done well overall since that time.    Objective   Vital Signs:  /60 (BP Location: Right arm, Patient Position: Sitting)   Pulse 86   Resp 18   Ht 162.6 cm (64\")   Wt 96.6 kg (213 lb)   SpO2 95%   BMI 36.56 kg/m²   Estimated body mass index is 36.56 kg/m² as calculated from the following:    Height as of this encounter: 162.6 cm (64\").    Weight as of this encounter: 96.6 kg (213 lb).      Physical Exam  Vitals reviewed.   Constitutional:       Appearance: She is obese.   Cardiovascular:      Rate and Rhythm: Normal rate and regular rhythm.      Pulses: Normal pulses.      Heart sounds: Normal heart sounds. No murmur heard.  Pulmonary:      Effort: Pulmonary effort is normal. No respiratory distress.      Breath sounds: Normal breath sounds.   Skin:     General: Skin is warm and dry.   Neurological:      General: No focal deficit present.      Mental Status: She is alert and oriented to person, place, and time.   Psychiatric:         Mood and Affect: Mood normal.         Judgment: Judgment normal.      Result Review :  The following data was reviewed by: Petra Cantor MD on 09/05/2023:  CMP          12/15/2022    09:36 3/24/2023    11:15 6/1/2023    08:53   CMP   Glucose 102  116  120    BUN 15  24  16    Creatinine 0.70  0.68  0.61    EGFR 92.6  93.2  95.1  "   Sodium 143  141  141    Potassium 4.3  4.6  4.6    Chloride 106  107  108    Calcium 9.4  9.3  9.1    Total Protein 6.7  7.2     Albumin 3.80  4.2     Globulin 2.9  3.0     Total Bilirubin 0.9  1.4     Alkaline Phosphatase 142  160     AST (SGOT) 11  14     ALT (SGPT) 11  8     Albumin/Globulin Ratio 1.3  1.4     BUN/Creatinine Ratio 21.4  35.3  26.2    Anion Gap 8.0  11.0  8.0      CBC          12/15/2022    09:36 3/24/2023    11:15 6/1/2023    08:53   CBC   WBC 8.88  11.08  9.60    RBC 4.80  5.02  4.99    Hemoglobin 11.9  13.2  13.1    Hematocrit 41.4  44.1  43.3    MCV 86.3  87.8  86.8    MCH 24.8  26.3  26.3    MCHC 28.7  29.9  30.3    RDW 16.9  15.4  15.0    Platelets 278  247  245      TSH          12/15/2022    09:36   TSH   TSH 0.805      [id number] SCORE   [id number] Score: 4 (9/5/2023  1:23 PM)        ECG 12 Lead    Date/Time: 9/5/2023 1:23 PM  Performed by: Petra Cantor MD  Authorized by: Petra Cantor MD   Comparison: compared with previous ECG from 6/1/2023  Comparison to previous ECG: Left posterior fascicular block is no longer present  Rhythm: sinus rhythm  Rate: normal  Conduction: conduction normal  QRS axis: normal  Other: no other findings    Clinical impression: normal ECG            Assessment and Plan     Diagnoses and all orders for this visit:    1. PAF (paroxysmal atrial fibrillation) (Primary)    Other orders  -     ECG 12 Lead        Christiana Hendrix is a 73 y.o. female with problem list as above who presents to the clinic for evaluation of paroxysmal atrial fibrillation.  She is doing well overall still with no evidence of recurrence following her ablation.  We discussed lifestyle modification including weight loss and exercise for prevention of recurrence.  She states that she will work on this.  She is scheduled for knee surgery soon.  She should hold her anticoagulation 48 to 70 hours prior to the procedure as well as however longer takes to achieve adequate hemostasis following  the procedure.  She has no modifiable risk factors for the procedure and is overall at low risk of cardiovascular complications.    Plan:  -Work on weight loss and exercise  -Continue rivaroxaban for anticoagulation given elevated [id number]  -Contact me with recurrences of arrhythmias         Follow Up     Return in about 6 months (around 3/5/2024).  Patient was given instructions and counseling regarding her condition or for health maintenance advice. Please see specific information pulled into the AVS if appropriate.     Part of this note may be an electronic transcription/translation of spoken language to printed text using the Dragon Dictation System.

## 2023-09-07 ENCOUNTER — OFFICE VISIT (OUTPATIENT)
Dept: ENT CLINIC | Age: 73
End: 2023-09-07
Payer: MEDICARE

## 2023-09-07 VITALS
SYSTOLIC BLOOD PRESSURE: 130 MMHG | WEIGHT: 214 LBS | HEIGHT: 64 IN | DIASTOLIC BLOOD PRESSURE: 80 MMHG | BODY MASS INDEX: 36.54 KG/M2

## 2023-09-07 DIAGNOSIS — H60.542 DERMATITIS OF EAR CANAL, LEFT: Primary | ICD-10-CM

## 2023-09-07 PROCEDURE — 1036F TOBACCO NON-USER: CPT | Performed by: OTOLARYNGOLOGY

## 2023-09-07 PROCEDURE — G8417 CALC BMI ABV UP PARAM F/U: HCPCS | Performed by: OTOLARYNGOLOGY

## 2023-09-07 PROCEDURE — 1090F PRES/ABSN URINE INCON ASSESS: CPT | Performed by: OTOLARYNGOLOGY

## 2023-09-07 PROCEDURE — 1123F ACP DISCUSS/DSCN MKR DOCD: CPT | Performed by: OTOLARYNGOLOGY

## 2023-09-07 PROCEDURE — G8427 DOCREV CUR MEDS BY ELIG CLIN: HCPCS | Performed by: OTOLARYNGOLOGY

## 2023-09-07 PROCEDURE — 3017F COLORECTAL CA SCREEN DOC REV: CPT | Performed by: OTOLARYNGOLOGY

## 2023-09-07 PROCEDURE — 99213 OFFICE O/P EST LOW 20 MIN: CPT | Performed by: OTOLARYNGOLOGY

## 2023-09-07 PROCEDURE — G8400 PT W/DXA NO RESULTS DOC: HCPCS | Performed by: OTOLARYNGOLOGY

## 2023-09-07 PROCEDURE — 4130F TOPICAL PREP RX AOE: CPT | Performed by: OTOLARYNGOLOGY

## 2023-09-07 RX ORDER — FLUOCINOLONE ACETONIDE 0.11 MG/ML
4 OIL AURICULAR (OTIC)
Qty: 20 ML | Refills: 4 | Status: SHIPPED | OUTPATIENT
Start: 2023-09-08

## 2023-09-07 ASSESSMENT — ENCOUNTER SYMPTOMS
RESPIRATORY NEGATIVE: 1
EYES NEGATIVE: 1
GASTROINTESTINAL NEGATIVE: 1
ALLERGIC/IMMUNOLOGIC NEGATIVE: 1

## 2023-09-07 NOTE — PROGRESS NOTES
2023    Steve Whitfield (:  1950) is a 68 y.o. female, Established patient, here for evaluation of the following chief complaint(s):  Follow-up (Ears)      Vitals:    23 1522   BP: 130/80   Weight: 214 lb (97.1 kg)   Height: 5' 4\" (1.626 m)       Wt Readings from Last 3 Encounters:   23 214 lb (97.1 kg)   08/10/23 216 lb (98 kg)   23 216 lb (98 kg)       BP Readings from Last 3 Encounters:   23 130/80   08/10/23 128/80   23 128/70         SUBJECTIVE/OBJECTIVE:    Patient seen today for her left ear. I placed her on Derm otic for dermatitis of the ear canal she is doing much better. She says she stopped using today but there is no more itching. Review of Systems   Constitutional: Negative. HENT: Negative. Eyes: Negative. Respiratory: Negative. Cardiovascular: Negative. Gastrointestinal: Negative. Endocrine: Negative. Musculoskeletal: Negative. Skin: Negative. Allergic/Immunologic: Negative. Neurological: Negative. Hematological: Negative. Psychiatric/Behavioral: Negative. Physical Exam  Vitals reviewed. Constitutional:       Appearance: Normal appearance. She is normal weight. HENT:      Head: Normocephalic and atraumatic. Right Ear: Tympanic membrane, ear canal and external ear normal.      Left Ear: Tympanic membrane, ear canal and external ear normal.      Nose: Nose normal.      Mouth/Throat:      Mouth: Mucous membranes are moist.      Pharynx: Oropharynx is clear. Eyes:      Extraocular Movements: Extraocular movements intact. Pupils: Pupils are equal, round, and reactive to light. Cardiovascular:      Rate and Rhythm: Normal rate and regular rhythm. Pulmonary:      Effort: Pulmonary effort is normal.      Breath sounds: Normal breath sounds. Musculoskeletal:      Cervical back: Normal range of motion. Skin:     General: Skin is warm and dry.    Neurological:      General: No focal deficit

## 2023-10-03 ENCOUNTER — TELEPHONE (OUTPATIENT)
Dept: UROLOGY | Facility: CLINIC | Age: 73
End: 2023-10-03

## 2023-10-03 NOTE — PROGRESS NOTES
Subjective    Ms. Hendrix is 73 y.o. female    Chief Complaint: Left upper quadrant pain , hx of kidney stone    History of Present Illness    73-year-old female established patient in new complaint of left upper quadrant pain with radiation at times to the mid back as well as the right upper quadrant.  Reports symptom onset within the past 2 weeks.  Denies any difficulty with urination or hematuria.  Patient does report a history of IBS with chronic constipation.  Known history of kidney stones last of which underwent right ESWL for 2 large 10 and 12 mm right upper pole renal stones by Dr. Hendrickson June of this year.  KUB today revealing bilateral small stone burden however nothing seen within the ureter to explain patient's pain.  Patient is however found to have moderate stool within the rectum and along the right side.    The following portions of the patient's history were reviewed and updated as appropriate: allergies, current medications, past family history, past medical history, past social history, past surgical history and problem list.    Review of Systems   Constitutional:  Negative for chills and fever.   Gastrointestinal:  Positive for constipation. Negative for abdominal pain, anal bleeding, blood in stool, nausea and vomiting.   Genitourinary:  Positive for flank pain. Negative for difficulty urinating, dysuria, frequency, hematuria, pelvic pain and urgency.   Musculoskeletal:  Positive for back pain.       Current Outpatient Medications:     amLODIPine (NORVASC) 10 MG tablet, Take 1 tablet by mouth 2 (Two) Times a Day. Takes 1 po QAM & 1/2 tablet po QPM, Disp: , Rfl:     atorvastatin (LIPITOR) 80 MG tablet, Take 1 tablet by mouth Daily., Disp: , Rfl:     Cholecalciferol (VITAMIN D3) 125 MCG (5000 UT) capsule capsule, Take 1 capsule by mouth Daily., Disp: , Rfl:     coenzyme Q10 100 MG capsule, Take 2 capsules by mouth Daily., Disp: , Rfl:     ezetimibe (ZETIA) 10 MG tablet, Take 1 tablet by mouth  Daily., Disp: , Rfl:     folic acid (FOLVITE) 800 MCG tablet, Take 1 tablet by mouth Daily., Disp: , Rfl:     Glucosamine-Chondroit-Vit C-Mn (GLUCOSAMINE 1500 COMPLEX PO), Take  by mouth., Disp: , Rfl:     hydrochlorothiazide (HYDRODIURIL) 25 MG tablet, Take 1 tablet by mouth Daily., Disp: , Rfl:     Inclisiran Sodium (Leqvio) 284 MG/1.5ML solution prefilled syringe, Inject 1 mL under the skin into the appropriate area as directed Every 6 (Six) Months., Disp: 1 mL, Rfl: 3    levocetirizine (XYZAL) 5 MG tablet, Take 1 tablet by mouth Every Evening., Disp: , Rfl:     metoprolol succinate XL (TOPROL-XL) 100 MG 24 hr tablet, Take 1 tablet by mouth Daily., Disp: , Rfl:     montelukast (SINGULAIR) 10 MG tablet, Take 1 tablet by mouth Every Night., Disp: , Rfl:     niacin (NIASPAN) 500 MG CR tablet, , Disp: , Rfl:     nitroglycerin (NITROSTAT) 0.4 MG SL tablet, Place 1 tablet under the tongue Every 5 (Five) Minutes As Needed for Chest Pain. Take no more than 3 doses in 15 minutes., Disp: 30 tablet, Rfl: 5    potassium chloride (K-DUR,KLOR-CON) 20 MEQ CR tablet, Take 1 tablet by mouth 2 (Two) Times a Day., Disp: , Rfl:     predniSONE (DELTASONE) 20 MG tablet, Take 2 tablets by mouth Daily., Disp: , Rfl:     rivaroxaban (Xarelto) 20 MG tablet, Take 1 tablet by mouth Daily., Disp: 90 tablet, Rfl: 3    traMADol (ULTRAM) 50 MG tablet, TAKE 1 TABLET BY MOUTH EVERY 6 HOURS FOR 14 DAYS, Disp: , Rfl:     vitamin A 77376 UNIT capsule, Take 1 capsule by mouth Daily., Disp: , Rfl:     Past Medical History:   Diagnosis Date    Asthma     Atrial fibrillation     CAD in native artery     Hyperlipidemia     Hypertension     Kidney stone 05/30/2023    MI, old        Past Surgical History:   Procedure Laterality Date    ABLATION OF DYSRHYTHMIC FOCUS      BLADDER NECK SUSPENSION      CARDIOVERSION      CHOLECYSTECTOMY      COLONOSCOPY W/ BIOPSIES      CORONARY ANGIOPLASTY  08/13/2007    had stents 2003    CORONARY STENT PLACEMENT  2002     "EXTRACORPOREAL SHOCK WAVE LITHOTRIPSY (ESWL) Right 06/05/2023    Procedure: EXTRACORPOREAL SHOCKWAVE LITHOTRIPSY-right;  Surgeon: Jamar Hendrickson MD;  Location: [facility];  Service: Urology;  Laterality: Right;    HYSTERECTOMY      VEIN SURGERY  1988       Social History     Socioeconomic History    Marital status: Single   Tobacco Use    Smoking status: Never    Smokeless tobacco: Never   Vaping Use    Vaping Use: Never used   Substance and Sexual Activity    Alcohol use: Never     Alcohol/week: 3.0 standard drinks     Types: 2 Glasses of wine, 1 Shots of liquor per week    Drug use: Never    Sexual activity: Not Currently     Partners: Male     Birth control/protection: None       Family History   Problem Relation Age of Onset    Dementia Mother     Heart attack Sister     Heart disease Sister     Diverticulitis Sister     Asthma Sister     Brain cancer Father     Heart disease Maternal Grandmother     Heart attack Maternal Grandmother     Stroke Maternal Grandfather     No Known Problems Paternal Grandmother     Brain cancer Paternal Grandfather     Breast cancer Sister     Breast cancer Paternal Aunt     Ovarian cancer Neg Hx     Uterine cancer Neg Hx     Colon cancer Neg Hx        Objective    Temp 97.2 °F (36.2 °C)   Ht 162.6 cm (64.02\")   Wt 97.3 kg (214 lb 6.4 oz)   BMI 36.78 kg/m²     Physical Exam  Nursing note reviewed.   Constitutional:       General: She is not in acute distress.     Appearance: Normal appearance. She is not ill-appearing.   HENT:      Nose: No congestion.   Abdominal:      Tenderness: There is no right CVA tenderness or left CVA tenderness.      Hernia: No hernia is present.   Skin:     General: Skin is warm and dry.   Neurological:      Mental Status: She is alert and oriented to person, place, and time.   Psychiatric:         Mood and Affect: Mood normal.         Behavior: Behavior normal.           Results for orders placed or performed in visit on 10/04/23   POC " Urinalysis Dipstick, Multipro    Specimen: Urine   Result Value Ref Range    Color Yellow Yellow, Straw, Dark Yellow, Beatriz    Clarity, UA Clear Clear    Glucose, UA Negative Negative mg/dL    Bilirubin Negative Negative    Ketones, UA Negative Negative    Specific Gravity  1.025 1.005 - 1.030    Blood, UA Moderate (A) Negative    pH, Urine 5.5 5.0 - 8.0    Protein, POC Negative Negative mg/dL    Urobilinogen, UA 0.2 E.U./dL Normal, 0.2 E.U./dL    Nitrite, UA Negative Negative    Leukocytes Negative Negative   KUB independent review    A KUB is available for me to review today.  The image is inspected for a bowel gas pattern and the general bone structure of the spine and pelvis. The kidneys are then inspected closely.  Renal outline is noted if identifiable. The kidney, collecting system, and anticipated path of the ureter are examined for calcifications including those in the true pelvis.  This film reveals:    On the right there are multiple renal stones.  1 stone within the right lower pole measuring 3 mm 1 stone within the right upper pole measuring 2 mm .    On the left there is a multiple renal stone 1 within the left lower pole measuring 2 mm as well as a 1 mm stone in the left upper pole.      Assessment and Plan    Diagnoses and all orders for this visit:    1. Left upper quadrant pain (Primary)    2. Kidney stone  -     POC Urinalysis Dipstick, Multipro    73-year-old female established patient in new complaint of left upper quadrant pain with radiation at times to the mid back as well as the right upper quadrant.    KUB today revealing bilateral small stone burden however nothing seen within the ureter to explain patient's pain.  Patient is however found to have moderate stool within the rectum and along the right side.    I have high suspicion that patient's pain may be coming from constipation as patient reports she has been battling IBS.  Recommend patient starting on a bowel regimen trying to get as  cleaned out as possible and if pain continues despite being cleaned out recommend further evaluation with CT imaging

## 2023-10-03 NOTE — TELEPHONE ENCOUNTER
Provider: LANCE CRENSHAW    Caller: TORI LANCASTER    Relationship to Patient: SELF    Reason for Call: PT FEELS SHE HAS A KIDNEY STONE, PT IS HAVING CONSTANT PAIN ON THE LEFT AND RIGHT.    When was the patient last seen: 6/23/23    When did it start: 2 WEEKS AGO    Where is it located: LOWER BACK, ABDOMEN    Characteristics of symptom/severity: 5    Timing- Is it constant or intermittent: CONSTANT    What makes it worse: NA    What makes it better: PAIN PILL    What therapies/medications have you tried: NA    PLEASE CALL PT TO ADVISE IF SHE NEEDS IMAGING PRIOR TO SCHEDULING AN APPT.

## 2023-10-04 ENCOUNTER — LAB (OUTPATIENT)
Dept: LAB | Facility: HOSPITAL | Age: 73
End: 2023-10-04
Payer: MEDICARE

## 2023-10-04 ENCOUNTER — OFFICE VISIT (OUTPATIENT)
Dept: UROLOGY | Facility: CLINIC | Age: 73
End: 2023-10-04
Payer: MEDICARE

## 2023-10-04 ENCOUNTER — HOSPITAL ENCOUNTER (OUTPATIENT)
Dept: GENERAL RADIOLOGY | Facility: HOSPITAL | Age: 73
Discharge: HOME | End: 2023-10-04
Payer: MEDICARE

## 2023-10-04 ENCOUNTER — TRANSCRIBE ORDERS (OUTPATIENT)
Dept: ADMINISTRATIVE | Facility: HOSPITAL | Age: 73
End: 2023-10-04
Payer: MEDICARE

## 2023-10-04 VITALS — TEMPERATURE: 97.2 F | BODY MASS INDEX: 36.6 KG/M2 | HEIGHT: 64 IN | WEIGHT: 214.4 LBS

## 2023-10-04 DIAGNOSIS — Z00.00 LABORATORY EXAMINATION ORDERED AS PART OF A ROUTINE GENERAL MEDICAL EXAMINATION: Primary | ICD-10-CM

## 2023-10-04 DIAGNOSIS — E78.01 FAMILIAL HYPERCHOLESTEREMIA: ICD-10-CM

## 2023-10-04 DIAGNOSIS — R10.12 LEFT UPPER QUADRANT PAIN: Primary | ICD-10-CM

## 2023-10-04 DIAGNOSIS — N20.0 KIDNEY STONE: ICD-10-CM

## 2023-10-04 DIAGNOSIS — E55.9 VITAMIN D DEFICIENCY: ICD-10-CM

## 2023-10-04 LAB
25(OH)D3 SERPL-MCNC: 91.6 NG/ML (ref 30–100)
ALBUMIN SERPL-MCNC: 4.1 G/DL (ref 3.5–5.2)
ALBUMIN/GLOB SERPL: 1.4 G/DL
ALP SERPL-CCNC: 167 U/L (ref 39–117)
ALT SERPL W P-5'-P-CCNC: 11 U/L (ref 1–33)
ANION GAP SERPL CALCULATED.3IONS-SCNC: 10 MMOL/L (ref 5–15)
AST SERPL-CCNC: 13 U/L (ref 1–32)
BASOPHILS # BLD AUTO: 0.02 10*3/MM3 (ref 0–0.2)
BASOPHILS NFR BLD AUTO: 0.2 % (ref 0–1.5)
BILIRUB BLD-MCNC: NEGATIVE MG/DL
BILIRUB SERPL-MCNC: 1.3 MG/DL (ref 0–1.2)
BUN SERPL-MCNC: 17 MG/DL (ref 8–23)
BUN/CREAT SERPL: 24.3 (ref 7–25)
CALCIUM SPEC-SCNC: 9.8 MG/DL (ref 8.6–10.5)
CHLORIDE SERPL-SCNC: 107 MMOL/L (ref 98–107)
CHOLEST SERPL-MCNC: 134 MG/DL (ref 0–200)
CLARITY, POC: CLEAR
CO2 SERPL-SCNC: 26 MMOL/L (ref 22–29)
COLOR UR: YELLOW
CREAT SERPL-MCNC: 0.7 MG/DL (ref 0.57–1)
DEPRECATED RDW RBC AUTO: 48.2 FL (ref 37–54)
EGFRCR SERPLBLD CKD-EPI 2021: 92 ML/MIN/1.73
EOSINOPHIL # BLD AUTO: 0.37 10*3/MM3 (ref 0–0.4)
EOSINOPHIL NFR BLD AUTO: 3.6 % (ref 0.3–6.2)
ERYTHROCYTE [DISTWIDTH] IN BLOOD BY AUTOMATED COUNT: 14.9 % (ref 12.3–15.4)
GLOBULIN UR ELPH-MCNC: 2.9 GM/DL
GLUCOSE SERPL-MCNC: 109 MG/DL (ref 65–99)
GLUCOSE UR STRIP-MCNC: NEGATIVE MG/DL
HCT VFR BLD AUTO: 44.2 % (ref 34–46.6)
HDLC SERPL-MCNC: 60 MG/DL (ref 40–60)
HGB BLD-MCNC: 13.1 G/DL (ref 12–15.9)
IMM GRANULOCYTES # BLD AUTO: 0.03 10*3/MM3 (ref 0–0.05)
IMM GRANULOCYTES NFR BLD AUTO: 0.3 % (ref 0–0.5)
KETONES UR QL: NEGATIVE
LDLC SERPL CALC-MCNC: 58 MG/DL (ref 0–100)
LDLC/HDLC SERPL: 0.95 {RATIO}
LEUKOCYTE EST, POC: NEGATIVE
LYMPHOCYTES # BLD AUTO: 1.4 10*3/MM3 (ref 0.7–3.1)
LYMPHOCYTES NFR BLD AUTO: 13.5 % (ref 19.6–45.3)
MCH RBC QN AUTO: 26.1 PG (ref 26.6–33)
MCHC RBC AUTO-ENTMCNC: 29.6 G/DL (ref 31.5–35.7)
MCV RBC AUTO: 88.2 FL (ref 79–97)
MONOCYTES # BLD AUTO: 1.02 10*3/MM3 (ref 0.1–0.9)
MONOCYTES NFR BLD AUTO: 9.9 % (ref 5–12)
NEUTROPHILS NFR BLD AUTO: 7.51 10*3/MM3 (ref 1.7–7)
NEUTROPHILS NFR BLD AUTO: 72.5 % (ref 42.7–76)
NITRITE UR-MCNC: NEGATIVE MG/ML
NRBC BLD AUTO-RTO: 0 /100 WBC (ref 0–0.2)
PH UR: 5.5 [PH] (ref 5–8)
PLATELET # BLD AUTO: 261 10*3/MM3 (ref 140–450)
PMV BLD AUTO: 10.9 FL (ref 6–12)
POTASSIUM SERPL-SCNC: 4.8 MMOL/L (ref 3.5–5.2)
PROT SERPL-MCNC: 7 G/DL (ref 6–8.5)
PROT UR STRIP-MCNC: NEGATIVE MG/DL
RBC # BLD AUTO: 5.01 10*6/MM3 (ref 3.77–5.28)
RBC # UR STRIP: ABNORMAL /UL
SODIUM SERPL-SCNC: 143 MMOL/L (ref 136–145)
SP GR UR: 1.02 (ref 1–1.03)
TRIGL SERPL-MCNC: 85 MG/DL (ref 0–150)
TSH SERPL DL<=0.05 MIU/L-ACNC: 2.55 UIU/ML (ref 0.27–4.2)
UROBILINOGEN UR QL: ABNORMAL
VLDLC SERPL-MCNC: 16 MG/DL (ref 5–40)
WBC NRBC COR # BLD: 10.35 10*3/MM3 (ref 3.4–10.8)

## 2023-10-04 PROCEDURE — 1160F RVW MEDS BY RX/DR IN RCRD: CPT

## 2023-10-04 PROCEDURE — 74018 RADEX ABDOMEN 1 VIEW: CPT

## 2023-10-04 PROCEDURE — 82306 VITAMIN D 25 HYDROXY: CPT | Performed by: STUDENT IN AN ORGANIZED HEALTH CARE EDUCATION/TRAINING PROGRAM

## 2023-10-04 PROCEDURE — 85025 COMPLETE CBC W/AUTO DIFF WBC: CPT | Performed by: STUDENT IN AN ORGANIZED HEALTH CARE EDUCATION/TRAINING PROGRAM

## 2023-10-04 PROCEDURE — 81001 URINALYSIS AUTO W/SCOPE: CPT

## 2023-10-04 PROCEDURE — 80053 COMPREHEN METABOLIC PANEL: CPT | Performed by: STUDENT IN AN ORGANIZED HEALTH CARE EDUCATION/TRAINING PROGRAM

## 2023-10-04 PROCEDURE — 1159F MED LIST DOCD IN RCRD: CPT

## 2023-10-04 PROCEDURE — 99214 OFFICE O/P EST MOD 30 MIN: CPT

## 2023-10-04 PROCEDURE — 36415 COLL VENOUS BLD VENIPUNCTURE: CPT | Performed by: STUDENT IN AN ORGANIZED HEALTH CARE EDUCATION/TRAINING PROGRAM

## 2023-10-04 PROCEDURE — 84443 ASSAY THYROID STIM HORMONE: CPT | Performed by: STUDENT IN AN ORGANIZED HEALTH CARE EDUCATION/TRAINING PROGRAM

## 2023-10-04 PROCEDURE — 80061 LIPID PANEL: CPT | Performed by: STUDENT IN AN ORGANIZED HEALTH CARE EDUCATION/TRAINING PROGRAM

## 2023-10-18 ENCOUNTER — TRANSCRIBE ORDERS (OUTPATIENT)
Dept: ADMINISTRATIVE | Facility: HOSPITAL | Age: 73
End: 2023-10-18
Payer: MEDICARE

## 2023-10-18 DIAGNOSIS — Z12.31 ENCOUNTER FOR SCREENING MAMMOGRAM FOR MALIGNANT NEOPLASM OF BREAST: Primary | ICD-10-CM

## 2023-10-22 LAB
NCCN CRITERIA FLAG: ABNORMAL
TYRER CUZICK SCORE: 13.5

## 2023-10-23 ENCOUNTER — TRANSCRIBE ORDERS (OUTPATIENT)
Dept: ADMINISTRATIVE | Facility: HOSPITAL | Age: 73
End: 2023-10-23

## 2023-10-23 DIAGNOSIS — Z12.31 ENCOUNTER FOR SCREENING MAMMOGRAM FOR MALIGNANT NEOPLASM OF BREAST: Primary | ICD-10-CM

## 2023-10-23 NOTE — PROGRESS NOTES
This patient recently took the CARE risk assessment prior to a mammogram appointment. Based on the patient's responses, NCCN criteria for genetic testing was met. The patient has had recent genetic testing.

## 2023-11-01 ENCOUNTER — HOSPITAL ENCOUNTER (OUTPATIENT)
Dept: MAMMOGRAPHY | Facility: HOSPITAL | Age: 73
Discharge: HOME | End: 2023-11-01
Admitting: STUDENT IN AN ORGANIZED HEALTH CARE EDUCATION/TRAINING PROGRAM
Payer: MEDICARE

## 2023-11-01 DIAGNOSIS — Z12.31 ENCOUNTER FOR SCREENING MAMMOGRAM FOR MALIGNANT NEOPLASM OF BREAST: ICD-10-CM

## 2023-11-01 PROCEDURE — 77063 BREAST TOMOSYNTHESIS BI: CPT

## 2023-11-01 PROCEDURE — 77067 SCR MAMMO BI INCL CAD: CPT

## 2023-11-02 ENCOUNTER — ANESTHESIA (OUTPATIENT)
Dept: PERIOP | Facility: HOSPITAL | Age: 73
End: 2023-11-02
Payer: MEDICARE

## 2023-11-02 ENCOUNTER — HOSPITAL ENCOUNTER (OUTPATIENT)
Facility: HOSPITAL | Age: 73
Setting detail: HOSPITAL OUTPATIENT SURGERY
Discharge: HOME | End: 2023-11-02
Attending: ORTHOPAEDIC SURGERY | Admitting: ORTHOPAEDIC SURGERY
Payer: MEDICARE

## 2023-11-02 ENCOUNTER — ANESTHESIA EVENT (OUTPATIENT)
Dept: PERIOP | Facility: HOSPITAL | Age: 73
End: 2023-11-02
Payer: MEDICARE

## 2023-11-02 VITALS
HEART RATE: 74 BPM | TEMPERATURE: 97.7 F | OXYGEN SATURATION: 92 % | RESPIRATION RATE: 18 BRPM | DIASTOLIC BLOOD PRESSURE: 72 MMHG | SYSTOLIC BLOOD PRESSURE: 144 MMHG

## 2023-11-02 DIAGNOSIS — Z98.890 S/P RIGHT KNEE ARTHROSCOPY: Primary | ICD-10-CM

## 2023-11-02 PROCEDURE — 25810000003 LACTATED RINGERS PER 1000 ML: Performed by: ORTHOPAEDIC SURGERY

## 2023-11-02 PROCEDURE — 25010000002 DEXAMETHASONE PER 1 MG: Performed by: ANESTHESIOLOGY

## 2023-11-02 PROCEDURE — C1713 ANCHOR/SCREW BN/BN,TIS/BN: HCPCS | Performed by: ORTHOPAEDIC SURGERY

## 2023-11-02 PROCEDURE — 25010000002 ONDANSETRON PER 1 MG: Performed by: NURSE ANESTHETIST, CERTIFIED REGISTERED

## 2023-11-02 PROCEDURE — 25010000002 PROPOFOL 10 MG/ML EMULSION: Performed by: NURSE ANESTHETIST, CERTIFIED REGISTERED

## 2023-11-02 PROCEDURE — 25010000002 SUGAMMADEX 200 MG/2ML SOLUTION: Performed by: NURSE ANESTHETIST, CERTIFIED REGISTERED

## 2023-11-02 PROCEDURE — 25010000002 ROPIVACAINE PER 1 MG: Performed by: ORTHOPAEDIC SURGERY

## 2023-11-02 PROCEDURE — 25010000002 CEFAZOLIN PER 500 MG: Performed by: ORTHOPAEDIC SURGERY

## 2023-11-02 DEVICE — SUT NONABS HS/FIBER ULTRALOOP MINITAPE NO2 26IN SLD/BLU: Type: IMPLANTABLE DEVICE | Site: KNEE | Status: FUNCTIONAL

## 2023-11-02 DEVICE — SUT NONABS HS/FIBER ULTRALOOP MINITAPE NO2 26IN STRIP/BLU: Type: IMPLANTABLE DEVICE | Site: KNEE | Status: FUNCTIONAL

## 2023-11-02 DEVICE — FOOTPRINT ULTRA PK SUTURE ANCHOR,                                    4.5 MM, SL
Type: IMPLANTABLE DEVICE | Site: KNEE | Status: FUNCTIONAL
Brand: FOOTPRINT

## 2023-11-02 RX ORDER — MAGNESIUM HYDROXIDE 1200 MG/15ML
LIQUID ORAL AS NEEDED
Status: DISCONTINUED | OUTPATIENT
Start: 2023-11-02 | End: 2023-11-02 | Stop reason: HOSPADM

## 2023-11-02 RX ORDER — DROPERIDOL 2.5 MG/ML
0.62 INJECTION, SOLUTION INTRAMUSCULAR; INTRAVENOUS ONCE AS NEEDED
Status: DISCONTINUED | OUTPATIENT
Start: 2023-11-02 | End: 2023-11-02 | Stop reason: HOSPADM

## 2023-11-02 RX ORDER — SODIUM CHLORIDE 0.9 % (FLUSH) 0.9 %
3 SYRINGE (ML) INJECTION AS NEEDED
Status: DISCONTINUED | OUTPATIENT
Start: 2023-11-02 | End: 2023-11-02 | Stop reason: HOSPADM

## 2023-11-02 RX ORDER — IBUPROFEN 600 MG/1
600 TABLET ORAL ONCE AS NEEDED
Status: DISCONTINUED | OUTPATIENT
Start: 2023-11-02 | End: 2023-11-02 | Stop reason: HOSPADM

## 2023-11-02 RX ORDER — LABETALOL HYDROCHLORIDE 5 MG/ML
5 INJECTION, SOLUTION INTRAVENOUS
Status: DISCONTINUED | OUTPATIENT
Start: 2023-11-02 | End: 2023-11-02 | Stop reason: HOSPADM

## 2023-11-02 RX ORDER — LIDOCAINE HYDROCHLORIDE 20 MG/ML
INJECTION, SOLUTION EPIDURAL; INFILTRATION; INTRACAUDAL; PERINEURAL AS NEEDED
Status: DISCONTINUED | OUTPATIENT
Start: 2023-11-02 | End: 2023-11-02 | Stop reason: SURG

## 2023-11-02 RX ORDER — HYDROCODONE BITARTRATE AND ACETAMINOPHEN 10; 325 MG/1; MG/1
1 TABLET ORAL EVERY 4 HOURS PRN
Status: DISCONTINUED | OUTPATIENT
Start: 2023-11-02 | End: 2023-11-02 | Stop reason: HOSPADM

## 2023-11-02 RX ORDER — LIDOCAINE HYDROCHLORIDE 10 MG/ML
0.5 INJECTION, SOLUTION EPIDURAL; INFILTRATION; INTRACAUDAL; PERINEURAL ONCE AS NEEDED
Status: DISCONTINUED | OUTPATIENT
Start: 2023-11-02 | End: 2023-11-02 | Stop reason: HOSPADM

## 2023-11-02 RX ORDER — SODIUM CHLORIDE 0.9 % (FLUSH) 0.9 %
3-10 SYRINGE (ML) INJECTION AS NEEDED
Status: DISCONTINUED | OUTPATIENT
Start: 2023-11-02 | End: 2023-11-02 | Stop reason: HOSPADM

## 2023-11-02 RX ORDER — ASPIRIN 81 MG/1
81 TABLET, CHEWABLE ORAL ONCE
Status: COMPLETED | OUTPATIENT
Start: 2023-11-02 | End: 2023-11-02

## 2023-11-02 RX ORDER — ROPIVACAINE HYDROCHLORIDE 5 MG/ML
INJECTION, SOLUTION EPIDURAL; INFILTRATION; PERINEURAL AS NEEDED
Status: DISCONTINUED | OUTPATIENT
Start: 2023-11-02 | End: 2023-11-02 | Stop reason: HOSPADM

## 2023-11-02 RX ORDER — SODIUM CHLORIDE, SODIUM LACTATE, POTASSIUM CHLORIDE, CALCIUM CHLORIDE 600; 310; 30; 20 MG/100ML; MG/100ML; MG/100ML; MG/100ML
1000 INJECTION, SOLUTION INTRAVENOUS CONTINUOUS
Status: DISCONTINUED | OUTPATIENT
Start: 2023-11-02 | End: 2023-11-02 | Stop reason: HOSPADM

## 2023-11-02 RX ORDER — ROCURONIUM BROMIDE 10 MG/ML
INJECTION, SOLUTION INTRAVENOUS AS NEEDED
Status: DISCONTINUED | OUTPATIENT
Start: 2023-11-02 | End: 2023-11-02 | Stop reason: SURG

## 2023-11-02 RX ORDER — SODIUM CHLORIDE 9 MG/ML
40 INJECTION, SOLUTION INTRAVENOUS AS NEEDED
Status: DISCONTINUED | OUTPATIENT
Start: 2023-11-02 | End: 2023-11-02 | Stop reason: HOSPADM

## 2023-11-02 RX ORDER — ACETAMINOPHEN 500 MG
1000 TABLET ORAL ONCE
Status: COMPLETED | OUTPATIENT
Start: 2023-11-02 | End: 2023-11-02

## 2023-11-02 RX ORDER — ONDANSETRON 2 MG/ML
INJECTION INTRAMUSCULAR; INTRAVENOUS AS NEEDED
Status: DISCONTINUED | OUTPATIENT
Start: 2023-11-02 | End: 2023-11-02 | Stop reason: SURG

## 2023-11-02 RX ORDER — DEXAMETHASONE SODIUM PHOSPHATE 4 MG/ML
4 INJECTION, SOLUTION INTRA-ARTICULAR; INTRALESIONAL; INTRAMUSCULAR; INTRAVENOUS; SOFT TISSUE ONCE AS NEEDED
Status: COMPLETED | OUTPATIENT
Start: 2023-11-02 | End: 2023-11-02

## 2023-11-02 RX ORDER — HYDROCODONE BITARTRATE AND ACETAMINOPHEN 10; 325 MG/1; MG/1
1 TABLET ORAL EVERY 6 HOURS PRN
Qty: 28 TABLET | Refills: 0 | Status: SHIPPED | OUTPATIENT
Start: 2023-11-02

## 2023-11-02 RX ORDER — ONDANSETRON 2 MG/ML
4 INJECTION INTRAMUSCULAR; INTRAVENOUS ONCE AS NEEDED
Status: DISCONTINUED | OUTPATIENT
Start: 2023-11-02 | End: 2023-11-02 | Stop reason: HOSPADM

## 2023-11-02 RX ORDER — FENTANYL CITRATE 50 UG/ML
25 INJECTION, SOLUTION INTRAMUSCULAR; INTRAVENOUS
Status: DISCONTINUED | OUTPATIENT
Start: 2023-11-02 | End: 2023-11-02 | Stop reason: HOSPADM

## 2023-11-02 RX ORDER — HYDROCODONE BITARTRATE AND ACETAMINOPHEN 7.5; 325 MG/1; MG/1
1 TABLET ORAL ONCE AS NEEDED
Status: DISCONTINUED | OUTPATIENT
Start: 2023-11-02 | End: 2023-11-02 | Stop reason: HOSPADM

## 2023-11-02 RX ORDER — SODIUM CHLORIDE 0.9 % (FLUSH) 0.9 %
3 SYRINGE (ML) INJECTION EVERY 12 HOURS SCHEDULED
Status: DISCONTINUED | OUTPATIENT
Start: 2023-11-02 | End: 2023-11-02 | Stop reason: HOSPADM

## 2023-11-02 RX ORDER — HYDROCODONE BITARTRATE AND ACETAMINOPHEN 5; 325 MG/1; MG/1
1 TABLET ORAL ONCE AS NEEDED
Status: DISCONTINUED | OUTPATIENT
Start: 2023-11-02 | End: 2023-11-02 | Stop reason: HOSPADM

## 2023-11-02 RX ORDER — NALOXONE HCL 0.4 MG/ML
0.4 VIAL (ML) INJECTION AS NEEDED
Status: DISCONTINUED | OUTPATIENT
Start: 2023-11-02 | End: 2023-11-02 | Stop reason: HOSPADM

## 2023-11-02 RX ORDER — FLUMAZENIL 0.1 MG/ML
0.2 INJECTION INTRAVENOUS AS NEEDED
Status: DISCONTINUED | OUTPATIENT
Start: 2023-11-02 | End: 2023-11-02 | Stop reason: HOSPADM

## 2023-11-02 RX ORDER — PROPOFOL 10 MG/ML
VIAL (ML) INTRAVENOUS AS NEEDED
Status: DISCONTINUED | OUTPATIENT
Start: 2023-11-02 | End: 2023-11-02 | Stop reason: SURG

## 2023-11-02 RX ORDER — ONDANSETRON 4 MG/1
4 TABLET, FILM COATED ORAL ONCE AS NEEDED
Status: DISCONTINUED | OUTPATIENT
Start: 2023-11-02 | End: 2023-11-02 | Stop reason: HOSPADM

## 2023-11-02 RX ORDER — ONDANSETRON 4 MG/1
4 TABLET, FILM COATED ORAL EVERY 8 HOURS PRN
Qty: 20 TABLET | Refills: 0 | Status: SHIPPED | OUTPATIENT
Start: 2023-11-02

## 2023-11-02 RX ORDER — NEOSTIGMINE METHYLSULFATE 5 MG/5 ML
SYRINGE (ML) INTRAVENOUS AS NEEDED
Status: DISCONTINUED | OUTPATIENT
Start: 2023-11-02 | End: 2023-11-02 | Stop reason: SURG

## 2023-11-02 RX ORDER — SODIUM CHLORIDE, SODIUM LACTATE, POTASSIUM CHLORIDE, CALCIUM CHLORIDE 600; 310; 30; 20 MG/100ML; MG/100ML; MG/100ML; MG/100ML
100 INJECTION, SOLUTION INTRAVENOUS CONTINUOUS
Status: DISCONTINUED | OUTPATIENT
Start: 2023-11-02 | End: 2023-11-02 | Stop reason: HOSPADM

## 2023-11-02 RX ADMIN — ROCURONIUM BROMIDE 50 MG: 10 INJECTION, SOLUTION INTRAVENOUS at 07:15

## 2023-11-02 RX ADMIN — SUGAMMADEX 200 MG: 100 INJECTION, SOLUTION INTRAVENOUS at 08:31

## 2023-11-02 RX ADMIN — ASPIRIN 81 MG: 81 TABLET, CHEWABLE ORAL at 06:46

## 2023-11-02 RX ADMIN — HYDROCODONE BITARTRATE AND ACETAMINOPHEN 1 TABLET: 10; 325 TABLET ORAL at 08:51

## 2023-11-02 RX ADMIN — ACETAMINOPHEN 1000 MG: 500 TABLET, FILM COATED ORAL at 06:46

## 2023-11-02 RX ADMIN — PROPOFOL 150 MG: 10 INJECTION, EMULSION INTRAVENOUS at 07:15

## 2023-11-02 RX ADMIN — Medication 3 MG: at 08:16

## 2023-11-02 RX ADMIN — CEFAZOLIN 2000 MG: 2 INJECTION, POWDER, FOR SOLUTION INTRAMUSCULAR; INTRAVENOUS at 07:19

## 2023-11-02 RX ADMIN — GLYCOPYRROLATE 0.4 MG: 0.2 INJECTION INTRAMUSCULAR; INTRAVENOUS at 08:16

## 2023-11-02 RX ADMIN — SODIUM CHLORIDE, POTASSIUM CHLORIDE, SODIUM LACTATE AND CALCIUM CHLORIDE 1000 ML: 600; 310; 30; 20 INJECTION, SOLUTION INTRAVENOUS at 06:46

## 2023-11-02 RX ADMIN — DEXAMETHASONE SODIUM PHOSPHATE 4 MG: 4 INJECTION INTRA-ARTICULAR; INTRALESIONAL; INTRAMUSCULAR; INTRAVENOUS; SOFT TISSUE at 06:46

## 2023-11-02 RX ADMIN — LIDOCAINE HYDROCHLORIDE 100 MG: 20 INJECTION, SOLUTION EPIDURAL; INFILTRATION; INTRACAUDAL; PERINEURAL at 07:15

## 2023-11-02 RX ADMIN — ONDANSETRON 4 MG: 2 INJECTION INTRAMUSCULAR; INTRAVENOUS at 08:16

## 2023-11-02 NOTE — ANESTHESIA PROCEDURE NOTES
Airway  Urgency: elective    Date/Time: 11/2/2023 7:16 AM  Airway not difficult    General Information and Staff    Patient location during procedure: OR  CRNA/CAA: Colton Barrett CRNA    Indications and Patient Condition  Indications for airway management: airway protection    Preoxygenated: yes  Mask difficulty assessment: 1 - vent by mask    Final Airway Details  Final airway type: endotracheal airway      Successful airway: ETT  Cuffed: yes   Successful intubation technique: direct laryngoscopy  Facilitating devices/methods: intubating stylet  Endotracheal tube insertion site: oral  Blade: Roberts  Blade size: 2  ETT size (mm): 7.5  Cormack-Lehane Classification: grade I - full view of glottis  Placement verified by: chest auscultation and capnometry   Cuff volume (mL): 8  Measured from: teeth  ETT/EBT  to teeth (cm): 21  Number of attempts at approach: 1  Assessment: lips, teeth, and gum same as pre-op and atraumatic intubation

## 2023-11-02 NOTE — ANESTHESIA PREPROCEDURE EVALUATION
Anesthesia Evaluation     Patient summary reviewed   no history of anesthetic complications:   NPO Solid Status: > 8 hours  NPO Liquid Status: > 8 hours           Airway   Mallampati: II  TM distance: >3 FB  Neck ROM: full  No difficulty expected  Dental - normal exam     Pulmonary    (+) asthma,  (-) not a smoker  Cardiovascular   Exercise tolerance: good (4-7 METS)    (+) hypertension, past MI , CAD, cardiac stents (19 years ago) , dysrhythmias Paroxysmal Atrial Fib, hyperlipidemia      Neuro/Psych  (-) seizures, TIA, CVA  GI/Hepatic/Renal/Endo    (+) obesity, renal disease- stones  (-) liver disease, diabetes    Musculoskeletal     Abdominal    Substance History      OB/GYN          Other                    Anesthesia Plan    ASA 3     general     (Chewable asa)  intravenous induction     Anesthetic plan, risks, benefits, and alternatives have been provided, discussed and informed consent has been obtained with: patient.    CODE STATUS:

## 2023-11-02 NOTE — H&P
Orthopedic History and Physical    Pt Name: Christiana Hendrix  MRN: [id number]  YOB: 1950  Date: 11/2/2023      HPI: 73-year-old female presents today for a right knee lateral meniscus root repair.      Past Medical/Surgical History:   Past Medical History:   Diagnosis Date    Asthma     Atrial fibrillation     CAD in native artery     Hyperlipidemia     Hypertension     IBS (irritable bowel syndrome)     Kidney stone 05/30/2023    Lateral meniscus tear     right    MI, old      Past Surgical History:   Procedure Laterality Date    ABLATION OF DYSRHYTHMIC FOCUS      BLADDER NECK SUSPENSION      BREAST BIOPSY Bilateral     2002    CARDIOVERSION      CATARACT EXTRACTION WITH INTRAOCULAR LENS IMPLANT Bilateral     CHOLECYSTECTOMY      COLONOSCOPY W/ BIOPSIES      CORONARY ANGIOPLASTY  08/13/2007    had stents 2003    CORONARY STENT PLACEMENT  2002    EXTRACORPOREAL SHOCK WAVE LITHOTRIPSY (ESWL) Right 06/05/2023    Procedure: EXTRACORPOREAL SHOCKWAVE LITHOTRIPSY-right;  Surgeon: Jamar Hendrickson MD;  Location: [facility];  Service: Urology;  Laterality: Right;    HYSTERECTOMY      VEIN SURGERY  1988         Social History:   Social History     Socioeconomic History    Marital status: Single   Tobacco Use    Smoking status: Never    Smokeless tobacco: Never   Vaping Use    Vaping Use: Never used   Substance and Sexual Activity    Alcohol use: Never     Alcohol/week: 3.0 standard drinks of alcohol     Types: 2 Glasses of wine, 1 Shots of liquor per week    Drug use: Never    Sexual activity: Not Currently     Partners: Male     Birth control/protection: None            Medications:   Current Facility-Administered Medications:     ceFAZolin 2000 mg IVPB in 100 mL NS (MBP), 2,000 mg, Intravenous, Once, Raymond Puentes MD    lactated ringers infusion 1,000 mL, 1,000 mL, Intravenous, Continuous, Raymond Puentes MD, Last Rate: 25 mL/hr at 11/02/23 0646, 1,000 mL at 11/02/23 0646    lactated ringers infusion, 100 mL/hr,  Intravenous, Continuous, Keely Pereyra MD    lidocaine PF 1% (XYLOCAINE) injection 0.5 mL, 0.5 mL, Intradermal, Once PRN, Raymond Puentes MD    sodium chloride 0.9 % flush 3 mL, 3 mL, Intravenous, PRN, Raymond Puentes MD    sodium chloride 0.9 % flush 3 mL, 3 mL, Intravenous, Q12H, Keely Pereyra MD    sodium chloride 0.9 % flush 3-10 mL, 3-10 mL, Intravenous, PRN, Keely Pereyra MD    sodium chloride 0.9 % infusion 40 mL, 40 mL, Intravenous, PRN, Keely Pereyra MD    Allergies:   Allergies   Allergen Reactions    Eliquis [Apixaban] GI Intolerance    Erythromycin Rash    Sulfa Antibiotics Rash          Review of Systems   Constitutional: Negative.    HENT: Negative.     Eyes: Negative.    Respiratory: Negative.     Cardiovascular: Negative.    Gastrointestinal: Negative.    Genitourinary: Negative.    Skin: Negative.    Allergic/Immunologic: Negative.    Neurological: Negative.    Hematological: Negative.    Psychiatric/Behavioral: Negative.            Physical Exam  Constitutional:       Appearance: She is well-developed.   HENT:      Head: Normocephalic and atraumatic.   Pulmonary:      Effort: Pulmonary effort is normal.   Abdominal:      Palpations: Abdomen is soft.   Musculoskeletal:      Cervical back: Normal range of motion and neck supple.   Skin:     General: Skin is warm and dry.   Neurological:      Mental Status: She is alert and oriented to person, place, and time.   Psychiatric:         Behavior: Behavior normal.         Thought Content: Thought content normal.         Judgment: Judgment normal.         Ortho Exam:  Right knee tenderness palpation posteriorly.      Imaging:  Imaging Results (Last 72 Hours)       ** No results found for the last 72 hours. **            Labs:   Lab Results (last 24 hours)       ** No results found for the last 24 hours. **            Impression: Right knee lateral meniscus tear.      Surgical Plan: Repair of right knee lateral meniscus  tear.      Electronically signed by Raymond Puentes MD on 11/2/2023 at 07:08 CDT

## 2023-11-02 NOTE — ANESTHESIA POSTPROCEDURE EVALUATION
Patient: Christiana Hendrix    Procedure Summary       Date: 11/02/23 Room / Location: [facility] OR  /  [facility] OR    Anesthesia Start: 0711 Anesthesia Stop: 0842    Procedure: RIGHT KNEE ARTHROSCOPIC LATERAL MENISCUS ROOT REPAIR, PARTIAL MEDIAL MENISECTOMY (Right: Knee) Diagnosis: (RIGHT KNEE PAIN)    Surgeons: Raymond Puentes MD Provider: Colton Barrett CRNA    Anesthesia Type: general ASA Status: 3            Anesthesia Type: general    Vitals  Vitals Value Taken Time   /68 11/02/23 0936   Temp 97.7 °F (36.5 °C) 11/02/23 0913   Pulse 78 11/02/23 0936   Resp 16 11/02/23 0936   SpO2 92 % 11/02/23 0936           Post Anesthesia Care and Evaluation    Patient location during evaluation: PHASE II  Patient participation: complete - patient participated  Level of consciousness: awake and awake and alert  Pain score: 0  Pain management: adequate    Airway patency: patent  Anesthetic complications: No anesthetic complications  PONV Status: none  Cardiovascular status: acceptable  Respiratory status: acceptable  Hydration status: acceptable    Comments: Patient discharged according to acceptable Hardik score per RN assessment. See nursing records for further information.     Blood pressure 138/68, pulse 78, temperature 97.7 °F (36.5 °C), temperature source Temporal, resp. rate 16, SpO2 92%, not currently breastfeeding.

## 2023-11-02 NOTE — OP NOTE
Pt Name: Christiana Hendrix  MRN: [id number]  YOB: 1950  Date: 11/2/2023    OPERATIVE NOTE    PREOPERATIVE DIAGNOSIS:    1.)  Right knee acute lateral meniscus root tear  2.)  Right knee acute complex medial meniscus body tear    POSTOPERATIVE DIAGNOSIS:   1.)  Right knee acute lateral meniscus root tear  2.)  Right knee acute complex medial meniscus body tear    PROCEDURE:    1.)  Right knee knee lateral meniscus arthroscopic root repair  2.)  Right knee arthroscopic partial medial meniscectomy of an acute complex medial meniscus body tear    SURGEON:  Raymond Puentes M.D.    ASSISTANT: Catalina Frank    ANESTHESIA:  General    ESTIMATED BLOOD LOSS:  Minimal    COMPLICATIONS:  None.    CONDITION:  Stable.    IMPLANTS:  None    HISTORY OF PRESENT ILLNESS: The patient is a 73-year old patient who presented to the outpatient clinic with complaints of sterile lateral-sided right knee pain with mechanical symptoms.   MRI confirmed a complex tear of the medial meniscus body and a lateral meniscus root tear. Based on this, the decision was made to take the patient to the operating room for the above-mentioned procedure. After the risks and benefits had been discussed with the patient she agreed to proceed.     DESCRIPTION OF PROCEDURE:  The patient was interviewed in the preanesthesia area where the operative extremity was marked with a marking pen.  The patient was then taken to the operative suite where general endotracheal anesthesia was performed per the anesthesia team.  A timeout was called to confirm the patient, the operative site, the planned procedure, and the administration of antibiotics.  The patient was then prepped and draped in a standard sterile fashion using ChloraPrep.  The operative site was exsanguinated with an Esmarch and the tourniquet was inflated to 250.    A standard anterolateral arthroscopic portal was established with a #11 blade scalpel. The scope and trocar were introduced into the notch  and then into the suprapatellar pouch.      The patient demonstrated some mild synovitis in the suprapatellar pouch.  The quadriceps expansion was intact.  There were no loose bodies in the medial or lateral gutter.  The undersurface of the patella and the trochlea was intact.    The knee was then dropped into flexion and a spinal needle was brought in just above the medial meniscus followed by an 11 blade scalpel and then a probe.    The medial compartment was then carefully examined.  There was a complex tear of the body of the medial meniscus.  The medial femoral condyle and tibial plateau demonstrated mild chondromalacia.    The scope was then taken across the notch where the ACL and PCL were found to be intact.    The knee was then placed in a figure 4 position and held by the assistant surgeon allowing me to examine the lateral compartment..  Patient demonstrated a lateral meniscal root tear posteriorly.  There is also areas of chondromalacia in the tibial plateau and the lateral femoral condyle.    Attention was turned the medial compartment where using a combination of straight basket cutter shaver and a radiofrequency wand a partial medial meniscectomy is performed.  This meniscus was resected back to a stable margin.  I was able to leave a significant portion of the meniscus as most of the tear was in the white white zone.    Attention was then turned to the root repair.  Initially the area below the root tear was debrided with a shaver and a radiofrequency wand.  The root itself was then debrided.  The Doyle [employer]hew meniscal guide was then placed into the knee.  A small incision was made just medial to the tibial tubercle.  A guidepin was then drilled to the tip of the guide at the insertion point of the root of the medial meniscus.  I then used a cannulated flip cutter to drill over the guidepin and deployed the flip cutter once in the knee joint.  I then created a 5 mm bony socket by retrograde  drilling.  The drill and pin were then removed and a nitinol wire was placed for a passing suture.    I then placed 2 loop sutures around the root of the meniscus.  A Smith [employer] Nephew suture tape as well as a Smith & Nephew #2 nonabsorbable suture were used.  The tails of the sutures were then shuttled via the nitinol wire down through the tibial tunnel docking the root into the prepared bony socket.  The suture tails were then fixated distally using a Smith [employer] Nephew lateral row anchor.  The scope was reintroduced into the knee confirming that the meniscal root was stable.      The scope was then withdrawn from the knee. The portal sites were closed with 3-0 nylon suture.    The portal sites were then infiltrated with 0.2% Naropin. The patient was then placed in a sterile dressing as well as a 3D rehab brace locked in extension to protect the repair.  The patient awoke from anesthesia without difficulty and was transferred to the PACU in stable condition. All sponge, needle and instrument counts were correct at the end of the procedure.     cc:         Raymond Puentes M.D

## 2023-12-07 ENCOUNTER — HOME HEALTH ADMISSION (OUTPATIENT)
Dept: HOME HEALTH SERVICES | Facility: HOME HEALTHCARE | Age: 73
End: 2023-12-07
Payer: MEDICARE

## 2024-01-04 PROCEDURE — 87086 URINE CULTURE/COLONY COUNT: CPT | Performed by: NURSE PRACTITIONER

## 2024-01-04 PROCEDURE — 87077 CULTURE AEROBIC IDENTIFY: CPT | Performed by: NURSE PRACTITIONER

## 2024-01-04 PROCEDURE — 87186 SC STD MICRODIL/AGAR DIL: CPT | Performed by: NURSE PRACTITIONER

## 2024-01-07 ENCOUNTER — TELEPHONE (OUTPATIENT)
Dept: URGENT CARE | Facility: CLINIC | Age: 74
End: 2024-01-07
Payer: MEDICARE

## 2024-01-07 DIAGNOSIS — N89.8 VAGINAL IRRITATION: Primary | ICD-10-CM

## 2024-01-07 RX ORDER — FLUCONAZOLE 150 MG/1
150 TABLET ORAL DAILY
Qty: 2 TABLET | Refills: 0 | Status: SHIPPED | OUTPATIENT
Start: 2024-01-07 | End: 2024-01-09

## 2024-01-07 NOTE — TELEPHONE ENCOUNTER
Patient reports burning.  Patient's culture revealed normal marcelina.  Will send Diflucan to pharmacy.  Patient advised to follow-up with healthcare provider if symptoms persist or get worse.

## 2024-01-08 ENCOUNTER — OFFICE VISIT (OUTPATIENT)
Dept: CARDIOLOGY | Facility: CLINIC | Age: 74
End: 2024-01-08
Payer: MEDICARE

## 2024-01-08 VITALS
WEIGHT: 204 LBS | SYSTOLIC BLOOD PRESSURE: 140 MMHG | BODY MASS INDEX: 34.83 KG/M2 | HEIGHT: 64 IN | HEART RATE: 83 BPM | DIASTOLIC BLOOD PRESSURE: 82 MMHG | OXYGEN SATURATION: 98 %

## 2024-01-08 DIAGNOSIS — E78.2 MIXED HYPERLIPIDEMIA: ICD-10-CM

## 2024-01-08 DIAGNOSIS — I25.10 CORONARY ARTERY DISEASE INVOLVING NATIVE CORONARY ARTERY OF NATIVE HEART WITHOUT ANGINA PECTORIS: Primary | ICD-10-CM

## 2024-01-08 DIAGNOSIS — I10 ESSENTIAL HYPERTENSION: ICD-10-CM

## 2024-01-08 DIAGNOSIS — I48.0 PAF (PAROXYSMAL ATRIAL FIBRILLATION): ICD-10-CM

## 2024-01-08 PROCEDURE — 93000 ELECTROCARDIOGRAM COMPLETE: CPT | Performed by: INTERNAL MEDICINE

## 2024-01-08 PROCEDURE — 99213 OFFICE O/P EST LOW 20 MIN: CPT | Performed by: INTERNAL MEDICINE

## 2024-01-08 PROCEDURE — 1159F MED LIST DOCD IN RCRD: CPT | Performed by: INTERNAL MEDICINE

## 2024-01-08 PROCEDURE — 3079F DIAST BP 80-89 MM HG: CPT | Performed by: INTERNAL MEDICINE

## 2024-01-08 PROCEDURE — 3077F SYST BP >= 140 MM HG: CPT | Performed by: INTERNAL MEDICINE

## 2024-01-08 PROCEDURE — 1160F RVW MEDS BY RX/DR IN RCRD: CPT | Performed by: INTERNAL MEDICINE

## 2024-01-08 NOTE — PROGRESS NOTES
Referring Provider: Petar Cummings MD    Reason for Follow-up Visit: CAD/afib    Subjective .   Chief Complaint:   Chief Complaint   Patient presents with    Coronary Artery Disease     Yearly pt states she is doing well with no issues of symptoms.    Atrial Fibrillation     Pt is seeing Dr. Devaughn MACDONALD.    Hypertension     Pt states this is good.    Hyperlipidemia     Last lab done 10/2023 LDL was 58 on Leqvio, Zetia and Lipitor 80 mg.       History of present illness:  Christiana Hendrix is a 73 y.o. yo female with CAD, s/p SARBJIT in the remote past, PAF with previous cardioversion in for routine follow up. She denies chest pain, SOB or palpitations       History  Past Medical History:   Diagnosis Date    Asthma     Atrial fibrillation     CAD in native artery     Hyperlipidemia     Hypertension     IBS (irritable bowel syndrome)     Kidney stone 05/30/2023    Lateral meniscus tear     right    MI, old    ,   Past Surgical History:   Procedure Laterality Date    ABLATION OF DYSRHYTHMIC FOCUS      BLADDER NECK SUSPENSION      BREAST BIOPSY Bilateral     2002    CARDIOVERSION      CATARACT EXTRACTION WITH INTRAOCULAR LENS IMPLANT Bilateral     CHOLECYSTECTOMY      COLONOSCOPY W/ BIOPSIES      CORONARY ANGIOPLASTY  08/13/2007    had stents 2003    CORONARY STENT PLACEMENT  2002    EXTRACORPOREAL SHOCK WAVE LITHOTRIPSY (ESWL) Right 06/05/2023    Procedure: EXTRACORPOREAL SHOCKWAVE LITHOTRIPSY-right;  Surgeon: Jamar Hendrickson MD;  Location:  [facility] OR;  Service: Urology;  Laterality: Right;    HYSTERECTOMY      KNEE ARTHROSCOPY Right 11/2/2023    Procedure: RIGHT KNEE ARTHROSCOPIC LATERAL MENISCUS ROOT REPAIR, PARTIAL MEDIAL MENISECTOMY;  Surgeon: Raymond Puentes MD;  Location:  [facility] OR;  Service: Orthopedics;  Laterality: Right;    VEIN SURGERY  1988   ,   Family History   Problem Relation Age of Onset    Dementia Mother     Heart attack Sister     Heart disease Sister     Diverticulitis Sister     Asthma Sister      Brain cancer Father     Heart disease Maternal Grandmother     Heart attack Maternal Grandmother     Stroke Maternal Grandfather     No Known Problems Paternal Grandmother     Brain cancer Paternal Grandfather     Breast cancer Sister     Breast cancer Paternal Aunt     Ovarian cancer Neg Hx     Uterine cancer Neg Hx     Colon cancer Neg Hx    ,   Social History     Tobacco Use    Smoking status: Never    Smokeless tobacco: Never   Vaping Use    Vaping Use: Never used   Substance Use Topics    Alcohol use: Never     Alcohol/week: 3.0 standard drinks of alcohol     Types: 2 Glasses of wine, 1 Shots of liquor per week    Drug use: Never   ,     Medications  Current Outpatient Medications   Medication Sig Dispense Refill    amLODIPine (NORVASC) 10 MG tablet Take 1 tablet by mouth Daily.      atorvastatin (LIPITOR) 80 MG tablet Take 1 tablet by mouth Every Night.      Cholecalciferol (VITAMIN D3) 125 MCG (5000 UT) capsule capsule Take 1 capsule by mouth 1 (One) Time Per Week. sundays      coenzyme Q10 100 MG capsule Take 2 capsules by mouth Daily.      ezetimibe (ZETIA) 10 MG tablet Take 1 tablet by mouth Daily.      fluconazole (DIFLUCAN) 150 MG tablet Take 1 tablet by mouth Daily for 2 doses. 2 tablet 0    fluocinolone acetonide (DERMOTIC) 0.01 % oil otic oil 4 drops 2 (Two) Times a Day.      folic acid (FOLVITE) 800 MCG tablet Take 1 tablet by mouth Daily.      Glucosamine-Chondroit-Vit C-Mn (GLUCOSAMINE 1500 COMPLEX PO) Take  by mouth 2 (Two) Times a Day.      hydrochlorothiazide (HYDRODIURIL) 25 MG tablet Take 1 tablet by mouth Daily.      Inclisiran Sodium (Leqvio) 284 MG/1.5ML solution prefilled syringe Inject 1 mL under the skin into the appropriate area as directed Every 6 (Six) Months. 1 mL 3    levocetirizine (XYZAL) 5 MG tablet Take 1 tablet by mouth Every Night.      metoprolol succinate XL (TOPROL-XL) 100 MG 24 hr tablet Take 1 tablet by mouth Every Night.      montelukast (SINGULAIR) 10 MG tablet Take 1  tablet by mouth Every Night.      Multiple Vitamins-Minerals (OCUVITE ADULT 50+ PO) Take  by mouth Daily.      nitrofurantoin, macrocrystal-monohydrate, (MACROBID) 100 MG capsule Take 1 capsule by mouth 2 (Two) Times a Day for 7 days. 14 capsule 0    nitroglycerin (NITROSTAT) 0.4 MG SL tablet Place 1 tablet under the tongue Every 5 (Five) Minutes As Needed for Chest Pain. Take no more than 3 doses in 15 minutes. 30 tablet 5    ondansetron (ZOFRAN) 4 MG tablet Take 1 tablet by mouth Every 8 (Eight) Hours As Needed for Nausea or Vomiting. 20 tablet 0    Polyethylene Glycol 3350 (MIRALAX PO) Take  by mouth As Needed.      potassium chloride ER (K-TAB) 20 MEQ tablet controlled-release ER tablet Take 1 tablet by mouth Daily.      Probiotic Product (PROBIOTIC BLEND PO) Take  by mouth Daily.      rivaroxaban (Xarelto) 20 MG tablet Take 1 tablet by mouth Daily. (Patient taking differently: Take 1 tablet by mouth Every Night.) 90 tablet 3    Symbicort 160-4.5 MCG/ACT inhaler INHALE 1 PUFF DAILY AS NEEDED      vitamin A 46044 UNIT capsule Take 1 capsule by mouth Daily.      HYDROcodone-acetaminophen (NORCO)  MG per tablet Take 1 tablet by mouth Every 6 (Six) Hours As Needed for Moderate Pain. 28 tablet 0    rivaroxaban (Xarelto) 10 MG tablet Take 1 tablet by mouth Daily. 30 tablet 0    traMADol (ULTRAM) 50 MG tablet        No current facility-administered medications for this visit.       Allergies:  Eliquis [apixaban], Erythromycin, and Sulfa antibiotics    Review of Systems  Review of Systems   HENT:  Negative for nosebleeds.    Cardiovascular:  Negative for chest pain, claudication, dyspnea on exertion, leg swelling, near-syncope, orthopnea, palpitations, paroxysmal nocturnal dyspnea and syncope.   Respiratory:  Negative for hemoptysis and shortness of breath.    Gastrointestinal:  Negative for melena.   Genitourinary:  Negative for hematuria.       Objective     Physical Exam:  /82   Pulse 83   Ht 162.6 cm  "(64\")   Wt 92.5 kg (204 lb)   SpO2 98%   BMI 35.02 kg/m²   Pulmonary:      Effort: Pulmonary effort is normal.      Breath sounds: Normal breath sounds.   Cardiovascular:      Normal rate. Regular rhythm.      Murmurs: There is no murmur.   Edema:     Peripheral edema absent.         Results Review:    ECG 12 Lead    Date/Time: 1/8/2024 9:18 AM  Performed by: Choco Diallo MD    Authorized by: Choco Diallo MD  Comparison: compared with previous ECG   Similar to previous ECG  Rhythm: sinus rhythm  Rate: normal  Conduction: conduction normal  ST Segments: ST segments normal  T Waves: T waves normal  QRS axis: normal    Clinical impression: normal ECG          Admission on 01/04/2024, Discharged on 01/04/2024   Component Date Value Ref Range Status    Color 01/04/2024 Straw   Final    Clarity, UA 01/04/2024 Cloudy (A)   Final    Glucose, UA 01/04/2024 Negative  mg/dL Final    Bilirubin 01/04/2024 Negative   Final    Ketones, UA 01/04/2024 Negative   Final    Specific Gravity  01/04/2024 1.020  1.005 - 1.030 Final    Blood, UA 01/04/2024 Large (A)   Final    pH, Urine 01/04/2024 6.0  5.0 - 8.0 Final    Protein, POC 01/04/2024 Negative  mg/dL Final    Urobilinogen, UA 01/04/2024 0.2 E.U./dL   Final    Nitrite, UA 01/04/2024 Negative   Final    Leukocytes 01/04/2024 Trace (A)   Final    Urine Culture 01/04/2024 25,000 CFU/mL Proteus mirabilis (A)   Final       Assessment & Plan   Problem List Items Addressed This Visit          Cardiac and Vasculature    Coronary artery disease involving native coronary artery of native heart without angina pectoris - Primary    Current Assessment & Plan     The patient denies chest pain, shortness of breath, dyspnea on exertion, orthopnea, PND, edema. There is no evidence of ongoing ischemia           Mixed hyperlipidemia    Current Assessment & Plan     Good response to treatment         Essential hypertension    Current Assessment & Plan     Adequate control         PAF " (paroxysmal atrial fibrillation)    Current Assessment & Plan     No evidence of recurrence            Medical Complexity  must have 1 out of 3     Moderate Complexity Level 3           1 of the following medical problems:          []One chronic illness with mild exacerbation         [x]Two or more stable chronic illness          []One new problem  []One acute illness with systemic symptoms    Complexity of Data  Reviewed (1 out of the 3 following categories)      Category 1 tests, documents, historian (must have 3 points)       []Review of prior external records  [x]Review of results of unique tests  []Ordering unique tests  []Assessment requires an independent historian    Category 2 Interpretation of tests   []Independent interpretation of test read by another doc    Category 3 Discuss Management/tests  []Discussion with external physician    Risk of complications and/or morbidity          [x]Prescription Drug Management

## 2024-01-09 ENCOUNTER — HOSPITAL ENCOUNTER (OUTPATIENT)
Dept: GENERAL RADIOLOGY | Facility: HOSPITAL | Age: 74
Discharge: HOME | End: 2024-01-09
Payer: MEDICARE

## 2024-01-09 ENCOUNTER — OFFICE VISIT (OUTPATIENT)
Dept: UROLOGY | Facility: CLINIC | Age: 74
End: 2024-01-09
Payer: MEDICARE

## 2024-01-09 VITALS — TEMPERATURE: 97.4 F | WEIGHT: 204 LBS | BODY MASS INDEX: 34.83 KG/M2 | HEIGHT: 64 IN

## 2024-01-09 DIAGNOSIS — N20.0 KIDNEY STONE: ICD-10-CM

## 2024-01-09 DIAGNOSIS — N39.0 URINARY TRACT INFECTION ASSOCIATED WITH CATHETERIZATION OF URINARY TRACT, UNSPECIFIED INDWELLING URINARY CATHETER TYPE, SUBSEQUENT ENCOUNTER: Primary | ICD-10-CM

## 2024-01-09 DIAGNOSIS — T83.511D URINARY TRACT INFECTION ASSOCIATED WITH CATHETERIZATION OF URINARY TRACT, UNSPECIFIED INDWELLING URINARY CATHETER TYPE, SUBSEQUENT ENCOUNTER: Primary | ICD-10-CM

## 2024-01-09 DIAGNOSIS — N39.0 URINARY TRACT INFECTION WITHOUT HEMATURIA, SITE UNSPECIFIED: ICD-10-CM

## 2024-01-09 LAB
BILIRUB BLD-MCNC: NEGATIVE MG/DL
CLARITY, POC: ABNORMAL
COLOR UR: ABNORMAL
GLUCOSE UR STRIP-MCNC: NEGATIVE MG/DL
KETONES UR QL: NEGATIVE
LEUKOCYTE EST, POC: ABNORMAL
NITRITE UR-MCNC: NEGATIVE MG/ML
PH UR: 6 [PH] (ref 5–8)
PROT UR STRIP-MCNC: ABNORMAL MG/DL
RBC # UR STRIP: ABNORMAL /UL
SP GR UR: 1.01 (ref 1–1.03)
UROBILINOGEN UR QL: ABNORMAL

## 2024-01-09 PROCEDURE — 99214 OFFICE O/P EST MOD 30 MIN: CPT

## 2024-01-09 PROCEDURE — 1160F RVW MEDS BY RX/DR IN RCRD: CPT

## 2024-01-09 PROCEDURE — 81001 URINALYSIS AUTO W/SCOPE: CPT

## 2024-01-09 PROCEDURE — 74018 RADEX ABDOMEN 1 VIEW: CPT

## 2024-01-09 PROCEDURE — 1159F MED LIST DOCD IN RCRD: CPT

## 2024-01-09 RX ORDER — CEFDINIR 300 MG/1
300 CAPSULE ORAL 2 TIMES DAILY
Qty: 20 CAPSULE | Refills: 0 | Status: SHIPPED | OUTPATIENT
Start: 2024-01-09

## 2024-01-09 RX ORDER — PHENAZOPYRIDINE HYDROCHLORIDE 100 MG/1
100 TABLET, FILM COATED ORAL 3 TIMES DAILY PRN
Qty: 20 TABLET | Refills: 0 | Status: SHIPPED | OUTPATIENT
Start: 2024-01-09

## 2024-01-09 NOTE — PROGRESS NOTES
Subjective    Ms. Hendrix is 73 y.o. female    Chief Complaint: blood in urine and burning    History of Present Illness    73-year-old female established patient in with new complaint of blood in urine and burning with urination that started over the past week and a half was treated at [facility] urgent care for suspected urinary tract infection with nitrofurantoin.  Urine culture revealed resistance.  No different antibiotic sent in as patient states was told that the Proteus bacteria was a marcelina and was sent in Diflucan for yeast instead.  Patient symptoms have remained.  History of kidney stones last of which treated right ESWL 2 large 10 and 12 mm right upper pole renal stones by Dr. Hendrickson June 2023.  KUB today unable to visualize the large stone in the right kidney previously seen on CT imaging May 2023.    The following portions of the patient's history were reviewed and updated as appropriate: allergies, current medications, past family history, past medical history, past social history, past surgical history and problem list.    Review of Systems   Constitutional:  Negative for chills and fever.   Gastrointestinal:  Negative for abdominal pain, anal bleeding, blood in stool, nausea and vomiting.   Genitourinary:  Positive for flank pain, frequency, hematuria and urgency. Negative for dysuria.   Musculoskeletal:  Positive for back pain.         Current Outpatient Medications:     amLODIPine (NORVASC) 10 MG tablet, Take 1 tablet by mouth Daily., Disp: , Rfl:     atorvastatin (LIPITOR) 80 MG tablet, Take 1 tablet by mouth Every Night., Disp: , Rfl:     Cholecalciferol (VITAMIN D3) 125 MCG (5000 UT) capsule capsule, Take 1 capsule by mouth 1 (One) Time Per Week. sundays, Disp: , Rfl:     coenzyme Q10 100 MG capsule, Take 2 capsules by mouth Daily., Disp: , Rfl:     ezetimibe (ZETIA) 10 MG tablet, Take 1 tablet by mouth Daily., Disp: , Rfl:     fluconazole (DIFLUCAN) 150 MG tablet, Take 1 tablet by mouth Daily for 2  doses., Disp: 2 tablet, Rfl: 0    fluocinolone acetonide (DERMOTIC) 0.01 % oil otic oil, 4 drops 2 (Two) Times a Day., Disp: , Rfl:     folic acid (FOLVITE) 800 MCG tablet, Take 1 tablet by mouth Daily., Disp: , Rfl:     Glucosamine-Chondroit-Vit C-Mn (GLUCOSAMINE 1500 COMPLEX PO), Take  by mouth 2 (Two) Times a Day., Disp: , Rfl:     hydrochlorothiazide (HYDRODIURIL) 25 MG tablet, Take 1 tablet by mouth Daily., Disp: , Rfl:     Inclisiran Sodium (Leqvio) 284 MG/1.5ML solution prefilled syringe, Inject 1 mL under the skin into the appropriate area as directed Every 6 (Six) Months., Disp: 1 mL, Rfl: 3    levocetirizine (XYZAL) 5 MG tablet, Take 1 tablet by mouth Every Night., Disp: , Rfl:     metoprolol succinate XL (TOPROL-XL) 100 MG 24 hr tablet, Take 1 tablet by mouth Every Night., Disp: , Rfl:     montelukast (SINGULAIR) 10 MG tablet, Take 1 tablet by mouth Every Night., Disp: , Rfl:     Multiple Vitamins-Minerals (OCUVITE ADULT 50+ PO), Take  by mouth Daily., Disp: , Rfl:     nitrofurantoin, macrocrystal-monohydrate, (MACROBID) 100 MG capsule, Take 1 capsule by mouth 2 (Two) Times a Day for 7 days., Disp: 14 capsule, Rfl: 0    nitroglycerin (NITROSTAT) 0.4 MG SL tablet, Place 1 tablet under the tongue Every 5 (Five) Minutes As Needed for Chest Pain. Take no more than 3 doses in 15 minutes., Disp: 30 tablet, Rfl: 5    ondansetron (ZOFRAN) 4 MG tablet, Take 1 tablet by mouth Every 8 (Eight) Hours As Needed for Nausea or Vomiting., Disp: 20 tablet, Rfl: 0    Polyethylene Glycol 3350 (MIRALAX PO), Take  by mouth As Needed., Disp: , Rfl:     potassium chloride ER (K-TAB) 20 MEQ tablet controlled-release ER tablet, Take 1 tablet by mouth Daily., Disp: , Rfl:     Probiotic Product (PROBIOTIC BLEND PO), Take  by mouth Daily., Disp: , Rfl:     rivaroxaban (Xarelto) 20 MG tablet, Take 1 tablet by mouth Daily. (Patient taking differently: Take 1 tablet by mouth Every Night.), Disp: 90 tablet, Rfl: 3    Symbicort 160-4.5  MCG/ACT inhaler, INHALE 1 PUFF DAILY AS NEEDED, Disp: , Rfl:     vitamin A 78848 UNIT capsule, Take 1 capsule by mouth Daily., Disp: , Rfl:     cefdinir (OMNICEF) 300 MG capsule, Take 1 capsule by mouth 2 (Two) Times a Day., Disp: 20 capsule, Rfl: 0    phenazopyridine (PYRIDIUM) 100 MG tablet, Take 1 tablet by mouth 3 (Three) Times a Day As Needed for Bladder Spasms., Disp: 20 tablet, Rfl: 0    Past Medical History:   Diagnosis Date    Asthma     Atrial fibrillation     CAD in native artery     Hyperlipidemia     Hypertension     IBS (irritable bowel syndrome)     Kidney stone 05/30/2023    Lateral meniscus tear     right    MI, old        Past Surgical History:   Procedure Laterality Date    ABLATION OF DYSRHYTHMIC FOCUS      BLADDER NECK SUSPENSION      BREAST BIOPSY Bilateral     2002    CARDIOVERSION      CATARACT EXTRACTION WITH INTRAOCULAR LENS IMPLANT Bilateral     CHOLECYSTECTOMY      COLONOSCOPY W/ BIOPSIES      CORONARY ANGIOPLASTY  08/13/2007    had stents 2003    CORONARY STENT PLACEMENT  2002    EXTRACORPOREAL SHOCK WAVE LITHOTRIPSY (ESWL) Right 06/05/2023    Procedure: EXTRACORPOREAL SHOCKWAVE LITHOTRIPSY-right;  Surgeon: Jamar Hendrickson MD;  Location: [facility] OR;  Service: Urology;  Laterality: Right;    HYSTERECTOMY      KNEE ARTHROSCOPY Right 11/2/2023    Procedure: RIGHT KNEE ARTHROSCOPIC LATERAL MENISCUS ROOT REPAIR, PARTIAL MEDIAL MENISECTOMY;  Surgeon: Raymond Puentes MD;  Location: [facility] OR;  Service: Orthopedics;  Laterality: Right;    VEIN SURGERY  1988       Social History     Socioeconomic History    Marital status: Single   Tobacco Use    Smoking status: Never    Smokeless tobacco: Never   Vaping Use    Vaping Use: Never used   Substance and Sexual Activity    Alcohol use: Never     Alcohol/week: 3.0 standard drinks of alcohol     Types: 2 Glasses of wine, 1 Shots of liquor per week    Drug use: Never    Sexual activity: Not Currently     Partners: Male     Birth control/protection:  "None       Family History   Problem Relation Age of Onset    Dementia Mother     Heart attack Sister     Heart disease Sister     Diverticulitis Sister     Asthma Sister     Brain cancer Father     Heart disease Maternal Grandmother     Heart attack Maternal Grandmother     Stroke Maternal Grandfather     No Known Problems Paternal Grandmother     Brain cancer Paternal Grandfather     Breast cancer Sister     Breast cancer Paternal Aunt     Ovarian cancer Neg Hx     Uterine cancer Neg Hx     Colon cancer Neg Hx        Objective    Temp 97.4 °F (36.3 °C)   Ht 162.6 cm (64.02\")   Wt 92.5 kg (204 lb)   BMI 35.00 kg/m²     Physical Exam  Nursing note reviewed.   Constitutional:       General: She is not in acute distress.     Appearance: Normal appearance. She is not ill-appearing.   HENT:      Nose: No congestion.   Abdominal:      Tenderness: There is no right CVA tenderness or left CVA tenderness.      Hernia: No hernia is present.   Skin:     General: Skin is warm and dry.   Neurological:      Mental Status: She is alert and oriented to person, place, and time.   Psychiatric:         Mood and Affect: Mood normal.         Behavior: Behavior normal.             Results for orders placed or performed in visit on 01/09/24   POC Urinalysis Dipstick, Multipro    Specimen: Urine   Result Value Ref Range    Color Red (A) Yellow, Straw, Dark Yellow, Beatriz    Clarity, UA Slightly Cloudy (A) Clear    Glucose, UA Negative Negative mg/dL    Bilirubin Negative Negative    Ketones, UA Negative Negative    Specific Gravity  1.010 1.005 - 1.030    Blood, UA Large (A) Negative    pH, Urine 6.0 5.0 - 8.0    Protein, POC 30 mg/dL (A) Negative mg/dL    Urobilinogen, UA 0.2 E.U./dL Normal, 0.2 E.U./dL    Nitrite, UA Negative Negative    Leukocytes Trace (A) Negative   KUB independent review    A KUB is available for me to review today.  The image is inspected for a bowel gas pattern and the general bone structure of the spine and " pelvis. The kidneys are then inspected closely.  Renal outline is noted if identifiable. The kidney, collecting system, and anticipated path of the ureter are examined for calcifications including those in the true pelvis.  This film reveals:    On the right there is a single renal stone measuring lower pole measuring 4 mm.    On the left there are no calcificaitons seen in the kidney or the expected course of the ureter. .      Assessment and Plan    Diagnoses and all orders for this visit:    1. Urinary tract infection associated with catheterization of urinary tract, unspecified indwelling urinary catheter type, subsequent encounter (Primary)    2. Urinary tract infection without hematuria, site unspecified  -     POC Urinalysis Dipstick, Multipro  -     cefdinir (OMNICEF) 300 MG capsule; Take 1 capsule by mouth 2 (Two) Times a Day.  Dispense: 20 capsule; Refill: 0  -     phenazopyridine (PYRIDIUM) 100 MG tablet; Take 1 tablet by mouth 3 (Three) Times a Day As Needed for Bladder Spasms.  Dispense: 20 tablet; Refill: 0    3. Kidney stone  -     XR abdomen kub; Future  -     XR Abdomen KUB; Future      Currently experiencing blood in urine and burning with urination    Recent urine culture completed through urgent care positive for small amount Proteus bacteria.  Was treated with nitrofurantoin for which patient has grown resistant.  Later prescribe Diflucan as was told the Proteus was a type of marcelina.    Has continued to experience symptoms    Will go ahead and treat patient with cefdinir x 10 days based on recent culture.  Will send in Pyridium as needed as well.  Have encouraged patient to contact our office if symptoms do not improve or worsen at which time recommend further evaluation with CT imaging.    As Proteus bacteria is a known bacteria contributing to stone formation therefore patient may warrant surgical intervention in the near future.    For now we will have patient follow-up in 3 months with repeat  CAROL ANN prior

## 2024-01-10 NOTE — ADDENDUM NOTE
Addended by: BAILEY ANNA on: 1/3/2022 10:41 AM     Modules accepted: Orders, Level of Service     J-Code:  Expiration Date (Optional): 10/2025 Include J-Code In Bill: No Additional Comments: PT STATES HES DOING GREAT ON DUPIXENT. SAMPLE USED TODAY. Use Enhanced Ndc?: Yes 05704 Billing Preferences: 1 Ndc (300 Mg Prefilled Syringe): [address]-00 Ndc (200 Mg Prefilled Syringe): [address]-16 Detail Level: None Ndc (300 Mg Prefilled Pen): [address]-15 Consent: The risks of pain and injection site reactions were reviewed with the patient prior to the injection. Administered By (Optional): MARYA Syringe Size Used (Required For Enhanced Ndc): 300 mg/2ml prefilled syringe Dupixent Dosing: 300 mg Lot # (Optional): [id number] Date Of Next Injection: 2 Weeks Ndc (200 Mg Prefilled Pen): 9014-7225-73

## 2024-02-08 ENCOUNTER — INFUSION (OUTPATIENT)
Dept: ONCOLOGY | Facility: HOSPITAL | Age: 74
End: 2024-02-08
Payer: MEDICARE

## 2024-02-08 VITALS
TEMPERATURE: 97.6 F | HEART RATE: 85 BPM | BODY MASS INDEX: 34.66 KG/M2 | WEIGHT: 203 LBS | HEIGHT: 64 IN | SYSTOLIC BLOOD PRESSURE: 140 MMHG | DIASTOLIC BLOOD PRESSURE: 58 MMHG | RESPIRATION RATE: 20 BRPM | OXYGEN SATURATION: 96 %

## 2024-02-08 DIAGNOSIS — E78.2 MIXED HYPERLIPIDEMIA: Primary | ICD-10-CM

## 2024-02-08 PROCEDURE — 96372 THER/PROPH/DIAG INJ SC/IM: CPT

## 2024-02-08 PROCEDURE — 25010000002 INCLISIRAN SODIUM 284 MG/1.5ML SOLUTION PREFILLED SYRINGE: Performed by: INTERNAL MEDICINE

## 2024-02-08 RX ADMIN — INCLISIRAN 284 MG: 284 INJECTION, SOLUTION SUBCUTANEOUS at 14:05

## 2024-02-12 DIAGNOSIS — R31.0 GROSS HEMATURIA: Primary | ICD-10-CM

## 2024-02-13 ENCOUNTER — TRANSCRIBE ORDERS (OUTPATIENT)
Dept: ADMINISTRATIVE | Facility: HOSPITAL | Age: 74
End: 2024-02-13
Payer: MEDICARE

## 2024-02-13 DIAGNOSIS — R53.83 OTHER FATIGUE: ICD-10-CM

## 2024-02-13 DIAGNOSIS — I25.10 ATHEROSCLEROSIS OF NATIVE CORONARY ARTERY WITHOUT ANGINA PECTORIS, UNSPECIFIED WHETHER NATIVE OR TRANSPLANTED HEART: Primary | ICD-10-CM

## 2024-02-13 DIAGNOSIS — E78.5 HYPERLIPIDEMIA, UNSPECIFIED HYPERLIPIDEMIA TYPE: ICD-10-CM

## 2024-02-14 ENCOUNTER — LAB (OUTPATIENT)
Dept: LAB | Facility: HOSPITAL | Age: 74
End: 2024-02-14
Payer: MEDICARE

## 2024-02-14 ENCOUNTER — HOSPITAL ENCOUNTER (OUTPATIENT)
Dept: GENERAL RADIOLOGY | Facility: HOSPITAL | Age: 74
Discharge: HOME | End: 2024-02-14
Payer: MEDICARE

## 2024-02-14 ENCOUNTER — OFFICE VISIT (OUTPATIENT)
Dept: UROLOGY | Facility: CLINIC | Age: 74
End: 2024-02-14
Payer: MEDICARE

## 2024-02-14 VITALS — HEIGHT: 64 IN | BODY MASS INDEX: 34.76 KG/M2 | TEMPERATURE: 97.5 F | WEIGHT: 203.6 LBS

## 2024-02-14 DIAGNOSIS — R31.0 GROSS HEMATURIA: ICD-10-CM

## 2024-02-14 DIAGNOSIS — R53.83 OTHER FATIGUE: ICD-10-CM

## 2024-02-14 DIAGNOSIS — I25.10 ATHEROSCLEROSIS OF NATIVE CORONARY ARTERY WITHOUT ANGINA PECTORIS, UNSPECIFIED WHETHER NATIVE OR TRANSPLANTED HEART: ICD-10-CM

## 2024-02-14 DIAGNOSIS — E78.5 HYPERLIPIDEMIA, UNSPECIFIED HYPERLIPIDEMIA TYPE: ICD-10-CM

## 2024-02-14 DIAGNOSIS — N20.0 KIDNEY STONE: Primary | ICD-10-CM

## 2024-02-14 LAB
ALBUMIN SERPL-MCNC: 4 G/DL (ref 3.5–5.2)
ALBUMIN/GLOB SERPL: 1.4 G/DL
ALP SERPL-CCNC: 171 U/L (ref 39–117)
ALT SERPL W P-5'-P-CCNC: 11 U/L (ref 1–33)
ANION GAP SERPL CALCULATED.3IONS-SCNC: 9 MMOL/L (ref 5–15)
AST SERPL-CCNC: 15 U/L (ref 1–32)
BASOPHILS # BLD AUTO: 0.02 10*3/MM3 (ref 0–0.2)
BASOPHILS NFR BLD AUTO: 0.2 % (ref 0–1.5)
BILIRUB SERPL-MCNC: 1.3 MG/DL (ref 0–1.2)
BUN SERPL-MCNC: 15 MG/DL (ref 8–23)
BUN/CREAT SERPL: 23.8 (ref 7–25)
CALCIUM SPEC-SCNC: 9.6 MG/DL (ref 8.6–10.5)
CHLORIDE SERPL-SCNC: 108 MMOL/L (ref 98–107)
CHOLEST SERPL-MCNC: 129 MG/DL (ref 0–200)
CO2 SERPL-SCNC: 27 MMOL/L (ref 22–29)
CREAT SERPL-MCNC: 0.63 MG/DL (ref 0.57–1)
DEPRECATED RDW RBC AUTO: 48.9 FL (ref 37–54)
EGFRCR SERPLBLD CKD-EPI 2021: 94.4 ML/MIN/1.73
EOSINOPHIL # BLD AUTO: 0.29 10*3/MM3 (ref 0–0.4)
EOSINOPHIL NFR BLD AUTO: 3.3 % (ref 0.3–6.2)
ERYTHROCYTE [DISTWIDTH] IN BLOOD BY AUTOMATED COUNT: 15.3 % (ref 12.3–15.4)
GLOBULIN UR ELPH-MCNC: 2.8 GM/DL
GLUCOSE SERPL-MCNC: 105 MG/DL (ref 65–99)
HCT VFR BLD AUTO: 41.8 % (ref 34–46.6)
HDLC SERPL-MCNC: 56 MG/DL (ref 40–60)
HGB BLD-MCNC: 12.9 G/DL (ref 12–15.9)
IMM GRANULOCYTES # BLD AUTO: 0.02 10*3/MM3 (ref 0–0.05)
IMM GRANULOCYTES NFR BLD AUTO: 0.2 % (ref 0–0.5)
LDLC SERPL CALC-MCNC: 57 MG/DL (ref 0–100)
LDLC/HDLC SERPL: 1.02 {RATIO}
LYMPHOCYTES # BLD AUTO: 1.46 10*3/MM3 (ref 0.7–3.1)
LYMPHOCYTES NFR BLD AUTO: 16.7 % (ref 19.6–45.3)
MCH RBC QN AUTO: 26.9 PG (ref 26.6–33)
MCHC RBC AUTO-ENTMCNC: 30.9 G/DL (ref 31.5–35.7)
MCV RBC AUTO: 87.1 FL (ref 79–97)
MONOCYTES # BLD AUTO: 0.71 10*3/MM3 (ref 0.1–0.9)
MONOCYTES NFR BLD AUTO: 8.1 % (ref 5–12)
NEUTROPHILS NFR BLD AUTO: 6.25 10*3/MM3 (ref 1.7–7)
NEUTROPHILS NFR BLD AUTO: 71.5 % (ref 42.7–76)
NRBC BLD AUTO-RTO: 0 /100 WBC (ref 0–0.2)
PLATELET # BLD AUTO: 250 10*3/MM3 (ref 140–450)
PMV BLD AUTO: 10.5 FL (ref 6–12)
POTASSIUM SERPL-SCNC: 4.5 MMOL/L (ref 3.5–5.2)
PROT SERPL-MCNC: 6.8 G/DL (ref 6–8.5)
RBC # BLD AUTO: 4.8 10*6/MM3 (ref 3.77–5.28)
SODIUM SERPL-SCNC: 144 MMOL/L (ref 136–145)
TRIGL SERPL-MCNC: 79 MG/DL (ref 0–150)
TSH SERPL DL<=0.05 MIU/L-ACNC: 1.57 UIU/ML (ref 0.27–4.2)
VLDLC SERPL-MCNC: 16 MG/DL (ref 5–40)
WBC NRBC COR # BLD AUTO: 8.75 10*3/MM3 (ref 3.4–10.8)

## 2024-02-14 PROCEDURE — 80053 COMPREHEN METABOLIC PANEL: CPT

## 2024-02-14 PROCEDURE — 36415 COLL VENOUS BLD VENIPUNCTURE: CPT

## 2024-02-14 PROCEDURE — 84443 ASSAY THYROID STIM HORMONE: CPT

## 2024-02-14 PROCEDURE — 87086 URINE CULTURE/COLONY COUNT: CPT

## 2024-02-14 PROCEDURE — 80061 LIPID PANEL: CPT

## 2024-02-14 PROCEDURE — 88112 CYTOPATH CELL ENHANCE TECH: CPT

## 2024-02-14 PROCEDURE — 87186 SC STD MICRODIL/AGAR DIL: CPT

## 2024-02-14 PROCEDURE — 85025 COMPLETE CBC W/AUTO DIFF WBC: CPT

## 2024-02-14 PROCEDURE — 74018 RADEX ABDOMEN 1 VIEW: CPT

## 2024-02-17 DIAGNOSIS — N30.00 ACUTE CYSTITIS WITHOUT HEMATURIA: Primary | ICD-10-CM

## 2024-02-17 LAB
BACTERIA SPEC AEROBE CULT: ABNORMAL
CYTO UR: NORMAL
LAB AP CASE REPORT: NORMAL
Lab: NORMAL
PATH REPORT.FINAL DX SPEC: NORMAL
PATH REPORT.GROSS SPEC: NORMAL

## 2024-02-17 RX ORDER — CEFDINIR 300 MG/1
300 CAPSULE ORAL 2 TIMES DAILY
Qty: 20 CAPSULE | Refills: 0 | Status: SHIPPED | OUTPATIENT
Start: 2024-02-17

## 2024-02-19 ENCOUNTER — TELEPHONE (OUTPATIENT)
Dept: UROLOGY | Facility: CLINIC | Age: 74
End: 2024-02-19
Payer: MEDICARE

## 2024-02-19 NOTE — TELEPHONE ENCOUNTER
----- Message from KAREN Hernandez sent at 2/17/2024  7:31 PM CST -----  Small amount bacteria noted have sent in cefdinir to  pharmacy listed

## 2024-02-19 NOTE — TELEPHONE ENCOUNTER
Spoke with patient to let her know   Small amount bacteria noted have sent in cefdinir to  pharmacy listed     I let patient also know we had sent her urine for further evaluation with the blood in her urine.. and the cytology show no malignant cells seen.     Patient verbalized understanding.

## 2024-02-22 ENCOUNTER — TRANSCRIBE ORDERS (OUTPATIENT)
Dept: ADMINISTRATIVE | Facility: HOSPITAL | Age: 74
End: 2024-02-22
Payer: MEDICARE

## 2024-02-22 DIAGNOSIS — I10 ESSENTIAL HYPERTENSION: ICD-10-CM

## 2024-02-22 DIAGNOSIS — I48.91 ATRIAL FIBRILLATION, UNSPECIFIED TYPE: ICD-10-CM

## 2024-02-22 DIAGNOSIS — I25.10 ATHEROSCLEROSIS OF NATIVE CORONARY ARTERY WITHOUT ANGINA PECTORIS, UNSPECIFIED WHETHER NATIVE OR TRANSPLANTED HEART: Primary | ICD-10-CM

## 2024-02-28 ENCOUNTER — HOSPITAL ENCOUNTER (OUTPATIENT)
Dept: CT IMAGING | Facility: HOSPITAL | Age: 74
Discharge: HOME | End: 2024-02-28
Payer: MEDICARE

## 2024-02-28 DIAGNOSIS — R31.0 GROSS HEMATURIA: ICD-10-CM

## 2024-02-28 LAB — CREAT BLDA-MCNC: 0.9 MG/DL (ref 0.6–1.3)

## 2024-02-28 PROCEDURE — 25510000001 IOPAMIDOL 61 % SOLUTION

## 2024-02-28 PROCEDURE — 74178 CT ABD&PLV WO CNTR FLWD CNTR: CPT

## 2024-02-28 PROCEDURE — 82565 ASSAY OF CREATININE: CPT

## 2024-02-28 RX ADMIN — IOPAMIDOL 100 ML: 612 INJECTION, SOLUTION INTRAVENOUS at 07:49

## 2024-02-28 NOTE — PROGRESS NOTES
No ureteral stones seen. Bilateral small non obstructing stones. Bladder under distended and unable to fully view. Left renal cyst seen. Recommend renal us follow up per radiology 1 year.

## 2024-02-29 ENCOUNTER — TELEPHONE (OUTPATIENT)
Dept: UROLOGY | Facility: CLINIC | Age: 74
End: 2024-02-29
Payer: MEDICARE

## 2024-02-29 NOTE — TELEPHONE ENCOUNTER
Spoke with patient to let her know   No ureteral stones seen. Bilateral small non obstructing stones. Bladder under distended and unable to fully view. Left renal cyst seen. Recommend renal us follow up per radiology 1 year.     Advised patient to keep appointment with Dr. Hendrickson for her Cysto.    Patient verbalized understanding.

## 2024-02-29 NOTE — TELEPHONE ENCOUNTER
----- Message from KAREN Hernandez sent at 2/28/2024  8:28 AM CST -----  No ureteral stones seen. Bilateral small non obstructing stones. Bladder under distended and unable to fully view. Left renal cyst seen. Recommend renal us follow up per radiology 1 year.

## 2024-03-05 ENCOUNTER — OFFICE VISIT (OUTPATIENT)
Dept: CARDIOLOGY | Facility: CLINIC | Age: 74
End: 2024-03-05
Payer: MEDICARE

## 2024-03-05 VITALS
BODY MASS INDEX: 34.31 KG/M2 | DIASTOLIC BLOOD PRESSURE: 84 MMHG | HEIGHT: 64 IN | WEIGHT: 201 LBS | HEART RATE: 85 BPM | SYSTOLIC BLOOD PRESSURE: 108 MMHG | OXYGEN SATURATION: 99 %

## 2024-03-05 DIAGNOSIS — E66.09 CLASS 1 OBESITY DUE TO EXCESS CALORIES WITH SERIOUS COMORBIDITY AND BODY MASS INDEX (BMI) OF 34.0 TO 34.9 IN ADULT: ICD-10-CM

## 2024-03-05 DIAGNOSIS — I48.0 PAF (PAROXYSMAL ATRIAL FIBRILLATION): ICD-10-CM

## 2024-03-05 DIAGNOSIS — I25.10 CORONARY ARTERY DISEASE INVOLVING NATIVE CORONARY ARTERY OF NATIVE HEART WITHOUT ANGINA PECTORIS: Primary | ICD-10-CM

## 2024-03-05 PROCEDURE — 3074F SYST BP LT 130 MM HG: CPT | Performed by: PHYSICIAN ASSISTANT

## 2024-03-05 PROCEDURE — 93000 ELECTROCARDIOGRAM COMPLETE: CPT | Performed by: PHYSICIAN ASSISTANT

## 2024-03-05 PROCEDURE — 99214 OFFICE O/P EST MOD 30 MIN: CPT | Performed by: PHYSICIAN ASSISTANT

## 2024-03-05 PROCEDURE — 3079F DIAST BP 80-89 MM HG: CPT | Performed by: PHYSICIAN ASSISTANT

## 2024-03-05 NOTE — PROGRESS NOTES
"[facility] -  CLINIC FOLLOW UP     Patient Care Team:  Petar Cummings MD as PCP - General (Internal Medicine)  Choco Diallo MD (Inactive) as Cardiologist (Cardiology)  Chrissy Allen APRN as Nurse Practitioner (Family Medicine)  Praneeth Diallo MD as Surgeon (Orthopedic Surgery)    Chief Complaint: PAF       Subjective   EP Problems:  1.  Paroxysmal atrial fibrillation  -10/20/2021: AblationAbdullahi     Cardiology Problems:  1.  Hypertension  2.  Hyperlipidemia  3.  CAD status post PCI 2005      Medical Problems:  1.  Obesity  -9/2023: BMI 36  2.  Eustachian tube malfunction  3.  Nephrolithiasis    HPI: Today I had the pleasure of seeing Christiana Hendrix in the cardiology clinic for follow up. She is a 73 year old female with a history of PAF with previous ablation in 2021 by Dr. Fernando. At her last follow up she had been doing well without any recurrences. She previously followed with Dr. Diallo and last saw him in January. She had her knee surgery successfully in the fall and plans to have a total knee in October this year. She denies any recurrence of afib perioperatively. Occasionally, she has hematuria associated with UTIs or kidney stones.     She has chronically elevated bilirubin and alk phos, normal LFTs. She has changed her diet and lost 19 lbs. Since she has gone to PT and states that helped. She only notes one episode when she was excited felt like afib and it resolved on its own. The episode lasted about 30 minutes and it was right after the ablation.     Objective     Visit Vitals  /84   Pulse 85   Ht 162.6 cm (64.02\")   Wt 91.2 kg (201 lb)   SpO2 99%   BMI 34.48 kg/m²           Vitals reviewed.   Constitutional:       Appearance: Healthy appearance. Not in distress.   Eyes:      Extraocular Movements: Extraocular movements intact.      Conjunctiva/sclera: Conjunctivae normal.      Pupils: Pupils are equal, round, and reactive to light.   HENT:      Head: " Normocephalic and atraumatic.      Nose: Nose normal.    Mouth/Throat:      Lips: Pink.      Mouth: Mucous membranes are moist.      Pharynx: Oropharynx is clear.   Neck:      Vascular: No carotid bruit or JVD. JVD normal.   Pulmonary:      Effort: Pulmonary effort is normal.      Breath sounds: Normal breath sounds.   Chest:      Chest wall: Not tender to palpatation.   Cardiovascular:      PMI at left midclavicular line. Normal rate. Regular rhythm. Normal S1. Normal S2.       Murmurs: There is no murmur.      No gallop.  No rub.   Pulses:     Radial: 2+ bilaterally.     Posterior tibial: 2+ bilaterally.  Edema:     Peripheral edema absent.   Abdominal:      General: Bowel sounds are normal.      Palpations: Abdomen is soft.   Musculoskeletal: Normal range of motion.      Extremities: No clubbing present.     Cervical back: Normal range of motion. Skin:     General: Skin is warm and dry.   Neurological:      General: No focal deficit present.      Mental Status: Alert and oriented to person, place, and time.   Psychiatric:         Attention and Perception: Attention normal.         Mood and Affect: Affect normal.         Speech: Speech normal.         Behavior: Behavior normal.         Cognition and Memory: Cognition normal.             The following portions of the patient's history were reviewed and updated as appropriate: allergies, current medications, past medical history, past social history, past and problem list.     Review of Systems   Constitutional: Negative.    HENT: Negative.     Eyes: Negative.    Respiratory: Negative.     Cardiovascular:  Positive for leg swelling.   Gastrointestinal: Negative.    Endocrine: Negative.    Genitourinary: Negative.    Musculoskeletal:  Positive for arthralgias.   Skin: Negative.    Allergic/Immunologic: Negative.    Neurological:  Positive for dizziness.   Hematological: Negative.    Psychiatric/Behavioral: Negative.              ECG 12 Lead    Date/Time: 3/5/2024  1:46 PM  Performed by: Lakia Ng PA    Authorized by: Lakia Ng PA  Comparison: compared with previous ECG from 9/5/2023  Rhythm: sinus rhythm  Rate: normal  BPM: 85  QRS axis: normal    Clinical impression: normal ECG            Medication Review: yes    Current Outpatient Medications:     amLODIPine (NORVASC) 10 MG tablet, Take 1 tablet by mouth Daily., Disp: , Rfl:     atorvastatin (LIPITOR) 80 MG tablet, Take 1 tablet by mouth Every Night., Disp: , Rfl:     cefdinir (OMNICEF) 300 MG capsule, Take 1 capsule by mouth 2 (Two) Times a Day., Disp: 20 capsule, Rfl: 0    Cholecalciferol (VITAMIN D3) 125 MCG (5000 UT) capsule capsule, Take 1 capsule by mouth 1 (One) Time Per Week. sundays, Disp: , Rfl:     coenzyme Q10 100 MG capsule, Take 2 capsules by mouth Daily., Disp: , Rfl:     ezetimibe (ZETIA) 10 MG tablet, Take 1 tablet by mouth Daily., Disp: , Rfl:     fluocinolone acetonide (DERMOTIC) 0.01 % oil otic oil, 4 drops 2 (Two) Times a Day., Disp: , Rfl:     folic acid (FOLVITE) 800 MCG tablet, Take 1 tablet by mouth Daily., Disp: , Rfl:     Glucosamine-Chondroit-Vit C-Mn (GLUCOSAMINE 1500 COMPLEX PO), Take  by mouth 2 (Two) Times a Day., Disp: , Rfl:     hydrochlorothiazide (HYDRODIURIL) 25 MG tablet, Take 1 tablet by mouth Daily., Disp: , Rfl:     Inclisiran Sodium (Leqvio) 284 MG/1.5ML solution prefilled syringe, Inject 1 mL under the skin into the appropriate area as directed Every 6 (Six) Months., Disp: 1 mL, Rfl: 3    levocetirizine (XYZAL) 5 MG tablet, Take 1 tablet by mouth Every Night., Disp: , Rfl:     metoprolol succinate XL (TOPROL-XL) 100 MG 24 hr tablet, Take 1 tablet by mouth Every Night., Disp: , Rfl:     montelukast (SINGULAIR) 10 MG tablet, Take 1 tablet by mouth Every Night., Disp: , Rfl:     Multiple Vitamins-Minerals (OCUVITE ADULT 50+ PO), Take  by mouth Daily., Disp: , Rfl:     nitroglycerin (NITROSTAT) 0.4 MG SL tablet, Place 1 tablet under the tongue Every 5 (Five) Minutes As  "Needed for Chest Pain. Take no more than 3 doses in 15 minutes., Disp: 30 tablet, Rfl: 5    ondansetron (ZOFRAN) 4 MG tablet, Take 1 tablet by mouth Every 8 (Eight) Hours As Needed for Nausea or Vomiting., Disp: 20 tablet, Rfl: 0    phenazopyridine (PYRIDIUM) 100 MG tablet, Take 1 tablet by mouth 3 (Three) Times a Day As Needed for Bladder Spasms., Disp: 20 tablet, Rfl: 0    Polyethylene Glycol 3350 (MIRALAX PO), Take  by mouth As Needed., Disp: , Rfl:     potassium chloride ER (K-TAB) 20 MEQ tablet controlled-release ER tablet, Take 1 tablet by mouth Daily., Disp: , Rfl:     Probiotic Product (PROBIOTIC BLEND PO), Take  by mouth Daily., Disp: , Rfl:     rivaroxaban (Xarelto) 20 MG tablet, Take 1 tablet by mouth Daily. (Patient taking differently: Take 1 tablet by mouth Every Night.), Disp: 90 tablet, Rfl: 3    Symbicort 160-4.5 MCG/ACT inhaler, INHALE 1 PUFF DAILY AS NEEDED, Disp: , Rfl:     vitamin A 82471 UNIT capsule, Take 1 capsule by mouth Daily., Disp: , Rfl:    Allergies   Allergen Reactions    Eliquis [Apixaban] GI Intolerance    Erythromycin Rash    Sulfa Antibiotics Rash       I have reviewed       CBC:  Lab Results - Last 18 Months   Lab Units 02/14/24  0941   WBC 10*3/mm3 8.75   HEMOGLOBIN g/dL 12.9   HEMATOCRIT % 41.8   PLATELETS 10*3/mm3 250      BMP/CMP:  Lab Results - Last 18 Months   Lab Units 02/28/24  0716 02/14/24  0941   SODIUM mmol/L  --  144   POTASSIUM mmol/L  --  4.5   CHLORIDE mmol/L  --  108*   CO2 mmol/L  --  27.0   GLUCOSE mg/dL  --  105*   BUN mg/dL  --  15   CREATININE mg/dL 0.90 0.63   CALCIUM mg/dL  --  9.6     BNP: No results for input(s): \"PROBNP\" in the last 93006 hours.   THYROID:  Lab Results - Last 18 Months   Lab Units 02/14/24  0941   TSH uIU/mL 1.570       Results for orders placed during the hospital encounter of 02/26/21    Adult Transesophageal Echo (KEELEY) W/ Cont if Necessary Per Protocol    Interpretation Summary  · Left ventricular systolic function is normal. Left " ventricular ejection fraction appears to be 56 - 60%.  · Normal right ventricular cavity size and systolic function noted.  · No evidence of a left atrial appendage thrombus.  · No significant valvular abnormalities.     Assessment:   Diagnoses and all orders for this visit:    1. Coronary artery disease involving native coronary artery of native heart without angina pectoris (Primary)    2. PAF (paroxysmal atrial fibrillation)    3. Class 1 obesity due to excess calories with serious comorbidity and body mass index (BMI) of 34.0 to 34.9 in adult    Other orders  -     ECG 12 Lead    PAF: Previous ablation in 2021 without any significant recurrence. Chronically anticoagulated with xarelto. Given that she maintains sinus rhythm, she will be acceptable to hold her xarelto 2-3 days prior to surgery.     OA: Upcoming knee surgery in October. Will be low to moderate risk for surgery without any modifiable risk factors. Denies any ongoing angina and no ischemic changes on EKG today.     I spent 30 minutes caring for Christiana on this date of service. This time includes time spent by me in the following activities:preparing for the visit, reviewing tests, obtaining and/or reviewing a separately obtained history, performing a medically appropriate examination and/or evaluation , counseling and educating the patient/family/caregiver, ordering medications, tests, or procedures, referring and communicating with other health care professionals , documenting information in the medical record, and independently interpreting results and communicating that information with the patient/family/caregiver        Electronically signed by SHEFALI Boyd

## 2024-03-08 ENCOUNTER — PROCEDURE VISIT (OUTPATIENT)
Dept: UROLOGY | Facility: CLINIC | Age: 74
End: 2024-03-08
Payer: MEDICARE

## 2024-03-08 DIAGNOSIS — N28.1 RENAL CYST: ICD-10-CM

## 2024-03-08 DIAGNOSIS — N20.0 KIDNEY STONE: ICD-10-CM

## 2024-03-08 DIAGNOSIS — R31.0 GROSS HEMATURIA: Primary | ICD-10-CM

## 2024-03-08 LAB
BILIRUB BLD-MCNC: NEGATIVE MG/DL
CLARITY, POC: CLEAR
COLOR UR: YELLOW
GLUCOSE UR STRIP-MCNC: NEGATIVE MG/DL
KETONES UR QL: NEGATIVE
LEUKOCYTE EST, POC: NEGATIVE
NITRITE UR-MCNC: NEGATIVE MG/ML
PH UR: 6.5 [PH] (ref 5–8)
PROT UR STRIP-MCNC: NEGATIVE MG/DL
RBC # UR STRIP: ABNORMAL /UL
SP GR UR: 1.01 (ref 1–1.03)
UROBILINOGEN UR QL: NORMAL

## 2024-03-08 NOTE — PROGRESS NOTES
Pre- operative diagnosis:  Hematuria    Post operative diagnosis:  Same    Procedure:  The patient was prepped and draped in a normal sterile fashion.  The urethra was anesthetized with 2% lidocaine jelly.  A flexible cystoscope was introduced per urethra.      Urethra:  Normal    Bladder:  There is no evidence of a stone, foreign body or mass within the bladder.  The bladder is minimally trabeculated.  The bladder neck is without contracture.    Ureteral orifices:  Normal position bilaterally    Patient tolerated the procedure well    Complications: none    Blood loss: minimal    Follow up:    Routine follow up

## 2024-03-27 ENCOUNTER — OFFICE VISIT (OUTPATIENT)
Dept: GASTROENTEROLOGY | Facility: CLINIC | Age: 74
End: 2024-03-27
Payer: MEDICARE

## 2024-03-27 VITALS
HEART RATE: 83 BPM | BODY MASS INDEX: 34.49 KG/M2 | OXYGEN SATURATION: 97 % | WEIGHT: 202 LBS | HEIGHT: 64 IN | DIASTOLIC BLOOD PRESSURE: 68 MMHG | TEMPERATURE: 96.9 F | SYSTOLIC BLOOD PRESSURE: 138 MMHG

## 2024-03-27 DIAGNOSIS — K59.09 CHRONIC CONSTIPATION: ICD-10-CM

## 2024-03-27 DIAGNOSIS — Z86.010 HX OF COLONIC POLYP: Primary | ICD-10-CM

## 2024-03-27 NOTE — PROGRESS NOTES
[facility] GASTROENTEROLOGY - OFFICE NOTE    3/27/2024    Christiana Hendrix   1950    Primary Physician: Petar Cummings MD    No chief complaint on file.  Colonoscopy   Chronic constipation      HISTORY OF PRESENT ILLNESS:     Christiana Hendrix is a 73 y.o. female presents to establish GI care. She has history of chronic constipation and takes miralax daily ( x 4 years ) and having 2 bm's per day. She did lose some weight after knee surgery 11/2023.  No rectal bleeding. No fever. No abdominal pain.         Last colonoscopy was 5 years ago. Done at [facility]. Due again 5/2024.   She has had colon polyps.   No family history of colon cancer/polyps.         Past Medical History:   Diagnosis Date    Asthma     Atrial fibrillation     CAD in native artery     Hyperlipidemia     Hypertension     IBS (irritable bowel syndrome)     Kidney stone 05/30/2023    Lateral meniscus tear     right    MI, old        Past Surgical History:   Procedure Laterality Date    ABLATION OF DYSRHYTHMIC FOCUS      BLADDER NECK SUSPENSION      BREAST BIOPSY Bilateral     2002    CARDIOVERSION      CATARACT EXTRACTION WITH INTRAOCULAR LENS IMPLANT Bilateral     CHOLECYSTECTOMY      COLONOSCOPY W/ BIOPSIES      CORONARY ANGIOPLASTY  08/13/2007    had stents 2003    CORONARY STENT PLACEMENT  2002    EXTRACORPOREAL SHOCK WAVE LITHOTRIPSY (ESWL) Right 06/05/2023    Procedure: EXTRACORPOREAL SHOCKWAVE LITHOTRIPSY-right;  Surgeon: Jamar Hendrickson MD;  Location:  [facility] OR;  Service: Urology;  Laterality: Right;    HYSTERECTOMY      KNEE ARTHROSCOPY Right 11/02/2023    Procedure: RIGHT KNEE ARTHROSCOPIC LATERAL MENISCUS ROOT REPAIR, PARTIAL MEDIAL MENISECTOMY;  Surgeon: Raymond Puentes MD;  Location:  [facility] OR;  Service: Orthopedics;  Laterality: Right;    VEIN SURGERY  1988       Outpatient Medications Marked as Taking for the 3/27/24 encounter (Office Visit) with Eufemia Izaguirre APRN   Medication Sig Dispense Refill    amLODIPine (NORVASC) 10  MG tablet Take 1 tablet by mouth Daily.      atorvastatin (LIPITOR) 80 MG tablet Take 1 tablet by mouth Every Night.      Cholecalciferol (VITAMIN D3) 125 MCG (5000 UT) capsule capsule Take 1 capsule by mouth 1 (One) Time Per Week. sundays      coenzyme Q10 100 MG capsule Take 2 capsules by mouth Daily.      ezetimibe (ZETIA) 10 MG tablet Take 1 tablet by mouth Daily.      fluocinolone acetonide (DERMOTIC) 0.01 % oil otic oil 4 drops 2 (Two) Times a Day.      folic acid (FOLVITE) 800 MCG tablet Take 1 tablet by mouth Daily.      Glucosamine-Chondroit-Vit C-Mn (GLUCOSAMINE 1500 COMPLEX PO) Take  by mouth 2 (Two) Times a Day.      hydrochlorothiazide (HYDRODIURIL) 25 MG tablet Take 1 tablet by mouth Daily.      Inclisiran Sodium (Leqvio) 284 MG/1.5ML solution prefilled syringe Inject 1 mL under the skin into the appropriate area as directed Every 6 (Six) Months. 1 mL 3    levocetirizine (XYZAL) 5 MG tablet Take 1 tablet by mouth Every Night.      metoprolol succinate XL (TOPROL-XL) 100 MG 24 hr tablet Take 1 tablet by mouth Every Night.      montelukast (SINGULAIR) 10 MG tablet Take 1 tablet by mouth Every Night.      Multiple Vitamins-Minerals (OCUVITE ADULT 50+ PO) Take  by mouth Daily.      nitroglycerin (NITROSTAT) 0.4 MG SL tablet Place 1 tablet under the tongue Every 5 (Five) Minutes As Needed for Chest Pain. Take no more than 3 doses in 15 minutes. 30 tablet 5    Polyethylene Glycol 3350 (MIRALAX PO) Take  by mouth As Needed.      potassium chloride ER (K-TAB) 20 MEQ tablet controlled-release ER tablet Take 1 tablet by mouth Daily.      Probiotic Product (PROBIOTIC BLEND PO) Take  by mouth Daily.      rivaroxaban (Xarelto) 20 MG tablet Take 1 tablet by mouth Daily. (Patient taking differently: Take 1 tablet by mouth Every Night.) 90 tablet 3    vitamin A 65848 UNIT capsule Take 1 capsule by mouth Daily.         Allergies   Allergen Reactions    Eliquis [Apixaban] GI Intolerance    Erythromycin Rash    Sulfa  "Antibiotics Rash       Social History     Socioeconomic History    Marital status: Single   Tobacco Use    Smoking status: Never    Smokeless tobacco: Never   Vaping Use    Vaping status: Never Used   Substance and Sexual Activity    Alcohol use: Never     Alcohol/week: 3.0 standard drinks of alcohol     Types: 2 Glasses of wine, 1 Shots of liquor per week    Drug use: Never    Sexual activity: Not Currently     Partners: Male     Birth control/protection: None       Family History   Problem Relation Age of Onset    Dementia Mother     Heart attack Sister     Heart disease Sister     Diverticulitis Sister     Asthma Sister     Brain cancer Father     Heart disease Maternal Grandmother     Heart attack Maternal Grandmother     Stroke Maternal Grandfather     No Known Problems Paternal Grandmother     Brain cancer Paternal Grandfather     Breast cancer Sister     Breast cancer Paternal Aunt     Ovarian cancer Neg Hx     Uterine cancer Neg Hx     Colon cancer Neg Hx        Review of Systems   Constitutional:  Negative for chills, fever and unexpected weight change.   Respiratory:  Negative for shortness of breath.    Cardiovascular:  Negative for chest pain.   Gastrointestinal:  Negative for abdominal distention, abdominal pain, anal bleeding, blood in stool, diarrhea, nausea and vomiting.        Vitals:    03/27/24 1241   BP: 138/68   Pulse: 83   Temp: 96.9 °F (36.1 °C)   SpO2: 97%   Weight: 91.6 kg (202 lb)   Height: 162.6 cm (64\")      Body mass index is 34.67 kg/m².    Physical Exam  Vitals reviewed.   Constitutional:       General: She is not in acute distress.  Cardiovascular:      Rate and Rhythm: Normal rate and regular rhythm.      Heart sounds: Normal heart sounds.   Pulmonary:      Effort: Pulmonary effort is normal.      Breath sounds: Normal breath sounds.   Abdominal:      General: Bowel sounds are normal. There is no distension.      Palpations: Abdomen is soft.      Tenderness: There is no abdominal " tenderness.   Skin:     General: Skin is warm and dry.   Neurological:      Mental Status: She is alert.                 ASSESSMENT AND PLAN    Assessment & Plan     Diagnoses and all orders for this visit:    1. Hx of colonic polyp (Primary)  -     Case Request; Standing  -     Case Request    2. Chronic constipation    Other orders  -     Implement Anesthesia Orders Day of Procedure; Standing  -     Obtain Informed Consent; Standing          Plan for colonoscopy. She will hold xarelto 2 days prior to procedure. Request records from Dr. Nogueira office.     In regards to constipation, recommend increase water intake/daily fiber supplement/exercise.  I recommend continue miralax daily.       The patient was advised on the risks of stopping blood thinners for a procedure.  The risks discussed included the risk of developing myocardial infarction, CVA, embolus, clogging of the arteries or stents, etc.  We discussed the potential consequences of the risks discussed.  The benefits of stopping as well as the alternatives were discussed as well. She has already been approved to hold xarelto 2 days prior to procedure by cardiology/ Lakia MEEHAN               COLONOSCOPY WITH ANESTHESIA (N/A)  All risks, benefits, alternatives, and indications of colonoscopy procedure have been discussed with the patient. Risks to include perforation of the colon requiring possible surgery or colostomy, risk of bleeding from biopsies or removal of colon tissue, possibility of missing a colon polyp or cancer, or adverse drug reaction.  Benefits to include the diagnosis and management of disease of the colon and rectum. Alternatives to include barium enema, radiographic evaluation, lab testing or no intervention. Pt verbalizes understanding and agrees.            No follow-ups on file.        There are no Patient Instructions on file for this visit.      KAREN Nunez

## 2024-03-28 PROBLEM — Z86.010 HX OF COLONIC POLYP: Status: ACTIVE | Noted: 2024-03-27

## 2024-03-28 PROBLEM — Z86.0100 HX OF COLONIC POLYP: Status: ACTIVE | Noted: 2024-03-27

## 2024-05-03 ENCOUNTER — TELEPHONE (OUTPATIENT)
Dept: GASTROENTEROLOGY | Facility: CLINIC | Age: 74
End: 2024-05-03

## 2024-05-03 NOTE — TELEPHONE ENCOUNTER
PT called needing to reschedule her endo/colon.   PT was scheduled for May 15.   She is rescheduled for 5-23-24.

## 2024-05-23 ENCOUNTER — TELEPHONE (OUTPATIENT)
Dept: GASTROENTEROLOGY | Facility: CLINIC | Age: 74
End: 2024-05-23
Payer: MEDICARE

## 2024-05-23 ENCOUNTER — ANESTHESIA (OUTPATIENT)
Dept: GASTROENTEROLOGY | Facility: HOSPITAL | Age: 74
End: 2024-05-23
Payer: MEDICARE

## 2024-05-23 ENCOUNTER — ANESTHESIA EVENT (OUTPATIENT)
Dept: GASTROENTEROLOGY | Facility: HOSPITAL | Age: 74
End: 2024-05-23
Payer: MEDICARE

## 2024-05-23 ENCOUNTER — HOSPITAL ENCOUNTER (OUTPATIENT)
Facility: HOSPITAL | Age: 74
Setting detail: HOSPITAL OUTPATIENT SURGERY
Discharge: HOME | End: 2024-05-23
Attending: INTERNAL MEDICINE | Admitting: INTERNAL MEDICINE
Payer: MEDICARE

## 2024-05-23 VITALS
SYSTOLIC BLOOD PRESSURE: 123 MMHG | DIASTOLIC BLOOD PRESSURE: 71 MMHG | HEART RATE: 81 BPM | TEMPERATURE: 96.8 F | BODY MASS INDEX: 33.12 KG/M2 | OXYGEN SATURATION: 100 % | WEIGHT: 194 LBS | RESPIRATION RATE: 17 BRPM | HEIGHT: 64 IN

## 2024-05-23 DIAGNOSIS — K57.92 DIVERTICULITIS: Primary | ICD-10-CM

## 2024-05-23 DIAGNOSIS — Z86.010 HX OF COLONIC POLYP: ICD-10-CM

## 2024-05-23 PROCEDURE — 25010000002 PROPOFOL 10 MG/ML EMULSION: Performed by: NURSE ANESTHETIST, CERTIFIED REGISTERED

## 2024-05-23 PROCEDURE — 45385 COLONOSCOPY W/LESION REMOVAL: CPT | Performed by: INTERNAL MEDICINE

## 2024-05-23 PROCEDURE — 88305 TISSUE EXAM BY PATHOLOGIST: CPT | Performed by: INTERNAL MEDICINE

## 2024-05-23 PROCEDURE — 25810000003 SODIUM CHLORIDE 0.9 % SOLUTION: Performed by: ANESTHESIOLOGY

## 2024-05-23 RX ORDER — LIDOCAINE HYDROCHLORIDE 20 MG/ML
INJECTION, SOLUTION EPIDURAL; INFILTRATION; INTRACAUDAL; PERINEURAL AS NEEDED
Status: DISCONTINUED | OUTPATIENT
Start: 2024-05-23 | End: 2024-05-23 | Stop reason: SURG

## 2024-05-23 RX ORDER — PROPOFOL 10 MG/ML
VIAL (ML) INTRAVENOUS AS NEEDED
Status: DISCONTINUED | OUTPATIENT
Start: 2024-05-23 | End: 2024-05-23 | Stop reason: SURG

## 2024-05-23 RX ORDER — SODIUM CHLORIDE 0.9 % (FLUSH) 0.9 %
10 SYRINGE (ML) INJECTION AS NEEDED
Status: CANCELLED | OUTPATIENT
Start: 2024-05-23

## 2024-05-23 RX ORDER — SODIUM CHLORIDE 0.9 % (FLUSH) 0.9 %
10 SYRINGE (ML) INJECTION AS NEEDED
Status: DISCONTINUED | OUTPATIENT
Start: 2024-05-23 | End: 2024-05-23 | Stop reason: HOSPADM

## 2024-05-23 RX ORDER — SODIUM CHLORIDE 9 MG/ML
500 INJECTION, SOLUTION INTRAVENOUS CONTINUOUS PRN
Status: DISCONTINUED | OUTPATIENT
Start: 2024-05-23 | End: 2024-05-23 | Stop reason: HOSPADM

## 2024-05-23 RX ORDER — ASPIRIN 81 MG/1
81 TABLET, CHEWABLE ORAL ONCE
Status: COMPLETED | OUTPATIENT
Start: 2024-05-23 | End: 2024-05-23

## 2024-05-23 RX ORDER — LIDOCAINE HYDROCHLORIDE 10 MG/ML
0.5 INJECTION, SOLUTION EPIDURAL; INFILTRATION; INTRACAUDAL; PERINEURAL ONCE AS NEEDED
Status: DISCONTINUED | OUTPATIENT
Start: 2024-05-23 | End: 2024-05-23 | Stop reason: HOSPADM

## 2024-05-23 RX ORDER — SODIUM CHLORIDE 9 MG/ML
40 INJECTION, SOLUTION INTRAVENOUS AS NEEDED
Status: CANCELLED | OUTPATIENT
Start: 2024-05-23

## 2024-05-23 RX ORDER — ONDANSETRON 2 MG/ML
4 INJECTION INTRAMUSCULAR; INTRAVENOUS ONCE AS NEEDED
Status: DISCONTINUED | OUTPATIENT
Start: 2024-05-23 | End: 2024-05-23 | Stop reason: HOSPADM

## 2024-05-23 RX ORDER — SODIUM CHLORIDE 9 MG/ML
100 INJECTION, SOLUTION INTRAVENOUS CONTINUOUS
Status: CANCELLED | OUTPATIENT
Start: 2024-05-23

## 2024-05-23 RX ORDER — SODIUM CHLORIDE 0.9 % (FLUSH) 0.9 %
10 SYRINGE (ML) INJECTION EVERY 12 HOURS SCHEDULED
Status: CANCELLED | OUTPATIENT
Start: 2024-05-23

## 2024-05-23 RX ADMIN — PROPOFOL 70 MG: 10 INJECTION, EMULSION INTRAVENOUS at 10:26

## 2024-05-23 RX ADMIN — PROPOFOL 30 MG: 10 INJECTION, EMULSION INTRAVENOUS at 10:53

## 2024-05-23 RX ADMIN — PROPOFOL 30 MG: 10 INJECTION, EMULSION INTRAVENOUS at 10:29

## 2024-05-23 RX ADMIN — PROPOFOL 30 MG: 10 INJECTION, EMULSION INTRAVENOUS at 10:49

## 2024-05-23 RX ADMIN — PROPOFOL 30 MG: 10 INJECTION, EMULSION INTRAVENOUS at 10:41

## 2024-05-23 RX ADMIN — PROPOFOL 30 MG: 10 INJECTION, EMULSION INTRAVENOUS at 10:51

## 2024-05-23 RX ADMIN — LIDOCAINE HYDROCHLORIDE 80 MG: 20 INJECTION, SOLUTION EPIDURAL; INFILTRATION; INTRACAUDAL; PERINEURAL at 10:26

## 2024-05-23 RX ADMIN — SODIUM CHLORIDE 500 ML: 9 INJECTION, SOLUTION INTRAVENOUS at 09:13

## 2024-05-23 RX ADMIN — PROPOFOL 30 MG: 10 INJECTION, EMULSION INTRAVENOUS at 10:39

## 2024-05-23 RX ADMIN — PROPOFOL 30 MG: 10 INJECTION, EMULSION INTRAVENOUS at 10:28

## 2024-05-23 RX ADMIN — PROPOFOL 30 MG: 10 INJECTION, EMULSION INTRAVENOUS at 10:46

## 2024-05-23 RX ADMIN — PROPOFOL 30 MG: 10 INJECTION, EMULSION INTRAVENOUS at 10:36

## 2024-05-23 RX ADMIN — PROPOFOL 40 MG: 10 INJECTION, EMULSION INTRAVENOUS at 10:27

## 2024-05-23 RX ADMIN — PROPOFOL 30 MG: 10 INJECTION, EMULSION INTRAVENOUS at 10:56

## 2024-05-23 RX ADMIN — PROPOFOL 30 MG: 10 INJECTION, EMULSION INTRAVENOUS at 10:43

## 2024-05-23 RX ADMIN — PROPOFOL 30 MG: 10 INJECTION, EMULSION INTRAVENOUS at 10:31

## 2024-05-23 RX ADMIN — ASPIRIN 81 MG: 81 TABLET, CHEWABLE ORAL at 09:27

## 2024-05-23 RX ADMIN — PROPOFOL 30 MG: 10 INJECTION, EMULSION INTRAVENOUS at 10:34

## 2024-05-23 NOTE — ANESTHESIA POSTPROCEDURE EVALUATION
Patient: Christiana Hendrix    Procedure Summary       Date: 05/23/24 Room / Location: [facility] ENDOSCOPY 5 / [facility]    Anesthesia Start: 1023 Anesthesia Stop: 1102    Procedure: COLONOSCOPY WITH ANESTHESIA Diagnosis:       Hx of colonic polyp      (Hx of colonic polyp [Z86.010])    Surgeons: Thiago Mensah MD Provider: Jayy Magallon CRNA    Anesthesia Type: MAC ASA Status: 3            Anesthesia Type: MAC    Vitals  Vitals Value Taken Time   /71 05/23/24 1116   Temp     Pulse 79 05/23/24 1120   Resp 17 05/23/24 1115   SpO2 98 % 05/23/24 1120   Vitals shown include unfiled device data.        Post Anesthesia Care and Evaluation    Patient location during evaluation: PHASE II  Patient participation: complete - patient participated  Level of consciousness: awake  Pain score: 0  Pain management: adequate    Airway patency: patent  Anesthetic complications: No anesthetic complications  PONV Status: none  Cardiovascular status: acceptable  Respiratory status: acceptable  Hydration status: acceptable

## 2024-05-23 NOTE — ANESTHESIA PREPROCEDURE EVALUATION
Anesthesia Evaluation     Patient summary reviewed   no history of anesthetic complications:   NPO Solid Status: > 8 hours  NPO Liquid Status: > 8 hours           Airway   Mallampati: II  TM distance: >3 FB  Neck ROM: full  No difficulty expected  Dental - normal exam     Pulmonary    (+) asthma,  (-) not a smoker  Cardiovascular   Exercise tolerance: good (4-7 METS)    (+) hypertension, past MI , CAD, cardiac stents , dysrhythmias Paroxysmal Atrial Fib, hyperlipidemia      Neuro/Psych  (-) seizures, TIA, CVA  GI/Hepatic/Renal/Endo    (+) obesity, renal disease- stones  (-) liver disease, diabetes    Musculoskeletal     Abdominal    Substance History      OB/GYN          Other                    Anesthesia Plan    ASA 3     MAC     (Chewable ASA )  intravenous induction     Anesthetic plan, risks, benefits, and alternatives have been provided, discussed and informed consent has been obtained with: patient.    CODE STATUS:

## 2024-05-23 NOTE — H&P
[facility] Gastroenterology  Pre Procedure History & Physical    Chief Complaint:   Colon polyps    Subjective     HPI:   The patient has a history of colon polyps who presents for exam.    Past Medical History:   Past Medical History:   Diagnosis Date    Asthma     Atrial fibrillation     CAD in native artery     Hyperlipidemia     Hypertension     IBS (irritable bowel syndrome)     Kidney stone 05/30/2023    Lateral meniscus tear     right    MI, old        Past Surgical History:  Past Surgical History:   Procedure Laterality Date    ABLATION OF DYSRHYTHMIC FOCUS      BLADDER NECK SUSPENSION      BREAST BIOPSY Bilateral     2002    CARDIOVERSION      CATARACT EXTRACTION WITH INTRAOCULAR LENS IMPLANT Bilateral     CHOLECYSTECTOMY      COLONOSCOPY W/ BIOPSIES      CORONARY ANGIOPLASTY  08/13/2007    had stents 2003    CORONARY STENT PLACEMENT  2002    EXTRACORPOREAL SHOCK WAVE LITHOTRIPSY (ESWL) Right 06/05/2023    Procedure: EXTRACORPOREAL SHOCKWAVE LITHOTRIPSY-right;  Surgeon: Jamar Hendrickson MD;  Location: [facility] OR;  Service: Urology;  Laterality: Right;    HYSTERECTOMY      KNEE ARTHROSCOPY Right 11/02/2023    Procedure: RIGHT KNEE ARTHROSCOPIC LATERAL MENISCUS ROOT REPAIR, PARTIAL MEDIAL MENISECTOMY;  Surgeon: Raymond Puentes MD;  Location:  [facility] OR;  Service: Orthopedics;  Laterality: Right;    VEIN SURGERY  1988       Family History:  Family History   Problem Relation Age of Onset    Dementia Mother     Heart attack Sister     Heart disease Sister     Diverticulitis Sister     Asthma Sister     Brain cancer Father     Heart disease Maternal Grandmother     Heart attack Maternal Grandmother     Stroke Maternal Grandfather     No Known Problems Paternal Grandmother     Brain cancer Paternal Grandfather     Breast cancer Sister     Breast cancer Paternal Aunt     Ovarian cancer Neg Hx     Uterine cancer Neg Hx     Colon cancer Neg Hx        Social History:   reports that she has never smoked. She has  never used smokeless tobacco. She reports that she does not drink alcohol and does not use drugs.    Medications:   Prior to Admission medications    Medication Sig Start Date End Date Taking? Authorizing Provider   amLODIPine (NORVASC) 10 MG tablet Take 1 tablet by mouth Daily.   Yes Emmett Singh MD   atorvastatin (LIPITOR) 80 MG tablet Take 1 tablet by mouth Every Night.   Yes Emmett Singh MD   Cholecalciferol (VITAMIN D3) 125 MCG (5000 UT) capsule capsule Take 1 capsule by mouth 1 (One) Time Per Week. sundays   Yes Emmett Singh MD   coenzyme Q10 100 MG capsule Take 2 capsules by mouth Daily.   Yes Emmett Singh MD   ezetimibe (ZETIA) 10 MG tablet Take 1 tablet by mouth Daily.   Yes Emmett Singh MD   folic acid (FOLVITE) 800 MCG tablet Take 1 tablet by mouth Daily.   Yes Emmett Singh MD   hydrochlorothiazide (HYDRODIURIL) 25 MG tablet Take 1 tablet by mouth Daily.   Yes Emmett Singh MD   metoprolol succinate XL (TOPROL-XL) 100 MG 24 hr tablet Take 1 tablet by mouth Every Night.   Yes Emmett Singh MD   montelukast (SINGULAIR) 10 MG tablet Take 1 tablet by mouth Every Night.   Yes Emmett Singh MD   Multiple Vitamins-Minerals (OCUVITE ADULT 50+ PO) Take  by mouth Daily.   Yes Emmett Singh MD   potassium chloride ER (K-TAB) 20 MEQ tablet controlled-release ER tablet Take 1 tablet by mouth Daily. 10/17/23  Yes Emmett Singh MD   Probiotic Product (PROBIOTIC BLEND PO) Take  by mouth Daily.   Yes Emmett Singh MD   vitamin A 99144 UNIT capsule Take 1 capsule by mouth Daily.   Yes Emmett Singh MD   fluocinolone acetonide (DERMOTIC) 0.01 % oil otic oil 4 drops 2 (Two) Times a Day. 9/8/23   Emmett Singh MD   Glucosamine-Chondroit-Vit C-Mn (GLUCOSAMINE 1500 COMPLEX PO) Take  by mouth 2 (Two) Times a Day.    Emmett Singh MD   Inclisiran Sodium (Leqvio) 284 MG/1.5ML solution prefilled syringe  "Inject 1 mL under the skin into the appropriate area as directed Every 6 (Six) Months. 4/6/23   Choco Diallo MD   levocetirizine (XYZAL) 5 MG tablet Take 1 tablet by mouth Every Night.    ProviderEmmett MD   nitroglycerin (NITROSTAT) 0.4 MG SL tablet Place 1 tablet under the tongue Every 5 (Five) Minutes As Needed for Chest Pain. Take no more than 3 doses in 15 minutes. 3/15/21   Ann Marie Traylor APRN   ondansetron (ZOFRAN) 4 MG tablet Take 1 tablet by mouth Every 8 (Eight) Hours As Needed for Nausea or Vomiting.  Patient not taking: Reported on 3/27/2024 11/2/23   Raymond Puentes MD   phenazopyridine (PYRIDIUM) 100 MG tablet Take 1 tablet by mouth 3 (Three) Times a Day As Needed for Bladder Spasms. 1/9/24   Frances Burrell APRN   Polyethylene Glycol 3350 (MIRALAX PO) Take  by mouth As Needed.    Provider, MD Emmett   rivaroxaban (Xarelto) 20 MG tablet Take 1 tablet by mouth Daily.  Patient taking differently: Take 1 tablet by mouth Every Night. 7/13/23   Choco Diallo MD   Symbicort 160-4.5 MCG/ACT inhaler INHALE 1 PUFF DAILY AS NEEDED  Patient not taking: Reported on 3/27/2024 12/18/23   ProviderEmmett MD       Allergies:  Eliquis [apixaban], Erythromycin, and Sulfa antibiotics    ROS:    General: Weight stable  Resp: No SOA  Cardiovascular: No CP    Objective     Blood pressure 153/80, pulse 90, temperature 96.8 °F (36 °C), temperature source Temporal, resp. rate 20, height 162.6 cm (64\"), weight 88 kg (194 lb), SpO2 96%, not currently breastfeeding.    Physical Exam   Constitutional: Pt is oriented to person, place, and in no distress.   Cardiovascular: Normal rate, regular rhythm.    Pulmonary/Chest: Effort normal. No respiratory distress.   Abdominal:  Non-distended.  Psychiatric: Mood, memory, affect and judgment appear normal.     Assessment & Plan     Diagnosis:  Colon polyps    Anticipated Surgical Procedure:  Colonoscopy    The risks, benefits, and alternatives of this procedure " have been discussed with the patient or the responsible party- the patient understands and agrees to proceed.      EMR Dragon/transcription disclaimer:  Much of this encounter note is electronic transcription/translation of spoken language to printed text.  The electronic translation of spoken language may be erroneous, or at times, nonsensical words or phrases may be inadvertently transcribed.  Although I have reviewed the note for such errors, some may still exist.

## 2024-05-23 NOTE — TELEPHONE ENCOUNTER
I did speak to her at length about the large diverticulum pocket I noticed on today's colonoscopy exam.  We talked about diverticular disease in general.  She informing that she has episodes of diverticulitis approximately every 2 to 3 months.  We talked about the surgical option.  She wishes to discuss the options further with the surgeon.    Brenda, will you please make arrangements for her to see Dr. Jovanna Curtis with the surgical group for evaluation of chronic diverticular disease and recurrent diverticulitis.  Please forward a copy of my recent colonoscopy report including the addendum on that report.  Let the patient know when her appointment is as well as me.

## 2024-05-23 NOTE — TELEPHONE ENCOUNTER
Of note, she was instructed to contact the office if she has not heard anything about her appointment with general surgery by the beginning of next week.  She agreed as did her family

## 2024-05-28 ENCOUNTER — TELEPHONE (OUTPATIENT)
Dept: GASTROENTEROLOGY | Facility: CLINIC | Age: 74
End: 2024-05-28
Payer: MEDICARE

## 2024-05-28 NOTE — TELEPHONE ENCOUNTER
Pt states Dr Mensah referred her to a surgeon (Jovanna Curtis) after her procedure however Dr Curtis can't see her for 3 weeks so she states Dr Mensah had metioned a male surgeon and she wants to know if she can get in with him sooner because she is suppose to go to [place] the first of August.

## 2024-05-29 ENCOUNTER — TELEPHONE (OUTPATIENT)
Dept: SURGERY | Facility: CLINIC | Age: 74
End: 2024-05-29

## 2024-05-29 NOTE — TELEPHONE ENCOUNTER
Caller: Christiana Hendrix    Relationship: Self    Best call back number: [phone]     What is the best time to reach you: ANYTIME    Who are you requesting to speak with (clinical staff, provider,  specific staff member):  STAFF    Do you know the name of the person who called:     What was the call regarding: PT IS STARTING TO HURT WORSE AND IS HOPING FOR A SOONER APPT W/ DR VEGA IF POSSIBLE, PLEASE. DID ADD APPT TO WAITLIST - JUST CONTACT IF ANYTHING OPENS SOONER    Is it okay if the provider responds through [employer]: NO, PLEASE CALL - OKAY TO LEAVE DETAILED VM

## 2024-06-12 ENCOUNTER — OFFICE VISIT (OUTPATIENT)
Dept: SURGERY | Facility: CLINIC | Age: 74
End: 2024-06-12
Payer: MEDICARE

## 2024-06-12 VITALS
OXYGEN SATURATION: 96 % | SYSTOLIC BLOOD PRESSURE: 139 MMHG | HEIGHT: 64 IN | WEIGHT: 200 LBS | BODY MASS INDEX: 34.15 KG/M2 | HEART RATE: 90 BPM | DIASTOLIC BLOOD PRESSURE: 71 MMHG

## 2024-06-12 DIAGNOSIS — R70.1 ABNORMAL PLASMA VISCOSITY: ICD-10-CM

## 2024-06-12 DIAGNOSIS — K57.32 DIVERTICULITIS OF LARGE INTESTINE WITHOUT PERFORATION OR ABSCESS WITHOUT BLEEDING: Primary | ICD-10-CM

## 2024-06-12 DIAGNOSIS — Z79.01 CHRONIC ANTICOAGULATION: ICD-10-CM

## 2024-06-12 DIAGNOSIS — E66.09 CLASS 1 OBESITY DUE TO EXCESS CALORIES WITH BODY MASS INDEX (BMI) OF 34.0 TO 34.9 IN ADULT, UNSPECIFIED WHETHER SERIOUS COMORBIDITY PRESENT: ICD-10-CM

## 2024-06-12 RX ORDER — SODIUM CHLORIDE 0.9 % (FLUSH) 0.9 %
10 SYRINGE (ML) INJECTION EVERY 12 HOURS SCHEDULED
OUTPATIENT
Start: 2024-06-12

## 2024-06-12 RX ORDER — SODIUM CHLORIDE 0.9 % (FLUSH) 0.9 %
10 SYRINGE (ML) INJECTION AS NEEDED
OUTPATIENT
Start: 2024-06-12

## 2024-06-12 RX ORDER — POLYETHYLENE GLYCOL 3350 17 G/17G
116 POWDER, FOR SOLUTION ORAL ONCE
Qty: 116 G | Refills: 0 | Status: SHIPPED | OUTPATIENT
Start: 2024-06-12 | End: 2024-06-12

## 2024-06-12 RX ORDER — METRONIDAZOLE 500 MG/1
500 TABLET ORAL SEE ADMIN INSTRUCTIONS
Qty: 4 TABLET | Refills: 0 | Status: SHIPPED | OUTPATIENT
Start: 2024-06-12 | End: 2024-06-13

## 2024-06-12 RX ORDER — HEPARIN SODIUM 5000 [USP'U]/ML
5000 INJECTION, SOLUTION INTRAVENOUS; SUBCUTANEOUS EVERY 8 HOURS SCHEDULED
OUTPATIENT
Start: 2024-06-12

## 2024-06-12 RX ORDER — AMOXICILLIN AND CLAVULANATE POTASSIUM 875; 125 MG/1; MG/1
1 TABLET, FILM COATED ORAL 2 TIMES DAILY
Qty: 14 TABLET | Refills: 0 | Status: SHIPPED | OUTPATIENT
Start: 2024-06-12 | End: 2024-06-19

## 2024-06-12 RX ORDER — SODIUM CHLORIDE 9 MG/ML
40 INJECTION, SOLUTION INTRAVENOUS AS NEEDED
OUTPATIENT
Start: 2024-06-12

## 2024-06-12 RX ORDER — METRONIDAZOLE 500 MG/100ML
500 INJECTION, SOLUTION INTRAVENOUS ONCE
OUTPATIENT
Start: 2024-06-12 | End: 2024-06-12

## 2024-06-12 RX ORDER — DOCUSATE SODIUM 100 MG/1
400 CAPSULE, LIQUID FILLED ORAL ONCE
Qty: 4 CAPSULE | Refills: 0 | Status: SHIPPED | OUTPATIENT
Start: 2024-06-12 | End: 2024-06-12

## 2024-06-12 NOTE — PROGRESS NOTES
Office New Patient History and Physical:     Referring Provider: Thiago Mensah MD    Chief Complaint   Patient presents with    Follow-up     Patient here for evaluation of diverticulitis        Subjective .     History of present illness:  Christiana Hendrix is a 74 y.o. female who presents to discuss her diverticulitis. She has had 5 episodes of diverticulitis in the past. She has never been hospitalized. She reports intermittent constipation with severe pain when constipated. The pain is in her left lower quadrant. No bleeding per rectum. No family history of colon cancer.     BMI is 34. PSH includes cholecystectomy, hysterectomy and bladder suspension. Non-smoker. She is on Xarelto for a fib.     History  Past Medical History:   Diagnosis Date    Asthma     Atrial fibrillation     CAD in native artery     Hyperlipidemia     Hypertension     IBS (irritable bowel syndrome)     Kidney stone 05/30/2023    Lateral meniscus tear     right    MI, old    ,   Past Surgical History:   Procedure Laterality Date    ABLATION OF DYSRHYTHMIC FOCUS      BLADDER NECK SUSPENSION      BREAST BIOPSY Bilateral     2002    CARDIOVERSION      CATARACT EXTRACTION WITH INTRAOCULAR LENS IMPLANT Bilateral     CHOLECYSTECTOMY      COLONOSCOPY N/A 5/23/2024    Procedure: COLONOSCOPY WITH ANESTHESIA;  Surgeon: Thiago Mensah MD;  Location: [facility] ENDOSCOPY;  Service: Gastroenterology;  Laterality: N/A;  pre: hx polyps  post: polyps. diverticulosis.   mounika schaeffer    COLONOSCOPY W/ BIOPSIES      CORONARY ANGIOPLASTY  08/13/2007    had stents 2003    CORONARY STENT PLACEMENT  2002    EXTRACORPOREAL SHOCK WAVE LITHOTRIPSY (ESWL) Right 06/05/2023    Procedure: EXTRACORPOREAL SHOCKWAVE LITHOTRIPSY-right;  Surgeon: Jamar Hendrickson MD;  Location: [facility] OR;  Service: Urology;  Laterality: Right;    HYSTERECTOMY      KNEE ARTHROSCOPY Right 11/02/2023    Procedure: RIGHT KNEE ARTHROSCOPIC LATERAL MENISCUS ROOT REPAIR, PARTIAL MEDIAL  MENISECTOMY;  Surgeon: Raymond Puentes MD;  Location:  [facility] OR;  Service: Orthopedics;  Laterality: Right;    VEIN SURGERY  1988   ,   Family History   Problem Relation Age of Onset    Dementia Mother     Heart attack Sister     Heart disease Sister     Diverticulitis Sister     Asthma Sister     Brain cancer Father     Heart disease Maternal Grandmother     Heart attack Maternal Grandmother     Stroke Maternal Grandfather     No Known Problems Paternal Grandmother     Brain cancer Paternal Grandfather     Breast cancer Sister     Breast cancer Paternal Aunt     Ovarian cancer Neg Hx     Uterine cancer Neg Hx     Colon cancer Neg Hx    ,   Social History     Tobacco Use    Smoking status: Never    Smokeless tobacco: Never   Vaping Use    Vaping status: Never Used   Substance Use Topics    Alcohol use: Never     Alcohol/week: 3.0 standard drinks of alcohol     Types: 2 Glasses of wine, 1 Shots of liquor per week    Drug use: Never   , (Not in a hospital admission)   and Allergies:  Eliquis [apixaban], Erythromycin, and Sulfa antibiotics    Current Outpatient Medications:     amLODIPine (NORVASC) 10 MG tablet, Take 1 tablet by mouth Daily., Disp: , Rfl:     atorvastatin (LIPITOR) 80 MG tablet, Take 1 tablet by mouth Every Night., Disp: , Rfl:     Cholecalciferol (VITAMIN D3) 125 MCG (5000 UT) capsule capsule, Take 1 capsule by mouth 1 (One) Time Per Week. sundays, Disp: , Rfl:     coenzyme Q10 100 MG capsule, Take 2 capsules by mouth Daily., Disp: , Rfl:     ezetimibe (ZETIA) 10 MG tablet, Take 1 tablet by mouth Daily., Disp: , Rfl:     fluocinolone acetonide (DERMOTIC) 0.01 % oil otic oil, 4 drops 2 (Two) Times a Day., Disp: , Rfl:     folic acid (FOLVITE) 800 MCG tablet, Take 1 tablet by mouth Daily., Disp: , Rfl:     Glucosamine-Chondroit-Vit C-Mn (GLUCOSAMINE 1500 COMPLEX PO), Take  by mouth 2 (Two) Times a Day., Disp: , Rfl:     hydrochlorothiazide (HYDRODIURIL) 25 MG tablet, Take 1 tablet by mouth Daily., Disp: ,  Rfl:     Inclisiran Sodium (Leqvio) 284 MG/1.5ML solution prefilled syringe, Inject 1 mL under the skin into the appropriate area as directed Every 6 (Six) Months., Disp: 1 mL, Rfl: 3    levocetirizine (XYZAL) 5 MG tablet, Take 1 tablet by mouth Every Night., Disp: , Rfl:     metoprolol succinate XL (TOPROL-XL) 100 MG 24 hr tablet, Take 1 tablet by mouth Every Night., Disp: , Rfl:     montelukast (SINGULAIR) 10 MG tablet, Take 1 tablet by mouth Every Night., Disp: , Rfl:     Multiple Vitamins-Minerals (OCUVITE ADULT 50+ PO), Take  by mouth Daily., Disp: , Rfl:     nitroglycerin (NITROSTAT) 0.4 MG SL tablet, Place 1 tablet under the tongue Every 5 (Five) Minutes As Needed for Chest Pain. Take no more than 3 doses in 15 minutes., Disp: 30 tablet, Rfl: 5    ondansetron (ZOFRAN) 4 MG tablet, Take 1 tablet by mouth Every 8 (Eight) Hours As Needed for Nausea or Vomiting., Disp: 20 tablet, Rfl: 0    phenazopyridine (PYRIDIUM) 100 MG tablet, Take 1 tablet by mouth 3 (Three) Times a Day As Needed for Bladder Spasms., Disp: 20 tablet, Rfl: 0    Polyethylene Glycol 3350 (MIRALAX PO), Take  by mouth As Needed., Disp: , Rfl:     potassium chloride ER (K-TAB) 20 MEQ tablet controlled-release ER tablet, Take 1 tablet by mouth Daily., Disp: , Rfl:     Probiotic Product (PROBIOTIC BLEND PO), Take  by mouth Daily., Disp: , Rfl:     rivaroxaban (Xarelto) 20 MG tablet, Take 1 tablet by mouth Daily. (Patient taking differently: Take 1 tablet by mouth Every Night.), Disp: 90 tablet, Rfl: 3    Symbicort 160-4.5 MCG/ACT inhaler, , Disp: , Rfl:     vitamin A 76948 UNIT capsule, Take 1 capsule by mouth Daily., Disp: , Rfl:     amoxicillin-clavulanate (AUGMENTIN) 875-125 MG per tablet, Take 1 tablet by mouth 2 (Two) Times a Day for 7 days., Disp: 14 tablet, Rfl: 0    metroNIDAZOLE (Flagyl) 500 MG tablet, Take 1 tablet by mouth See Admin Instructions for 1 day. Take two (2) tablets at 7 PM and two (2) tablets at 11 PM the night prior to  "surgery., Disp: 4 tablet, Rfl: 0    Review of Systems    Review of Systems - General ROS: negative  ENT ROS: negative  Respiratory ROS: no cough, shortness of breath, or wheezing  Cardiovascular ROS: no chest pain or dyspnea on exertion  Gastrointestinal ROS: positive for - abdominal pain, appetite loss, constipation, and nausea/vomiting  Genito-Urinary ROS: no dysuria, trouble voiding, or hematuria  Dermatological ROS: negative   Breast ROS: negative for breast lumps  Hematological and Lymphatic ROS: negative  Musculoskeletal ROS: negative   Neurological ROS: no TIA or stroke symptoms    Psychological ROS: negative  Endocrine ROS: negative      Objective     Vital Signs   /71 (BP Location: Right arm, Patient Position: Sitting, Cuff Size: Large Adult)   Pulse 90   Ht 162.6 cm (64.02\")   Wt 90.7 kg (200 lb)   SpO2 96%   BMI 34.31 kg/m²      Physical Exam:  General appearance - alert, well appearing, and in no distress  Mental status - alert, oriented to person, place, and time  Eyes - pupils equal and reactive, extraocular eye movements intact  Neck - supple, no significant adenopathy  Chest - no tachypnea, retractions or cyanosis  Heart - normal rate and regular rhythm  Abdomen - soft, nontender, nondistended, no masses or organomegaly  Neurological - alert, oriented, normal speech, no focal findings or movement disorder noted    Physical Exam  Abdominal:               Results Review:    The following data was reviewed by: Jovanna Curtis MD on 06/12/2024:     Colonoscopy (05/23/2024 10:15)   Multiple small and large-mouthed diverticula were found in the sigmoid colon. There is 1 large diverticulum pouch about 3 cm in size estimated. It had Adherent mucus with an erythematous base. There is no purulent discharge. This was located 15 cm  CT Abdomen Pelvis With & Without Contrast (02/28/2024 07:49)     Assessment & Plan       Diagnoses and all orders for this visit:    1. Diverticulitis of large " intestine without perforation or abscess without bleeding (Primary)  -     docusate sodium (Colace) 100 MG capsule; Take 4 capsules by mouth 1 (One) Time for 1 dose. Take all 4 tabs at 1pm the day before surgery.  Dispense: 4 capsule; Refill: 0  -     polyethylene glycol (MIRALAX) 17 GM/SCOOP powder; Take 116 g by mouth 1 (One) Time for 1 dose. The day before surgery at 3pm, mix the miralax in 32oz of sports drink. Shake until dissolved. Drink 8oz every 10-15min until gone.  Dispense: 116 g; Refill: 0  -     metroNIDAZOLE (Flagyl) 500 MG tablet; Take 1 tablet by mouth See Admin Instructions for 1 day. Take two (2) tablets at 7 PM and two (2) tablets at 11 PM the night prior to surgery.  Dispense: 4 tablet; Refill: 0  -     amoxicillin-clavulanate (AUGMENTIN) 875-125 MG per tablet; Take 1 tablet by mouth 2 (Two) Times a Day for 7 days.  Dispense: 14 tablet; Refill: 0  -     Case Request; Standing  -     XR Chest 1 View; Future  -     ECG 12 Lead; Future  -     sodium chloride 0.9 % flush 10 mL  -     sodium chloride 0.9 % flush 10 mL  -     sodium chloride 0.9 % infusion 40 mL  -     ceFAZolin (ANCEF) 2 g in sodium chloride 0.9 % 100 mL IVPB  -     metroNIDAZOLE (FLAGYL) IVPB 500 mg  -     heparin (porcine) 5000 UNIT/ML injection 5,000 Units  -     CBC & Differential; Future  -     Comprehensive Metabolic Panel; Future  -     CEA; Future  -     Case Request    2. Class 1 obesity due to excess calories with body mass index (BMI) of 34.0 to 34.9 in adult, unspecified whether serious comorbidity present    3. Abnormal plasma viscosity  -     CEA; Future    4. Chronic anticoagulation    Other orders  -     Inpatient Admission; Standing  -     Follow Anesthesia Guidelines / Protocol; Future  -     Follow Anesthesia Guidelines / Protocol; Standing  -     Verify / Perform Chlorhexidine Skin Prep; Standing  -     Obtain Informed Consent; Future  -     Provide NPO Instructions to Patient; Future  -     Chlorhexidine Skin  Prep; Future  -     Notify Physician - Standard; Standing  -     Instructions on Coughing, Deep Breathing & Incentive Spirometry; Standing  -     Insert Peripheral IV x2; Standing  -     Saline Lock & Maintain IV Access; Standing  -     Place Sequential Compression Device; Standing  -     Maintain Sequential Compression Device; Standing         Christiana Hendrix is a 74 y.o. female with chronic recurrent diverticulitis.  At this point, she has failed medical management. I have recommended laparoscopic robotic-assisted sigmoid colectomy with primary anastomosis, flexible sigmoidoscopy and possible splenic flexure mobilization with possible diverting loop ileostomy if positive leak test. We had a long discussion on the risks and benefits of surgery including (1) bleeding (2) infection including wound infection, abscess, and most significantly, leak. The patient understands that a leak could mean antibiotics and bowel rest or that a leak could necessitate a take back to the OR with ostomy due to possible sepsis. The patient understands that the risk of leak is anywhere from 1-10%. We discussed the ways we decrease the risk of leak including bowel prep, no tension in the OR and ensuring adequate blood supply in the OR with ICG dye. (3) damage to the surrounding structures including ureters, bladder, small intestine, and nerves. (4) pulmonary complications (5) cardiac complications. We also discussed alternatives to include a change in diet with fiber supplementation; however the patient has tried this and it has not worked. The patient is scheduled for a laparoscopic robotic-assisted sigmoid colectomy with intra-operative flexible sigmoidoscopy and possible splenic flexure mobilization with possible diverting loop ileosotmy. I have ordered pre-op work up to include CBC, CMP, CXR and EKG. The patient is scheduled for surgery on  8/20/24. Hold anticoagulation x 48 hours preop .     This is a chronic problem with  progression. I have reviewed the CT and colonoscopy above. I have ordered CBC, CMP, CXR, and EKG. She is at increased risk of perioperative complications 2/2 her elevated BMI of 34 and chronic anticoagulation.     BMI is >= 30 and <35. (Class 1 Obesity). The following options were offered after discussion;: weight loss educational material (shared in after visit summary)      Jovanna Curtis MD  06/13/24  20:46 CDT

## 2024-06-14 NOTE — PATIENT INSTRUCTIONS

## 2024-06-17 ENCOUNTER — TELEPHONE (OUTPATIENT)
Dept: CARDIOLOGY | Facility: CLINIC | Age: 74
End: 2024-06-17
Payer: MEDICARE

## 2024-06-17 RX ORDER — INCLISIRAN 284 MG/1.5ML
1 INJECTION, SOLUTION SUBCUTANEOUS
Qty: 1 ML | Refills: 1
Start: 2024-06-17

## 2024-06-17 RX ORDER — NITROGLYCERIN 0.4 MG/1
0.4 TABLET SUBLINGUAL
Qty: 25 TABLET | Refills: 5 | Status: SHIPPED | OUTPATIENT
Start: 2024-06-17

## 2024-06-17 NOTE — TELEPHONE ENCOUNTER
Caller: Tori Hendrix    Relationship: Self    Best call back number: [phone]     What is the best time to reach you: ANYTIME    Who are you requesting to speak with (clinical staff, provider,  specific staff member): CLINICAL    Do you know the name of the person who called: TORI    What was the call regarding: PT IS GOING TO BE DUE FOR HER LEQVIO IN AUGUST AND NEEDS IT BEFORE THE 20TH OF AUGUST. SHE IS NEEDING TO GET THIS REFILLED AND SCHEDULED. SHE STATES THAT THEY [facility]THING [facility]. SHE ALSO STATES THAT SHE DOES NOT PICK IT UP AT THE PHARMACY THAT IT GOES TO THE [facility]    Is it okay if the provider responds through MyChart: YES

## 2024-06-17 NOTE — TELEPHONE ENCOUNTER
Caller: Christiana Hendrix    Relationship: Self    Best call back number: [phone]     Requested Prescriptions:   Requested Prescriptions     Pending Prescriptions Disp Refills    nitroglycerin (NITROSTAT) 0.4 MG SL tablet 30 tablet 5     Sig: Place 1 tablet under the tongue Every 5 (Five) Minutes As Needed for Chest Pain. Take no more than 3 doses in 15 minutes.        Pharmacy where request should be sent: [facility]Y #[id number] - [place], [place] - [address] [phone] [facility] [phone]      Last office visit with prescribing clinician: Visit date not found   Last telemedicine visit with prescribing clinician: Visit date not found   Next office visit with prescribing clinician: 2025     Additional details provided by patient: ONLY HAS  MEDICATION    Does the patient have less than a 3 day supply:  [] Yes  [x] No    Would you like a call back once the refill request has been completed: [] Yes [] No    If the office needs to give you a call back, can they leave a voicemail: [] Yes [] No    Verna Salomon Rep   24 09:49 CDT

## 2024-06-17 NOTE — TELEPHONE ENCOUNTER
I sent a new Rx for the Leqvio to Dr. Funk to sign and be put in the chart so that when its time [facility] it will be a new Rx signed by Dr. Funk instead of Dr. Diallo in the chart.  I spoke with William at the [facility] and she will call pt to make appt for her next shot.  Pt informed.  True Mota, CMA

## 2024-06-17 NOTE — TELEPHONE ENCOUNTER
Pt is due to have her next Leqvio in August and will need a new Rx so that the [facility] can update the Rx since Dr. Diallo is retired now.  Thanks.  True Mota, CMA

## 2024-07-10 NOTE — PROGRESS NOTES
Subjective    Ms. Hendrix is 74 y.o. female    Chief Complaint: Blood in urine    History of Present Illness    74-year-old female established patient in with complaint of return of blood in urine over the past week.  Known history of recurrent urinary tract infections as well as kidney stones.  Went for KUB today no ureteral stone visualized.  Stable appearing to right renal stones. Currently  asymptomatic.  Negative hematuria workup with CT urogram followed by cystoscopy Dr. Hendrickson 3/2024.  Remains on Xarelto daily due to cardiac stents.      right ESWL 2 large 10 and 12 mm right upper pole renal stones by Dr. Hendrickson June 2023.        The following portions of the patient's history were reviewed and updated as appropriate: allergies, current medications, past family history, past medical history, past social history, past surgical history and problem list.    Review of Systems   Constitutional:  Negative for chills and fever.   Gastrointestinal:  Negative for abdominal pain, anal bleeding, blood in stool, nausea and vomiting.   Genitourinary:  Positive for hematuria and urgency. Negative for dysuria.   Musculoskeletal:  Positive for back pain.         Current Outpatient Medications:     albuterol sulfate  (90 Base) MCG/ACT inhaler, INHALE 2 PUFFS EVERY 4 HOURS BY INHALATION ROUTE., Disp: , Rfl:     amLODIPine (NORVASC) 10 MG tablet, Take 1 tablet by mouth Daily., Disp: , Rfl:     atorvastatin (LIPITOR) 80 MG tablet, Take 1 tablet by mouth Every Night., Disp: , Rfl:     Cholecalciferol (VITAMIN D3) 125 MCG (5000 UT) capsule capsule, Take 1 capsule by mouth 1 (One) Time Per Week. sundays, Disp: , Rfl:     coenzyme Q10 100 MG capsule, Take 2 capsules by mouth Daily., Disp: , Rfl:     ezetimibe (ZETIA) 10 MG tablet, Take 1 tablet by mouth Daily., Disp: , Rfl:     fluocinolone acetonide (DERMOTIC) 0.01 % oil otic oil, 4 drops 2 (Two) Times a Day., Disp: , Rfl:     folic acid (FOLVITE) 800 MCG tablet, Take 1 tablet by  mouth Daily., Disp: , Rfl:     Glucosamine-Chondroit-Vit C-Mn (GLUCOSAMINE 1500 COMPLEX PO), Take  by mouth 2 (Two) Times a Day., Disp: , Rfl:     hydrochlorothiazide (HYDRODIURIL) 25 MG tablet, Take 1 tablet by mouth Daily., Disp: , Rfl:     Inclisiran Sodium (Leqvio) 284 MG/1.5ML solution prefilled syringe, Inject 1 mL under the skin into the appropriate area as directed Every 6 (Six) Months., Disp: 1 mL, Rfl: 1    levocetirizine (XYZAL) 5 MG tablet, Take 1 tablet by mouth Every Night., Disp: , Rfl:     metoprolol succinate XL (TOPROL-XL) 100 MG 24 hr tablet, Take 1 tablet by mouth Every Night., Disp: , Rfl:     montelukast (SINGULAIR) 10 MG tablet, Take 1 tablet by mouth Every Night., Disp: , Rfl:     Multiple Vitamins-Minerals (OCUVITE ADULT 50+ PO), Take  by mouth Daily., Disp: , Rfl:     nitroglycerin (NITROSTAT) 0.4 MG SL tablet, Place 1 tablet under the tongue Every 5 (Five) Minutes As Needed for Chest Pain. Take no more than 3 doses in 15 minutes., Disp: 25 tablet, Rfl: 5    phenazopyridine (PYRIDIUM) 100 MG tablet, Take 1 tablet by mouth 3 (Three) Times a Day As Needed for Bladder Spasms., Disp: 20 tablet, Rfl: 0    potassium chloride ER (K-TAB) 20 MEQ tablet controlled-release ER tablet, Take 1 tablet by mouth Daily., Disp: , Rfl:     Probiotic Product (PROBIOTIC BLEND PO), Take  by mouth Daily., Disp: , Rfl:     rivaroxaban (Xarelto) 20 MG tablet, Take 1 tablet by mouth Daily. (Patient taking differently: Take 1 tablet by mouth Every Night.), Disp: 90 tablet, Rfl: 3    Symbicort 160-4.5 MCG/ACT inhaler, , Disp: , Rfl:     vitamin A 99365 UNIT capsule, Take 1 capsule by mouth Daily., Disp: , Rfl:     cefdinir (OMNICEF) 300 MG capsule, Take 1 capsule by mouth 2 (Two) Times a Day., Disp: 20 capsule, Rfl: 0    linaclotide (Linzess) 72 MCG capsule capsule, Take 1 capsule by mouth Daily. (Patient not taking: Reported on 7/11/2024), Disp: , Rfl:     ondansetron (ZOFRAN) 4 MG tablet, Take 1 tablet by mouth Every  8 (Eight) Hours As Needed for Nausea or Vomiting. (Patient not taking: Reported on 7/11/2024), Disp: 20 tablet, Rfl: 0    Polyethylene Glycol 3350 (MIRALAX PO), Take  by mouth As Needed. (Patient not taking: Reported on 7/11/2024), Disp: , Rfl:     Past Medical History:   Diagnosis Date    Asthma     Atrial fibrillation     CAD in native artery     Hyperlipidemia     Hypertension     IBS (irritable bowel syndrome)     Kidney stone 05/30/2023    Lateral meniscus tear     right    MI, old        Past Surgical History:   Procedure Laterality Date    ABLATION OF DYSRHYTHMIC FOCUS      BLADDER NECK SUSPENSION      BREAST BIOPSY Bilateral     2002    CARDIOVERSION      CATARACT EXTRACTION WITH INTRAOCULAR LENS IMPLANT Bilateral     CHOLECYSTECTOMY      COLONOSCOPY N/A 5/23/2024    Procedure: COLONOSCOPY WITH ANESTHESIA;  Surgeon: Thiago Mensah MD;  Location: [facility] ENDOSCOPY;  Service: Gastroenterology;  Laterality: N/A;  pre: hx polyps  post: polyps. diverticulosis.   mounika schaeffer    COLONOSCOPY W/ BIOPSIES      CORONARY ANGIOPLASTY  08/13/2007    had stents 2003    CORONARY STENT PLACEMENT  2002    EXTRACORPOREAL SHOCK WAVE LITHOTRIPSY (ESWL) Right 06/05/2023    Procedure: EXTRACORPOREAL SHOCKWAVE LITHOTRIPSY-right;  Surgeon: Jamar Hendrickson MD;  Location: [facility] OR;  Service: Urology;  Laterality: Right;    HYSTERECTOMY      KNEE ARTHROSCOPY Right 11/02/2023    Procedure: RIGHT KNEE ARTHROSCOPIC LATERAL MENISCUS ROOT REPAIR, PARTIAL MEDIAL MENISECTOMY;  Surgeon: Raymond Puentes MD;  Location: [facility] OR;  Service: Orthopedics;  Laterality: Right;    VEIN SURGERY  1988       Social History     Socioeconomic History    Marital status: Single   Tobacco Use    Smoking status: Never    Smokeless tobacco: Never   Vaping Use    Vaping status: Never Used   Substance and Sexual Activity    Alcohol use: Never     Alcohol/week: 3.0 standard drinks of alcohol     Types: 2 Glasses of wine, 1 Shots of liquor per week     "Drug use: Never    Sexual activity: Not Currently     Partners: Male     Birth control/protection: None       Family History   Problem Relation Age of Onset    Dementia Mother     Heart attack Sister     Heart disease Sister     Diverticulitis Sister     Asthma Sister     Brain cancer Father     Heart disease Maternal Grandmother     Heart attack Maternal Grandmother     Stroke Maternal Grandfather     No Known Problems Paternal Grandmother     Brain cancer Paternal Grandfather     Breast cancer Sister     Breast cancer Paternal Aunt     Ovarian cancer Neg Hx     Uterine cancer Neg Hx     Colon cancer Neg Hx        Objective    Temp 97.5 °F (36.4 °C)   Ht 162.6 cm (64.02\")   Wt 90.7 kg (200 lb)   BMI 34.31 kg/m²     Physical Exam  Nursing note reviewed.   Constitutional:       General: She is not in acute distress.     Appearance: Normal appearance. She is not ill-appearing.   HENT:      Nose: No congestion.   Abdominal:      Tenderness: There is no right CVA tenderness or left CVA tenderness.      Hernia: No hernia is present.   Skin:     General: Skin is warm and dry.   Neurological:      Mental Status: She is alert and oriented to person, place, and time.   Psychiatric:         Mood and Affect: Mood normal.         Behavior: Behavior normal.             Results for orders placed or performed in visit on 07/11/24   POC Urinalysis Dipstick, Multipro    Specimen: Urine   Result Value Ref Range    Color Yellow Yellow, Straw, Dark Yellow, Beatriz    Clarity, UA Clear Clear    Glucose, UA Negative Negative mg/dL    Bilirubin Negative Negative    Ketones, UA Negative Negative    Specific Gravity  1.010 1.005 - 1.030    Blood, UA Large (A) Negative    pH, Urine 7.0 5.0 - 8.0    Protein, POC Negative Negative mg/dL    Urobilinogen, UA 0.2 E.U./dL Normal, 0.2 E.U./dL    Nitrite, UA Negative Negative    Leukocytes Trace (A) Negative   KUB independent review    A KUB is available for me to review today.  The image is " inspected for a bowel gas pattern and the general bone structure of the spine and pelvis. The kidneys are then inspected closely.  Renal outline is noted if identifiable. The kidney, collecting system, and anticipated path of the ureter are examined for calcifications including those in the true pelvis.  This film reveals:    On the right there are multiple renal stones. 2 stones stable.    On the left there are no calcificaitons seen in the kidney or the expected course of the ureter. .    Assessment and Plan    Diagnoses and all orders for this visit:    1. Gross hematuria (Primary)  -     XR Abdomen KUB; Future  -     POC Urinalysis Dipstick, Multipro  -     Urine Culture - Urine, Urine, Random Void  -     cefdinir (OMNICEF) 300 MG capsule; Take 1 capsule by mouth 2 (Two) Times a Day.  Dispense: 20 capsule; Refill: 0  -     XR Abdomen KUB; Future    2. Urinary tract infection without hematuria, site unspecified  -     phenazopyridine (PYRIDIUM) 100 MG tablet; Take 1 tablet by mouth 3 (Three) Times a Day As Needed for Bladder Spasms.  Dispense: 20 tablet; Refill: 0        Return of blood in urine over the past 3 to 4 days intermittently.  Remains asymptomatic.  Urinalysis today showing large blood and trace leukocytes.  Will go ahead and send urine for culture today to rule out infection.  Will also start on cefdinir as patient has recently had a negative full hematuria workup by Dr. Hendrickson March 2024.  Patient requesting refill of Pyridium to have on hand for bladder spasms.    KUB today no ureteral stones seen

## 2024-07-11 ENCOUNTER — HOSPITAL ENCOUNTER (OUTPATIENT)
Dept: GENERAL RADIOLOGY | Facility: HOSPITAL | Age: 74
Discharge: HOME | End: 2024-07-11
Payer: MEDICARE

## 2024-07-11 ENCOUNTER — OFFICE VISIT (OUTPATIENT)
Dept: UROLOGY | Facility: CLINIC | Age: 74
End: 2024-07-11
Payer: MEDICARE

## 2024-07-11 VITALS — WEIGHT: 200 LBS | TEMPERATURE: 97.5 F | BODY MASS INDEX: 34.15 KG/M2 | HEIGHT: 64 IN

## 2024-07-11 DIAGNOSIS — N39.0 URINARY TRACT INFECTION WITHOUT HEMATURIA, SITE UNSPECIFIED: ICD-10-CM

## 2024-07-11 DIAGNOSIS — R31.0 GROSS HEMATURIA: ICD-10-CM

## 2024-07-11 DIAGNOSIS — R31.0 GROSS HEMATURIA: Primary | ICD-10-CM

## 2024-07-11 LAB
BILIRUB BLD-MCNC: NEGATIVE MG/DL
CLARITY, POC: CLEAR
COLOR UR: YELLOW
GLUCOSE UR STRIP-MCNC: NEGATIVE MG/DL
KETONES UR QL: NEGATIVE
LEUKOCYTE EST, POC: ABNORMAL
NITRITE UR-MCNC: NEGATIVE MG/ML
PH UR: 7 [PH] (ref 5–8)
PROT UR STRIP-MCNC: NEGATIVE MG/DL
RBC # UR STRIP: ABNORMAL /UL
SP GR UR: 1.01 (ref 1–1.03)
UROBILINOGEN UR QL: ABNORMAL

## 2024-07-11 PROCEDURE — 74018 RADEX ABDOMEN 1 VIEW: CPT

## 2024-07-11 PROCEDURE — 87086 URINE CULTURE/COLONY COUNT: CPT

## 2024-07-11 PROCEDURE — 87186 SC STD MICRODIL/AGAR DIL: CPT

## 2024-07-11 PROCEDURE — 87077 CULTURE AEROBIC IDENTIFY: CPT

## 2024-07-11 RX ORDER — CEFDINIR 300 MG/1
300 CAPSULE ORAL 2 TIMES DAILY
Qty: 20 CAPSULE | Refills: 0 | Status: SHIPPED | OUTPATIENT
Start: 2024-07-11 | End: 2024-07-15

## 2024-07-11 RX ORDER — ALBUTEROL SULFATE 90 UG/1
AEROSOL, METERED RESPIRATORY (INHALATION)
COMMUNITY
Start: 2024-07-02

## 2024-07-11 RX ORDER — PHENAZOPYRIDINE HYDROCHLORIDE 100 MG/1
100 TABLET, FILM COATED ORAL 3 TIMES DAILY PRN
Qty: 20 TABLET | Refills: 0 | Status: SHIPPED | OUTPATIENT
Start: 2024-07-11

## 2024-07-13 LAB — BACTERIA SPEC AEROBE CULT: ABNORMAL

## 2024-07-15 DIAGNOSIS — N39.0 URINARY TRACT INFECTION WITHOUT HEMATURIA, SITE UNSPECIFIED: Primary | ICD-10-CM

## 2024-07-15 RX ORDER — AMOXICILLIN AND CLAVULANATE POTASSIUM 875; 125 MG/1; MG/1
1 TABLET, FILM COATED ORAL 2 TIMES DAILY
Qty: 10 TABLET | Refills: 0 | Status: SHIPPED | OUTPATIENT
Start: 2024-07-15

## 2024-08-09 ENCOUNTER — OFFICE VISIT (OUTPATIENT)
Dept: UROLOGY | Facility: CLINIC | Age: 74
End: 2024-08-09
Payer: MEDICARE

## 2024-08-09 VITALS — BODY MASS INDEX: 34.15 KG/M2 | HEIGHT: 64 IN | WEIGHT: 200 LBS | TEMPERATURE: 97.4 F

## 2024-08-09 DIAGNOSIS — N39.0 RECURRENT UTI: Primary | ICD-10-CM

## 2024-08-09 LAB
BILIRUB BLD-MCNC: NEGATIVE MG/DL
CLARITY, POC: CLEAR
COLOR UR: YELLOW
GLUCOSE UR STRIP-MCNC: NEGATIVE MG/DL
KETONES UR QL: ABNORMAL
LEUKOCYTE EST, POC: ABNORMAL
NITRITE UR-MCNC: NEGATIVE MG/ML
PH UR: 5.5 [PH] (ref 5–8)
PROT UR STRIP-MCNC: NEGATIVE MG/DL
RBC # UR STRIP: ABNORMAL /UL
SP GR UR: 1.02 (ref 1–1.03)
UROBILINOGEN UR QL: ABNORMAL

## 2024-08-09 RX ORDER — ESTRADIOL 0.1 MG/G
2 CREAM VAGINAL 2 TIMES WEEKLY
Qty: 42.5 G | Refills: 5 | Status: SHIPPED | OUTPATIENT
Start: 2024-08-12

## 2024-08-09 RX ORDER — TRIMETHOPRIM 100 MG/1
100 TABLET ORAL NIGHTLY
Qty: 90 TABLET | Refills: 0 | Status: SHIPPED | OUTPATIENT
Start: 2024-08-09

## 2024-08-09 NOTE — PROGRESS NOTES
Subjective    Ms. Hendrix is 74 y.o. female    Chief Complaint: Urine recheck    History of Present Illness    74-year-old female established patient in for urine recheck as patient has a history of recurrent urinary tract infections was last seen in our office was found to have a small amount of Proteus bacteria.  Was treated initially with cefdinir later switched to Augmentin after patient presented with blood in urine.  Patient [place] and while [place] again had return of urinary symptoms accompanied with blood in urine.  Reports was given a prescription of Augmentin by Dr. Curtis due to her colon troubles as patient is currently awaiting a colon resection in 10 days from now.  Patient use the Augmentin while traveling and is currently reporting majority of symptoms have resolved is no longer seeing blood in urine only experiencing a mild burning sensation.  History of kidney stones.  Recent KUB no ureteral stone visualized.  Stable appearing to right renal stones. Currently  asymptomatic.  Negative hematuria workup with CT urogram followed by cystoscopy Dr. Hendrickson 3/2024.  Remains on Xarelto daily due to cardiac stents.      right ESWL 2 large 10 and 12 mm right upper pole renal stones by Dr. Hendrickson June 2023.     The following portions of the patient's history were reviewed and updated as appropriate: allergies, current medications, past family history, past medical history, past social history, past surgical history and problem list.    Review of Systems   Constitutional:  Negative for chills and fever.   Gastrointestinal:  Negative for abdominal pain, anal bleeding, blood in stool, nausea and vomiting.   Genitourinary:  Positive for dysuria and urgency. Negative for hematuria.         Current Outpatient Medications:     albuterol sulfate  (90 Base) MCG/ACT inhaler, INHALE 2 PUFFS EVERY 4 HOURS BY INHALATION ROUTE., Disp: , Rfl:     amLODIPine (NORVASC) 10 MG tablet, Take  1 tablet by mouth Daily., Disp: , Rfl:     amoxicillin-clavulanate (AUGMENTIN) 875-125 MG per tablet, Take 1 tablet by mouth 2 (Two) Times a Day., Disp: 10 tablet, Rfl: 0    atorvastatin (LIPITOR) 80 MG tablet, Take 1 tablet by mouth Every Night., Disp: , Rfl:     Cholecalciferol (VITAMIN D3) 125 MCG (5000 UT) capsule capsule, Take 1 capsule by mouth 1 (One) Time Per Week. sundays, Disp: , Rfl:     coenzyme Q10 100 MG capsule, Take 2 capsules by mouth Daily., Disp: , Rfl:     ezetimibe (ZETIA) 10 MG tablet, Take 1 tablet by mouth Daily., Disp: , Rfl:     fluocinolone acetonide (DERMOTIC) 0.01 % oil otic oil, 4 drops 2 (Two) Times a Day., Disp: , Rfl:     folic acid (FOLVITE) 800 MCG tablet, Take 1 tablet by mouth Daily., Disp: , Rfl:     Glucosamine-Chondroit-Vit C-Mn (GLUCOSAMINE 1500 COMPLEX PO), Take  by mouth 2 (Two) Times a Day., Disp: , Rfl:     hydrochlorothiazide (HYDRODIURIL) 25 MG tablet, Take 1 tablet by mouth Daily., Disp: , Rfl:     Inclisiran Sodium (Leqvio) 284 MG/1.5ML solution prefilled syringe, Inject 1 mL under the skin into the appropriate area as directed Every 6 (Six) Months., Disp: 1 mL, Rfl: 1    levocetirizine (XYZAL) 5 MG tablet, Take 1 tablet by mouth Every Night., Disp: , Rfl:     metoprolol succinate XL (TOPROL-XL) 100 MG 24 hr tablet, Take 1 tablet by mouth Every Night., Disp: , Rfl:     montelukast (SINGULAIR) 10 MG tablet, Take 1 tablet by mouth Every Night., Disp: , Rfl:     Multiple Vitamins-Minerals (OCUVITE ADULT 50+ PO), Take  by mouth Daily., Disp: , Rfl:     nitroglycerin (NITROSTAT) 0.4 MG SL tablet, Place 1 tablet under the tongue Every 5 (Five) Minutes As Needed for Chest Pain. Take no more than 3 doses in 15 minutes., Disp: 25 tablet, Rfl: 5    phenazopyridine (PYRIDIUM) 100 MG tablet, Take 1 tablet by mouth 3 (Three) Times a Day As Needed for Bladder Spasms., Disp: 20 tablet, Rfl: 0    potassium chloride ER (K-TAB) 20 MEQ tablet controlled-release ER tablet, Take 1 tablet  by mouth Daily., Disp: , Rfl:     Probiotic Product (PROBIOTIC BLEND PO), Take  by mouth Daily., Disp: , Rfl:     rivaroxaban (Xarelto) 20 MG tablet, Take 1 tablet by mouth Daily., Disp: 90 tablet, Rfl: 3    Symbicort 160-4.5 MCG/ACT inhaler, , Disp: , Rfl:     vitamin A 37470 UNIT capsule, Take 1 capsule by mouth Daily., Disp: , Rfl:     [START ON 8/12/2024] estradiol (ESTRACE) 0.1 MG/GM vaginal cream, Insert 2 g into the vagina 2 (Two) Times a Week., Disp: 42.5 g, Rfl: 5    linaclotide (Linzess) 72 MCG capsule capsule, Take 1 capsule by mouth Daily. (Patient not taking: Reported on 7/11/2024), Disp: , Rfl:     ondansetron (ZOFRAN) 4 MG tablet, Take 1 tablet by mouth Every 8 (Eight) Hours As Needed for Nausea or Vomiting. (Patient not taking: Reported on 7/11/2024), Disp: 20 tablet, Rfl: 0    Polyethylene Glycol 3350 (MIRALAX PO), Take  by mouth As Needed. (Patient not taking: Reported on 7/11/2024), Disp: , Rfl:     trimethoprim (TRIMPEX) 100 MG tablet, Take 1 tablet by mouth Every Night., Disp: 90 tablet, Rfl: 0    Past Medical History:   Diagnosis Date    Asthma     Atrial fibrillation     CAD in native artery     Hyperlipidemia     Hypertension     IBS (irritable bowel syndrome)     Kidney stone 05/30/2023    Lateral meniscus tear     right    MI, old        Past Surgical History:   Procedure Laterality Date    ABLATION OF DYSRHYTHMIC FOCUS      BLADDER NECK SUSPENSION      BREAST BIOPSY Bilateral     2002    CARDIOVERSION      CATARACT EXTRACTION WITH INTRAOCULAR LENS IMPLANT Bilateral     CHOLECYSTECTOMY      COLONOSCOPY N/A 5/23/2024    Procedure: COLONOSCOPY WITH ANESTHESIA;  Surgeon: Thiago Mensah MD;  Location: [facility] ENDOSCOPY;  Service: Gastroenterology;  Laterality: N/A;  pre: hx polyps  post: polyps. diverticulosis.   mounika schaeffer    COLONOSCOPY W/ BIOPSIES      CORONARY ANGIOPLASTY  08/13/2007    had stents 2003    CORONARY STENT PLACEMENT  2002    EXTRACORPOREAL SHOCK WAVE LITHOTRIPSY (ESWL)  "Right 06/05/2023    Procedure: EXTRACORPOREAL SHOCKWAVE LITHOTRIPSY-right;  Surgeon: Jamar Hendrickson MD;  Location:  [facility] OR;  Service: Urology;  Laterality: Right;    HYSTERECTOMY      KNEE ARTHROSCOPY Right 11/02/2023    Procedure: RIGHT KNEE ARTHROSCOPIC LATERAL MENISCUS ROOT REPAIR, PARTIAL MEDIAL MENISECTOMY;  Surgeon: Raymond Puentes MD;  Location:  [facility] OR;  Service: Orthopedics;  Laterality: Right;    VEIN SURGERY  1988       Social History     Socioeconomic History    Marital status: Single   Tobacco Use    Smoking status: Never    Smokeless tobacco: Never   Vaping Use    Vaping status: Never Used   Substance and Sexual Activity    Alcohol use: Never     Alcohol/week: 3.0 standard drinks of alcohol     Types: 2 Glasses of wine, 1 Shots of liquor per week    Drug use: Never    Sexual activity: Not Currently     Partners: Male     Birth control/protection: None       Family History   Problem Relation Age of Onset    Dementia Mother     Heart attack Sister     Heart disease Sister     Diverticulitis Sister     Asthma Sister     Brain cancer Father     Heart disease Maternal Grandmother     Heart attack Maternal Grandmother     Stroke Maternal Grandfather     No Known Problems Paternal Grandmother     Brain cancer Paternal Grandfather     Breast cancer Sister     Breast cancer Paternal Aunt     Ovarian cancer Neg Hx     Uterine cancer Neg Hx     Colon cancer Neg Hx        Objective    Temp 97.4 °F (36.3 °C)   Ht 162.6 cm (64.02\")   Wt 90.7 kg (200 lb)   BMI 34.31 kg/m²     Physical Exam  Nursing note reviewed.   Constitutional:       General: She is not in acute distress.     Appearance: Normal appearance. She is not ill-appearing.   HENT:      Nose: No congestion.   Abdominal:      Tenderness: There is no right CVA tenderness or left CVA tenderness.      Hernia: No hernia is present.   Skin:     General: Skin is warm and dry.   Neurological:      Mental Status: She is alert and oriented to person, " place, and time.   Psychiatric:         Mood and Affect: Mood normal.         Behavior: Behavior normal.             Results for orders placed or performed in visit on 08/09/24   POC Urinalysis Dipstick, Multipro    Specimen: Urine   Result Value Ref Range    Color Yellow Yellow, Straw, Dark Yellow, Beatriz    Clarity, UA Clear Clear    Glucose, UA Negative Negative mg/dL    Bilirubin Negative Negative    Ketones, UA Trace (A) Negative    Specific Gravity  1.020 1.005 - 1.030    Blood, UA Large (A) Negative    pH, Urine 5.5 5.0 - 8.0    Protein, POC Negative Negative mg/dL    Urobilinogen, UA 0.2 E.U./dL Normal, 0.2 E.U./dL    Nitrite, UA Negative Negative    Leukocytes Small (1+) (A) Negative     Assessment and Plan    Diagnoses and all orders for this visit:    1. Recurrent UTI (Primary)  -     POC Urinalysis Dipstick, Multipro  -     trimethoprim (TRIMPEX) 100 MG tablet; Take 1 tablet by mouth Every Night.  Dispense: 90 tablet; Refill: 0  -     estradiol (ESTRACE) 0.1 MG/GM vaginal cream; Insert 2 g into the vagina 2 (Two) Times a Week.  Dispense: 42.5 g; Refill: 5      Patient awaiting bowel resection in 10 days given patient's frequent recurrence of blood in urine and infection recommend starting on a low-dose antibiotic will start on trimethoprim x 3 months.  Will also start on a vaginal estrogen cream to be used twice weekly    Follow-up 3 months

## 2024-08-13 ENCOUNTER — HOSPITAL ENCOUNTER (OUTPATIENT)
Dept: GENERAL RADIOLOGY | Facility: HOSPITAL | Age: 74
Discharge: HOME | End: 2024-08-13
Payer: MEDICARE

## 2024-08-13 ENCOUNTER — PRE-ADMISSION TESTING (OUTPATIENT)
Dept: PREADMISSION TESTING | Facility: HOSPITAL | Age: 74
End: 2024-08-13
Payer: MEDICARE

## 2024-08-13 VITALS
HEART RATE: 91 BPM | HEIGHT: 64 IN | WEIGHT: 197.09 LBS | DIASTOLIC BLOOD PRESSURE: 68 MMHG | SYSTOLIC BLOOD PRESSURE: 147 MMHG | BODY MASS INDEX: 33.65 KG/M2 | OXYGEN SATURATION: 95 % | RESPIRATION RATE: 18 BRPM

## 2024-08-13 DIAGNOSIS — R70.1 ABNORMAL PLASMA VISCOSITY: ICD-10-CM

## 2024-08-13 DIAGNOSIS — K57.32 DIVERTICULITIS OF LARGE INTESTINE WITHOUT PERFORATION OR ABSCESS WITHOUT BLEEDING: ICD-10-CM

## 2024-08-13 LAB
ALBUMIN SERPL-MCNC: 3.8 G/DL (ref 3.5–5.2)
ALBUMIN/GLOB SERPL: 1.2 G/DL
ALP SERPL-CCNC: 182 U/L (ref 39–117)
ALT SERPL W P-5'-P-CCNC: 12 U/L (ref 1–33)
ANION GAP SERPL CALCULATED.3IONS-SCNC: 9 MMOL/L (ref 5–15)
AST SERPL-CCNC: 15 U/L (ref 1–32)
BASOPHILS # BLD AUTO: 0.02 10*3/MM3 (ref 0–0.2)
BASOPHILS NFR BLD AUTO: 0.2 % (ref 0–1.5)
BILIRUB SERPL-MCNC: 1.1 MG/DL (ref 0–1.2)
BUN SERPL-MCNC: 18 MG/DL (ref 8–23)
BUN/CREAT SERPL: 24.3 (ref 7–25)
CALCIUM SPEC-SCNC: 9.3 MG/DL (ref 8.6–10.5)
CEA SERPL-MCNC: 1.04 NG/ML
CHLORIDE SERPL-SCNC: 105 MMOL/L (ref 98–107)
CO2 SERPL-SCNC: 28 MMOL/L (ref 22–29)
CREAT SERPL-MCNC: 0.74 MG/DL (ref 0.57–1)
DEPRECATED RDW RBC AUTO: 51.2 FL (ref 37–54)
EGFRCR SERPLBLD CKD-EPI 2021: 85.6 ML/MIN/1.73
EOSINOPHIL # BLD AUTO: 0.13 10*3/MM3 (ref 0–0.4)
EOSINOPHIL NFR BLD AUTO: 1.3 % (ref 0.3–6.2)
ERYTHROCYTE [DISTWIDTH] IN BLOOD BY AUTOMATED COUNT: 16.4 % (ref 12.3–15.4)
GLOBULIN UR ELPH-MCNC: 3.1 GM/DL
GLUCOSE SERPL-MCNC: 96 MG/DL (ref 65–99)
HCT VFR BLD AUTO: 39.8 % (ref 34–46.6)
HGB BLD-MCNC: 12.1 G/DL (ref 12–15.9)
IMM GRANULOCYTES # BLD AUTO: 0.03 10*3/MM3 (ref 0–0.05)
IMM GRANULOCYTES NFR BLD AUTO: 0.3 % (ref 0–0.5)
LYMPHOCYTES # BLD AUTO: 1.48 10*3/MM3 (ref 0.7–3.1)
LYMPHOCYTES NFR BLD AUTO: 15.3 % (ref 19.6–45.3)
MCH RBC QN AUTO: 25.9 PG (ref 26.6–33)
MCHC RBC AUTO-ENTMCNC: 30.4 G/DL (ref 31.5–35.7)
MCV RBC AUTO: 85.2 FL (ref 79–97)
MONOCYTES # BLD AUTO: 0.91 10*3/MM3 (ref 0.1–0.9)
MONOCYTES NFR BLD AUTO: 9.4 % (ref 5–12)
NEUTROPHILS NFR BLD AUTO: 7.08 10*3/MM3 (ref 1.7–7)
NEUTROPHILS NFR BLD AUTO: 73.5 % (ref 42.7–76)
NRBC BLD AUTO-RTO: 0 /100 WBC (ref 0–0.2)
PLATELET # BLD AUTO: 284 10*3/MM3 (ref 140–450)
PMV BLD AUTO: 10.5 FL (ref 6–12)
POTASSIUM SERPL-SCNC: 4.3 MMOL/L (ref 3.5–5.2)
PROT SERPL-MCNC: 6.9 G/DL (ref 6–8.5)
RBC # BLD AUTO: 4.67 10*6/MM3 (ref 3.77–5.28)
SODIUM SERPL-SCNC: 142 MMOL/L (ref 136–145)
WBC NRBC COR # BLD AUTO: 9.65 10*3/MM3 (ref 3.4–10.8)

## 2024-08-13 PROCEDURE — 36415 COLL VENOUS BLD VENIPUNCTURE: CPT

## 2024-08-13 PROCEDURE — 85025 COMPLETE CBC W/AUTO DIFF WBC: CPT

## 2024-08-13 PROCEDURE — 93005 ELECTROCARDIOGRAM TRACING: CPT

## 2024-08-13 PROCEDURE — 80053 COMPREHEN METABOLIC PANEL: CPT

## 2024-08-13 PROCEDURE — 82378 CARCINOEMBRYONIC ANTIGEN: CPT

## 2024-08-13 PROCEDURE — 71045 X-RAY EXAM CHEST 1 VIEW: CPT

## 2024-08-13 NOTE — DISCHARGE INSTRUCTIONS
Preparing for Surgery  Follow these instructions before the procedure:  Several days or weeks before your procedure      Ask your health care provider about:  Changing or stopping your regular medicines. This is especially important if you are taking diabetes medicines or blood thinners.  Taking medicines such as aspirin and ibuprofen. These medicines can thin your blood. Do not take these medicines unless your health care provider tells you to take them.  Taking over-the-counter medicines, vitamins, herbs, and supplements.    Contact your surgeon if you:  Develop a fever of more than 100.4°F (38°C) or other feelings of illness during the 48 hours before your surgery.  Have symptoms that get worse.  Have questions or concerns about your surgery.  If you are going home the same day of your surgery you will need to arrange for a responsible adult, age 18 years old or older, to drive you home from the hospital and stay with you for 24 hours. Verification of the  will be made prior to any procedure requiring sedation. You may not go home in a taxi or any form of public transportation by yourself.     Day before your procedure    24 hours before your procedure DO NOT drink alcoholic beverages or smoke.  24 hours before your procedure STOP taking Erectile Dysfunction medication (i.e.,Cialis, Viagra)   You may be asked to shower with a germ-killing soap.  Day of your procedure   You may take the following medication(s) the morning of surgery with a sip of water:  AS DIRECTED BY YOUR DOCTOR      8 hours before your scheduled arrival time, STOP all food, any dairy products, and full liquids. This includes hard candy, chewing gum or mints. This is extremely important to prevent serious complications.     Up to 2 hours before your scheduled arrival time, you may have clear liquids no cream, powder, or pulp of any kind. Safe options are water, black coffee, plain tea, soda, Gatorade/Powerade, clear broth, apple  juice.    2 hours before your scheduled arrival time, STOP drinking clear liquids.    You may need to take another shower with a germ-killing soap before you leave home in the morning. Do not use perfumes, colognes, or body lotions.  Wear comfortable loose-fitting clothing.  Remove all jewelry including body piercing and rings, dark colored nail polish, and make up prior to arrival at the hospital. Leave all valuables at home.   Bring your hearing aids if you rely on them.  Do not wear contact lenses. If you wear eyeglasses remember to bring a case to store them in while you are in surgery.  Do not use denture adhesives since you will be asked to remove them during your surgery.    You do not need to bring your home medications into the hospital.   Bring your sleep apnea device with you on the day of your surgery (if this applies to you).  If you wear portable oxygen, bring it with you.   If you are staying overnight, you may bring a bag of items you may need such as slippers, robe and a change of clothes for your discharge. You may want to leave these items in the car until you are ready for them since your family will take your belongings when you leave the pre-operative area.  Arrive at the hospital as scheduled by the office. You will be asked to arrive 2 hours prior to your surgery time in order to prepare for your procedure.  When you arrive at the hospital  Go to the registration desk located at the main entrance of the hospital.  After registration is completed, you will be given a beeper and a sticker sheet. Take the stickers to Outpatient Surgery and place in the tray at the end of the desk to notify the staff that you have arrived and registered.   Return to the lobby to wait. You are not always called back according to the time of arrival but rather the time your doctor will be ready.  When your beeper lights up and vibrates proceed through the double doors, under the stairs, and a member of the  Outpatient Surgery staff will escort you to your preoperative room.     How to Use Chlorhexidine Before Surgery  Chlorhexidine gluconate (CHG) is a germ-killing (antiseptic) solution that is used to clean the skin. It can get rid of the bacteria that normally live on the skin and can keep them away for about 24 hours. To clean your skin with CHG, you may be given:  A CHG solution to use in the shower or as part of a sponge bath.  A prepackaged cloth that contains CHG.  Cleaning your skin with CHG may help lower the risk for infection:  While you are staying in the intensive care unit of the hospital.  If you have a vascular access, such as a central line, to provide short-term or long-term access to your veins.  If you have a catheter to drain urine from your bladder.  If you are on a ventilator. A ventilator is a machine that helps you breathe by moving air in and out of your lungs.  After surgery.  What are the risks?  Risks of using CHG include:  A skin reaction.  Hearing loss, if CHG gets in your ears and you have a perforated eardrum.  Eye injury, if CHG gets in your eyes and is not rinsed out.  The CHG product catching fire.  Make sure that you avoid smoking and flames after applying CHG to your skin.  Do not use CHG:  If you have a chlorhexidine allergy or have previously reacted to chlorhexidine.  On babies younger than 2 months of age.  How to use CHG solution  Use CHG only as told by your health care provider, and follow the instructions on the label.  Use the full amount of CHG as directed. Usually, this is one bottle.  During a shower    Follow these steps when using CHG solution during a shower (unless your health care provider gives you different instructions):  Start the shower.  Use your normal soap and shampoo to wash your face and hair.  Turn off the shower or move out of the shower stream.  Pour the CHG onto a clean washcloth. Do not use any type of brush or rough-edged sponge.  Starting at your  neck, lather your body down to your toes. Make sure you follow these instructions:  If you will be having surgery, pay special attention to the part of your body where you will be having surgery. Scrub this area for at least 1 minute.  Do not use CHG on your head or face. If the solution gets into your ears or eyes, rinse them well with water.  Avoid your genital area.  Avoid any areas of skin that have broken skin, cuts, or scrapes.  Scrub your back and under your arms. Make sure to wash skin folds.  Let the lather sit on your skin for 1-2 minutes or as long as told by your health care provider.  Thoroughly rinse your entire body in the shower. Make sure that all body creases and crevices are rinsed well.  Dry off with a clean towel. Do not put any substances on your body afterward--such as powder, lotion, or perfume--unless you are told to do so by your health care provider. Only use lotions that are recommended by the .  Put on clean clothes or pajamas.  If it is the night before your surgery, sleep in clean sheets.     During a sponge bath  Follow these steps when using CHG solution during a sponge bath (unless your health care provider gives you different instructions):  Use your normal soap and shampoo to wash your face and hair.  Pour the CHG onto a clean washcloth.  Starting at your neck, lather your body down to your toes. Make sure you follow these instructions:  If you will be having surgery, pay special attention to the part of your body where you will be having surgery. Scrub this area for at least 1 minute.  Do not use CHG on your head or face. If the solution gets into your ears or eyes, rinse them well with water.  Avoid your genital area.  Avoid any areas of skin that have broken skin, cuts, or scrapes.  Scrub your back and under your arms. Make sure to wash skin folds.  Let the lather sit on your skin for 1-2 minutes or as long as told by your health care provider.  Using a different  clean, wet washcloth, thoroughly rinse your entire body. Make sure that all body creases and crevices are rinsed well.  Dry off with a clean towel. Do not put any substances on your body afterward--such as powder, lotion, or perfume--unless you are told to do so by your health care provider. Only use lotions that are recommended by the .  Put on clean clothes or pajamas.  If it is the night before your surgery, sleep in clean sheets.  How to use CHG prepackaged cloths  Only use CHG cloths as told by your health care provider, and follow the instructions on the label.  Use the CHG cloth on clean, dry skin.  Do not use the CHG cloth on your head or face unless your health care provider tells you to.  When washing with the CHG cloth:  Avoid your genital area.  Avoid any areas of skin that have broken skin, cuts, or scrapes.  Before surgery    Follow these steps when using a CHG cloth to clean before surgery (unless your health care provider gives you different instructions):  Using the CHG cloth, vigorously scrub the part of your body where you will be having surgery. Scrub using a back-and-forth motion for 3 minutes. The area on your body should be completely wet with CHG when you are done scrubbing.  Do not rinse. Discard the cloth and let the area air-dry. Do not put any substances on the area afterward, such as powder, lotion, or perfume.  Put on clean clothes or pajamas.  If it is the night before your surgery, sleep in clean sheets.     For general bathing  Follow these steps when using CHG cloths for general bathing (unless your health care provider gives you different instructions).  Use a separate CHG cloth for each area of your body. Make sure you wash between any folds of skin and between your fingers and toes. Wash your body in the following order, switching to a new cloth after each step:  The front of your neck, shoulders, and chest.  Both of your arms, under your arms, and your hands.  Your  stomach and groin area, avoiding the genitals.  Your right leg and foot.  Your left leg and foot.  The back of your neck, your back, and your buttocks.  Do not rinse. Discard the cloth and let the area air-dry. Do not put any substances on your body afterward--such as powder, lotion, or perfume--unless you are told to do so by your health care provider. Only use lotions that are recommended by the .  Put on clean clothes or pajamas.  Contact a health care provider if:  Your skin gets irritated after scrubbing.  You have questions about using your solution or cloth.  You swallow any chlorhexidine. Call your local poison control center (1-592.996.7509 in the [place]  Get help right away if:  Your eyes itch badly, or they become very red or swollen.  Your skin itches badly and is red or swollen.  Your hearing changes.  You have trouble seeing.  You have swelling or tingling in your mouth or throat.  You have trouble breathing.  These symptoms may represent a serious problem that is an emergency. Do not wait to see if the symptoms will go away. Get medical help right away. Call your local emergency services (972 in the [place] Do not drive yourself to the hospital.  Summary  Chlorhexidine gluconate (CHG) is a germ-killing (antiseptic) solution that is used to clean the skin. Cleaning your skin with CHG may help to lower your risk for infection.  You may be given CHG to use for bathing. It may be in a bottle or in a prepackaged cloth to use on your skin. Carefully follow your health care provider's instructions and the instructions on the product label.  Do not use CHG if you have a chlorhexidine allergy.  Contact your health care provider if your skin gets irritated after scrubbing.  This information is not intended to replace advice given to you by your health care provider. Make sure you discuss any questions you have with your health care provider.  Document Revised: 04/17/2023 Document Reviewed:  02/28/2022  Elsevier Patient Education © 2023 [employer]

## 2024-08-14 ENCOUNTER — INFUSION (OUTPATIENT)
Dept: ONCOLOGY | Facility: HOSPITAL | Age: 74
End: 2024-08-14
Payer: MEDICARE

## 2024-08-14 VITALS
HEIGHT: 64 IN | WEIGHT: 199.2 LBS | DIASTOLIC BLOOD PRESSURE: 54 MMHG | TEMPERATURE: 97 F | RESPIRATION RATE: 16 BRPM | HEART RATE: 90 BPM | SYSTOLIC BLOOD PRESSURE: 143 MMHG | OXYGEN SATURATION: 97 % | BODY MASS INDEX: 34.01 KG/M2

## 2024-08-14 DIAGNOSIS — E78.2 MIXED HYPERLIPIDEMIA: Primary | ICD-10-CM

## 2024-08-14 LAB
QT INTERVAL: 354 MS
QTC INTERVAL: 428 MS

## 2024-08-14 PROCEDURE — 25010000002 INCLISIRAN SODIUM 284 MG/1.5ML SOLUTION PREFILLED SYRINGE: Performed by: INTERNAL MEDICINE

## 2024-08-14 PROCEDURE — 96372 THER/PROPH/DIAG INJ SC/IM: CPT

## 2024-08-14 RX ADMIN — INCLISIRAN 284 MG: 284 INJECTION, SOLUTION SUBCUTANEOUS at 10:38

## 2024-08-20 ENCOUNTER — HOSPITAL ENCOUNTER (INPATIENT)
Facility: HOSPITAL | Age: 74
LOS: 3 days | Discharge: HOME | End: 2024-08-23
Attending: STUDENT IN AN ORGANIZED HEALTH CARE EDUCATION/TRAINING PROGRAM | Admitting: STUDENT IN AN ORGANIZED HEALTH CARE EDUCATION/TRAINING PROGRAM
Payer: MEDICARE

## 2024-08-20 ENCOUNTER — ANESTHESIA (OUTPATIENT)
Dept: PERIOP | Facility: HOSPITAL | Age: 74
End: 2024-08-20
Payer: MEDICARE

## 2024-08-20 ENCOUNTER — ANESTHESIA EVENT (OUTPATIENT)
Dept: PERIOP | Facility: HOSPITAL | Age: 74
End: 2024-08-20
Payer: MEDICARE

## 2024-08-20 DIAGNOSIS — K57.32 DIVERTICULITIS OF LARGE INTESTINE WITHOUT PERFORATION OR ABSCESS WITHOUT BLEEDING: ICD-10-CM

## 2024-08-20 DIAGNOSIS — Z74.09 IMPAIRED MOBILITY: Primary | ICD-10-CM

## 2024-08-20 LAB — GLUCOSE BLDC GLUCOMTR-MCNC: 179 MG/DL (ref 70–130)

## 2024-08-20 PROCEDURE — 25810000003 LACTATED RINGERS PER 1000 ML: Performed by: STUDENT IN AN ORGANIZED HEALTH CARE EDUCATION/TRAINING PROGRAM

## 2024-08-20 PROCEDURE — 25010000002 METRONIDAZOLE 500 MG/100ML SOLUTION: Performed by: STUDENT IN AN ORGANIZED HEALTH CARE EDUCATION/TRAINING PROGRAM

## 2024-08-20 PROCEDURE — 25010000002 FENTANYL CITRATE (PF) 250 MCG/5ML SOLUTION

## 2024-08-20 PROCEDURE — 25010000002 CEFAZOLIN PER 500 MG: Performed by: STUDENT IN AN ORGANIZED HEALTH CARE EDUCATION/TRAINING PROGRAM

## 2024-08-20 PROCEDURE — 25010000002 HYDROMORPHONE 1 MG/ML SOLUTION

## 2024-08-20 PROCEDURE — 44207 L COLECTOMY/COLOPROCTOSTOMY: CPT | Performed by: STUDENT IN AN ORGANIZED HEALTH CARE EDUCATION/TRAINING PROGRAM

## 2024-08-20 PROCEDURE — 25010000002 BUPIVACAINE (PF) 0.25 % SOLUTION 30 ML VIAL: Performed by: STUDENT IN AN ORGANIZED HEALTH CARE EDUCATION/TRAINING PROGRAM

## 2024-08-20 PROCEDURE — 25010000002 HYDROMORPHONE PER 4 MG: Performed by: STUDENT IN AN ORGANIZED HEALTH CARE EDUCATION/TRAINING PROGRAM

## 2024-08-20 PROCEDURE — 44213 LAP MOBIL SPLENIC FL ADD-ON: CPT | Performed by: STUDENT IN AN ORGANIZED HEALTH CARE EDUCATION/TRAINING PROGRAM

## 2024-08-20 PROCEDURE — 25010000002 PROPOFOL 10 MG/ML EMULSION

## 2024-08-20 PROCEDURE — 0DTN4ZZ RESECTION OF SIGMOID COLON, PERCUTANEOUS ENDOSCOPIC APPROACH: ICD-10-PCS | Performed by: STUDENT IN AN ORGANIZED HEALTH CARE EDUCATION/TRAINING PROGRAM

## 2024-08-20 PROCEDURE — 25010000002 MORPHINE SULFATE (PF) 2 MG/ML SOLUTION 1 ML CARTRIDGE: Performed by: STUDENT IN AN ORGANIZED HEALTH CARE EDUCATION/TRAINING PROGRAM

## 2024-08-20 PROCEDURE — 25010000002 DEXAMETHASONE PER 1 MG

## 2024-08-20 PROCEDURE — 25010000002 DEXAMETHASONE PER 1 MG: Performed by: STUDENT IN AN ORGANIZED HEALTH CARE EDUCATION/TRAINING PROGRAM

## 2024-08-20 PROCEDURE — 25810000003 SODIUM CHLORIDE PER 500 ML: Performed by: STUDENT IN AN ORGANIZED HEALTH CARE EDUCATION/TRAINING PROGRAM

## 2024-08-20 PROCEDURE — 8E0W4CZ ROBOTIC ASSISTED PROCEDURE OF TRUNK REGION, PERCUTANEOUS ENDOSCOPIC APPROACH: ICD-10-PCS | Performed by: STUDENT IN AN ORGANIZED HEALTH CARE EDUCATION/TRAINING PROGRAM

## 2024-08-20 PROCEDURE — 25010000002 MIDAZOLAM PER 1MG: Performed by: ANESTHESIOLOGY

## 2024-08-20 PROCEDURE — 0DJD8ZZ INSPECTION OF LOWER INTESTINAL TRACT, VIA NATURAL OR ARTIFICIAL OPENING ENDOSCOPIC: ICD-10-PCS | Performed by: STUDENT IN AN ORGANIZED HEALTH CARE EDUCATION/TRAINING PROGRAM

## 2024-08-20 PROCEDURE — 82948 REAGENT STRIP/BLOOD GLUCOSE: CPT

## 2024-08-20 PROCEDURE — 63710000001 INSULIN LISPRO (HUMAN) PER 5 UNITS: Performed by: STUDENT IN AN ORGANIZED HEALTH CARE EDUCATION/TRAINING PROGRAM

## 2024-08-20 PROCEDURE — 25010000002 ONDANSETRON PER 1 MG

## 2024-08-20 PROCEDURE — 88307 TISSUE EXAM BY PATHOLOGIST: CPT | Performed by: STUDENT IN AN ORGANIZED HEALTH CARE EDUCATION/TRAINING PROGRAM

## 2024-08-20 PROCEDURE — 25010000002 BUPIVACAINE 0.5 % SOLUTION 50 ML VIAL: Performed by: STUDENT IN AN ORGANIZED HEALTH CARE EDUCATION/TRAINING PROGRAM

## 2024-08-20 PROCEDURE — 25010000002 HEPARIN (PORCINE) PER 1000 UNITS: Performed by: STUDENT IN AN ORGANIZED HEALTH CARE EDUCATION/TRAINING PROGRAM

## 2024-08-20 PROCEDURE — 25010000002 SUGAMMADEX 200 MG/2ML SOLUTION

## 2024-08-20 PROCEDURE — 88305 TISSUE EXAM BY PATHOLOGIST: CPT | Performed by: STUDENT IN AN ORGANIZED HEALTH CARE EDUCATION/TRAINING PROGRAM

## 2024-08-20 PROCEDURE — S0260 H&P FOR SURGERY: HCPCS | Performed by: STUDENT IN AN ORGANIZED HEALTH CARE EDUCATION/TRAINING PROGRAM

## 2024-08-20 PROCEDURE — 25010000002 LIDOCAINE 1 % SOLUTION 20 ML VIAL: Performed by: STUDENT IN AN ORGANIZED HEALTH CARE EDUCATION/TRAINING PROGRAM

## 2024-08-20 DEVICE — LARGE LIGATION CLIPS 6 CLIPS/CART
Type: IMPLANTABLE DEVICE | Site: ABDOMEN | Status: FUNCTIONAL
Brand: VAS-Q-CLIP

## 2024-08-20 DEVICE — CIRCULAR MECH XL SEAL 29MM: Type: IMPLANTABLE DEVICE | Site: ABDOMEN | Status: FUNCTIONAL

## 2024-08-20 DEVICE — ABSORBABLE WOUND CLOSURE DEVICE
Type: IMPLANTABLE DEVICE | Site: ABDOMEN | Status: FUNCTIONAL
Brand: V-LOC 90

## 2024-08-20 DEVICE — STAPLER 60 RELOAD BLUE
Type: IMPLANTABLE DEVICE | Site: ABDOMEN | Status: FUNCTIONAL
Brand: SUREFORM

## 2024-08-20 RX ORDER — ALBUTEROL SULFATE 0.83 MG/ML
2.5 SOLUTION RESPIRATORY (INHALATION) EVERY 6 HOURS PRN
Status: DISCONTINUED | OUTPATIENT
Start: 2024-08-20 | End: 2024-08-23 | Stop reason: HOSPADM

## 2024-08-20 RX ORDER — GABAPENTIN 300 MG/1
300 CAPSULE ORAL 2 TIMES DAILY
Status: COMPLETED | OUTPATIENT
Start: 2024-08-20 | End: 2024-08-23

## 2024-08-20 RX ORDER — HYDROCODONE BITARTRATE AND ACETAMINOPHEN 10; 325 MG/1; MG/1
1 TABLET ORAL ONCE AS NEEDED
Status: DISCONTINUED | OUTPATIENT
Start: 2024-08-20 | End: 2024-08-20 | Stop reason: HOSPADM

## 2024-08-20 RX ORDER — BUDESONIDE AND FORMOTEROL FUMARATE DIHYDRATE 160; 4.5 UG/1; UG/1
2 AEROSOL RESPIRATORY (INHALATION)
Status: DISCONTINUED | OUTPATIENT
Start: 2024-08-20 | End: 2024-08-20

## 2024-08-20 RX ORDER — INSULIN LISPRO 100 [IU]/ML
0-7 INJECTION, SOLUTION INTRAVENOUS; SUBCUTANEOUS
Status: DISCONTINUED | OUTPATIENT
Start: 2024-08-20 | End: 2024-08-23 | Stop reason: HOSPADM

## 2024-08-20 RX ORDER — HEPARIN SODIUM 5000 [USP'U]/ML
5000 INJECTION, SOLUTION INTRAVENOUS; SUBCUTANEOUS EVERY 8 HOURS SCHEDULED
Status: DISCONTINUED | OUTPATIENT
Start: 2024-08-21 | End: 2024-08-23 | Stop reason: HOSPADM

## 2024-08-20 RX ORDER — FAMOTIDINE 20 MG/1
20 TABLET, FILM COATED ORAL 2 TIMES DAILY
Status: DISCONTINUED | OUTPATIENT
Start: 2024-08-20 | End: 2024-08-23 | Stop reason: HOSPADM

## 2024-08-20 RX ORDER — SODIUM CHLORIDE, SODIUM LACTATE, POTASSIUM CHLORIDE, CALCIUM CHLORIDE 600; 310; 30; 20 MG/100ML; MG/100ML; MG/100ML; MG/100ML
1000 INJECTION, SOLUTION INTRAVENOUS CONTINUOUS
Status: DISCONTINUED | OUTPATIENT
Start: 2024-08-20 | End: 2024-08-20

## 2024-08-20 RX ORDER — AMLODIPINE BESYLATE 10 MG/1
10 TABLET ORAL DAILY
Status: DISCONTINUED | OUTPATIENT
Start: 2024-08-21 | End: 2024-08-23 | Stop reason: HOSPADM

## 2024-08-20 RX ORDER — BUDESONIDE AND FORMOTEROL FUMARATE DIHYDRATE 160; 4.5 UG/1; UG/1
2 AEROSOL RESPIRATORY (INHALATION)
Status: DISCONTINUED | OUTPATIENT
Start: 2024-08-21 | End: 2024-08-23 | Stop reason: HOSPADM

## 2024-08-20 RX ORDER — ROCURONIUM BROMIDE 10 MG/ML
INJECTION, SOLUTION INTRAVENOUS AS NEEDED
Status: DISCONTINUED | OUTPATIENT
Start: 2024-08-20 | End: 2024-08-20 | Stop reason: SURG

## 2024-08-20 RX ORDER — NALOXONE HCL 0.4 MG/ML
0.04 VIAL (ML) INJECTION AS NEEDED
Status: DISCONTINUED | OUTPATIENT
Start: 2024-08-20 | End: 2024-08-20 | Stop reason: HOSPADM

## 2024-08-20 RX ORDER — HEPARIN SODIUM 5000 [USP'U]/ML
5000 INJECTION, SOLUTION INTRAVENOUS; SUBCUTANEOUS EVERY 8 HOURS SCHEDULED
Status: DISCONTINUED | OUTPATIENT
Start: 2024-08-20 | End: 2024-08-20 | Stop reason: HOSPADM

## 2024-08-20 RX ORDER — HYDROMORPHONE HYDROCHLORIDE 1 MG/ML
0.5 INJECTION, SOLUTION INTRAMUSCULAR; INTRAVENOUS; SUBCUTANEOUS
Status: DISCONTINUED | OUTPATIENT
Start: 2024-08-20 | End: 2024-08-23 | Stop reason: HOSPADM

## 2024-08-20 RX ORDER — CYCLOBENZAPRINE HCL 10 MG
5 TABLET ORAL 3 TIMES DAILY PRN
Status: DISCONTINUED | OUTPATIENT
Start: 2024-08-20 | End: 2024-08-23 | Stop reason: HOSPADM

## 2024-08-20 RX ORDER — ONDANSETRON 2 MG/ML
4 INJECTION INTRAMUSCULAR; INTRAVENOUS
Status: DISCONTINUED | OUTPATIENT
Start: 2024-08-20 | End: 2024-08-20 | Stop reason: HOSPADM

## 2024-08-20 RX ORDER — ONDANSETRON 4 MG/1
4 TABLET, ORALLY DISINTEGRATING ORAL EVERY 6 HOURS PRN
Status: DISCONTINUED | OUTPATIENT
Start: 2024-08-20 | End: 2024-08-23 | Stop reason: HOSPADM

## 2024-08-20 RX ORDER — OXYCODONE HYDROCHLORIDE 5 MG/1
5 TABLET ORAL EVERY 6 HOURS PRN
Status: DISCONTINUED | OUTPATIENT
Start: 2024-08-20 | End: 2024-08-23 | Stop reason: HOSPADM

## 2024-08-20 RX ORDER — PROPOFOL 10 MG/ML
VIAL (ML) INTRAVENOUS AS NEEDED
Status: DISCONTINUED | OUTPATIENT
Start: 2024-08-20 | End: 2024-08-20 | Stop reason: SURG

## 2024-08-20 RX ORDER — PHENAZOPYRIDINE HYDROCHLORIDE 100 MG/1
100 TABLET, FILM COATED ORAL 3 TIMES DAILY PRN
Status: DISCONTINUED | OUTPATIENT
Start: 2024-08-20 | End: 2024-08-23 | Stop reason: HOSPADM

## 2024-08-20 RX ORDER — METOPROLOL TARTRATE 50 MG
50 TABLET ORAL EVERY 12 HOURS SCHEDULED
Status: DISCONTINUED | OUTPATIENT
Start: 2024-08-21 | End: 2024-08-23 | Stop reason: HOSPADM

## 2024-08-20 RX ORDER — DROPERIDOL 2.5 MG/ML
0.62 INJECTION, SOLUTION INTRAMUSCULAR; INTRAVENOUS ONCE AS NEEDED
Status: DISCONTINUED | OUTPATIENT
Start: 2024-08-20 | End: 2024-08-20 | Stop reason: HOSPADM

## 2024-08-20 RX ORDER — ONDANSETRON 2 MG/ML
4 INJECTION INTRAMUSCULAR; INTRAVENOUS EVERY 6 HOURS PRN
Status: DISCONTINUED | OUTPATIENT
Start: 2024-08-20 | End: 2024-08-23 | Stop reason: HOSPADM

## 2024-08-20 RX ORDER — ASPIRIN 81 MG/1
81 TABLET, CHEWABLE ORAL ONCE
Status: COMPLETED | OUTPATIENT
Start: 2024-08-20 | End: 2024-08-20

## 2024-08-20 RX ORDER — SODIUM CHLORIDE 0.9 % (FLUSH) 0.9 %
3 SYRINGE (ML) INJECTION EVERY 12 HOURS SCHEDULED
Status: DISCONTINUED | OUTPATIENT
Start: 2024-08-20 | End: 2024-08-20 | Stop reason: HOSPADM

## 2024-08-20 RX ORDER — ACETAMINOPHEN 500 MG
1000 TABLET ORAL EVERY 6 HOURS
Status: DISCONTINUED | OUTPATIENT
Start: 2024-08-20 | End: 2024-08-23 | Stop reason: HOSPADM

## 2024-08-20 RX ORDER — LIDOCAINE HYDROCHLORIDE 20 MG/ML
INJECTION, SOLUTION EPIDURAL; INFILTRATION; INTRACAUDAL; PERINEURAL AS NEEDED
Status: DISCONTINUED | OUTPATIENT
Start: 2024-08-20 | End: 2024-08-20 | Stop reason: SURG

## 2024-08-20 RX ORDER — IBUPROFEN 600 MG/1
600 TABLET, FILM COATED ORAL EVERY 6 HOURS PRN
Status: DISCONTINUED | OUTPATIENT
Start: 2024-08-20 | End: 2024-08-20 | Stop reason: HOSPADM

## 2024-08-20 RX ORDER — FENTANYL CITRATE 50 UG/ML
INJECTION, SOLUTION INTRAMUSCULAR; INTRAVENOUS AS NEEDED
Status: DISCONTINUED | OUTPATIENT
Start: 2024-08-20 | End: 2024-08-20 | Stop reason: SURG

## 2024-08-20 RX ORDER — CETIRIZINE HYDROCHLORIDE 10 MG/1
10 TABLET ORAL DAILY
Status: DISCONTINUED | OUTPATIENT
Start: 2024-08-21 | End: 2024-08-23 | Stop reason: HOSPADM

## 2024-08-20 RX ORDER — BUPIVACAINE HCL/0.9 % NACL/PF 0.125 %
PLASTIC BAG, INJECTION (ML) EPIDURAL AS NEEDED
Status: DISCONTINUED | OUTPATIENT
Start: 2024-08-20 | End: 2024-08-20 | Stop reason: SURG

## 2024-08-20 RX ORDER — LABETALOL HYDROCHLORIDE 5 MG/ML
5 INJECTION, SOLUTION INTRAVENOUS
Status: DISCONTINUED | OUTPATIENT
Start: 2024-08-20 | End: 2024-08-20 | Stop reason: HOSPADM

## 2024-08-20 RX ORDER — SODIUM CHLORIDE 0.9 % (FLUSH) 0.9 %
10 SYRINGE (ML) INJECTION EVERY 12 HOURS SCHEDULED
Status: DISCONTINUED | OUTPATIENT
Start: 2024-08-20 | End: 2024-08-20 | Stop reason: HOSPADM

## 2024-08-20 RX ORDER — LIDOCAINE HYDROCHLORIDE 10 MG/ML
0.5 INJECTION, SOLUTION EPIDURAL; INFILTRATION; INTRACAUDAL; PERINEURAL ONCE AS NEEDED
Status: DISCONTINUED | OUTPATIENT
Start: 2024-08-20 | End: 2024-08-20 | Stop reason: HOSPADM

## 2024-08-20 RX ORDER — INDOCYANINE GREEN AND WATER 25 MG
KIT INJECTION
Status: DISPENSED
Start: 2024-08-20 | End: 2024-08-21

## 2024-08-20 RX ORDER — SODIUM CHLORIDE 0.9 % (FLUSH) 0.9 %
10 SYRINGE (ML) INJECTION AS NEEDED
Status: DISCONTINUED | OUTPATIENT
Start: 2024-08-20 | End: 2024-08-20 | Stop reason: HOSPADM

## 2024-08-20 RX ORDER — SODIUM CHLORIDE 9 MG/ML
INJECTION, SOLUTION INTRAVENOUS AS NEEDED
Status: DISCONTINUED | OUTPATIENT
Start: 2024-08-20 | End: 2024-08-20 | Stop reason: HOSPADM

## 2024-08-20 RX ORDER — FLUMAZENIL 0.1 MG/ML
0.2 INJECTION INTRAVENOUS AS NEEDED
Status: DISCONTINUED | OUTPATIENT
Start: 2024-08-20 | End: 2024-08-20 | Stop reason: HOSPADM

## 2024-08-20 RX ORDER — SODIUM CHLORIDE 9 MG/ML
40 INJECTION, SOLUTION INTRAVENOUS AS NEEDED
Status: DISCONTINUED | OUTPATIENT
Start: 2024-08-20 | End: 2024-08-20 | Stop reason: HOSPADM

## 2024-08-20 RX ORDER — SODIUM CHLORIDE, SODIUM LACTATE, POTASSIUM CHLORIDE, CALCIUM CHLORIDE 600; 310; 30; 20 MG/100ML; MG/100ML; MG/100ML; MG/100ML
100 INJECTION, SOLUTION INTRAVENOUS CONTINUOUS
Status: DISCONTINUED | OUTPATIENT
Start: 2024-08-20 | End: 2024-08-21

## 2024-08-20 RX ORDER — ACETAMINOPHEN 500 MG
1000 TABLET ORAL ONCE
Status: COMPLETED | OUTPATIENT
Start: 2024-08-20 | End: 2024-08-20

## 2024-08-20 RX ORDER — FENTANYL CITRATE 50 UG/ML
50 INJECTION, SOLUTION INTRAMUSCULAR; INTRAVENOUS
Status: DISCONTINUED | OUTPATIENT
Start: 2024-08-20 | End: 2024-08-20 | Stop reason: HOSPADM

## 2024-08-20 RX ORDER — HYDROMORPHONE HYDROCHLORIDE 1 MG/ML
0.5 INJECTION, SOLUTION INTRAMUSCULAR; INTRAVENOUS; SUBCUTANEOUS
Status: DISCONTINUED | OUTPATIENT
Start: 2024-08-20 | End: 2024-08-20 | Stop reason: HOSPADM

## 2024-08-20 RX ORDER — TRAMADOL HYDROCHLORIDE 50 MG/1
50 TABLET ORAL EVERY 6 HOURS PRN
Status: DISCONTINUED | OUTPATIENT
Start: 2024-08-20 | End: 2024-08-23 | Stop reason: HOSPADM

## 2024-08-20 RX ORDER — ONDANSETRON 2 MG/ML
INJECTION INTRAMUSCULAR; INTRAVENOUS AS NEEDED
Status: DISCONTINUED | OUTPATIENT
Start: 2024-08-20 | End: 2024-08-20 | Stop reason: SURG

## 2024-08-20 RX ORDER — SODIUM CHLORIDE, SODIUM LACTATE, POTASSIUM CHLORIDE, CALCIUM CHLORIDE 600; 310; 30; 20 MG/100ML; MG/100ML; MG/100ML; MG/100ML
100 INJECTION, SOLUTION INTRAVENOUS CONTINUOUS
Status: DISCONTINUED | OUTPATIENT
Start: 2024-08-20 | End: 2024-08-20

## 2024-08-20 RX ORDER — NALOXONE HCL 0.4 MG/ML
0.4 VIAL (ML) INJECTION
Status: DISCONTINUED | OUTPATIENT
Start: 2024-08-20 | End: 2024-08-23 | Stop reason: HOSPADM

## 2024-08-20 RX ORDER — LIDOCAINE 4 G/G
2 PATCH TOPICAL
Status: DISCONTINUED | OUTPATIENT
Start: 2024-08-21 | End: 2024-08-23 | Stop reason: HOSPADM

## 2024-08-20 RX ORDER — METRONIDAZOLE 500 MG/100ML
500 INJECTION, SOLUTION INTRAVENOUS EVERY 8 HOURS
Status: COMPLETED | OUTPATIENT
Start: 2024-08-20 | End: 2024-08-21

## 2024-08-20 RX ORDER — DEXAMETHASONE SODIUM PHOSPHATE 4 MG/ML
INJECTION, SOLUTION INTRA-ARTICULAR; INTRALESIONAL; INTRAMUSCULAR; INTRAVENOUS; SOFT TISSUE AS NEEDED
Status: DISCONTINUED | OUTPATIENT
Start: 2024-08-20 | End: 2024-08-20 | Stop reason: SURG

## 2024-08-20 RX ORDER — SODIUM CHLORIDE 0.9 % (FLUSH) 0.9 %
3-10 SYRINGE (ML) INJECTION AS NEEDED
Status: DISCONTINUED | OUTPATIENT
Start: 2024-08-20 | End: 2024-08-20 | Stop reason: HOSPADM

## 2024-08-20 RX ORDER — METRONIDAZOLE 500 MG/100ML
500 INJECTION, SOLUTION INTRAVENOUS ONCE
Status: COMPLETED | OUTPATIENT
Start: 2024-08-20 | End: 2024-08-20

## 2024-08-20 RX ORDER — MIDAZOLAM HYDROCHLORIDE 2 MG/2ML
0.5 INJECTION, SOLUTION INTRAMUSCULAR; INTRAVENOUS
Status: DISCONTINUED | OUTPATIENT
Start: 2024-08-20 | End: 2024-08-20 | Stop reason: HOSPADM

## 2024-08-20 RX ADMIN — DEXAMETHASONE SODIUM PHOSPHATE 8 MG: 4 INJECTION, SOLUTION INTRA-ARTICULAR; INTRALESIONAL; INTRAMUSCULAR; INTRAVENOUS; SOFT TISSUE at 18:37

## 2024-08-20 RX ADMIN — HYDROMORPHONE HYDROCHLORIDE 0.5 MG: 1 INJECTION, SOLUTION INTRAMUSCULAR; INTRAVENOUS; SUBCUTANEOUS at 23:40

## 2024-08-20 RX ADMIN — FENTANYL CITRATE 50 MCG: 50 INJECTION, SOLUTION INTRAMUSCULAR; INTRAVENOUS at 16:12

## 2024-08-20 RX ADMIN — SODIUM CHLORIDE, POTASSIUM CHLORIDE, SODIUM LACTATE AND CALCIUM CHLORIDE 1000 ML: 600; 310; 30; 20 INJECTION, SOLUTION INTRAVENOUS at 12:30

## 2024-08-20 RX ADMIN — INSULIN LISPRO 2 UNITS: 100 INJECTION, SOLUTION INTRAVENOUS; SUBCUTANEOUS at 21:23

## 2024-08-20 RX ADMIN — SUGAMMADEX 200 MG: 100 INJECTION, SOLUTION INTRAVENOUS at 18:54

## 2024-08-20 RX ADMIN — FENTANYL CITRATE 100 MCG: 50 INJECTION, SOLUTION INTRAMUSCULAR; INTRAVENOUS at 15:56

## 2024-08-20 RX ADMIN — HYDROMORPHONE HYDROCHLORIDE 1 MG: 1 INJECTION, SOLUTION INTRAMUSCULAR; INTRAVENOUS; SUBCUTANEOUS at 18:57

## 2024-08-20 RX ADMIN — LIDOCAINE HYDROCHLORIDE 100 MG: 20 INJECTION, SOLUTION EPIDURAL; INFILTRATION; INTRACAUDAL; PERINEURAL at 15:47

## 2024-08-20 RX ADMIN — ROCURONIUM BROMIDE 25 MG: 10 INJECTION, SOLUTION INTRAVENOUS at 17:55

## 2024-08-20 RX ADMIN — Medication 100 MCG: at 18:10

## 2024-08-20 RX ADMIN — GABAPENTIN 300 MG: 300 CAPSULE ORAL at 21:12

## 2024-08-20 RX ADMIN — METRONIDAZOLE 500 MG: 5 INJECTION, SOLUTION INTRAVENOUS at 14:19

## 2024-08-20 RX ADMIN — METRONIDAZOLE 500 MG: 500 INJECTION, SOLUTION INTRAVENOUS at 23:22

## 2024-08-20 RX ADMIN — ROCURONIUM BROMIDE 30 MG: 10 INJECTION, SOLUTION INTRAVENOUS at 16:21

## 2024-08-20 RX ADMIN — ASPIRIN 81 MG: 81 TABLET, CHEWABLE ORAL at 14:19

## 2024-08-20 RX ADMIN — FENTANYL CITRATE 100 MCG: 50 INJECTION, SOLUTION INTRAMUSCULAR; INTRAVENOUS at 16:09

## 2024-08-20 RX ADMIN — FAMOTIDINE 20 MG: 20 TABLET, FILM COATED ORAL at 21:12

## 2024-08-20 RX ADMIN — OXYCODONE HYDROCHLORIDE 5 MG: 5 TABLET ORAL at 21:12

## 2024-08-20 RX ADMIN — ACETAMINOPHEN 1000 MG: 500 TABLET, FILM COATED ORAL at 14:19

## 2024-08-20 RX ADMIN — SODIUM CHLORIDE, POTASSIUM CHLORIDE, SODIUM LACTATE AND CALCIUM CHLORIDE 100 ML/HR: 600; 310; 30; 20 INJECTION, SOLUTION INTRAVENOUS at 21:12

## 2024-08-20 RX ADMIN — PROPOFOL 200 MG: 10 INJECTION, EMULSION INTRAVENOUS at 15:47

## 2024-08-20 RX ADMIN — ROCURONIUM BROMIDE 70 MG: 10 INJECTION, SOLUTION INTRAVENOUS at 15:47

## 2024-08-20 RX ADMIN — ONDANSETRON 4 MG: 2 INJECTION INTRAMUSCULAR; INTRAVENOUS at 18:52

## 2024-08-20 RX ADMIN — ACETAMINOPHEN 1000 MG: 500 TABLET, FILM COATED ORAL at 21:12

## 2024-08-20 RX ADMIN — Medication 100 MCG: at 19:05

## 2024-08-20 RX ADMIN — HEPARIN SODIUM 5000 UNITS: 5000 INJECTION, SOLUTION INTRAVENOUS; SUBCUTANEOUS at 14:21

## 2024-08-20 RX ADMIN — Medication 100 MCG: at 18:37

## 2024-08-20 RX ADMIN — MIDAZOLAM HYDROCHLORIDE 0.5 MG: 1 INJECTION, SOLUTION INTRAMUSCULAR; INTRAVENOUS at 14:31

## 2024-08-20 RX ADMIN — CEFAZOLIN 2 G: 2 INJECTION, POWDER, FOR SOLUTION INTRAMUSCULAR; INTRAVENOUS at 15:54

## 2024-08-20 RX ADMIN — SODIUM CHLORIDE, POTASSIUM CHLORIDE, SODIUM LACTATE AND CALCIUM CHLORIDE: 600; 310; 30; 20 INJECTION, SOLUTION INTRAVENOUS at 18:02

## 2024-08-20 RX ADMIN — SODIUM CHLORIDE, POTASSIUM CHLORIDE, SODIUM LACTATE AND CALCIUM CHLORIDE 1000 ML: 600; 310; 30; 20 INJECTION, SOLUTION INTRAVENOUS at 12:32

## 2024-08-20 NOTE — ANESTHESIA PREPROCEDURE EVALUATION
Anesthesia Evaluation     no history of anesthetic complications:   NPO Solid Status: > 8 hours  NPO Liquid Status: > 8 hours           Airway   Mallampati: II  TM distance: >3 FB  No difficulty expected  Dental      Pulmonary    (+) asthma,  (-) sleep apnea  Cardiovascular   Exercise tolerance: poor (<4 METS)    (+) hypertension, past MI  >12 months, CAD, cardiac stents (1 stent placed 20 years ago) more than 12 months ago , dysrhythmias Atrial Fib, hyperlipidemia      Neuro/Psych  (-) seizures, TIA, CVA  GI/Hepatic/Renal/Endo    (+) obesity, renal disease- stones  (-) diabetes    Musculoskeletal     Abdominal    Substance History      OB/GYN          Other                      Anesthesia Plan    ASA 3     general     intravenous induction     Anesthetic plan, risks, benefits, and alternatives have been provided, discussed and informed consent has been obtained with: patient.    CODE STATUS:

## 2024-08-20 NOTE — ANESTHESIA PROCEDURE NOTES
Airway  Urgency: elective    Airway not difficult    General Information and Staff    Patient location during procedure: OR  CRNA/CAA: Eliseo Cazares CRNA    Indications and Patient Condition    Preoxygenated: yes  Mask difficulty assessment: 1 - vent by mask    Final Airway Details  Final airway type: endotracheal airway      Successful airway: ETT  Cuffed: yes   Successful intubation technique: video laryngoscopy  Endotracheal tube insertion site: oral  Blade: Fried  Blade size: 3  ETT size (mm): 7.5  Cormack-Lehane Classification: grade I - full view of glottis  Placement verified by: chest auscultation   Cuff volume (mL): 7  Measured from: lips  ETT/EBT  to lips (cm): 21  Number of attempts at approach: 1  Assessment: lips, teeth, and gum same as pre-op and atraumatic intubation

## 2024-08-20 NOTE — H&P
Jovanna Curtis MD - General Surgery History and Physical     Referring Provider: Jovanna Curtis MD    Patient Care Team:  Petar Cummings MD as PCP - General (Internal Medicine)  Choco Diallo MD (Inactive) as Cardiologist (Cardiology)  Chrissy Allen APRN as Nurse Practitioner (Family Medicine)  Praneeth Diallo MD as Surgeon (Orthopedic Surgery)  Jovanna Curtis MD as Consulting Physician (General Surgery)    Chief complaint diverticulitis     Subjective .     History of present illness:  Christiana Hendrix is a 74 y.o. female who presents to discuss her diverticulitis. She has had 5 episodes of diverticulitis in the past. She has never been hospitalized. She reports intermittent constipation with severe pain when constipated. The pain is in her left lower quadrant. No bleeding per rectum. No family history of colon cancer.      BMI is 34. PSH includes cholecystectomy, hysterectomy and bladder suspension. Non-smoker. She is on Xarelto for a fib    Review of Systems    Review of Systems - General ROS: negative  ENT ROS: negative  Respiratory ROS: no cough, shortness of breath, or wheezing  Cardiovascular ROS: no chest pain or dyspnea on exertion  Gastrointestinal ROS: positive for - abdominal pain, appetite loss, constipation, and nausea/vomiting  Genito-Urinary ROS: no dysuria, trouble voiding, or hematuria  Dermatological ROS: negative   Breast ROS: negative for breast lumps  Hematological and Lymphatic ROS: negative  Musculoskeletal ROS: negative   Neurological ROS: no TIA or stroke symptoms    Psychological ROS: negative  Endocrine ROS: negative    History  Past Medical History:   Diagnosis Date    Asthma     Atrial fibrillation     CAD in native artery     Chronic UTI     Diverticulitis     Hyperlipidemia     Hypertension     Kidney stone 05/30/2023    Lateral meniscus tear     right    MI, old    ,   Past Surgical History:   Procedure Laterality Date    ABLATION OF DYSRHYTHMIC FOCUS       BLADDER NECK SUSPENSION      BREAST BIOPSY Bilateral     2002    CARDIOVERSION      CATARACT EXTRACTION WITH INTRAOCULAR LENS IMPLANT Bilateral     CHOLECYSTECTOMY      COLONOSCOPY N/A 05/23/2024    Procedure: COLONOSCOPY WITH ANESTHESIA;  Surgeon: Thiago Mensah MD;  Location:  [facility];  Service: Gastroenterology;  Laterality: N/A;  pre: hx polyps  post: polyps. diverticulosis.   mounika maksim    COLONOSCOPY W/ BIOPSIES      CORONARY ANGIOPLASTY  08/13/2007    had stents 2003    CORONARY STENT PLACEMENT  2002    EXTRACORPOREAL SHOCK WAVE LITHOTRIPSY (ESWL) Right 06/05/2023    Procedure: EXTRACORPOREAL SHOCKWAVE LITHOTRIPSY-right;  Surgeon: Jamar Hendrickson MD;  Location: [facility] OR;  Service: Urology;  Laterality: Right;    HYSTERECTOMY      KNEE ARTHROSCOPY Right 11/02/2023    Procedure: RIGHT KNEE ARTHROSCOPIC LATERAL MENISCUS ROOT REPAIR, PARTIAL MEDIAL MENISECTOMY;  Surgeon: Raymond Puentes MD;  Location:  [facility] OR;  Service: Orthopedics;  Laterality: Right;    VEIN SURGERY  1988   ,   Family History   Problem Relation Age of Onset    Dementia Mother     Heart attack Sister     Heart disease Sister     Diverticulitis Sister     Asthma Sister     Brain cancer Father     Heart disease Maternal Grandmother     Heart attack Maternal Grandmother     Stroke Maternal Grandfather     No Known Problems Paternal Grandmother     Brain cancer Paternal Grandfather     Breast cancer Sister     Breast cancer Paternal Aunt     Ovarian cancer Neg Hx     Uterine cancer Neg Hx     Colon cancer Neg Hx    ,   Social History     Tobacco Use    Smoking status: Never    Smokeless tobacco: Never   Vaping Use    Vaping status: Never Used   Substance Use Topics    Alcohol use: Never     Alcohol/week: 3.0 standard drinks of alcohol     Types: 2 Glasses of wine, 1 Shots of liquor per week    Drug use: Never   ,   Medications Prior to Admission   Medication Sig Dispense Refill Last Dose    albuterol sulfate  (90 Base)  MCG/ACT inhaler Inhale 2 puffs Every 6 (Six) Hours As Needed.   Past Week    amLODIPine (NORVASC) 10 MG tablet Take 1 tablet by mouth Daily.   8/20/2024    atorvastatin (LIPITOR) 80 MG tablet Take 1 tablet by mouth Every Night.   8/19/2024 at 2200    estradiol (ESTRACE) 0.1 MG/GM vaginal cream Insert 2 g into the vagina 2 (Two) Times a Week. 42.5 g 5 8/19/2024 at 2200    ezetimibe (ZETIA) 10 MG tablet Take 1 tablet by mouth Daily.   8/19/2024 at 0800    fluocinolone acetonide (DERMOTIC) 0.01 % oil otic oil 4 drops 2 (Two) Times a Day.   8/19/2024    levocetirizine (XYZAL) 5 MG tablet Take 1 tablet by mouth Every Night.   8/19/2024 at 2200    metoprolol succinate XL (TOPROL-XL) 100 MG 24 hr tablet Take 1 tablet by mouth Every Night.   8/20/2024 at 0800    montelukast (SINGULAIR) 10 MG tablet Take 1 tablet by mouth Every Night.   8/19/2024 at 2200    potassium chloride ER (K-TAB) 20 MEQ tablet controlled-release ER tablet Take 1 tablet by mouth Daily.   8/20/2024 at 0800    Symbicort 160-4.5 MCG/ACT inhaler    Past Month    trimethoprim (TRIMPEX) 100 MG tablet Take 1 tablet by mouth Every Night. 90 tablet 0 8/19/2024 at 2200    amoxicillin-clavulanate (AUGMENTIN) 875-125 MG per tablet Take 1 tablet by mouth 2 (Two) Times a Day. 10 tablet 0     Cholecalciferol (VITAMIN D3) 125 MCG (5000 UT) capsule capsule Take 1 capsule by mouth 1 (One) Time Per Week. sundays   8/17/2024    coenzyme Q10 100 MG capsule Take 2 capsules by mouth Daily.   8/17/2024    folic acid (FOLVITE) 800 MCG tablet Take 1 tablet by mouth Daily.   8/17/2024    Glucosamine-Chondroit-Vit C-Mn (GLUCOSAMINE 1500 COMPLEX PO) Take  by mouth Daily.   8/17/2024    Inclisiran Sodium (Leqvio) 284 MG/1.5ML solution prefilled syringe Inject 1 mL under the skin into the appropriate area as directed Every 6 (Six) Months. 1 mL 1 8/14/2024    Multiple Vitamins-Minerals (OCUVITE ADULT 50+ PO) Take  by mouth Daily.   8/17/2024    nitroglycerin (NITROSTAT) 0.4 MG SL  tablet Place 1 tablet under the tongue Every 5 (Five) Minutes As Needed for Chest Pain. Take no more than 3 doses in 15 minutes. 25 tablet 5     phenazopyridine (PYRIDIUM) 100 MG tablet Take 1 tablet by mouth 3 (Three) Times a Day As Needed for Bladder Spasms. 20 tablet 0 More than a month    Polyethylene Glycol 3350 (MIRALAX PO) Take  by mouth As Needed.   8/18/2024    Probiotic Product (PROBIOTIC BLEND PO) Take  by mouth Daily.   8/18/2024    rivaroxaban (Xarelto) 20 MG tablet Take 1 tablet by mouth Daily. 90 tablet 3 8/18/2024 at 2200    vitamin A 70861 UNIT capsule Take 1 capsule by mouth Daily.   8/17/2024    and Allergies:  Eliquis [apixaban], Erythromycin, and Sulfa antibiotics    Current Facility-Administered Medications:     ceFAZolin 2000 mg IVPB in 100 mL NS (MBP), 2 g, Intravenous, Once **AND** metroNIDAZOLE (FLAGYL) IVPB 500 mg, 500 mg, Intravenous, Once, Jovanna Curtis MD, Last Rate: 100 mL/hr at 08/20/24 1419, 500 mg at 08/20/24 1419    heparin (porcine) 5000 UNIT/ML injection 5,000 Units, 5,000 Units, Subcutaneous, Q8H, Jovanna Curtis MD, 5,000 Units at 08/20/24 1421    lactated ringers infusion 1,000 mL, 1,000 mL, Intravenous, Continuous, Jovanna Curtis MD, Last Rate: 25 mL/hr at 08/20/24 1232, 1,000 mL at 08/20/24 1232    lactated ringers infusion 1,000 mL, 1,000 mL, Intravenous, Continuous, Jovanna Curtis MD, Last Rate: 25 mL/hr at 08/20/24 1230, 1,000 mL at 08/20/24 1230    lactated ringers infusion, 100 mL/hr, Intravenous, Continuous, Kathie Grider MD    lidocaine PF 1% (XYLOCAINE) injection 0.5 mL, 0.5 mL, Intradermal, Once PRN, Jovanna Curtis MD    Midazolam HCl (PF) (VERSED) injection 0.5 mg, 0.5 mg, Intravenous, Q10 Min PRN, Kathie Grider MD, 0.5 mg at 08/20/24 1431    sodium chloride 0.9 % flush 10 mL, 10 mL, Intravenous, Q12H, Jovanna Curtis MD    sodium chloride 0.9 % flush 10 mL, 10 mL, Intravenous, PRN, Jovanna Curtis MD     sodium chloride 0.9 % flush 3 mL, 3 mL, Intravenous, Q12H, Kathie Grider MD    sodium chloride 0.9 % flush 3-10 mL, 3-10 mL, Intravenous, PRN, Kathie Grider MD    sodium chloride 0.9 % infusion 40 mL, 40 mL, Intravenous, PRN, Jovanna Curtis MD    sodium chloride 0.9 % infusion 40 mL, 40 mL, Intravenous, PRN, Kathie Grider MD    Objective     Vital Signs   Temp:  [97.6 °F (36.4 °C)] 97.6 °F (36.4 °C)  Heart Rate:  [95] 95  Resp:  [18] 18  BP: (142)/(71) 142/71    Physical Exam:  General appearance - alert, well appearing, and in no distress  Mental status - alert, oriented to person, place, and time  Eyes - pupils equal and reactive, extraocular eye movements intact  Neck - supple, no significant adenopathy  Chest - no tachypnea, retractions or cyanosis  Heart - normal rate and regular rhythm  Abdomen - soft, nontender, nondistended, no masses or organomegaly  Neurological - alert, oriented, normal speech, no focal findings or movement disorder noted    Results Review:     Lab Results (last 24 hours)       ** No results found for the last 24 hours. **          Imaging Results (Last 24 Hours)       ** No results found for the last 24 hours. **              Assessment & Plan     Christiana Hendrix is a 74 y.o. female with chronic recurrent diverticulitis.  At this point, she has failed medical management. I have recommended laparoscopic robotic-assisted sigmoid colectomy with primary anastomosis, flexible sigmoidoscopy and possible splenic flexure mobilization with possible diverting loop ileostomy if positive leak test. We had a long discussion on the risks and benefits of surgery including (1) bleeding (2) infection including wound infection, abscess, and most significantly, leak. The patient understands that a leak could mean antibiotics and bowel rest or that a leak could necessitate a take back to the OR with ostomy due to possible sepsis. The patient understands that the risk of leak is  anywhere from 1-10%. We discussed the ways we decrease the risk of leak including bowel prep, no tension in the OR and ensuring adequate blood supply in the OR with ICG dye. (3) damage to the surrounding structures including ureters, bladder, small intestine, and nerves. (4) pulmonary complications (5) cardiac complications. We also discussed alternatives to include a change in diet with fiber supplementation; however the patient has tried this and it has not worked. The patient is scheduled for a laparoscopic robotic-assisted sigmoid colectomy with intra-operative flexible sigmoidoscopy and possible splenic flexure mobilization with possible diverting loop ileosotmy.         Jovanna Curtis MD  08/20/24  15:05 CDT

## 2024-08-21 LAB
ALBUMIN SERPL-MCNC: 3.3 G/DL (ref 3.5–5.2)
ALBUMIN/GLOB SERPL: 1.1 G/DL
ALP SERPL-CCNC: 201 U/L (ref 39–117)
ALT SERPL W P-5'-P-CCNC: 34 U/L (ref 1–33)
ANION GAP SERPL CALCULATED.3IONS-SCNC: 12 MMOL/L (ref 5–15)
AST SERPL-CCNC: 42 U/L (ref 1–32)
BASOPHILS # BLD AUTO: 0.06 10*3/MM3 (ref 0–0.2)
BASOPHILS NFR BLD AUTO: 0.3 % (ref 0–1.5)
BILIRUB SERPL-MCNC: 1 MG/DL (ref 0–1.2)
BUN SERPL-MCNC: 17 MG/DL (ref 8–23)
BUN/CREAT SERPL: 25 (ref 7–25)
CALCIUM SPEC-SCNC: 8.9 MG/DL (ref 8.6–10.5)
CHLORIDE SERPL-SCNC: 103 MMOL/L (ref 98–107)
CO2 SERPL-SCNC: 25 MMOL/L (ref 22–29)
CREAT SERPL-MCNC: 0.68 MG/DL (ref 0.57–1)
DEPRECATED RDW RBC AUTO: 49.8 FL (ref 37–54)
EGFRCR SERPLBLD CKD-EPI 2021: 92.1 ML/MIN/1.73
EOSINOPHIL # BLD AUTO: 0 10*3/MM3 (ref 0–0.4)
EOSINOPHIL NFR BLD AUTO: 0 % (ref 0.3–6.2)
ERYTHROCYTE [DISTWIDTH] IN BLOOD BY AUTOMATED COUNT: 15.8 % (ref 12.3–15.4)
GLOBULIN UR ELPH-MCNC: 2.9 GM/DL
GLUCOSE BLDC GLUCOMTR-MCNC: 142 MG/DL (ref 70–130)
GLUCOSE BLDC GLUCOMTR-MCNC: 160 MG/DL (ref 70–130)
GLUCOSE BLDC GLUCOMTR-MCNC: 165 MG/DL (ref 70–130)
GLUCOSE BLDC GLUCOMTR-MCNC: 193 MG/DL (ref 70–130)
GLUCOSE SERPL-MCNC: 167 MG/DL (ref 65–99)
HCT VFR BLD AUTO: 39.1 % (ref 34–46.6)
HGB BLD-MCNC: 11.9 G/DL (ref 12–15.9)
IMM GRANULOCYTES # BLD AUTO: 0.13 10*3/MM3 (ref 0–0.05)
IMM GRANULOCYTES NFR BLD AUTO: 0.6 % (ref 0–0.5)
LYMPHOCYTES # BLD AUTO: 0.51 10*3/MM3 (ref 0.7–3.1)
LYMPHOCYTES NFR BLD AUTO: 2.4 % (ref 19.6–45.3)
MAGNESIUM SERPL-MCNC: 1.8 MG/DL (ref 1.6–2.4)
MCH RBC QN AUTO: 26.2 PG (ref 26.6–33)
MCHC RBC AUTO-ENTMCNC: 30.4 G/DL (ref 31.5–35.7)
MCV RBC AUTO: 85.9 FL (ref 79–97)
MONOCYTES # BLD AUTO: 0.84 10*3/MM3 (ref 0.1–0.9)
MONOCYTES NFR BLD AUTO: 3.9 % (ref 5–12)
NEUTROPHILS NFR BLD AUTO: 19.74 10*3/MM3 (ref 1.7–7)
NEUTROPHILS NFR BLD AUTO: 92.8 % (ref 42.7–76)
NRBC BLD AUTO-RTO: 0 /100 WBC (ref 0–0.2)
PHOSPHATE SERPL-MCNC: 3.9 MG/DL (ref 2.5–4.5)
PLATELET # BLD AUTO: 227 10*3/MM3 (ref 140–450)
PMV BLD AUTO: 10.9 FL (ref 6–12)
POTASSIUM SERPL-SCNC: 3.8 MMOL/L (ref 3.5–5.2)
PROT SERPL-MCNC: 6.2 G/DL (ref 6–8.5)
RBC # BLD AUTO: 4.55 10*6/MM3 (ref 3.77–5.28)
SODIUM SERPL-SCNC: 140 MMOL/L (ref 136–145)
WBC NRBC COR # BLD AUTO: 21.28 10*3/MM3 (ref 3.4–10.8)

## 2024-08-21 PROCEDURE — 82948 REAGENT STRIP/BLOOD GLUCOSE: CPT

## 2024-08-21 PROCEDURE — 94799 UNLISTED PULMONARY SVC/PX: CPT

## 2024-08-21 PROCEDURE — 84100 ASSAY OF PHOSPHORUS: CPT | Performed by: STUDENT IN AN ORGANIZED HEALTH CARE EDUCATION/TRAINING PROGRAM

## 2024-08-21 PROCEDURE — 25010000002 HEPARIN (PORCINE) PER 1000 UNITS: Performed by: STUDENT IN AN ORGANIZED HEALTH CARE EDUCATION/TRAINING PROGRAM

## 2024-08-21 PROCEDURE — 99024 POSTOP FOLLOW-UP VISIT: CPT | Performed by: STUDENT IN AN ORGANIZED HEALTH CARE EDUCATION/TRAINING PROGRAM

## 2024-08-21 PROCEDURE — 80053 COMPREHEN METABOLIC PANEL: CPT | Performed by: STUDENT IN AN ORGANIZED HEALTH CARE EDUCATION/TRAINING PROGRAM

## 2024-08-21 PROCEDURE — 85025 COMPLETE CBC W/AUTO DIFF WBC: CPT | Performed by: STUDENT IN AN ORGANIZED HEALTH CARE EDUCATION/TRAINING PROGRAM

## 2024-08-21 PROCEDURE — 83735 ASSAY OF MAGNESIUM: CPT | Performed by: STUDENT IN AN ORGANIZED HEALTH CARE EDUCATION/TRAINING PROGRAM

## 2024-08-21 PROCEDURE — 25010000002 CEFAZOLIN PER 500 MG: Performed by: STUDENT IN AN ORGANIZED HEALTH CARE EDUCATION/TRAINING PROGRAM

## 2024-08-21 PROCEDURE — 63710000001 INSULIN LISPRO (HUMAN) PER 5 UNITS: Performed by: STUDENT IN AN ORGANIZED HEALTH CARE EDUCATION/TRAINING PROGRAM

## 2024-08-21 PROCEDURE — 25010000002 METRONIDAZOLE 500 MG/100ML SOLUTION: Performed by: STUDENT IN AN ORGANIZED HEALTH CARE EDUCATION/TRAINING PROGRAM

## 2024-08-21 PROCEDURE — 97161 PT EVAL LOW COMPLEX 20 MIN: CPT

## 2024-08-21 PROCEDURE — 94761 N-INVAS EAR/PLS OXIMETRY MLT: CPT

## 2024-08-21 PROCEDURE — 94640 AIRWAY INHALATION TREATMENT: CPT

## 2024-08-21 PROCEDURE — 97166 OT EVAL MOD COMPLEX 45 MIN: CPT

## 2024-08-21 RX ADMIN — CEFAZOLIN 2000 MG: 2 INJECTION, POWDER, FOR SOLUTION INTRAMUSCULAR; INTRAVENOUS at 08:48

## 2024-08-21 RX ADMIN — CEFAZOLIN 2000 MG: 2 INJECTION, POWDER, FOR SOLUTION INTRAMUSCULAR; INTRAVENOUS at 01:29

## 2024-08-21 RX ADMIN — TRAMADOL HYDROCHLORIDE 50 MG: 50 TABLET ORAL at 21:56

## 2024-08-21 RX ADMIN — OXYCODONE HYDROCHLORIDE 5 MG: 5 TABLET ORAL at 06:39

## 2024-08-21 RX ADMIN — HEPARIN SODIUM 5000 UNITS: 5000 INJECTION, SOLUTION INTRAVENOUS; SUBCUTANEOUS at 15:10

## 2024-08-21 RX ADMIN — HEPARIN SODIUM 5000 UNITS: 5000 INJECTION, SOLUTION INTRAVENOUS; SUBCUTANEOUS at 21:57

## 2024-08-21 RX ADMIN — BUDESONIDE AND FORMOTEROL FUMARATE DIHYDRATE 2 PUFF: 160; 4.5 AEROSOL RESPIRATORY (INHALATION) at 19:30

## 2024-08-21 RX ADMIN — METRONIDAZOLE 500 MG: 500 INJECTION, SOLUTION INTRAVENOUS at 06:02

## 2024-08-21 RX ADMIN — INSULIN LISPRO 2 UNITS: 100 INJECTION, SOLUTION INTRAVENOUS; SUBCUTANEOUS at 11:16

## 2024-08-21 RX ADMIN — GABAPENTIN 300 MG: 300 CAPSULE ORAL at 08:50

## 2024-08-21 RX ADMIN — HEPARIN SODIUM 5000 UNITS: 5000 INJECTION, SOLUTION INTRAVENOUS; SUBCUTANEOUS at 06:01

## 2024-08-21 RX ADMIN — CETIRIZINE HYDROCHLORIDE 10 MG: 10 TABLET ORAL at 08:50

## 2024-08-21 RX ADMIN — TRAMADOL HYDROCHLORIDE 50 MG: 50 TABLET ORAL at 10:54

## 2024-08-21 RX ADMIN — FAMOTIDINE 20 MG: 20 TABLET, FILM COATED ORAL at 08:50

## 2024-08-21 RX ADMIN — AMLODIPINE BESYLATE 10 MG: 10 TABLET ORAL at 08:50

## 2024-08-21 RX ADMIN — OXYCODONE HYDROCHLORIDE 5 MG: 5 TABLET ORAL at 19:04

## 2024-08-21 RX ADMIN — GABAPENTIN 300 MG: 300 CAPSULE ORAL at 21:57

## 2024-08-21 RX ADMIN — ACETAMINOPHEN 1000 MG: 500 TABLET, FILM COATED ORAL at 21:56

## 2024-08-21 RX ADMIN — ACETAMINOPHEN 1000 MG: 500 TABLET, FILM COATED ORAL at 08:50

## 2024-08-21 RX ADMIN — ACETAMINOPHEN 1000 MG: 500 TABLET, FILM COATED ORAL at 04:24

## 2024-08-21 RX ADMIN — INSULIN LISPRO 2 UNITS: 100 INJECTION, SOLUTION INTRAVENOUS; SUBCUTANEOUS at 21:57

## 2024-08-21 RX ADMIN — METOPROLOL TARTRATE 50 MG: 50 TABLET, FILM COATED ORAL at 22:25

## 2024-08-21 RX ADMIN — METOPROLOL TARTRATE 50 MG: 50 TABLET, FILM COATED ORAL at 08:50

## 2024-08-21 RX ADMIN — ACETAMINOPHEN 1000 MG: 500 TABLET, FILM COATED ORAL at 15:10

## 2024-08-21 RX ADMIN — BUDESONIDE AND FORMOTEROL FUMARATE DIHYDRATE 2 PUFF: 160; 4.5 AEROSOL RESPIRATORY (INHALATION) at 07:30

## 2024-08-21 RX ADMIN — FAMOTIDINE 20 MG: 20 TABLET, FILM COATED ORAL at 21:57

## 2024-08-21 RX ADMIN — LIDOCAINE 2 PATCH: 4 PATCH TOPICAL at 08:51

## 2024-08-21 NOTE — CASE MANAGEMENT/SOCIAL WORK
Discharge Planning Assessment  [facility]     Patient Name: Christiana Hendrix  MRN: [id number]  Today's Date: 8/21/2024    Admit Date: 8/20/2024        Discharge Needs Assessment       Row Name 08/21/24 1020       Living Environment    People in Home child(radha), adult    Name(s) of People in Home roxie Hendrix  [phone]    Current Living Arrangements home    Potentially Unsafe Housing Conditions none    In the past 12 months has the electric, gas, oil, or water company threatened to shut off services in your home? No    Primary Care Provided by self    Provides Primary Care For no one    Family Caregiver if Needed child(radha), adult    Family Caregiver Names roxie Hendrix    Quality of Family Relationships helpful;involved;supportive    Able to Return to Prior Arrangements yes       Resource/Environmental Concerns    Resource/Environmental Concerns none    Transportation Concerns none       Transportation Needs    In the past 12 months, has lack of transportation kept you from medical appointments or from getting medications? no    In the past 12 months, has lack of transportation kept you from meetings, work, or from getting things needed for daily living? No       Food Insecurity    Within the past 12 months, you worried that your food would run out before you got the money to buy more. Never true    Within the past 12 months, the food you bought just didn't last and you didn't have money to get more. Never true       Transition Planning    Patient/Family Anticipates Transition to home with family    Patient/Family Anticipated Services at Transition none    Transportation Anticipated family or friend will provide       Discharge Needs Assessment    Equipment Currently Used at Home cane, straight    Concerns to be Addressed denies needs/concerns at this time    Anticipated Changes Related to Illness none    Equipment Needed After Discharge none    Discharge Coordination/Progress Patient lives at home with  her son, Dillon Hendrix.  Uses a straight cane for ambulation. PCP is Dr. Petar Cummings. Has Medicare coverage. She denies any current needs and plans to discharge back home with her son. CM/SS is available if needed.                   Discharge Plan    No documentation.                 Continued Care and Services - Admitted Since 8/20/2024    No active coordination exists for this encounter.          Demographic Summary    No documentation.                  Functional Status    No documentation.                  Psychosocial    No documentation.                  Abuse/Neglect    No documentation.                  Legal    No documentation.                  Substance Abuse    No documentation.                  Patient Forms    No documentation.                     Sandra Doyle RN

## 2024-08-21 NOTE — OP NOTE
Laparoscopic Robotic-Assisted Sigmoid Colectomy with Intraoperative flexible sigmoidoscopy with Splenic Flexure Mobilization:     Patient: Christiana Hendrix  MRN: [id number]    YOB: 1950  Age: 74 y.o.  Sex: female  Unit:  PAD OR Room/Bed: PAD OR/MAIN OR Location: [facility]    Admitting Physician: YOLI SOW    Primary Care Physician: Petar Cummings MD             INDICATIONS: Christiana Hendrix is a 74 y.o. female with chronic recurrent diverticulitis. At this point, she has failed medical management. I have recommended laparoscopic robotic-assisted sigmoid colectomy with primary anastomosis, flexible sigmoidoscopy and possible splenic flexure mobilization with possible diverting loop ileostomy if positive leak test. We had a long discussion on the risks and benefits of surgery including (1) bleeding (2) infection including wound infection, abscess, and most significantly, leak. The patient understands that a leak could mean antibiotics and bowel rest or that a leak could necessitate a take back to the OR with ostomy due to possible sepsis. The patient understands that the risk of leak is anywhere from 1-10%. We discussed the ways we decrease the risk of leak including bowel prep, no tension in the OR and ensuring adequate blood supply in the OR with ICG dye. (3) damage to the surrounding structures including ureters, bladder, small intestine, and nerves. (4) pulmonary complications (5) cardiac complications. We also discussed alternatives to include a change in diet with fiber supplementation; however the patient has tried this and it has not worked. The patient is scheduled for a laparoscopic robotic-assisted sigmoid colectomy with intra-operative flexible sigmoidoscopy and possible splenic flexure mobilization with possible diverting loop ileosotmy.     DATE OF OPERATION: 8/20/2024     Surgeons and Role:     * Yoli Sow MD - Primary  Yu Sampson PA-C - Assist      ANESTHESIA: General     PREOPERATIVE DIAGNOSIS: Diverticulitis of large intestine without perforation or abscess without bleeding [K57.32]    POSTOPERATIVE DIAGNOSIS: Same    PROCEDURES PERFORMED:    (1) Laparoscopic, robotic-assisted sigmoid colectomy  (2) Intraoperative flexible sigmoidoscopy   (3) Laparoscopic, robotic-assisted Splenic flexure mobilization     PROCEDURE DETAILS:     After informed consent was obtained, the patient was brought to the operating room. The patient was given a dose of Ancef and Flagyl for preoperative prophylaxis. The patient was also given a dose of subcutaneous heparin for DVT prophylaxis. Once under general endotracheal anesthesia, the abdomen was prepped with ChloraPrep and sterilely draped. A time-out was performed per hospital policy to confirm the correct patient, location and procedure.          A scalpel was used to make an 8 mm incision in the left upper quadrant and a 5 mm Veress was inserted. The abdomen was insufflated to 15 mmHg. An robotic trocar was then inserted into the peritoneal cavity. A camera was introduced. Two additional 8 mm robotic trocars were placed in an oblique line from the left upper quadrant to the right lower quadrant, avoiding placing a trocar in the midline under direct visualization. The gelport mini was placed in the right lower quadrant and a 12 mm robotic stapling trocar was placed through it. The sigmoid colon was noted to be adherent to the left pelvic sidewall, and the distal sigmoid colon was noted to be woody in nature from recurrent diverticulitis. My laparoscopic TAP block was performed with my deep local. The patient was placed in Trendelenburg with the left side up. The robot was then docked.      With the tip up, force bipolar, and the scissors with cautery, the dissection was begun. I started my dissection laterally, freeing the colon from the left sidewall. I continued this dissection up to the splenic flexure, and down to  include lateral mobilization of the distal sigmoid. I then turned to the medial dissection. I incised the junction of the retroperitoneum and the mesentery and the avascular plane was entered. I quickly met my lateral plane. The left ureter was identified and preserved along its course.  The proximal rectum was mobilized with a combination of the vessel sealer and scissors. Hemostasis was confirmed. The mesentery of the rectum was divided off the retroperitoneum using the vessel sealer. The mid rectum was transected using a blue 60 mm robotic stapler below the abnormal mesentery that had a mass vs. A mesenteric abscess vs. The large 3 cm diverticulum found on colonoscopy. I then selected my proximal transection site, ensuring that this was proximal to the diseased segment of colon. Based on my proximal transection site, I knew I needed more reach. I proceed with a splenic flexure mobilization. Starting from distal, I was able to mobilize the distal splenic flexure using blunt dissection and the vessel sealer. I then went to the distal transverse colon and divided the omentum off the transverse colon from the mid transverse to the splenic flexure. I completed the splenic flexure mobilization. Using the vessel sealer, I divided the mesentery up to the transection site. This was transected with the vessel sealer on cut. The open end of the specimen was sewn shut with a 3-0 Vicryl to prevent spillage of stool.  The specimen was placed in the right upper quadrant to be removed at the end of the case. Then 1.5 mL of ICG followed by a 10 mL flush was then injected by anesthesia, using MARY, I was able to see that both the distal end of the colon and the transected rectum had more than adequate blood flow and were nicely perfused. I then placed the anvil in the distal open colon and sewed it into place with a 3-0 V-Loc. At this point, I went to bedside and used the EEA sizers, passing them sequentially into the rectum up to  29 mm. The 29 mm EEA was then inserted and I performed an end-to-end anastomosis. Prior to firing the stapler, the anvil was secured to the pin, and the mesentery was rechecked and noted to be straight with no twisting. The anastomosis was noted to be tension free. I then performed an intraoperative flexible sigmoidoscopy which showed an intact staple line, no bleeding from the staple line, and there were no bubbles on the leak test (negative leak test). All robotic instruments were removed from the abdomen. At this point, I scrubbed back in and removed the specimen from the gel port mini. It was sent for permanent pathology. The abdomen was noted to be hemostatic, and it was then desufflated.     The peritoneum of the extraction site was closed with an 0-Vicryl. The fascia was then closed with an 0-PDS.  The abdomen was re-insufflated and the fascial closure was noted to be air tight with no bowel caught in the closure. The incisions were closed with 4-0 Monocryl subcuticular sutures. All the counts were correct x2. Skin glue was placed on the port sites.  The patient tolerated the procedure well, was extubated, and was transferred to PACU in good condition.      Yu Sampson PA-C was responsible for performing the following activities: Retraction, Suction, Irrigation, Suturing, Closing, Placing Dressing, and exchanging robotic instruments  and their skilled assistance was necessary for the success of this case.    Findings: diverticulitis with mass vs. Abscess in mesentery. ICG with good blood flow. Anastomosis not under significant tension. Leak test negative.   Estimated Blood Loss:  75mL  Complications: none apparent             Specimens: Sigmoid Colon, Anastomotic donuts     This procedure was not performed to treat colon cancer through resection     Disposition: PACU - hemodynamically stable.           Condition: stable    Jovanna Curtis MD  06/12/2024

## 2024-08-21 NOTE — BRIEF OP NOTE
COLON RESECTION LAPAROSCOPIC SIGMOID WITH DAVINCI ROBOT  Progress Note    Christiana Hendrix  8/20/2024    Pre-op Diagnosis:   Diverticulitis of large intestine without perforation or abscess without bleeding [K57.32]       Post-Op Diagnosis Codes:     * Diverticulitis of large intestine without perforation or abscess without bleeding [K57.32]    Procedure/CPT® Codes:        Procedure(s):  COLON RESECTION LAPAROSCOPIC SIGMOID WITH DAVINCI ROBOT, INTRA-OPERATIVE FLEXIBLE SIGMOIDOSCOPY, SPLENIC MOBILIZATION.              Surgeon(s):  Jovanna Curtis MD    Anesthesia: General    Staff:   Circulator: Agnes Charlton RN; Nallely Dunn RN  Scrub Person: Juan Romero; Alejandra Alegria; Esau Adams  Assistant: Yu Sampson PA-C  Assistant: Yu Sampson PA-C      Estimated Blood Loss:  75mL    Urine Voided: * No values recorded between 8/20/2024  3:42 PM and 8/20/2024  6:58 PM *    Specimens:                Specimens       ID Source Type Tests Collected By Collected At Frozen?    A Large Intestine, Sigmoid Colon Tissue TISSUE PATHOLOGY EXAM   Jovanna Curtis MD 8/20/24 1835 No    Description: ANASTOMOTIC DONUTS    B Large Intestine, Sigmoid Colon Tissue TISSUE PATHOLOGY EXAM   Jovanna Curtis MD 8/20/24 1845 No    Description: SIGMOID COLON                  Drains:   Urethral Catheter Latex;Straight-tip 16 Fr. (Active)       Findings: diverticulitis with mass vs. Abscess in mesentery. ICG with good blood flow. Anastomosis not under significant tension. Leak test negative.         Complications: none apparent     Assistant: Yu Sampson PA-C  was responsible for performing the following activities: Retraction, Suction, Irrigation, Suturing, Closing, and Placing Dressing and their skilled assistance was necessary for the success of this case.    Jovanna Curtis MD     Date: 8/20/2024  Time: 19:05 CDT

## 2024-08-21 NOTE — ANESTHESIA POSTPROCEDURE EVALUATION
"Patient: Christiana Hendrix    Procedure Summary       Date: 08/20/24 Room / Location:  PAD OR 06 / [facility] P[facility] OR    Anesthesia Start: 1542 Anesthesia Stop: 1911    Procedure: COLON RESECTION LAPAROSCOPIC SIGMOID WITH DAVINCI ROBOT, INTRA-OPERATIVE FLEXIBLE SIGMOIDOSCOPY, SPLENIC MOBILIZATION. (Abdomen) Diagnosis:       Diverticulitis of large intestine without perforation or abscess without bleeding      (Diverticulitis of large intestine without perforation or abscess without bleeding [K57.32])    Surgeons: Jovanna Curtis MD Provider: Melita Alvarado CRNA    Anesthesia Type: general ASA Status: 3            Anesthesia Type: general    Vitals  Vitals Value Taken Time   /59 08/20/24 1955   Temp     Pulse 90 08/20/24 1957   Resp 16 08/20/24 1955   SpO2 95 % 08/20/24 1957   Vitals shown include unfiled device data.        Post Anesthesia Care and Evaluation    Patient location during evaluation: PACU  Patient participation: complete - patient participated  Level of consciousness: awake and alert  Pain management: adequate    Airway patency: patent  Anesthetic complications: No anesthetic complications  PONV Status: none  Cardiovascular status: acceptable and hemodynamically stable  Respiratory status: acceptable  Hydration status: acceptable    Comments: Blood pressure 136/71, pulse 93, temperature 97.6 °F (36.4 °C), temperature source Oral, resp. rate 16, height 163 cm (64.17\"), weight 88 kg (194 lb), SpO2 94%, not currently breastfeeding.    Patient discharged from PACU based upon Hardik score. Please see RN notes for further details    "

## 2024-08-21 NOTE — PLAN OF CARE
Goal Outcome Evaluation:  Plan of Care Reviewed With: patient        Progress: no change  Outcome Evaluation: A/Ox4, vitals stable, c/o mild incisional pain to abd. surgical stabs BEVERLY CDI. Pt up x1 walker ambulating in hallway w/ staff a couple times this shift. Tolerating full liquid deit, GI soft diet tomorrow morning. fall precautions, safety maintained.

## 2024-08-21 NOTE — PLAN OF CARE
Goal Outcome Evaluation:  Plan of Care Reviewed With: patient        Progress: no change  Outcome Evaluation: OT eval completed. Pt seated in chair upon therapist arrival; A&Ox4; c/o 6/10 abdominal pain; abdominal binder in place. Pt reports Mod I-I with all BADLs including fxl ambulation with use of SC at baseline. Today, Pt performed sit<>stand with CGA and verbal cues to push from chair. Pt ambulated in hallway utilizing HHAx2 with CGA; Pt did demo unsteadiness on feet and would benefit from use of rwx at this time. BUE strength grossly 4+/5 but Pt demos poor muscle endurance. Pt currently requires Max A with LB ADLs due to decreased ROM secondary to abdominal pain. Skilled OT intervention indicated in order to address deficits in fxl mobility, fxl activity tolerance, balance, strength, and use of adaptive techniques/equipment during performance of BADLs. Anticipate Pt to return home with assist from her son at discharge.      Anticipated Discharge Disposition (OT): home with assist

## 2024-08-21 NOTE — PLAN OF CARE
Goal Outcome Evaluation:  Plan of Care Reviewed With: patient           Outcome Evaluation: PT eval complete.Pt resting in recliner, AOx4 and reports generalized pain in the abdomen. Pt reports independence at home and her goal is to return to PLOF. Today pt performs functional AROM but reports increased pain with active hip flexion, therfore no MMT performed. Pt LE strength remains >3/5 and functional. Pt sit>stand with CGA and HHA and tolerated ambulation to 100', and rated RPE at 7/10 today. Pt demo R trunk lean with gait but states this is baseline. Pt returned to recliner and edcuated on benefits of continued mobility and goals with PT session. PT will benefit from skilled Pt to improve pain with mobility, RPE scale, stair safety and return to PLOF. Debra d/c home with assist from son when medically stable.      Anticipated Discharge Disposition (PT): home with assist

## 2024-08-21 NOTE — THERAPY EVALUATION
Patient Name: Christiana Hendrix  : 1950    MRN: [id number]                              Today's Date: 2024       Admit Date: 2024    Visit Dx:     ICD-10-CM ICD-9-CM   1. Diverticulitis of large intestine without perforation or abscess without bleeding  K57.32 562.11     Patient Active Problem List   Diagnosis    Coronary artery disease involving native coronary artery of native heart without angina pectoris    Mixed hyperlipidemia    Essential hypertension    PAF (paroxysmal atrial fibrillation)    Long term (current) use of anticoagulants    History of coronary artery stent placement    Class 1 obesity due to excess calories with serious comorbidity and body mass index (BMI) of 34.0 to 34.9 in adult    Urinary incontinence    Kidney stone    Hx of colonic polyp    Diverticulitis large intestine     Past Medical History:   Diagnosis Date    Asthma     Atrial fibrillation     CAD in native artery     Chronic UTI     Diverticulitis     Hyperlipidemia     Hypertension     Kidney stone 2023    Lateral meniscus tear     right    MI, old      Past Surgical History:   Procedure Laterality Date    ABLATION OF DYSRHYTHMIC FOCUS      BLADDER NECK SUSPENSION      BREAST BIOPSY Bilateral         CARDIOVERSION      CATARACT EXTRACTION WITH INTRAOCULAR LENS IMPLANT Bilateral     CHOLECYSTECTOMY      COLON RESECTION N/A 2024    Procedure: COLON RESECTION LAPAROSCOPIC SIGMOID WITH DAVINCI ROBOT, INTRA-OPERATIVE FLEXIBLE SIGMOIDOSCOPY, SPLENIC MOBILIZATION.;  Surgeon: Jovanna Curtis MD;  Location:  [facility] OR;  Service: Robotics - DaVinci;  Laterality: N/A;    COLONOSCOPY N/A 2024    Procedure: COLONOSCOPY WITH ANESTHESIA;  Surgeon: Thiago Mensah MD;  Location: [facility] ENDOSCOPY;  Service: Gastroenterology;  Laterality: N/A;  pre: hx polyps  post: polyps. diverticulosis.   mounika schaeffer    COLONOSCOPY W/ BIOPSIES      CORONARY ANGIOPLASTY  2007    had stents     CORONARY  STENT PLACEMENT  2002    EXTRACORPOREAL SHOCK WAVE LITHOTRIPSY (ESWL) Right 06/05/2023    Procedure: EXTRACORPOREAL SHOCKWAVE LITHOTRIPSY-right;  Surgeon: Jamar Hendrickson MD;  Location:  [facility] OR;  Service: Urology;  Laterality: Right;    HYSTERECTOMY      KNEE ARTHROSCOPY Right 11/02/2023    Procedure: RIGHT KNEE ARTHROSCOPIC LATERAL MENISCUS ROOT REPAIR, PARTIAL MEDIAL MENISECTOMY;  Surgeon: Raymond Puentes MD;  Location:  [facility] OR;  Service: Orthopedics;  Laterality: Right;    VEIN SURGERY  1988      General Information       Row Name 08/21/24 [facility]          OT Time and Intention    Document Type evaluation  cc: chronic recurrent diverticulitis. Pt now s/p Sigmoid Colectomy on 8/20  -     Mode of Treatment occupational therapy  -       Row Name 08/21/24 08          General Information    Patient Profile Reviewed yes  -     Prior Level of Function independent:;ADL's;all household mobility;community mobility;driving;home management;cooking;cleaning  Utilizes SC for fxl ambulation  -     Existing Precautions/Restrictions fall;other (see comments)  abdominal binder  -     Barriers to Rehab previous functional deficit  -       Row Name 08/21/24 [facility]          Occupational Profile    Environmental Supports and Barriers (Occupational Profile) Walk in shower with built in bench; utilizes a rwx when getting in/out of the shower. Grab bars next to toilet. Has step stool with handle to get in bed. Other AD/DME: SC, rwx  -       Row Name 08/21/24 08          Living Environment    People in Home child(radha), adult  -       Row Name 08/21/24 08          Home Main Entrance    Number of Stairs, Main Entrance four  -LS     Stair Railings, Main Entrance railing on left side (ascending)  -       Row Name 08/21/24 08          Stairs Within Home, Primary    Number of Stairs, Within Home, Primary other (see comments)  flight of stairs to second floor but Pt does not utilize that floor  -       Row Name  08/21/24 0808          Cognition    Orientation Status (Cognition) oriented x 4  -LS       Row Name 08/21/24 0808          Safety Issues, Functional Mobility    Impairments Affecting Function (Mobility) balance;pain;endurance/activity tolerance;strength  -LS               User Key  (r) = Recorded By, (t) = Taken By, (c) = Cosigned By      Initials Name Provider Type    LS Sherice Pepe, OTR/L Occupational Therapist                     Mobility/ADL's       Row Name 08/21/24 0808          Transfers    Transfers sit-stand transfer;stand-sit transfer  -LS       Row Name 08/21/24 0808          Sit-Stand Transfer    Sit-Stand [place] (Transfers) contact guard;verbal cues  -LS       Row Name 08/21/24 0808          Stand-Sit Transfer    Stand-Sit [place] (Transfers) contact guard;verbal cues  -       Row Name 08/21/24 08          Functional Mobility    Functional Mobility- Ind. Level contact guard assist  -LS     Functional Mobility- Device other (see comments)  HHAx2  -       Row Name 08/21/24 0808          Activities of Daily Living    BADL Assessment/Intervention upper body dressing;lower body dressing;toileting  -       Row Name 08/21/24 08          Upper Body Dressing Assessment/Training    [place] Level (Upper Body Dressing) upper body dressing skills;set up  -LS     Position (Upper Body Dressing) unsupported sitting  -       Row Name 08/21/24 08          Lower Body Dressing Assessment/Training    [place] Level (Lower Body Dressing) lower body dressing skills;maximum assist (25% patient effort)  -LS     Position (Lower Body Dressing) unsupported sitting;supported standing  -       Row Name 08/21/24 0808          Toileting Assessment/Training    [place] Level (Toileting) toileting skills;maximum assist (25% patient effort)  -LS     Position (Toileting) unsupported sitting;supported standing  -LS               User Key  (r) = Recorded By, (t) = Taken By, (c) = Cosigned By       Initials Name Provider Type     Sherice Pepe OTR/L Occupational Therapist                   Obj/Interventions       [facility] Name 08/21/24 0808          Sensory Assessment (Somatosensory)    Sensory Assessment (Somatosensory) UE sensation intact  -[facility] Name 08/21/24 0808          Vision Assessment/Intervention    Visual Impairment/Limitations WFL  -LS       [facility] Name 08/21/24 0808          Range of Motion Comprehensive    General Range of Motion bilateral upper extremity ROM WFL  -LS       Row Name 08/21/24 0808          Strength Comprehensive (MMT)    Comment, General Manual Muscle Testing (MMT) Assessment BUE strength grossly 4+/5 but poor muscle endurance  -LS       Row Name 08/21/24 0808          Balance    Balance Assessment sitting static balance;sitting dynamic balance;standing static balance;standing dynamic balance  -LS     Static Sitting Balance independent  -LS     Dynamic Sitting Balance independent  -LS     Position, Sitting Balance unsupported;sitting in chair  -LS     Static Standing Balance contact guard  -LS     Dynamic Standing Balance contact guard  -LS     Position/Device Used, Standing Balance other (see comments);supported  HHAx2  -LS               User Key  (r) = Recorded By, (t) = Taken By, (c) = Cosigned By      Initials Name Provider Type     Sherice Pepe OTR/L Occupational Therapist                   Goals/Plan       Row Name 08/21/24 0808          Transfer Goal 1 (OT)    Activity/Assistive Device (Transfer Goal 1, OT) toilet;shower chair  -     [place] Level/Cues Needed (Transfer Goal 1, OT) modified independence  -     Time Frame (Transfer Goal 1, OT) long term goal (LTG);10 days  -     Progress/Outcome (Transfer Goal 1, OT) new goal  -LS       Row Name 08/21/24 0808          Dressing Goal 1 (OT)    Activity/Device (Dressing Goal 1, OT) dressing skills, all  -LS     [place]/Cues Needed (Dressing Goal 1, OT) minimum assist (75% or more patient effort)  -      Time Frame (Dressing Goal 1, OT) long term goal (LTG);10 days  -LS     Progress/Outcome (Dressing Goal 1, OT) new goal  -LS       Row Name 08/21/24 0808          Toileting Goal 1 (OT)    Activity/Device (Toileting Goal 1, OT) toileting skills, all  -LS     [place] Level/Cues Needed (Toileting Goal 1, OT) modified independence  -LS     Time Frame (Toileting Goal 1, OT) long term goal (LTG);10 days  -LS     Progress/Outcome (Toileting Goal 1, OT) new goal  -LS       Row Name 08/21/24 0808          Therapy Assessment/Plan (OT)    Planned Therapy Interventions (OT) activity tolerance training;functional balance retraining;occupation/activity based interventions;ROM/therapeutic exercise;strengthening exercise;transfer/mobility retraining;patient/caregiver education/training;adaptive equipment training;BADL retraining  -LS               User Key  (r) = Recorded By, (t) = Taken By, (c) = Cosigned By      Initials Name Provider Type    LS Sherice Pepe, OTR/L Occupational Therapist                   Clinical Impression       Row Name 08/21/24 0808          Pain Assessment    Pretreatment Pain Rating 6/10  -LS     Posttreatment Pain Rating 6/10  -LS     Pain Location - abdomen  -LS     Pain Intervention(s) Repositioned;Ambulation/increased activity  -       Row Name 08/21/24 0808          Plan of Care Review    Plan of Care Reviewed With patient  -LS     Progress no change  -LS     Outcome Evaluation OT eval completed. Pt seated in chair upon therapist arrival; A&Ox4; c/o 6/10 abdominal pain; abdominal binder in place. Pt reports Mod I-I with all BADLs including fxl ambulation with use of SC at baseline. Today, Pt performed sit<>stand with CGA and verbal cues to push from chair. Pt ambulated in hallway utilizing HHAx2 with CGA; Pt did demo unsteadiness on feet and would benefit from use of rwx at this time. BUE strength grossly 4+/5 but Pt demos poor muscle endurance. Pt currently requires Max A with LB ADLs due to  decreased ROM secondary to abdominal pain. Skilled OT intervention indicated in order to address deficits in fxl mobility, fxl activity tolerance, balance, strength, and use of adaptive techniques/equipment during performance of BADLs. Anticipate Pt to return home with assist from her son at discharge.  -       Row Name 08/21/24 0808          Therapy Assessment/Plan (OT)    Rehab Potential (OT) good, to achieve stated therapy goals  -     Criteria for Skilled Therapeutic Interventions Met (OT) yes;skilled treatment is necessary  -     Therapy Frequency (OT) 5 times/wk  -       Row Name 08/21/24 0808          Therapy Plan Review/Discharge Plan (OT)    Anticipated Discharge Disposition (OT) home with assist  -       Row Name 08/21/24 0808          Positioning and Restraints    Pre-Treatment Position sitting in chair/recliner  -LS     Post Treatment Position chair  -LS     In Chair sitting;call light within reach;encouraged to call for assist  -LS               User Key  (r) = Recorded By, (t) = Taken By, (c) = Cosigned By      Initials Name Provider Type    Sherice Haskins OTR/L Occupational Therapist                   Outcome Measures       Row Name 08/21/24 0808          How much help from another is currently needed...    Putting on and taking off regular lower body clothing? 2  -LS     Bathing (including washing, rinsing, and drying) 2  -LS     Toileting (which includes using toilet bed pan or urinal) 2  -LS     Putting on and taking off regular upper body clothing 4  -LS     Taking care of personal grooming (such as brushing teeth) 4  -LS     Eating meals 4  -LS     AM-PAC 6 Clicks Score (OT) 18  -[facility] Name 08/21/24 0808          Functional Assessment    Outcome Measure Options AM-PAC 6 Clicks Daily Activity (OT)  -LS               User Key  (r) = Recorded By, (t) = Taken By, (c) = Cosigned By      Initials Name Provider Type    Sherice Haskins OTR/L Occupational Therapist                     Occupational Therapy Education       Title: PT OT SLP Therapies (In Progress)       Topic: Occupational Therapy (In Progress)       Point: ADL training (Done)       Description:   Instruct learner(s) on proper safety adaptation and remediation techniques during self care or transfers.   Instruct in proper use of assistive devices.                  Learning Progress Summary             Patient Acceptance, E, VU by  at 8/21/2024 0846                         Point: Home exercise program (Not Started)       Description:   Instruct learner(s) on appropriate technique for monitoring, assisting and/or progressing therapeutic exercises/activities.                  Learner Progress:  Not documented in this visit.              Point: Precautions (Done)       Description:   Instruct learner(s) on prescribed precautions during self-care and functional transfers.                  Learning Progress Summary             Patient Acceptance, E, VU by  at 8/21/2024 0846                         Point: Body mechanics (Done)       Description:   Instruct learner(s) on proper positioning and spine alignment during self-care, functional mobility activities and/or exercises.                  Learning Progress Summary             Patient Acceptance, E, VU by  at 8/21/2024 0846                                         User Key       Initials Effective Dates Name Provider Type Discipline     06/20/22 -  Sherice Pepe, OTR/L Occupational Therapist OT                  OT Recommendation and Plan  Planned Therapy Interventions (OT): activity tolerance training, functional balance retraining, occupation/activity based interventions, ROM/therapeutic exercise, strengthening exercise, transfer/mobility retraining, patient/caregiver education/training, adaptive equipment training, BADL retraining  Therapy Frequency (OT): 5 times/wk  Plan of Care Review  Plan of Care Reviewed With: patient  Progress: no change  Outcome Evaluation: OT yelena  completed. Pt seated in chair upon therapist arrival; A&Ox4; c/o 6/10 abdominal pain; abdominal binder in place. Pt reports Mod I-I with all BADLs including fxl ambulation with use of SC at baseline. Today, Pt performed sit<>stand with CGA and verbal cues to push from chair. Pt ambulated in hallway utilizing HHAx2 with CGA; Pt did demo unsteadiness on feet and would benefit from use of rwx at this time. BUE strength grossly 4+/5 but Pt demos poor muscle endurance. Pt currently requires Max A with LB ADLs due to decreased ROM secondary to abdominal pain. Skilled OT intervention indicated in order to address deficits in fxl mobility, fxl activity tolerance, balance, strength, and use of adaptive techniques/equipment during performance of BADLs. Anticipate Pt to return home with assist from her son at discharge.     Time Calculation:         Time Calculation- OT       Row Name 08/21/24 0808             Time Calculation- OT    OT Start Time 0808  +10 minutes chart review  -      OT Stop Time 0839  -      OT Time Calculation (min) 31 min  -      OT Non-Billable Time (min) 41 min  -      OT Received On 08/21/24  -      OT Goal Re-Cert Due Date 08/31/24  -                User Key  (r) = Recorded By, (t) = Taken By, (c) = Cosigned By      Initials Name Provider Type    Sherice Haskins OTR/L Occupational Therapist                  Therapy Charges for Today       Code Description Service Date Service Provider Modifiers Qty    [id number] HC OT EVAL MOD COMPLEXITY 3 8/21/2024 Sherice Pepe OTR/L GO 1                 MARCIAL Ortiz/WILLIAMS  8/21/2024

## 2024-08-21 NOTE — PROGRESS NOTES
Jovanna Curtis MD - General Surgery  Progress Note     LOS: 1 day   Patient Care Team:  Petar Cummings MD as PCP - General (Internal Medicine)  Choco Diallo MD (Inactive) as Cardiologist (Cardiology)  Chrissy Allen APRN as Nurse Practitioner (Family Medicine)  Praneeth Diallo MD as Surgeon (Orthopedic Surgery)  Jovanna Curtis MD as Consulting Physician (General Surgery)      Subjective     Interval History:     POD 1. Ambulated x 2. Tolerating clears. Mild bloating but no nausea, vomiting. She is hungry. Vitals stable.  Pain well controlled. No bowel function.     Objective     Vital Signs  Temp:  [97.4 °F (36.3 °C)-97.8 °F (36.6 °C)] 97.6 °F (36.4 °C)  Heart Rate:  [76-95] 94  Resp:  [16-18] 16  BP: (113-142)/(46-73) 136/71    Physical Exam:  General appearance - alert, well appearing, and in no distress  Mental status - alert, oriented to person, place, and time  Eyes - pupils equal and reactive, extraocular eye movements intact  Neck - supple, no significant adenopathy  Chest - no tachypnea, retractions or cyanosis  Heart - normal rate and regular rhythm  Abdomen - soft, appropriately tender, incisions c/d/i  Neurological - alert, oriented, normal speech, no focal findings or movement disorder noted      Results Review:    Lab Results (last 24 hours)       Procedure Component Value Units Date/Time    POC Glucose Once [[id number]]  (Abnormal) Collected: 08/21/24 1054    Specimen: Blood Updated: 08/21/24 1105     Glucose 193 mg/dL      Comment: : [id number] Antonio Richardson ID: [id number]       POC Glucose Once [[id number]]  (Abnormal) Collected: 08/21/24 0751    Specimen: Blood Updated: 08/21/24 0802     Glucose 142 mg/dL      Comment: : [id number] Ever Pate ID: [id number]       Tissue Pathology Exam [[id number]] Collected: 08/20/24 1835    Specimen: Tissue from Large Intestine, Sigmoid Colon; Tissue from Large Intestine, Sigmoid Colon Updated: 08/21/24 0732     Comprehensive Metabolic Panel [[id number]]  (Abnormal) Collected: 08/21/24 0505    Specimen: Blood Updated: 08/21/24 0547     Glucose 167 mg/dL      BUN 17 mg/dL      Creatinine 0.68 mg/dL      Sodium 140 mmol/L      Potassium 3.8 mmol/L      Chloride 103 mmol/L      CO2 25.0 mmol/L      Calcium 8.9 mg/dL      Total Protein 6.2 g/dL      Albumin 3.3 g/dL      ALT (SGPT) 34 U/L      AST (SGOT) 42 U/L      Alkaline Phosphatase 201 U/L      Total Bilirubin 1.0 mg/dL      Globulin 2.9 gm/dL      A/G Ratio 1.1 g/dL      BUN/Creatinine Ratio 25.0     Anion Gap 12.0 mmol/L      eGFR 92.1 mL/min/1.73     Narrative:      GFR Normal >60  Chronic Kidney Disease <60  Kidney Failure <15    The GFR formula is only valid for adults with stable renal function between ages 18 and 70.    Phosphorus [[id number]]  (Normal) Collected: 08/21/24 0505    Specimen: Blood Updated: 08/21/24 0547     Phosphorus 3.9 mg/dL     Magnesium [[id number]]  (Normal) Collected: 08/21/24 0505    Specimen: Blood Updated: 08/21/24 0547     Magnesium 1.8 mg/dL     CBC & Differential [[id number]]  (Abnormal) Collected: 08/21/24 0505    Specimen: Blood Updated: 08/21/24 0524    Narrative:      The following orders were created for panel order CBC & Differential.  Procedure                               Abnormality         Status                     ---------                               -----------         ------                     CBC Auto Differential[[id number]]        Abnormal            Final result                 Please view results for these tests on the individual orders.    CBC Auto Differential [[id number]]  (Abnormal) Collected: 08/21/24 0505    Specimen: Blood Updated: 08/21/24 0524     WBC 21.28 10*3/mm3      RBC 4.55 10*6/mm3      Hemoglobin 11.9 g/dL      Hematocrit 39.1 %      MCV 85.9 fL      MCH 26.2 pg      MCHC 30.4 g/dL      RDW 15.8 %      RDW-SD 49.8 fl      MPV 10.9 fL      Platelets 227 10*3/mm3      Neutrophil % 92.8 %      Lymphocyte %  2.4 %      Monocyte % 3.9 %      Eosinophil % 0.0 %      Basophil % 0.3 %      Immature Grans % 0.6 %      Neutrophils, Absolute 19.74 10*3/mm3      Lymphocytes, Absolute 0.51 10*3/mm3      Monocytes, Absolute 0.84 10*3/mm3      Eosinophils, Absolute 0.00 10*3/mm3      Basophils, Absolute 0.06 10*3/mm3      Immature Grans, Absolute 0.13 10*3/mm3      nRBC 0.0 /100 WBC     POC Glucose Once [[id number]]  (Abnormal) Collected: 08/20/24 2108    Specimen: Blood Updated: 08/20/24 2119     Glucose 179 mg/dL      Comment: : [id number] Bartolo BrittaneyMeter ID: [id number]             Imaging Results (Last 24 Hours)       ** No results found for the last 24 hours. **              Assessment & Plan       POD 1 s/p robotic sigmoid colectomy. Encourage ambulation. DC garza. DC IVF. Advance to full liquid diet. GI soft in AM for breakfast. Labs reviewed and stable. Post op antibiotics x 2 doses ordered. DVT ppx ordered. Await return of bowel function.       Jovanna Curtis MD  08/21/24  11:15 CDT

## 2024-08-21 NOTE — THERAPY EVALUATION
Patient Name: Christiana Hendrix  : 1950    MRN: [id number]                              Today's Date: 2024       Admit Date: 2024    Visit Dx:     ICD-10-CM ICD-9-CM   1. Impaired mobility [Z74.09]  Z74.09 799.89   2. Diverticulitis of large intestine without perforation or abscess without bleeding  K57.32 562.11     Patient Active Problem List   Diagnosis    Coronary artery disease involving native coronary artery of native heart without angina pectoris    Mixed hyperlipidemia    Essential hypertension    PAF (paroxysmal atrial fibrillation)    Long term (current) use of anticoagulants    History of coronary artery stent placement    Class 1 obesity due to excess calories with serious comorbidity and body mass index (BMI) of 34.0 to 34.9 in adult    Urinary incontinence    Kidney stone    Hx of colonic polyp    Diverticulitis large intestine     Past Medical History:   Diagnosis Date    Asthma     Atrial fibrillation     CAD in native artery     Chronic UTI     Diverticulitis     Hyperlipidemia     Hypertension     Kidney stone 2023    Lateral meniscus tear     right    MI, old      Past Surgical History:   Procedure Laterality Date    ABLATION OF DYSRHYTHMIC FOCUS      BLADDER NECK SUSPENSION      BREAST BIOPSY Bilateral     2002    CARDIOVERSION      CATARACT EXTRACTION WITH INTRAOCULAR LENS IMPLANT Bilateral     CHOLECYSTECTOMY      COLON RESECTION N/A 2024    Procedure: COLON RESECTION LAPAROSCOPIC SIGMOID WITH [employer] ROBOT, INTRA-OPERATIVE FLEXIBLE SIGMOIDOSCOPY, SPLENIC MOBILIZATION.;  Surgeon: Jovanna Curtis MD;  Location: [facility] OR;  Service: Robotics - DaVinci;  Laterality: N/A;    COLONOSCOPY N/A 2024    Procedure: COLONOSCOPY WITH ANESTHESIA;  Surgeon: Thiago Mensah MD;  Location: [facility] ENDOSCOPY;  Service: Gastroenterology;  Laterality: N/A;  pre: hx polyps  post: polyps. diverticulosis.   mounika schaeffer    COLONOSCOPY W/ BIOPSIES      CORONARY ANGIOPLASTY   08/13/2007    had stents 2003    CORONARY STENT PLACEMENT  2002    EXTRACORPOREAL SHOCK WAVE LITHOTRIPSY (ESWL) Right 06/05/2023    Procedure: EXTRACORPOREAL SHOCKWAVE LITHOTRIPSY-right;  Surgeon: Jamar Hendrickson MD;  Location:  [facility] OR;  Service: Urology;  Laterality: Right;    HYSTERECTOMY      KNEE ARTHROSCOPY Right 11/02/2023    Procedure: RIGHT KNEE ARTHROSCOPIC LATERAL MENISCUS ROOT REPAIR, PARTIAL MEDIAL MENISECTOMY;  Surgeon: Raymodn Puentes MD;  Location:  [facility] OR;  Service: Orthopedics;  Laterality: Right;    VEIN SURGERY  1988      General Information       Row Name 08/21/24 08          Physical Therapy Time and Intention    Document Type evaluation  Dx; recurrent diverticuitis resulting in colon resection on 8/20  -     Mode of Treatment physical therapy  -       Row Name 08/21/24 08          General Information    Patient Profile Reviewed yes  -AJ     Prior Level of Function independent:;all household mobility;community mobility;gait;ADL's;home management  -AJ     Existing Precautions/Restrictions fall;other (see comments)  abd binder  -     Barriers to Rehab previous functional deficit;physical barrier  -       Row Name 08/21/24 08          Living Environment    People in Home child(radha), adult  -       Row Name 08/21/24 0807          Home Main Entrance    Number of Stairs, Main Entrance four  -AJ     Stair Railings, Main Entrance railing on left side (ascending)  -       Row Name 08/21/24 0807          Stairs Within Home, Primary    Number of Stairs, Within Home, Primary other (see comments)  has stairs but does not use them  -       Row Name 08/21/24 0807          Cognition    Orientation Status (Cognition) oriented x 4  -       Row Name 08/21/24 0807          Safety Issues, Functional Mobility    Impairments Affecting Function (Mobility) balance;pain;endurance/activity tolerance;strength  -               User Key  (r) = Recorded By, (t) = Taken By, (c) = Cosigned By       Initials Name Provider Type    Silvestre Saleh PT DPT Physical Therapist                   Mobility       Row Name 08/21/24 0807          Bed-Chair Transfer    Bed-Chair [place] (Transfers) contact guard;1 person assist  -     Assistive Device (Bed-Chair Transfers) other (see comments)  HHA but left FWW in room  -       Row Name 08/21/24 0807          Sit-Stand Transfer    Sit-Stand [place] (Transfers) contact guard  -     Assistive Device (Sit-Stand Transfers) other (see comments)  HHA but left FWW in room  -       Row Name 08/21/24 0807          Gait/Stairs (Locomotion)    [place] Level (Gait) contact guard;minimum assist (75% patient effort);1 person to manage equipment  -     Assistive Device (Gait) other (see comments)  HHA but left FWW in room  -     Distance in Feet (Gait) 100  -     Deviations/Abnormal Patterns (Gait) bilateral deviations  -     Bilateral Gait Deviations forward flexed posture  -     Right Sided Gait Deviations leans right;lateral trunk flexion  -               User Key  (r) = Recorded By, (t) = Taken By, (c) = Cosigned By      Initials Name Provider Type    Silvestre Saleh PT DPT Physical Therapist                   Obj/Interventions       Row Name 08/21/24 0807          Range of Motion Comprehensive    General Range of Motion bilateral lower extremity ROM WFL  -       Row Name 08/21/24 0807          Strength Comprehensive (MMT)    General Manual Muscle Testing (MMT) Assessment lower extremity strength deficits identified  -     Comment, General Manual Muscle Testing (MMT) Assessment deferred MMT due to abdominal pain with hip flexion today, remains >3/5  -       Row Name 08/21/24 0807          Motor Skills    Motor Skills functional endurance  -     Functional Endurance pain limited  -       Row Name 08/21/24 0807          Balance    Balance Assessment sitting static balance;sitting dynamic balance;standing static balance;standing  dynamic balance  -AJ     Static Sitting Balance independent  -AJ     Dynamic Sitting Balance independent  -AJ     Position, Sitting Balance unsupported;sitting in chair  -AJ     Static Standing Balance contact guard  -AJ     Dynamic Standing Balance contact guard  -AJ     Position/Device Used, Standing Balance other (see comments)  HHA but left FWW in room  -AJ     Balance Interventions sitting;standing;sit to stand;supported;static;dynamic  -AJ       Row Name 08/21/24 0807          Sensory Assessment (Somatosensory)    Sensory Assessment (Somatosensory) LE sensation intact  -AJ               User Key  (r) = Recorded By, (t) = Taken By, (c) = Cosigned By      Initials Name Provider Type    Silvestre Saleh, PT DPT Physical Therapist                   Goals/Plan       Row Name 08/21/24 0807          Bed Mobility Goal 1 (PT)    Activity/Assistive Device (Bed Mobility Goal 1, PT) rolling to left;rolling to right;sit to supine;supine to sit  -AJ     [place] Level/Cues Needed (Bed Mobility Goal 1, PT) standby assist  -AJ     Time Frame (Bed Mobility Goal 1, PT) long term goal (LTG);10 days  -AJ     Strategies/Barriers (Bed Mobility Goal 1, PT) pain less than 4/10  -AJ     Progress/Outcomes (Bed Mobility Goal 1, PT) new goal  -       Row Name 08/21/24 0807          Transfer Goal 1 (PT)    Activity/Assistive Device (Transfer Goal 1, PT) sit-to-stand/stand-to-sit;bed-to-chair/chair-to-bed;toilet;transfers, all  -AJ     [place] Level/Cues Needed (Transfer Goal 1, PT) independent  -AJ     Time Frame (Transfer Goal 1, PT) long term goal (LTG);10 days  -AJ     Progress/Outcome (Transfer Goal 1, PT) new goal  -       Row Name 08/21/24 0807          Gait Training Goal 1 (PT)    Activity/Assistive Device (Gait Training Goal 1, PT) gait (walking locomotion);decrease asymmetrical patterns;decrease fall risk;diminish gait deviation;forward stepping;improve balance and speed;increase endurance/gait distance;increase  energy conservation;assistive device use;walker, rolling  -AJ     [place] Level (Gait Training Goal 1, PT) standby assist  -AJ     Distance (Gait Training Goal 1, PT) 250' with RPE 4/10 and pain 4/10 or less  -AJ     Time Frame (Gait Training Goal 1, PT) long term goal (LTG);10 days  -AJ     Progress/Outcome (Gait Training Goal 1, PT) new goal  -AJ       Row Name 08/21/24 0807          Stairs Goal 1 (PT)    Activity/Assistive Device (Stairs Goal 1, PT) ascending stairs;descending stairs;using handrail, left;step-to-step;decrease fall risk;improve balance and speed  -AJ     [place] Level/Cues Needed (Stairs Goal 1, PT) independent  -AJ     Number of Stairs (Stairs Goal 1, PT) 4 as needed for home safety and pain less than 4/10  -AJ     Time Frame (Stairs Goal 1, PT) long term goal (LTG);10 days  -AJ     Progress/Outcome (Stairs Goal 1, PT) new goal  -       Row Name 08/21/24 0807          Therapy Assessment/Plan (PT)    Planned Therapy Interventions (PT) balance training;bed mobility training;gait training;home exercise program;patient/family education;transfer training;strengthening;stair training;ROM (range of motion);postural re-education  -AJ               User Key  (r) = Recorded By, (t) = Taken By, (c) = Cosigned By      Initials Name Provider Type    Silvestre Saleh, PT DPT Physical Therapist                   Clinical Impression       Row Name 08/21/24 0807          Pain    Pretreatment Pain Rating 6/10  -AJ     Posttreatment Pain Rating 6/10  -AJ     Pain Location generalized  -AJ     Pain Location - abdomen  -AJ     Pain Intervention(s) Ambulation/increased activity;Nursing Notified;Repositioned  -AJ       Row Name 08/21/24 0807          Plan of Care Review    Plan of Care Reviewed With patient  -AJ     Outcome Evaluation PT eval complete.Pt resting in recliner, AOx4 and reports generalized pain in the abdomen. Pt reports independence at home and her goal is to return to PLOF. Today pt performs  functional AROM but reports increased pain with active hip flexion, therfore no MMT performed. Pt LE strength remains >3/5 and functional. Pt sit>stand with CGA and HHA and tolerated ambulation to 100', and rated RPE at 7/10 today. Pt demo R trunk lean with gait but states this is baseline. Pt returned to recliner and edcuated on benefits of continued mobility and goals with PT session. PT will benefit from skilled Pt to improve pain with mobility, RPE scale, stair safety and return to PLOF. Anticiapte d/c home with assist from son when medically stable.  -       Row Name 08/21/24 0807          Therapy Assessment/Plan (PT)    Patient/Family Therapy Goals Statement (PT) get pain better  -AJ     Rehab Potential (PT) good, to achieve stated therapy goals  -     Criteria for Skilled Interventions Met (PT) yes;meets criteria;skilled treatment is necessary  -AJ     Therapy Frequency (PT) 2 times/day  -AJ     Predicted Duration of Therapy Intervention (PT) until d/c  -       Row Name 08/21/24 0807          Vital Signs    Pre SpO2 (%) 92  -AJ     O2 Delivery Pre Treatment room air  -AJ     Post SpO2 (%) 91  -AJ     O2 Delivery Post Treatment room air  -AJ     Pre Patient Position Sitting  -AJ     Intra Patient Position Standing  -AJ     Post Patient Position Sitting  -AJ       Row Name 08/21/24 0807          Positioning and Restraints    Pre-Treatment Position sitting in chair/recliner  -AJ     Post Treatment Position chair  -AJ     In Chair notified nsg;call light within reach;sitting;encouraged to call for assist  -AJ               User Key  (r) = Recorded By, (t) = Taken By, (c) = Cosigned By      Initials Name Provider Type    Silvestre Saleh, PT DPT Physical Therapist                   Outcome Measures       Row Name 08/21/24 0807          How much help from another person do you currently need...    Turning from your back to your side while in flat bed without using bedrails? 4  -AJ     Moving from lying on  back to sitting on the side of a flat bed without bedrails? 4  -AJ     Moving to and from a bed to a chair (including a wheelchair)? 3  -AJ     Standing up from a chair using your arms (e.g., wheelchair, bedside chair)? 4  -AJ     Climbing 3-5 steps with a railing? 2  -AJ     To walk in hospital room? 3  -AJ     AM-PAC 6 Clicks Score (PT) 20  -AJ     Highest Level of Mobility Goal 6 --> Walk 10 steps or more  -       Row Name 08/21/24 0808 08/21/24 0807       Functional Assessment    Outcome Measure Options AM-PAC 6 Clicks Daily Activity (OT)  - AM-PAC 6 Clicks Basic Mobility (PT)  -              User Key  (r) = Recorded By, (t) = Taken By, (c) = Cosigned By      Initials Name Provider Type    Sherice Haskins, OTR/L Occupational Therapist    Silvestre Saleh, PT DPT Physical Therapist                                 Physical Therapy Education       Title: PT OT SLP Therapies (In Progress)       Topic: Physical Therapy (Done)       Point: Mobility training (Done)       Learning Progress Summary             Patient Acceptance, E, VU by SHANIQUE at 8/21/2024 1137    Comment: RPE scale, use of AD, beginning safe stair and mobility, walk multiple times daily                         Point: Home exercise program (Done)       Learning Progress Summary             Patient Acceptance, E, VU by SHANIQUE at 8/21/2024 1137    Comment: RPE scale, use of AD, beginning safe stair and mobility, walk multiple times daily                         Point: Body mechanics (Done)       Learning Progress Summary             Patient Acceptance, E, VU by SHANIQUE at 8/21/2024 1137    Comment: RPE scale, use of AD, beginning safe stair and mobility, walk multiple times daily                         Point: Precautions (Done)       Learning Progress Summary             Patient Acceptance, E, VU by SHANIQUE at 8/21/2024 1137    Comment: RPE scale, use of AD, beginning safe stair and mobility, walk multiple times daily                                         User  Key       Initials Effective Dates Name Provider Type Discipline     08/15/24 -  Silvestre Izaguirre PT DPT Physical Therapist PT                  PT Recommendation and Plan  Planned Therapy Interventions (PT): balance training, bed mobility training, gait training, home exercise program, patient/family education, transfer training, strengthening, stair training, ROM (range of motion), postural re-education  Plan of Care Reviewed With: patient  Outcome Evaluation: PT eval complete.Pt resting in recliner, AOx4 and reports generalized pain in the abdomen. Pt reports independence at home and her goal is to return to PLOF. Today pt performs functional AROM but reports increased pain with active hip flexion, therfore no MMT performed. Pt LE strength remains >3/5 and functional. Pt sit>stand with CGA and HHA and tolerated ambulation to 100', and rated RPE at 7/10 today. Pt demo R trunk lean with gait but states this is baseline. Pt returned to recliner and edcuated on benefits of continued mobility and goals with PT session. PT will benefit from skilled Pt to improve pain with mobility, RPE scale, stair safety and return to PLOF. Anticiapte d/c home with assist from son when medically stable.     Time Calculation:         PT Charges       Row Name 08/21/24 0807             Time Calculation    Start Time 0807  -      Stop Time 0835  +10 for chart review  -      Time Calculation (min) 28 min  -AJ      PT Received On 08/21/24  -AJ      PT Goal Re-Cert Due Date 08/31/24  -         Untimed Charges    PT Eval/Re-eval Minutes 38  -AJ         Total Minutes    Untimed Charges Total Minutes 38  -AJ       Total Minutes 38  -AJ                User Key  (r) = Recorded By, (t) = Taken By, (c) = Cosigned By      Initials Name Provider Type    AJ Silvestre Izaguirre PT DPT Physical Therapist                  Therapy Charges for Today       Code Description Service Date Service Provider Modifiers Qty    54440341765 HC PT EVAL LOW COMPLEXITY  3 8/21/2024 Silvestre Izaguirre, PT DPT GP 1            PT G-Codes  Outcome Measure Options: AM-PAC 6 Clicks Daily Activity (OT)  AM-PAC 6 Clicks Score (PT): 20  AM-PAC 6 Clicks Score (OT): 18  PT Discharge Summary  Anticipated Discharge Disposition (PT): home with assist    Silvestre Izaguirre PT DPT  8/21/2024

## 2024-08-22 LAB
CYTO UR: NORMAL
GLUCOSE BLDC GLUCOMTR-MCNC: 100 MG/DL (ref 70–130)
GLUCOSE BLDC GLUCOMTR-MCNC: 107 MG/DL (ref 70–130)
GLUCOSE BLDC GLUCOMTR-MCNC: 110 MG/DL (ref 70–130)
GLUCOSE BLDC GLUCOMTR-MCNC: 151 MG/DL (ref 70–130)
LAB AP CASE REPORT: NORMAL
Lab: NORMAL
PATH REPORT.FINAL DX SPEC: NORMAL
PATH REPORT.GROSS SPEC: NORMAL

## 2024-08-22 PROCEDURE — 25010000002 HEPARIN (PORCINE) PER 1000 UNITS: Performed by: STUDENT IN AN ORGANIZED HEALTH CARE EDUCATION/TRAINING PROGRAM

## 2024-08-22 PROCEDURE — 97535 SELF CARE MNGMENT TRAINING: CPT

## 2024-08-22 PROCEDURE — 97116 GAIT TRAINING THERAPY: CPT

## 2024-08-22 PROCEDURE — 94799 UNLISTED PULMONARY SVC/PX: CPT

## 2024-08-22 PROCEDURE — 99024 POSTOP FOLLOW-UP VISIT: CPT | Performed by: STUDENT IN AN ORGANIZED HEALTH CARE EDUCATION/TRAINING PROGRAM

## 2024-08-22 PROCEDURE — 82948 REAGENT STRIP/BLOOD GLUCOSE: CPT

## 2024-08-22 PROCEDURE — 94761 N-INVAS EAR/PLS OXIMETRY MLT: CPT

## 2024-08-22 RX ORDER — POLYETHYLENE GLYCOL 3350 17 G/17G
17 POWDER, FOR SOLUTION ORAL DAILY
Status: DISCONTINUED | OUTPATIENT
Start: 2024-08-22 | End: 2024-08-23 | Stop reason: HOSPADM

## 2024-08-22 RX ORDER — DOCUSATE SODIUM 100 MG/1
100 CAPSULE, LIQUID FILLED ORAL 2 TIMES DAILY
Status: DISCONTINUED | OUTPATIENT
Start: 2024-08-22 | End: 2024-08-23 | Stop reason: HOSPADM

## 2024-08-22 RX ADMIN — CETIRIZINE HYDROCHLORIDE 10 MG: 10 TABLET ORAL at 08:42

## 2024-08-22 RX ADMIN — DOCUSATE SODIUM 100 MG: 100 CAPSULE, LIQUID FILLED ORAL at 20:48

## 2024-08-22 RX ADMIN — FAMOTIDINE 20 MG: 20 TABLET, FILM COATED ORAL at 20:48

## 2024-08-22 RX ADMIN — DOCUSATE SODIUM 100 MG: 100 CAPSULE, LIQUID FILLED ORAL at 10:04

## 2024-08-22 RX ADMIN — HEPARIN SODIUM 5000 UNITS: 5000 INJECTION, SOLUTION INTRAVENOUS; SUBCUTANEOUS at 15:41

## 2024-08-22 RX ADMIN — POLYETHYLENE GLYCOL 3350 17 G: 17 POWDER, FOR SOLUTION ORAL at 10:04

## 2024-08-22 RX ADMIN — ACETAMINOPHEN 1000 MG: 500 TABLET, FILM COATED ORAL at 20:48

## 2024-08-22 RX ADMIN — BUDESONIDE AND FORMOTEROL FUMARATE DIHYDRATE 2 PUFF: 160; 4.5 AEROSOL RESPIRATORY (INHALATION) at 06:54

## 2024-08-22 RX ADMIN — ACETAMINOPHEN 1000 MG: 500 TABLET, FILM COATED ORAL at 08:41

## 2024-08-22 RX ADMIN — FAMOTIDINE 20 MG: 20 TABLET, FILM COATED ORAL at 08:42

## 2024-08-22 RX ADMIN — HEPARIN SODIUM 5000 UNITS: 5000 INJECTION, SOLUTION INTRAVENOUS; SUBCUTANEOUS at 05:31

## 2024-08-22 RX ADMIN — GABAPENTIN 300 MG: 300 CAPSULE ORAL at 08:41

## 2024-08-22 RX ADMIN — METOPROLOL TARTRATE 50 MG: 50 TABLET, FILM COATED ORAL at 20:48

## 2024-08-22 RX ADMIN — BUDESONIDE AND FORMOTEROL FUMARATE DIHYDRATE 2 PUFF: 160; 4.5 AEROSOL RESPIRATORY (INHALATION) at 19:05

## 2024-08-22 RX ADMIN — ACETAMINOPHEN 1000 MG: 500 TABLET, FILM COATED ORAL at 15:41

## 2024-08-22 RX ADMIN — METOPROLOL TARTRATE 50 MG: 50 TABLET, FILM COATED ORAL at 08:42

## 2024-08-22 RX ADMIN — HEPARIN SODIUM 5000 UNITS: 5000 INJECTION, SOLUTION INTRAVENOUS; SUBCUTANEOUS at 20:48

## 2024-08-22 RX ADMIN — GABAPENTIN 300 MG: 300 CAPSULE ORAL at 20:48

## 2024-08-22 RX ADMIN — AMLODIPINE BESYLATE 10 MG: 10 TABLET ORAL at 08:42

## 2024-08-22 RX ADMIN — LIDOCAINE 2 PATCH: 4 PATCH TOPICAL at 08:43

## 2024-08-22 NOTE — THERAPY TREATMENT NOTE
Acute Care - Physical Therapy Treatment Note  [facility]     Patient Name: Christiana Hendrix  : 1950  MRN: [id number]  Today's Date: 2024      Visit Dx:     ICD-10-CM ICD-9-CM   1. Impaired mobility [Z74.09]  Z74.09 799.89   2. Diverticulitis of large intestine without perforation or abscess without bleeding  K57.32 562.11     Patient Active Problem List   Diagnosis    Coronary artery disease involving native coronary artery of native heart without angina pectoris    Mixed hyperlipidemia    Essential hypertension    PAF (paroxysmal atrial fibrillation)    Long term (current) use of anticoagulants    History of coronary artery stent placement    Class 1 obesity due to excess calories with serious comorbidity and body mass index (BMI) of 34.0 to 34.9 in adult    Urinary incontinence    Kidney stone    Hx of colonic polyp    Diverticulitis large intestine     Past Medical History:   Diagnosis Date    Asthma     Atrial fibrillation     CAD in native artery     Chronic UTI     Diverticulitis     Hyperlipidemia     Hypertension     Kidney stone 2023    Lateral meniscus tear     right    MI, old      Past Surgical History:   Procedure Laterality Date    ABLATION OF DYSRHYTHMIC FOCUS      BLADDER NECK SUSPENSION      BREAST BIOPSY Bilateral     2002    CARDIOVERSION      CATARACT EXTRACTION WITH INTRAOCULAR LENS IMPLANT Bilateral     CHOLECYSTECTOMY      COLON RESECTION N/A 2024    Procedure: COLON RESECTION LAPAROSCOPIC SIGMOID WITH [employer] ROBOT, INTRA-OPERATIVE FLEXIBLE SIGMOIDOSCOPY, SPLENIC MOBILIZATION.;  Surgeon: Jovanna Curtis MD;  Location: [facility] OR;  Service: Robotics - DaVinci;  Laterality: N/A;    COLONOSCOPY N/A 2024    Procedure: COLONOSCOPY WITH ANESTHESIA;  Surgeon: Thiago Mnesah MD;  Location: [facility] ENDOSCOPY;  Service: Gastroenterology;  Laterality: N/A;  pre: hx polyps  post: polyps. diverticulosis.   mounika schaeffer    COLONOSCOPY W/ BIOPSIES      CORONARY  "ANGIOPLASTY  08/13/2007    had stents 2003    CORONARY STENT PLACEMENT  2002    EXTRACORPOREAL SHOCK WAVE LITHOTRIPSY (ESWL) Right 06/05/2023    Procedure: EXTRACORPOREAL SHOCKWAVE LITHOTRIPSY-right;  Surgeon: Jamar Hendrickson MD;  Location:  [facility] OR;  Service: Urology;  Laterality: Right;    HYSTERECTOMY      KNEE ARTHROSCOPY Right 11/02/2023    Procedure: RIGHT KNEE ARTHROSCOPIC LATERAL MENISCUS ROOT REPAIR, PARTIAL MEDIAL MENISECTOMY;  Surgeon: Raymond Puentes MD;  Location:  [facility] OR;  Service: Orthopedics;  Laterality: Right;    VEIN SURGERY  1988     PT Assessment (Last 12 Hours)       PT Evaluation and Treatment       Row Name 08/22/24 1309          Physical Therapy Time and Intention    Subjective Information no complaints  -NW     Document Type therapy note (daily note)  -NW     Mode of Treatment physical therapy  -NW     Comment need to have BM so \"i can go home\"  -NW       Row Name 08/22/24 1309          General Information    Existing Precautions/Restrictions fall  -NW       Row Name 08/22/24 1309          Pain    Posttreatment Pain Rating 4/10  -NW     Pain Location - abdomen  -NW       Row Name 08/22/24 1309          Bed Mobility    Comment, (Bed Mobility) chair  -NW       Row Name 08/22/24 1309          Sit-Stand Transfer    Sit-Stand [place] (Transfers) verbal cues;standby assist  -NW       Row Name 08/22/24 1309          Stand-Sit Transfer    Stand-Sit [place] (Transfers) supervision  -NW       Row Name 08/22/24 1309          Gait/Stairs (Locomotion)    [place] Level (Gait) supervision  -NW     Assistive Device (Gait) walker, front-wheeled  -NW     Distance in Feet (Gait) 200  -NW     [place] Level (Stairs) verbal cues;contact guard;stand by assist  -NW     Handrail Location (Stairs) left side (ascending)  -NW     Number of Steps (Stairs) 4  -NW     Ascending Technique (Stairs) step-over-step  -NW     Descending Technique (Stairs) step-over-step  -NW     Comment, " (Gait/Stairs) reviewed home safety and POC  -NW       Row Name 08/22/24 1309          Safety Issues, Functional Mobility    Impairments Affecting Function (Mobility) balance;strength  -NW       Row Name             Wound 08/20/24 1605 umbilical area Incision    Wound - Properties Group Placement Date: 08/20/24  -SN Placement Time: 1605  -SN Location: umbilical area  -SN Primary Wound Type: Incision  -SN, trocar sites     Retired Wound - Properties Group Placement Date: 08/20/24  -SN Placement Time: 1605  -SN Location: umbilical area  -SN Primary Wound Type: Incision  -SN, trocar sites     Retired Wound - Properties Group Date first assessed: 08/20/24  -SN Time first assessed: 1605  -SN Location: umbilical area  -SN Primary Wound Type: Incision  -SN, trocar sites       Row Name 08/22/24 1309          Positioning and Restraints    Pre-Treatment Position sitting in chair/recliner  -NW     Post Treatment Position chair  -NW     In Chair sitting;call light within reach;encouraged to call for assist  -NW               User Key  (r) = Recorded By, (t) = Taken By, (c) = Cosigned By      Initials Name Provider Type    Xuan Elizabeth PTA Physical Therapist Assistant    Nallely Leo RN Registered Nurse                    Physical Therapy Education       Title: PT OT SLP Therapies (In Progress)       Topic: Physical Therapy (Done)       Point: Mobility training (Done)       Learning Progress Summary             Patient Acceptance, E, VU by SHANIQUE at 8/21/2024 1137    Comment: RPE scale, use of AD, beginning safe stair and mobility, walk multiple times daily                         Point: Home exercise program (Done)       Learning Progress Summary             Patient Acceptance, E, VU by SHANIQUE at 8/21/2024 1137    Comment: RPE scale, use of AD, beginning safe stair and mobility, walk multiple times daily                         Point: Body mechanics (Done)       Learning Progress Summary             Patient Acceptance,  E, VU by SHANIQUE at 8/21/2024 1137    Comment: RPE scale, use of AD, beginning safe stair and mobility, walk multiple times daily                         Point: Precautions (Done)       Learning Progress Summary             Patient Acceptance, E, VU by SHANIQUE at 8/21/2024 1137    Comment: RPE scale, use of AD, beginning safe stair and mobility, walk multiple times daily                                         User Key       Initials Effective Dates Name Provider Type Discipline     08/15/24 -  Silvestre Izaguirre, PT DPT Physical Therapist PT                  PT Recommendation and Plan             Time Calculation:    PT Charges       Row Name 08/22/24 1322             Time Calculation    Start Time 1309  -NW      Stop Time 1322  -NW      Time Calculation (min) 13 min  -NW      PT Received On 08/22/24  -NW      PT Goal Re-Cert Due Date 08/31/24  -NW         Time Calculation- PT    Total Timed Code Minutes- PT 13 minute(s)  -NW         Timed Charges    49791 - Gait Training Minutes  13  -NW         Total Minutes    Timed Charges Total Minutes 13  -NW       Total Minutes 13  -NW                User Key  (r) = Recorded By, (t) = Taken By, (c) = Cosigned By      Initials Name Provider Type    NW Xuan Rodriguez PTA Physical Therapist Assistant                  Therapy Charges for Today       Code Description Service Date Service Provider Modifiers Qty    51253766873 HC GAIT TRAINING EA 15 MIN 8/22/2024 Xuan Rodriguez PTA GP 1            PT G-Codes  Outcome Measure Options: AM-PAC 6 Clicks Daily Activity (OT)  AM-PAC 6 Clicks Score (PT): 20  AM-PAC 6 Clicks Score (OT): 18    Xuan Rodriguez PTA  8/22/2024

## 2024-08-22 NOTE — PLAN OF CARE
Goal Outcome Evaluation:  Plan of Care Reviewed With: patient        Progress: improving  Outcome Evaluation: Pt has been up w/ nsg. Pt up in chair. sit-stand sba Pt able to amb 200' rwx supervison. Pt was able to amb up/down 4 steps cga/sba. reviewd home safety and POC. pt hopes to d/c home soon. feel pt is safe when medically stable from PT standpoint

## 2024-08-22 NOTE — THERAPY TREATMENT NOTE
Patient Name: Christiana Hendrix  : 1950    MRN: [id number]                              Today's Date: 2024       Admit Date: 2024    Visit Dx:     ICD-10-CM ICD-9-CM   1. Impaired mobility [Z74.09]  Z74.09 799.89   2. Diverticulitis of large intestine without perforation or abscess without bleeding  K57.32 562.11     Patient Active Problem List   Diagnosis    Coronary artery disease involving native coronary artery of native heart without angina pectoris    Mixed hyperlipidemia    Essential hypertension    PAF (paroxysmal atrial fibrillation)    Long term (current) use of anticoagulants    History of coronary artery stent placement    Class 1 obesity due to excess calories with serious comorbidity and body mass index (BMI) of 34.0 to 34.9 in adult    Urinary incontinence    Kidney stone    Hx of colonic polyp    Diverticulitis large intestine     Past Medical History:   Diagnosis Date    Asthma     Atrial fibrillation     CAD in native artery     Chronic UTI     Diverticulitis     Hyperlipidemia     Hypertension     Kidney stone 2023    Lateral meniscus tear     right    MI, old      Past Surgical History:   Procedure Laterality Date    ABLATION OF DYSRHYTHMIC FOCUS      BLADDER NECK SUSPENSION      BREAST BIOPSY Bilateral     2002    CARDIOVERSION      CATARACT EXTRACTION WITH INTRAOCULAR LENS IMPLANT Bilateral     CHOLECYSTECTOMY      COLON RESECTION N/A 2024    Procedure: COLON RESECTION LAPAROSCOPIC SIGMOID WITH [employer] ROBOT, INTRA-OPERATIVE FLEXIBLE SIGMOIDOSCOPY, SPLENIC MOBILIZATION.;  Surgeon: Jovanna Curtis MD;  Location: [facility] OR;  Service: Robotics - DaVinci;  Laterality: N/A;    COLONOSCOPY N/A 2024    Procedure: COLONOSCOPY WITH ANESTHESIA;  Surgeon: Thiago Mensah MD;  Location: [facility] ENDOSCOPY;  Service: Gastroenterology;  Laterality: N/A;  pre: hx polyps  post: polyps. diverticulosis.   mounika schaeffer    COLONOSCOPY W/ BIOPSIES      CORONARY ANGIOPLASTY   08/13/2007    had stents 2003    CORONARY STENT PLACEMENT  2002    EXTRACORPOREAL SHOCK WAVE LITHOTRIPSY (ESWL) Right 06/05/2023    Procedure: EXTRACORPOREAL SHOCKWAVE LITHOTRIPSY-right;  Surgeon: Jamar Hendrickson MD;  Location:  [facility] OR;  Service: Urology;  Laterality: Right;    HYSTERECTOMY      KNEE ARTHROSCOPY Right 11/02/2023    Procedure: RIGHT KNEE ARTHROSCOPIC LATERAL MENISCUS ROOT REPAIR, PARTIAL MEDIAL MENISECTOMY;  Surgeon: Raymond Puentes MD;  Location:  [facility] OR;  Service: Orthopedics;  Laterality: Right;    VEIN SURGERY  1988      General Information       Row Name 08/22/24 0824          OT Time and Intention    Document Type therapy note (daily note)  -MT     Mode of Treatment occupational therapy  -MT       Row Name 08/22/24 0824          General Information    Patient Profile Reviewed yes  -MT     Existing Precautions/Restrictions fall;other (see comments)  abdominal binder  -MT       Row Name 08/22/24 0824          Cognition    Orientation Status (Cognition) oriented x 4  -MT       Row Name 08/22/24 0824          Safety Issues, Functional Mobility    Impairments Affecting Function (Mobility) balance;pain;endurance/activity tolerance;strength  -MT               User Key  (r) = Recorded By, (t) = Taken By, (c) = Cosigned By      Initials Name Provider Type    MT Estefany Jenkins COTA Occupational Therapist Assistant                     Mobility/ADL's       Row Name 08/22/24 0824          Bed Mobility    Bed Mobility sidelying-sit-sidelying  -MT     Comment, (Bed Mobility) CGA to assist in sequencing and controlled movement as pt quick moving. Simulated home environment and bed height.  -MT       Row Name 08/22/24 0824          Transfers    Transfers sit-stand transfer;stand-sit transfer;toilet transfer  -MT       Row Name 08/22/24 0824          Sit-Stand Transfer    Sit-Stand [place] (Transfers) contact guard;verbal cues  -MT     Assistive Device (Sit-Stand Transfers) walker, front-wheeled   -MT       Row Name 08/22/24 0824          Stand-Sit Transfer    Stand-Sit [place] (Transfers) contact guard;verbal cues  -MT     Assistive Device (Stand-Sit Transfers) walker, front-wheeled  -MT       Row Name 08/22/24 0824          Toilet Transfer    Type (Toilet Transfer) sit-stand;stand-sit  -MT     [place] Level (Toilet Transfer) standby assist  -MT     Assistive Device (Toilet Transfer) commode  -MT       Row Name 08/22/24 0824          Functional Mobility    Functional Mobility- Ind. Level contact guard assist  -MT     Functional Mobility- Device walker, front-wheeled  -MT     Functional Mobility- Comment pt performed CGA fxl mobility short distances in room with no AD, pt then utilized RW for longer distance  -MT       Row Name 08/22/24 0824          Activities of Daily Living    BADL Assessment/Intervention grooming  -MT       Row Name 08/22/24 0824          Grooming Assessment/Training    Oral Care teeth brushed - regular toothbrush  -MT               User Key  (r) = Recorded By, (t) = Taken By, (c) = Cosigned By      Initials Name Provider Type    Estefany Campos COTA Occupational Therapist Assistant                   Obj/Interventions    No documentation.                  Goals/Plan    No documentation.                  Clinical Impression       Row Name 08/22/24 0824          Pain Assessment    Pretreatment Pain Rating 6/10  -MT     Posttreatment Pain Rating 6/10  -MT     Pain Location - abdomen  -MT       Row Name 08/22/24 0824          Therapy Plan Review/Discharge Plan (OT)    Anticipated Discharge Disposition (OT) home with assist  -MT               User Key  (r) = Recorded By, (t) = Taken By, (c) = Cosigned By      Initials Name Provider Type    Estefany Campos COTA Occupational Therapist Assistant                   Outcome Measures       Row Name 08/22/24 0000          How much help from another person do you currently need...    Turning from your back to your side while in flat  bed without using bedrails? 4  -JH     Moving from lying on back to sitting on the side of a flat bed without bedrails? 4  -JH     Moving to and from a bed to a chair (including a wheelchair)? 3  -JH     Standing up from a chair using your arms (e.g., wheelchair, bedside chair)? 4  -JH     Climbing 3-5 steps with a railing? 2  -JH     To walk in hospital room? 3  -JH     AM-PAC 6 Clicks Score (PT) 20  -JH     Highest Level of Mobility Goal 6 --> Walk 10 steps or more  -               User Key  (r) = Recorded By, (t) = Taken By, (c) = Cosigned By      Initials Name Provider Type    Katiuska Cuellar, RN Registered Nurse                    Occupational Therapy Education       Title: PT OT SLP Therapies (In Progress)       Topic: Occupational Therapy (In Progress)       Point: ADL training (Done)       Description:   Instruct learner(s) on proper safety adaptation and remediation techniques during self care or transfers.   Instruct in proper use of assistive devices.                  Learning Progress Summary             Patient Acceptance, E, VU by LS at 8/21/2024 0846                         Point: Home exercise program (Not Started)       Description:   Instruct learner(s) on appropriate technique for monitoring, assisting and/or progressing therapeutic exercises/activities.                  Learner Progress:  Not documented in this visit.              Point: Precautions (Done)       Description:   Instruct learner(s) on prescribed precautions during self-care and functional transfers.                  Learning Progress Summary             Patient Acceptance, E, VU by LS at 8/21/2024 0846                         Point: Body mechanics (Done)       Description:   Instruct learner(s) on proper positioning and spine alignment during self-care, functional mobility activities and/or exercises.                  Learning Progress Summary             Patient Acceptance, E, VU by LS at 8/21/2024 0846                                          User Key       Initials Effective Dates Name Provider Type Discipline     06/20/22 -  Sherice Pepe, OTR/L Occupational Therapist OT                  OT Recommendation and Plan     Plan of Care Review  Plan of Care Reviewed With: patient  Progress: improving  Outcome Evaluation: CGA to assist in sequencing and controlled movement as pt quick moving. Simulated home environment and bed height. Pt performed CGA fxl mobility short distances in room with no AD, pt then utilized RW for longer distances. Pt utilized commode and completed all aspects of toileting with SBA. Performed oral care sink side SBA. Facial grooming and hair care. Pt with many questions/concerns for home d/c as compelting all ADLs/IADLs I. Performed simulated ADLs LB needs AE of sock aid. Educated on use of AE (reacher/shoe horn) for dressing/bathing. Pt needing 2 assist for abdonimal binder in bed, educated on adaptations to complete I. Discussed home environment adaptations and care for animals d/t increased pain with any bend/LB movement. Pt improving and reports son can assist with certain activities.     Time Calculation:         Time Calculation- OT       Row Name 08/22/24 0824             Time Calculation- OT    OT Start Time 0824  -MT      OT Stop Time 0934  -MT      OT Time Calculation (min) 70 min  -MT      Total Timed Code Minutes- OT 70 minute(s)  -MT      OT Received On 08/22/24  -MT         Timed Charges    59049 - OT Self Care/Mgmt Minutes 70  -MT         Total Minutes    Timed Charges Total Minutes 70  -MT       Total Minutes 70  -MT                User Key  (r) = Recorded By, (t) = Taken By, (c) = Cosigned By      Initials Name Provider Type    MT Estefany Jenkins COTA Occupational Therapist Assistant                  Therapy Charges for Today       Code Description Service Date Service Provider Modifiers Qty    [id number]  OT SELF CARE/MGMT/TRAIN EA 15 MIN 8/22/2024 Estefany Jenkins COTA GO 5                  Estefany Jenkins, SALIMA  8/22/2024

## 2024-08-22 NOTE — PLAN OF CARE
Goal Outcome Evaluation:  Plan of Care Reviewed With: patient        Progress: improving  Outcome Evaluation: CGA to assist in sequencing and controlled movement as pt quick moving. Simulated home environment and bed height. Pt performed CGA fxl mobility short distances in room with no AD, pt then utilized RW for longer distances. Pt utilized commode and completed all aspects of toileting with SBA. Performed oral care sink side SBA. Facial grooming and hair care. Pt with many questions/concerns for home d/c as compelting all ADLs/IADLs I. Performed simulated ADLs LB needs AE of sock aid. Educated on use of AE (reacher/shoe horn) for dressing/bathing. Pt needing 2 assist for abdonimal binder in bed, educated on adaptations to complete I. Discussed home environment adaptations and care for animals d/t increased pain with any bend/LB movement. Pt improving and reports son can assist with certain activities.

## 2024-08-22 NOTE — PLAN OF CARE
Goal Outcome Evaluation:  Plan of Care Reviewed With: patient        Progress: improving  Outcome Evaluation: Pt A&O x4. VSS. No c/o pain. Pt ambulating in carter with standby assist. Safety maintained.

## 2024-08-23 VITALS
HEART RATE: 80 BPM | SYSTOLIC BLOOD PRESSURE: 144 MMHG | HEIGHT: 64 IN | DIASTOLIC BLOOD PRESSURE: 80 MMHG | TEMPERATURE: 97.7 F | BODY MASS INDEX: 33.12 KG/M2 | WEIGHT: 194 LBS | OXYGEN SATURATION: 93 % | RESPIRATION RATE: 16 BRPM

## 2024-08-23 PROBLEM — K57.32 DIVERTICULITIS LARGE INTESTINE: Status: RESOLVED | Noted: 2024-06-12 | Resolved: 2024-08-23

## 2024-08-23 LAB
GLUCOSE BLDC GLUCOMTR-MCNC: 90 MG/DL (ref 70–130)
GLUCOSE BLDC GLUCOMTR-MCNC: 93 MG/DL (ref 70–130)

## 2024-08-23 PROCEDURE — 94761 N-INVAS EAR/PLS OXIMETRY MLT: CPT

## 2024-08-23 PROCEDURE — 82948 REAGENT STRIP/BLOOD GLUCOSE: CPT

## 2024-08-23 PROCEDURE — 99024 POSTOP FOLLOW-UP VISIT: CPT

## 2024-08-23 PROCEDURE — 25010000002 HEPARIN (PORCINE) PER 1000 UNITS: Performed by: STUDENT IN AN ORGANIZED HEALTH CARE EDUCATION/TRAINING PROGRAM

## 2024-08-23 PROCEDURE — 94799 UNLISTED PULMONARY SVC/PX: CPT

## 2024-08-23 PROCEDURE — 97535 SELF CARE MNGMENT TRAINING: CPT

## 2024-08-23 PROCEDURE — 94664 DEMO&/EVAL PT USE INHALER: CPT

## 2024-08-23 PROCEDURE — 97116 GAIT TRAINING THERAPY: CPT

## 2024-08-23 RX ORDER — ACETAMINOPHEN 325 MG/1
650 TABLET ORAL EVERY 4 HOURS PRN
Qty: 100 TABLET | Refills: 2 | Status: SHIPPED | OUTPATIENT
Start: 2024-08-23 | End: 2025-08-23

## 2024-08-23 RX ORDER — ONDANSETRON 4 MG/1
4 TABLET, FILM COATED ORAL EVERY 8 HOURS PRN
Qty: 15 TABLET | Refills: 0 | Status: SHIPPED | OUTPATIENT
Start: 2024-08-23 | End: 2025-08-23

## 2024-08-23 RX ORDER — OXYCODONE HYDROCHLORIDE 5 MG/1
5 TABLET ORAL EVERY 8 HOURS PRN
Qty: 10 TABLET | Refills: 0 | Status: SHIPPED | OUTPATIENT
Start: 2024-08-23 | End: 2025-08-23

## 2024-08-23 RX ORDER — DOCUSATE SODIUM 100 MG/1
100 CAPSULE, LIQUID FILLED ORAL 2 TIMES DAILY
Qty: 60 CAPSULE | Refills: 1 | Status: SHIPPED | OUTPATIENT
Start: 2024-08-23

## 2024-08-23 RX ORDER — POLYETHYLENE GLYCOL 3350 17 G/17G
17 POWDER, FOR SOLUTION ORAL DAILY
Qty: 60 PACKET | Refills: 0 | Status: SHIPPED | OUTPATIENT
Start: 2024-08-23

## 2024-08-23 RX ADMIN — CETIRIZINE HYDROCHLORIDE 10 MG: 10 TABLET ORAL at 08:35

## 2024-08-23 RX ADMIN — METOPROLOL TARTRATE 50 MG: 50 TABLET, FILM COATED ORAL at 08:35

## 2024-08-23 RX ADMIN — GABAPENTIN 300 MG: 300 CAPSULE ORAL at 08:35

## 2024-08-23 RX ADMIN — ACETAMINOPHEN 1000 MG: 500 TABLET, FILM COATED ORAL at 15:50

## 2024-08-23 RX ADMIN — BUDESONIDE AND FORMOTEROL FUMARATE DIHYDRATE 2 PUFF: 160; 4.5 AEROSOL RESPIRATORY (INHALATION) at 06:59

## 2024-08-23 RX ADMIN — AMLODIPINE BESYLATE 10 MG: 10 TABLET ORAL at 08:35

## 2024-08-23 RX ADMIN — FAMOTIDINE 20 MG: 20 TABLET, FILM COATED ORAL at 08:35

## 2024-08-23 RX ADMIN — ACETAMINOPHEN 1000 MG: 500 TABLET, FILM COATED ORAL at 08:35

## 2024-08-23 RX ADMIN — DOCUSATE SODIUM 100 MG: 100 CAPSULE, LIQUID FILLED ORAL at 08:35

## 2024-08-23 RX ADMIN — ACETAMINOPHEN 1000 MG: 500 TABLET, FILM COATED ORAL at 05:06

## 2024-08-23 RX ADMIN — HEPARIN SODIUM 5000 UNITS: 5000 INJECTION, SOLUTION INTRAVENOUS; SUBCUTANEOUS at 05:06

## 2024-08-23 RX ADMIN — LIDOCAINE 2 PATCH: 4 PATCH TOPICAL at 08:34

## 2024-08-23 RX ADMIN — POLYETHYLENE GLYCOL 3350 17 G: 17 POWDER, FOR SOLUTION ORAL at 08:34

## 2024-08-23 NOTE — DISCHARGE SUMMARY
Consults       No orders found from 7/22/2024 to 8/21/2024.         Yu Sampson PA-C - Discharge Summary    Date of Discharge:  8/23/2024    Discharge Diagnosis: Status post sigmoid colectomy    Presenting Problem/History of Present Illness  Diverticulitis of large intestine without perforation or abscess without bleeding [K57.32]  Diverticulitis large intestine [K57.32]     Hospital Course  Patient is a 74 y.o. female presented with chronic recurrent diverticulitis after failing medical management.  He underwent sigmoid colon resection on 8/20/2024.  Postop day 1 her [place] catheter was removed and patient began ambulating.  She was started on a clear liquid diet and her diet was advanced to GI soft without nausea or vomiting.  Bowel function returned as patient was passing gas and having bowel movements.  Pain was well-controlled with oral pain medication.  Patient was discharged in stable condition.    Procedures Performed  Procedure(s):  COLON RESECTION LAPAROSCOPIC SIGMOID WITH [employer]INCI ROBOT, INTRA-OPERATIVE FLEXIBLE SIGMOIDOSCOPY, SPLENIC MOBILIZATION.       Consults:   Consults       No orders found from 7/22/2024 to 8/21/2024.            Condition on Discharge:  stable/improved    Vital Signs  Temp:  [97.7 °F (36.5 °C)-98.5 °F (36.9 °C)] 97.7 °F (36.5 °C)  Heart Rate:  [70-89] 80  Resp:  [16-18] 16  BP: (116-144)/(55-80) 144/80    Physical Exam:   See History and Physical found in chart.    Discharge Disposition  Home or Self Care    Discharge Medications     Discharge Medications        New Medications        Instructions Start Date   acetaminophen 325 MG tablet  Commonly known as: Tylenol   Take 2 tablets by mouth Every 4 (Four) Hours As Needed for Mild or Moderate Pain.      docusate sodium 100 MG capsule  Commonly known as: Colace   100 mg, Oral, 2 Times Daily      ondansetron 4 MG tablet  Commonly known as: Zofran   4 mg, Oral, Every 8 Hours PRN      oxyCODONE 5 MG immediate release tablet  Commonly  known as: Roxicodone   5 mg, Oral, Every 8 Hours PRN             Changes to Medications        Instructions Start Date   MIRALAX PO  What changed: Another medication with the same name was added. Make sure you understand how and when to take each.   17 g, Oral, Daily PRN      polyethylene glycol 17 g packet  Commonly known as: MIRALAX  What changed: You were already taking a medication with the same name, and this prescription was added. Make sure you understand how and when to take each.   17 g, Oral, Daily             Continue These Medications        Instructions Start Date   albuterol sulfate  (90 Base) MCG/ACT inhaler  Commonly known as: PROVENTIL HFA;VENTOLIN HFA;PROAIR HFA   Inhale 2 puffs Every 6 (Six) Hours As Needed for Wheezing or Shortness of Air.      amLODIPine 10 MG tablet  Commonly known as: NORVASC   10 mg, Oral, Daily      atorvastatin 80 MG tablet  Commonly known as: LIPITOR   80 mg, Oral, Nightly      budesonide-formoterol 160-4.5 MCG/ACT inhaler  Commonly known as: SYMBICORT   Inhale 2 puffs 2 (Two) Times a Day.      coenzyme Q10 100 MG capsule   200 mg, Oral, Daily      estradiol 0.1 MG/GM vaginal cream  Commonly known as: ESTRACE   2 applicators, Vaginal, 2 Times Weekly      ezetimibe 10 MG tablet  Commonly known as: ZETIA   10 mg, Oral, Daily      fluocinolone acetonide 0.01 % oil otic oil  Commonly known as: DERMOTIC   4 drops, Left Ear, 3 Times Weekly      folic acid 800 MCG tablet  Commonly known as: FOLVITE   800 mcg, Oral, Daily      GLUCOSAMINE 1500 COMPLEX PO   1 capsule, Oral, Daily      Leqvio 284 MG/1.5ML solution prefilled syringe  Generic drug: Inclisiran Sodium   189.3333 mg, Subcutaneous, Every 6 Months      levocetirizine 5 MG tablet  Commonly known as: XYZAL   5 mg, Oral, Nightly      metoprolol succinate  MG 24 hr tablet  Commonly known as: TOPROL-XL   100 mg, Oral, Nightly      montelukast 10 MG tablet  Commonly known as: SINGULAIR   10 mg, Oral, Nightly       nitroglycerin 0.4 MG SL tablet  Commonly known as: NITROSTAT   0.4 mg, Sublingual, Every 5 Minutes PRN, Take no more than 3 doses in 15 minutes.      OCUVITE ADULT 50+ PO   1 tablet, Oral, Daily      potassium chloride ER 20 MEQ tablet controlled-release ER tablet  Commonly known as: K-TAB   1 tablet, Oral, Daily      PROBIOTIC BLEND PO   1 capsule, Oral, Daily      rivaroxaban 20 MG tablet  Commonly known as: Xarelto   20 mg, Oral, Daily      trimethoprim 100 MG tablet  Commonly known as: TRIMPEX   100 mg, Oral, Nightly      vitamin A 56123 UNIT capsule   10,000 Units, Oral, Daily      vitamin D3 125 MCG (5000 UT) capsule capsule   5,000 Units, Oral, Daily               Discharge Diet: regular diet; limit red meat and raw vegetables     Activity at Discharge:   Activity Instructions    Wound:   - you have skin glue on your incisions. Okay to shower tomorrow.   - Leave skin glue in place, it should slowly fall off over 2 weeks   - No swimming/soaking/bathing x 2 weeks to allow incisions to heal.     Activity:   - Activity as tolerated. NO heavy lifting x 4 weeks - no more than 25lb at a time.   - No driving or operating machinery on narcotic pain medication.     Pain medication:   - Take 1000mg of tylenol every 8 hours for 3 days. After three days, take it prn.   - You have a prescription for a narcotic. It will be roxicodone 5mg tabs. Take these only as needed after you have taken the tylenol and ibuprofen. If you are taking the roxicodone, make sure to take a stool softener (colace) with it as it can cause constipation.   - The narcotic may make you nauseated, you will have a prescription for zofran in case of nausea.     Follow up:   - make an appointment to see me in 1 weeks  - If you have any concerns before then, call me office at [phone]         up ad steve; no lifting greater than 25 pounds for 4 weeks and no soaking underwater for 2 weeks    Follow-up Appointments  Future Appointments   Date Time  Provider Department [place]   8/29/2024  1:30 PM Yu Sampson PA-C [facility]   9/24/2024  1:00 PM Lakia Ng PA [facility]   11/12/2024  1:00 PM Frances Burrell APRN [facility]   1/8/2025  1:30 PM Jamar Funk MD [facility]   1/13/2025  1:00 PM Frances Burrell APRN [facility]         Test Results Pending at Discharge       Yu Sampson PA-C  08/23/24  14:37 CDT

## 2024-08-23 NOTE — PLAN OF CARE
Goal Outcome Evaluation:  Plan of Care Reviewed With: patient        Progress: improving  Outcome Evaluation: OT tx completed. Pt presents reclined in chair, motivated to participate. Max A required for adjusting socks, reports plans for use of sock aid and long-handled shoe horn at home. Pt completed sit<>stand t/f's at chair and commode with SBA/Supervision. Fxl mobility completed using FWW with SBA. Toileting completed at commode and grooming completed at sink-side with SBA. Pt abandoned FWW in BR, educated on proximity and use to decrease fall risk. Pt reports hopes to D/C home this date, educated on home safety and energy conservation with understanding verbalized. Continue OT POC.

## 2024-08-23 NOTE — PLAN OF CARE
Goal Outcome Evaluation:               Safety maintained no falls or injuries this shift. Ambulated x2

## 2024-08-23 NOTE — DISCHARGE INSTRUCTIONS
Wound:   - you have skin glue on your incisions. Okay to shower tomorrow.   - Leave skin glue in place, it should slowly fall off over 2 weeks   - No swimming/soaking/bathing x 2 weeks to allow incisions to heal.     Activity:   - Activity as tolerated. NO heavy lifting x 4 weeks - no more than 25lb at a time.   - No driving or operating machinery on narcotic pain medication.     Pain medication:   - Take 1000mg of tylenol every 8 hours for 3 days. After three days, take it prn.   - You have a prescription for a narcotic. It will be roxicodone 5mg tabs. Take these only as needed after you have taken the tylenol and ibuprofen. If you are taking the roxicodone, make sure to take a stool softener (colace) with it as it can cause constipation.   - The narcotic may make you nauseated, you will have a prescription for zofran in case of nausea.     Follow up:   - make an appointment to see me in 1 weeks  - If you have any concerns before then, call me office at [phone]

## 2024-08-23 NOTE — PROGRESS NOTES
Jovanna Curtis MD - General Surgery  Progress Note     LOS: 3 days   Patient Care Team:  Petar Cummings MD as PCP - General (Internal Medicine)  Choco Diallo MD (Inactive) as Cardiologist (Cardiology)  Chrissy Allen APRN as Nurse Practitioner (Family Medicine)  Praneeth Diallo MD as Surgeon (Orthopedic Surgery)  Jovanna Curtis MD as Consulting Physician (General Surgery)      Subjective     Interval History:     POD 2. Ambulated x 2. Tolerating fulls. Mild bloating but no nausea, vomiting. She is hungry. Vitals stable.  Pain well controlled. + flatus, no BM.     Objective     Vital Signs  Temp:  [97.6 °F (36.4 °C)-98.5 °F (36.9 °C)] 97.8 °F (36.6 °C)  Heart Rate:  [69-89] 78  Resp:  [16-18] 16  BP: (112-142)/(55-87) 129/61    Physical Exam:  General appearance - alert, well appearing, and in no distress  Mental status - alert, oriented to person, place, and time  Eyes - pupils equal and reactive, extraocular eye movements intact  Neck - supple, no significant adenopathy  Chest - no tachypnea, retractions or cyanosis  Heart - normal rate and regular rhythm  Abdomen - soft, appropriately tender, incisions c/d/i  Neurological - alert, oriented, normal speech, no focal findings or movement disorder noted      Results Review:    Lab Results (last 24 hours)       Procedure Component Value Units Date/Time    POC Glucose Once [[id number]]  (Abnormal) Collected: 08/22/24 2037    Specimen: Blood Updated: 08/22/24 2048     Glucose 151 mg/dL      Comment: : [email or web address] Laci JackieMeter ID: [id number]       POC Glucose Once [[id number]]  (Normal) Collected: 08/22/24 1725    Specimen: Blood Updated: 08/22/24 1736     Glucose 110 mg/dL      Comment: : [id number] [employer] BrittanyMeter ID: [id number]       Tissue Pathology Exam [[id number]] Collected: 08/20/24 1835    Specimen: Tissue from Large Intestine, Sigmoid Colon; Tissue from Large Intestine, Sigmoid Colon Updated: 08/22/24 1342     Note to  "Patients --     This report may contain a detailed description of human tissue sent by a health care provider to the laboratory for pathologic evaluation. The content of this report is essential for diagnosis and may provide important critical findings. This information may be unfamiliar to patients to review without a medical professional present. It is advised that the patient review this report in the presence of a health care provider who can answer questions and explain the results.       Case Report --     Surgical Pathology Report                         Case: [id number]                                  Authorizing Provider:  Jovanna Curtis MD    Collected:           08/20/2024 06:35 PM          Ordering Location:     [facility] OR  Received:            08/21/2024 07:32 AM          Pathologist:           Vipin Cazares MD                                                        Specimens:   1) - Large Intestine, Sigmoid Colon, ANASTOMOTIC DONUTS                                             2) - Large Intestine, Sigmoid Colon, SIGMOID COLON                                          Final Diagnosis --     1.  Anastomotic donuts, resection:  Unremarkable segments of colon.    2.  Sigmoid colon, resection:  Acute diverticulitis with pericolonic abscess.  Viable resection margins.       Gross Description --     1. Large Intestine, Sigmoid Colon.   Received in a formalin filled container labeled with the patient's name, date of birth, and \"anastomotic donuts\".  The specimen consists of 2 ring-shaped tissue fragments measuring 0.6 and 1.0 cm in length and each measuring 1.7 cm in diameter.  The serosal surfaces are tan-pink with the small amount of adherent fatty tissue.  Multiple blue sutures are identified in one fragment.  The mucosal surfaces are tan-pink and appear unremarkable.  A representative section of each ring is submitted in block 1A.    2. Large Intestine, Sigmoid Colon.   Received in " "a formalin filled container labeled with the patient's name, date of birth, and \"sigmoid colon\".  The specimen consists of a segment of sigmoid colon with large amount of attached mesocolon.  The colon measures 13.9 cm in length by 1.7 cm in diameter.  One resection margin is open and the opposite is stapled.  The serosal surface is yellow-tan with tightly adhered mesocolon and intact, bulging diverticula.  The segment is opened and reveals yellow-tan, soft and unremarkable mucosa.  A portion of the colon has been previously opened, this area does not appear consistent with rupture.,  This area does not appear consistent with rupture.  The segment of colon is cross-sectioned and reveals an intact, large pericolonic abscess, completely encased in fatty tissue.  The abscess measures 4.6 cm proximal to distal by 2.5 cm in diameter.  The lining of the abscess is yellow-brown and dusky.  The tissue surrounding the abscess shows dense gray-white fibrotic tissue from the abscess to the surrounding serosal margin.  Communication between the abscess and colon is identified and a ruptured diverticulum measuring 0.6 cm in length by 0.3 cm in diameter.  The ruptured diverticulum measures 4.5 cm from the open resection margin.  A second possible ruptured diverticulum leading to the same abscess is identified measuring 1.1 cm in length by 0.2 cm in diameter.  This diverticulum measures 1.8 cm from the open resection margin.  Additionally intact diverticula are also identified and averages 0.3 cm in length by 0.3 cm in diameter.  2A-open resection margin  2B-stapled resection margin  2C-representative section of intact diverticulum  2D-representative section of pericolonic abscess and serosal surface  2E-representative section of unremarkable colon wall with adjacent pericolonic abscess  2F-representative section of first possible ruptured diverticulum  2G-representative section of second possible ruptured " diverticulum  2H-representative section of unremarkable colon wall         Microscopic Description --     Microscopic examination performed.      POC Glucose Once [[id number]]  (Normal) Collected: 08/22/24 1141    Specimen: Blood Updated: 08/22/24 1153     Glucose 107 mg/dL      Comment: : [id number] [employer] BrittanyMeter ID: [id number]       POC Glucose Once [[id number]]  (Normal) Collected: 08/22/24 0820    Specimen: Blood Updated: 08/22/24 0831     Glucose 100 mg/dL      Comment: : [id number] [employer] BrittanyMeter ID: [id number]             Imaging Results (Last 24 Hours)       ** No results found for the last 24 hours. **              Assessment & Plan       POD 2 s/p robotic sigmoid colectomy. Encourage ambulation.Advance to GI soft diet. Post op antibiotics x 2 doses complete. DVT ppx ordered. Await return of bowel function.       Jovanna Curtis MD  08/23/24  06:50 CDT

## 2024-08-23 NOTE — THERAPY TREATMENT NOTE
Acute Care - Physical Therapy Treatment Note  [facility]     Patient Name: Christiana Hendrix  : 1950  MRN: [id number]  Today's Date: 2024      Visit Dx:     ICD-10-CM ICD-9-CM   1. Impaired mobility [Z74.09]  Z74.09 799.89   2. Diverticulitis of large intestine without perforation or abscess without bleeding  K57.32 562.11     Patient Active Problem List   Diagnosis    Coronary artery disease involving native coronary artery of native heart without angina pectoris    Mixed hyperlipidemia    Essential hypertension    PAF (paroxysmal atrial fibrillation)    Long term (current) use of anticoagulants    History of coronary artery stent placement    Class 1 obesity due to excess calories with serious comorbidity and body mass index (BMI) of 34.0 to 34.9 in adult    Urinary incontinence    Kidney stone    Hx of colonic polyp    Diverticulitis large intestine     Past Medical History:   Diagnosis Date    Asthma     Atrial fibrillation     CAD in native artery     Chronic UTI     Diverticulitis     Hyperlipidemia     Hypertension     Kidney stone 2023    Lateral meniscus tear     right    MI, old      Past Surgical History:   Procedure Laterality Date    ABLATION OF DYSRHYTHMIC FOCUS      BLADDER NECK SUSPENSION      BREAST BIOPSY Bilateral     2002    CARDIOVERSION      CATARACT EXTRACTION WITH INTRAOCULAR LENS IMPLANT Bilateral     CHOLECYSTECTOMY      COLON RESECTION N/A 2024    Procedure: COLON RESECTION LAPAROSCOPIC SIGMOID WITH [employer] ROBOT, INTRA-OPERATIVE FLEXIBLE SIGMOIDOSCOPY, SPLENIC MOBILIZATION.;  Surgeon: Jovanna Curtis MD;  Location: [facility] OR;  Service: Robotics - DaVinci;  Laterality: N/A;    COLONOSCOPY N/A 2024    Procedure: COLONOSCOPY WITH ANESTHESIA;  Surgeon: Thiago Mensah MD;  Location: [facility] ENDOSCOPY;  Service: Gastroenterology;  Laterality: N/A;  pre: hx polyps  post: polyps. diverticulosis.   mounika schaeffer    COLONOSCOPY W/ BIOPSIES      CORONARY  ANGIOPLASTY  08/13/2007    had stents 2003    CORONARY STENT PLACEMENT  2002    EXTRACORPOREAL SHOCK WAVE LITHOTRIPSY (ESWL) Right 06/05/2023    Procedure: EXTRACORPOREAL SHOCKWAVE LITHOTRIPSY-right;  Surgeon: Jamar Hendrickson MD;  Location:  [facility] OR;  Service: Urology;  Laterality: Right;    HYSTERECTOMY      KNEE ARTHROSCOPY Right 11/02/2023    Procedure: RIGHT KNEE ARTHROSCOPIC LATERAL MENISCUS ROOT REPAIR, PARTIAL MEDIAL MENISECTOMY;  Surgeon: Raymond Puentes MD;  Location:  [facility] OR;  Service: Orthopedics;  Laterality: Right;    VEIN SURGERY  1988     PT Assessment (Last 12 Hours)       PT Evaluation and Treatment       Row Name 08/23/24 1021          Physical Therapy Time and Intention    Subjective Information no complaints  -NW     Document Type therapy note (daily note)  -NW     Mode of Treatment physical therapy  -NW       Row Name 08/23/24 1021          General Information    Existing Precautions/Restrictions fall  -NW       Row Name 08/23/24 1021          Pain    Posttreatment Pain Rating 4/10  -NW     Pain Location - abdomen  -NW       Row Name 08/23/24 1021          Bed Mobility    Comment, (Bed Mobility) chair  -NW       Row Name 08/23/24 1021          Sit-Stand Transfer    Sit-Stand [place] (Transfers) supervision  -NW       Row Name 08/23/24 1021          Stand-Sit Transfer    Stand-Sit [place] (Transfers) supervision  -NW       Row Name 08/23/24 1021          Gait/Stairs (Locomotion)    [place] Level (Gait) supervision  -NW     Assistive Device (Gait) walker, front-wheeled  -NW     Distance in Feet (Gait) 500  -NW     Comment, (Gait/Stairs) pt hopes to d/c home today, reivewed home safety and POC  -NW       Row Name 08/23/24 1021          Safety Issues, Functional Mobility    Impairments Affecting Function (Mobility) balance;strength  -NW       Row Name             Wound 08/20/24 1605 umbilical area Incision    Wound - Properties Group Placement Date: 08/20/24  -SN Placement  Time: 1605 -SN Location: umbilical area  -SN Primary Wound Type: Incision  -SN, trocar sites     Retired Wound - Properties Group Placement Date: 08/20/24  -SN Placement Time: 1605 -SN Location: umbilical area  -SN Primary Wound Type: Incision  -SN, trocar sites     Retired Wound - Properties Group Date first assessed: 08/20/24  -SN Time first assessed: 1605 -SN Location: umbilical area  -SN Primary Wound Type: Incision  -SN, trocar sites       Row Name 08/23/24 1021          Positioning and Restraints    Pre-Treatment Position sitting in chair/recliner  -NW     Post Treatment Position chair  -NW     In Chair sitting;call light within reach;encouraged to call for assist  -NW               User Key  (r) = Recorded By, (t) = Taken By, (c) = Cosigned By      Initials Name Provider Type    Xuan Elizabeth PTA Physical Therapist Assistant    Nallely Leo RN Registered Nurse                    Physical Therapy Education       Title: PT OT SLP Therapies (In Progress)       Topic: Physical Therapy (Done)       Point: Mobility training (Done)       Learning Progress Summary             Patient Acceptance, E, VU by SHANIQUE at 8/21/2024 1137    Comment: RPE scale, use of AD, beginning safe stair and mobility, walk multiple times daily                         Point: Home exercise program (Done)       Learning Progress Summary             Patient Acceptance, E, VU by SHANIQUE at 8/21/2024 1137    Comment: RPE scale, use of AD, beginning safe stair and mobility, walk multiple times daily                         Point: Body mechanics (Done)       Learning Progress Summary             Patient Acceptance, E, VU by SHANIQUE at 8/21/2024 1137    Comment: RPE scale, use of AD, beginning safe stair and mobility, walk multiple times daily                         Point: Precautions (Done)       Learning Progress Summary             Patient Acceptance, E, VU by SHANIQUE at 8/21/2024 1137    Comment: RPE scale, use of AD, beginning safe stair and  mobility, walk multiple times daily                                         User Key       Initials Effective Dates Name Provider Type Discipline    AJ 08/15/24 -  Silvestre Izaguirre, YE DPT Physical Therapist PT                  PT Recommendation and Plan     Plan of Care Reviewed With: patient  Progress: improving  Outcome Evaluation: Pt has been up w/ nsg. Pt up in chair. sit-stand sba Pt able to amb 200' rwx supervison. Pt was able to amb up/down 4 steps cga/sba. reviewd home safety and POC. pt hopes to d/c home soon. feel pt is safe when medically stable from PT standpoint       Time Calculation:    PT Charges       Row Name 08/23/24 1050             Time Calculation    Start Time 1021  -NW      Stop Time 1045  -NW      Time Calculation (min) 24 min  -NW      PT Received On 08/23/24  -NW      PT Goal Re-Cert Due Date 08/31/24  -NW         Time Calculation- PT    Total Timed Code Minutes- PT 24 minute(s)  -NW         Timed Charges    86786 - Gait Training Minutes  24  -NW         Total Minutes    Timed Charges Total Minutes 24  -NW       Total Minutes 24  -NW                User Key  (r) = Recorded By, (t) = Taken By, (c) = Cosigned By      Initials Name Provider Type    NW Xuan Rodriguez PTA Physical Therapist Assistant                  Therapy Charges for Today       Code Description Service Date Service Provider Modifiers Qty    57689540798 HC GAIT TRAINING EA 15 MIN 8/22/2024 Xuan Rodriguez PTA GP 1    68364287432 HC GAIT TRAINING EA 15 MIN 8/23/2024 Xuan Rodriguez PTA GP 2            PT G-Codes  Outcome Measure Options: AM-PAC 6 Clicks Daily Activity (OT)  AM-PAC 6 Clicks Score (PT): 20  AM-PAC 6 Clicks Score (OT): 18    Xuan Rodriguez PTA  8/23/2024

## 2024-08-23 NOTE — DISCHARGE INSTR - ACTIVITY
Wound:   - you have skin glue on your incisions. Okay to shower tomorrow.   - Leave skin glue in place, it should slowly fall off over 2 weeks   - No swimming/soaking/bathing x 2 weeks to allow incisions to heal.     Activity:   - Activity as tolerated. NO heavy lifting x 4 weeks - no more than 25lb at a time.   - No driving or operating machinery on narcotic pain medication.     Pain medication:   - Take 1000mg of tylenol every 8 hours for 3 days. After three days, take it prn.   - You have a prescription for a narcotic. It will be roxicodone 5mg tabs. Take these only as needed after you have taken the tylenol and ibuprofen. If you are taking the roxicodone, make sure to take a stool softener (colace) with it as it can cause constipation.   - The narcotic may make you nauseated, you will have a prescription for zofran in case of nausea.     Follow up:   - make an appointment to see me in 1 weeks  - If you have any concerns before then, call me office at [phone]

## 2024-08-23 NOTE — THERAPY TREATMENT NOTE
Patient Name: Christiana Hendrix  : 1950    MRN: [id number]                              Today's Date: 2024       Admit Date: 2024    Visit Dx:     ICD-10-CM ICD-9-CM   1. Impaired mobility [Z74.09]  Z74.09 799.89   2. Diverticulitis of large intestine without perforation or abscess without bleeding  K57.32 562.11     Patient Active Problem List   Diagnosis    Coronary artery disease involving native coronary artery of native heart without angina pectoris    Mixed hyperlipidemia    Essential hypertension    PAF (paroxysmal atrial fibrillation)    Long term (current) use of anticoagulants    History of coronary artery stent placement    Class 1 obesity due to excess calories with serious comorbidity and body mass index (BMI) of 34.0 to 34.9 in adult    Urinary incontinence    Kidney stone    Hx of colonic polyp     Past Medical History:   Diagnosis Date    Asthma     Atrial fibrillation     CAD in native artery     Chronic UTI     Diverticulitis     Hyperlipidemia     Hypertension     Kidney stone 2023    Lateral meniscus tear     right    MI, old      Past Surgical History:   Procedure Laterality Date    ABLATION OF DYSRHYTHMIC FOCUS      BLADDER NECK SUSPENSION      BREAST BIOPSY Bilateral     2002    CARDIOVERSION      CATARACT EXTRACTION WITH INTRAOCULAR LENS IMPLANT Bilateral     CHOLECYSTECTOMY      COLON RESECTION N/A 2024    Procedure: COLON RESECTION LAPAROSCOPIC SIGMOID WITH [employer] ROBOT, INTRA-OPERATIVE FLEXIBLE SIGMOIDOSCOPY, SPLENIC MOBILIZATION.;  Surgeon: Jovanna Curtis MD;  Location: [facility] OR;  Service: Robotics - DaVinci;  Laterality: N/A;    COLONOSCOPY N/A 2024    Procedure: COLONOSCOPY WITH ANESTHESIA;  Surgeon: Thiago Mensah MD;  Location: [facility];  Service: Gastroenterology;  Laterality: N/A;  pre: hx polyps  post: polyps. diverticulosis.   mounika schaeffer    COLONOSCOPY W/ BIOPSIES      CORONARY ANGIOPLASTY  2007    had stents      CORONARY STENT PLACEMENT  2002    EXTRACORPOREAL SHOCK WAVE LITHOTRIPSY (ESWL) Right 06/05/2023    Procedure: EXTRACORPOREAL SHOCKWAVE LITHOTRIPSY-right;  Surgeon: Jamar Hendrickson MD;  Location:  [facility] OR;  Service: Urology;  Laterality: Right;    HYSTERECTOMY      KNEE ARTHROSCOPY Right 11/02/2023    Procedure: RIGHT KNEE ARTHROSCOPIC LATERAL MENISCUS ROOT REPAIR, PARTIAL MEDIAL MENISECTOMY;  Surgeon: Raymond Puentes MD;  Location:  [facility] OR;  Service: Orthopedics;  Laterality: Right;    VEIN SURGERY  1988      General Information       Row Name 08/23/24 1117          OT Time and Intention    Document Type therapy note (daily note)  -LR     Mode of Treatment occupational therapy  -LR       Row Name 08/23/24 1117          General Information    Patient Profile Reviewed yes  -LR     Existing Precautions/Restrictions fall  -LR               User Key  (r) = Recorded By, (t) = Taken By, (c) = Cosigned By      Initials Name Provider Type    LR Kalpana Franco, OTR/L Occupational Therapist                     Mobility/ADL's       Row Name 08/23/24 1117          Transfers    Transfers sit-stand transfer;stand-sit transfer;toilet transfer  -LR       Row Name 08/23/24 1117          Sit-Stand Transfer    Sit-Stand [place] (Transfers) supervision  -LR     Assistive Device (Sit-Stand Transfers) walker, front-wheeled  -LR       Row Name 08/23/24 1117          Stand-Sit Transfer    Stand-Sit [place] (Transfers) supervision  -LR     Assistive Device (Stand-Sit Transfers) walker, front-wheeled  -LR       Row Name 08/23/24 1117          Toilet Transfer    Type (Toilet Transfer) sit-stand;stand-sit  -LR     [place] Level (Toilet Transfer) standby assist;supervision  -LR     Assistive Device (Toilet Transfer) commode;grab bars/safety frame;walker, front-wheeled  -LR       Row Name 08/23/24 1117          Functional Mobility    Functional Mobility- Ind. Level standby assist  -LR     Functional Mobility- Device  walker, front-wheeled  -LR       Row Name 08/23/24 1117          Activities of Daily Living    BADL Assessment/Intervention toileting;grooming;lower body dressing  -LR       Row Name 08/23/24 1117          Lower Body Dressing Assessment/Training    [place] Level (Lower Body Dressing) socks;maximum assist (25% patient effort)  -LR     Position (Lower Body Dressing) unsupported sitting  -LR     Comment, (Lower Body Dressing) adjust socks; reports will have sock aid and long-handled shoe horn at home  -LR       Row Name 08/23/24 [facility]          Toileting Assessment/Training    [place] Level (Toileting) adjust/manage clothing;perform perineal hygiene;standby assist  -LR     Assistive Devices (Toileting) commode;grab bar/safety frame  -LR     Position (Toileting) unsupported sitting;supported standing  -LR       Row Name 08/23/24 [facility]          Grooming Assessment/Training    [place] Level (Grooming) wash face, hands;standby assist  -LR     Position (Grooming) sink side;supported standing  -LR               User Key  (r) = Recorded By, (t) = Taken By, (c) = Cosigned By      Initials Name Provider Type    LR Kalpana Franco, ABDIELR/L Occupational Therapist                   Obj/Interventions       Row Name 08/23/24 1117          Balance    Balance Assessment sitting static balance;sitting dynamic balance;standing static balance;standing dynamic balance  -LR     Static Sitting Balance independent  -LR     Dynamic Sitting Balance supervision  -LR     Position, Sitting Balance supported;sitting in chair  -LR     Static Standing Balance supervision  -LR     Dynamic Standing Balance standby assist  -LR     Position/Device Used, Standing Balance supported;walker, front-wheeled  -LR               User Key  (r) = Recorded By, (t) = Taken By, (c) = Cosigned By      Initials Name Provider Type    LR Kalpana Franco OTR/L Occupational Therapist                   Goals/Plan    No documentation.                   Clinical Impression       Row Name 08/23/24 1117          Pain Assessment    Pretreatment Pain Rating 4/10  -LR     Posttreatment Pain Rating 4/10  -LR     Pain Location - abdomen  -LR     Pain Intervention(s) Medication (See MAR);Repositioned;Ambulation/increased activity  -LR       Row Name 08/23/24 1117          Plan of Care Review    Plan of Care Reviewed With patient  -LR     Progress improving  -LR     Outcome Evaluation OT tx completed. Pt presents reclined in chair, motivated to participate. Max A required for adjusting socks, reports plans for use of sock aid and long-handled shoe horn at home. Pt completed sit<>stand t/f's at chair and commode with SBA/Supervision. Fxl mobility completed using FWW with SBA. Toileting completed at commode and grooming completed at sink-side with SBA. Pt abandoned FWW in BR, educated on proximity and use to decrease fall risk. Pt reports hopes to D/C home this date, educated on home safety and energy conservation with understanding verbalized. Continue OT POC.  -LR       Row Name 08/23/24 1117          Vital Signs    O2 Delivery Pre Treatment room air  -LR     Intra SpO2 (%) 92  -LR     O2 Delivery Intra Treatment room air  -LR     Post SpO2 (%) 93  -LR     O2 Delivery Post Treatment room air  -LR     Pre Patient Position Sitting  -LR     Intra Patient Position Standing  -LR     Post Patient Position Sitting  -LR       Row Name 08/23/24 1117          Positioning and Restraints    Pre-Treatment Position sitting in chair/recliner  -LR     Post Treatment Position chair  -LR     In Chair notified nsg;reclined;call light within reach;encouraged to call for assist  -LR               User Key  (r) = Recorded By, (t) = Taken By, (c) = Cosigned By      Initials Name Provider Type    LR Kalpana Franco, OTR/L Occupational Therapist                   Outcome Measures       Row Name 08/23/24 1117          How much help from another is currently needed...    Putting on and taking off  regular lower body clothing? 2  -LR     Bathing (including washing, rinsing, and drying) 3  -LR     Toileting (which includes using toilet bed pan or urinal) 3  -LR     Putting on and taking off regular upper body clothing 3  -LR     Taking care of personal grooming (such as brushing teeth) 3  -LR     Eating meals 4  -LR     AM-PAC 6 Clicks Score (OT) 18  -LR       Row Name 08/23/24 0800          How much help from another person do you currently need...    Turning from your back to your side while in flat bed without using bedrails? 4  -BC     Moving from lying on back to sitting on the side of a flat bed without bedrails? 4  -BC     Moving to and from a bed to a chair (including a wheelchair)? 3  -BC     Standing up from a chair using your arms (e.g., wheelchair, bedside chair)? 4  -BC     Climbing 3-5 steps with a railing? 2  -BC     To walk in hospital room? 3  -BC     AM-PAC 6 Clicks Score (PT) 20  -BC     Highest Level of Mobility Goal 6 --> Walk 10 steps or more  -BC       Row Name 08/23/24 1117          Functional Assessment    Outcome Measure Options AM-PAC 6 Clicks Daily Activity (OT)  -LR               User Key  (r) = Recorded By, (t) = Taken By, (c) = Cosigned By      Initials Name Provider Type    Gia Nuñez, RN Registered Nurse    Kalpana Lucia OTR/L Occupational Therapist                    Occupational Therapy Education       Title: PT OT SLP Therapies (In Progress)       Topic: Occupational Therapy (In Progress)       Point: ADL training (Done)       Description:   Instruct learner(s) on proper safety adaptation and remediation techniques during self care or transfers.   Instruct in proper use of assistive devices.                  Learning Progress Summary             Patient Acceptance, E,D, VU by LR at 8/23/2024 1151    Acceptance, E, VU by GIULIANA at 8/21/2024 0846                         Point: Home exercise program (Not Started)       Description:   Instruct learner(s) on  appropriate technique for monitoring, assisting and/or progressing therapeutic exercises/activities.                  Learner Progress:  Not documented in this visit.              Point: Precautions (Done)       Description:   Instruct learner(s) on prescribed precautions during self-care and functional transfers.                  Learning Progress Summary             Patient Acceptance, E,D, VU by LR at 8/23/2024 1151    Acceptance, E, VU by LS at 8/21/2024 0846                         Point: Body mechanics (Done)       Description:   Instruct learner(s) on proper positioning and spine alignment during self-care, functional mobility activities and/or exercises.                  Learning Progress Summary             Patient Acceptance, E,D, VU by LR at 8/23/2024 1151    Acceptance, E, VU by LS at 8/21/2024 0846                                         User Key       Initials Effective Dates Name Provider Type Discipline    LS 06/20/22 -  Sherice Pepe, OTR/L Occupational Therapist OT    LR 04/25/23 -  Kalpana Franco OTR/L Occupational Therapist OT                  OT Recommendation and Plan     Plan of Care Review  Plan of Care Reviewed With: patient  Progress: improving  Outcome Evaluation: OT tx completed. Pt presents reclined in chair, motivated to participate. Max A required for adjusting socks, reports plans for use of sock aid and long-handled shoe horn at home. Pt completed sit<>stand t/f's at chair and commode with SBA/Supervision. Fxl mobility completed using FWW with SBA. Toileting completed at commode and grooming completed at sink-side with SBA. Pt abandoned FWW in BR, educated on proximity and use to decrease fall risk. Pt reports hopes to D/C home this date, educated on home safety and energy conservation with understanding verbalized. Continue OT POC.     Time Calculation:         Time Calculation- OT       Row Name 08/23/24 1117 08/23/24 1050          Time Calculation- OT    OT Start Time 1117   -LR --     OT Stop Time 1146  -LR --     OT Time Calculation (min) 29 min  -LR --     Total Timed Code Minutes- OT 29 minute(s)  -LR --     OT Received On 08/23/24  -LR --        Timed Charges    49957 - Gait Training Minutes  -- 24  -NW     62672 - OT Self Care/Mgmt Minutes 29  -LR --        Total Minutes    Timed Charges Total Minutes 29  -LR 24  -NW      Total Minutes 29  -LR 24  -NW               User Key  (r) = Recorded By, (t) = Taken By, (c) = Cosigned By      Initials Name Provider Type    NW Xuan Rodriguez, PTA Physical Therapist Assistant    LR Kalpana Franco, OTR/L Occupational Therapist                  Therapy Charges for Today       Code Description Service Date Service Provider Modifiers Qty    17065230908 HC OT SELF CARE/MGMT/TRAIN EA 15 MIN 8/23/2024 Kalpana Franco OTR/L GO 2                 Kalpana Franco OTR/WILLIAMS  8/23/2024

## 2024-08-23 NOTE — PLAN OF CARE
Problem: Adult Inpatient Plan of Care  Goal: Plan of Care Review  Outcome: Ongoing, Progressing  Goal: Patient-Specific Goal (Individualized)  Outcome: Ongoing, Progressing  Goal: Absence of Hospital-Acquired Illness or Injury  Outcome: Ongoing, Progressing  Intervention: Identify and Manage Fall Risk  Recent Flowsheet Documentation  Taken 8/23/2024 0600 by Katiuska Aguero RN  Safety Promotion/Fall Prevention: safety round/check completed  Taken 8/23/2024 0400 by Katiuska Aguero RN  Safety Promotion/Fall Prevention: safety round/check completed  Taken 8/23/2024 0200 by Katiuska Aguero RN  Safety Promotion/Fall Prevention: safety round/check completed  Taken 8/23/2024 0000 by Katiuska Aguero RN  Safety Promotion/Fall Prevention: safety round/check completed  Taken 8/22/2024 2300 by Katiuska Aguero RN  Safety Promotion/Fall Prevention: safety round/check completed  Taken 8/22/2024 2200 by Katiuska Aguero RN  Safety Promotion/Fall Prevention: safety round/check completed  Taken 8/22/2024 2100 by Katiuska Aguero RN  Safety Promotion/Fall Prevention: safety round/check completed  Taken 8/22/2024 2001 by Katiuska Aguero RN  Safety Promotion/Fall Prevention:   assistive device/personal items within reach   clutter free environment maintained   mobility aid in reach   nonskid shoes/slippers when out of bed   safety round/check completed  Intervention: Prevent Skin Injury  Recent Flowsheet Documentation  Taken 8/22/2024 2001 by Katiuska Aguero RN  Body Position: position changed independently  Intervention: Prevent and Manage VTE (Venous Thromboembolism) Risk  Recent Flowsheet Documentation  Taken 8/23/2024 0600 by Katiuska Aguero RN  Activity Management: ambulated outside room  Taken 8/23/2024 0000 by Katiuska Aguero RN  Activity Management: ambulated to bathroom  Taken 8/22/2024 2200 by Katiuska Aguero RN  Activity Management: ambulated outside room  Taken 8/22/2024 2100 by Katiuska Aguero RN  Activity  Management: up in chair  Taken 8/22/2024 2001 by Katiuska Aguero RN  Activity Management: up in chair  VTE Prevention/Management: (see mar)   sequential compression devices on   other (see comments)  Intervention: Prevent Infection  Recent Flowsheet Documentation  Taken 8/22/2024 2001 by Katiuska Aguero RN  Infection Prevention:   single patient room provided   rest/sleep promoted   hand hygiene promoted  Goal: Optimal Comfort and Wellbeing  Outcome: Ongoing, Progressing  Goal: Readiness for Transition of Care  Outcome: Ongoing, Progressing     Problem: Bleeding (Bowel Resection)  Goal: Absence of Bleeding  Outcome: Ongoing, Progressing     Problem: Bowel Motility Impaired (Bowel Resection)  Goal: Effective Bowel Motility and Elimination  Outcome: Ongoing, Progressing     Problem: Fluid and Electrolyte Imbalance (Bowel Resection)  Goal: Fluid and Electrolyte Balance  Outcome: Ongoing, Progressing     Problem: Infection (Bowel Resection)  Goal: Absence of Infection Signs and Symptoms  Outcome: Ongoing, Progressing     Problem: Malabsorption (Bowel Resection)  Goal: Fluid, Electrolyte and Nutrition Balance  Outcome: Ongoing, Progressing     Problem: Ongoing Anesthesia Effects (Bowel Resection)  Goal: Anesthesia/Sedation Recovery  Outcome: Ongoing, Progressing  Intervention: Optimize Anesthesia Recovery  Recent Flowsheet Documentation  Taken 8/23/2024 0600 by Katiuska Aguero RN  Safety Promotion/Fall Prevention: safety round/check completed  Taken 8/23/2024 0400 by Katiuska Aguero RN  Safety Promotion/Fall Prevention: safety round/check completed  Taken 8/23/2024 0200 by Katiuska Aguero RN  Safety Promotion/Fall Prevention: safety round/check completed  Taken 8/23/2024 0000 by Katiuska Aguero RN  Safety Promotion/Fall Prevention: safety round/check completed  Taken 8/22/2024 2300 by Katiuska Aguero RN  Safety Promotion/Fall Prevention: safety round/check completed  Taken 8/22/2024 2200 by Katiuska Aguero  RN  Safety Promotion/Fall Prevention: safety round/check completed  Taken 8/22/2024 2100 by Katiuska Aguero RN  Safety Promotion/Fall Prevention: safety round/check completed  Taken 8/22/2024 2001 by Katiuska Aguero RN  Safety Promotion/Fall Prevention:   assistive device/personal items within reach   clutter free environment maintained   mobility aid in reach   nonskid shoes/slippers when out of bed   safety round/check completed     Problem: Pain (Bowel Resection)  Goal: Acceptable Pain Control  Outcome: Ongoing, Progressing     Problem: Postoperative Nausea and Vomiting (Bowel Resection)  Goal: Nausea and Vomiting Relief  Outcome: Ongoing, Progressing     Problem: Postoperative Urinary Retention (Bowel Resection)  Goal: Effective Urinary Elimination  Outcome: Ongoing, Progressing     Problem: Respiratory Compromise (Bowel Resection)  Goal: Effective Oxygenation and Ventilation  Outcome: Ongoing, Progressing     Problem: Pain Chronic (Persistent) (Comorbidity Management)  Goal: Acceptable Pain Control and Functional Ability  Outcome: Ongoing, Progressing     Problem: Fall Injury Risk  Goal: Absence of Fall and Fall-Related Injury  Outcome: Ongoing, Progressing  Intervention: Promote Injury-Free Environment  Recent Flowsheet Documentation  Taken 8/23/2024 0600 by Katiuska Aguero RN  Safety Promotion/Fall Prevention: safety round/check completed  Taken 8/23/2024 0400 by Katiuska Aguero RN  Safety Promotion/Fall Prevention: safety round/check completed  Taken 8/23/2024 0200 by Katiuska Aguero RN  Safety Promotion/Fall Prevention: safety round/check completed  Taken 8/23/2024 0000 by Katiuska Aguero RN  Safety Promotion/Fall Prevention: safety round/check completed  Taken 8/22/2024 2300 by Katiuska Aguero RN  Safety Promotion/Fall Prevention: safety round/check completed  Taken 8/22/2024 2200 by Katiuska Aguero RN  Safety Promotion/Fall Prevention: safety round/check completed  Taken 8/22/2024 2100 by Laci  Katiuska, RN  Safety Promotion/Fall Prevention: safety round/check completed  Taken 8/22/2024 2001 by Katiuska Aguero, RN  Safety Promotion/Fall Prevention:   assistive device/personal items within reach   clutter free environment maintained   mobility aid in reach   nonskid shoes/slippers when out of bed   safety round/check completed   Goal Outcome Evaluation:                        Ambulated x2 this shift. Safety maintained, no falls or injuries this shift. Resting comfortably in bed. Denies needs.

## 2024-08-24 NOTE — THERAPY DISCHARGE NOTE
Acute Care - Occupational Therapy Discharge Summary  [facility]     Patient Name: Christiana Hendrix  : 1950  MRN: [id number]    Today's Date: 2024                 Admit Date: 2024        OT Recommendation and Plan    Visit Dx:    ICD-10-CM ICD-9-CM   1. Impaired mobility [Z74.09]  Z74.09 799.89   2. Diverticulitis of large intestine without perforation or abscess without bleeding  K57.32 562.11                OT Rehab Goals       Row Name 24 0900             Transfer Goal 1 (OT)    Activity/Assistive Device (Transfer Goal 1, OT) toilet;shower chair  -AC      [place] Level/Cues Needed (Transfer Goal 1, OT) modified independence  -AC      Time Frame (Transfer Goal 1, OT) long term goal (LTG);10 days  -AC      Progress/Outcome (Transfer Goal 1, OT) goal not met  -AC         Dressing Goal 1 (OT)    Activity/Device (Dressing Goal 1, OT) dressing skills, all  -AC      [place]/Cues Needed (Dressing Goal 1, OT) minimum assist (75% or more patient effort)  -AC      Time Frame (Dressing Goal 1, OT) long term goal (LTG);10 days  -AC      Progress/Outcome (Dressing Goal 1, OT) goal not met  -AC         Toileting Goal 1 (OT)    Activity/Device (Toileting Goal 1, OT) toileting skills, all  -AC      [place] Level/Cues Needed (Toileting Goal 1, OT) modified independence  -AC      Time Frame (Toileting Goal 1, OT) long term goal (LTG);10 days  -AC      Progress/Outcome (Toileting Goal 1, OT) goal not met  -AC                User Key  (r) = Recorded By, (t) = Taken By, (c) = Cosigned By      Initials Name Provider Type Discipline    AC Mike Franklin, OTR/L, CNT Occupational Therapist OT                        Timed Therapy Charges  Total Units: 2      Suggested Charges  Total Units: 2      Procedure Name Documented Minutes Units Code    HC OT SELF CARE/MGMT/TRAIN EA 15 MIN 29 2   62979 (CPT®)                 Documented Minutes  Total Minutes: 29      Therapy Provided Minutes    65752 - OT Self  Care/Mgmt Minutes 29                        OT Discharge Summary  Anticipated Discharge Disposition (OT): home with assist  Reason for Discharge: Discharge from facility  Outcomes Achieved: Refer to plan of care for updates on goals achieved  Discharge Destination: Home with assist      Mike Franklin OTR/L, LAFONSO  8/24/2024

## 2024-08-25 NOTE — THERAPY DISCHARGE NOTE
Acute Care - Physical Therapy Discharge Summary  [facility]       Patient Name: Christiana Hendrix  : 1950  MRN: [id number]    Today's Date: 2024                 Admit Date: 2024      PT Recommendation and Plan    Visit Dx:    ICD-10-CM ICD-9-CM   1. Impaired mobility [Z74.09]  Z74.09 799.89   2. Diverticulitis of large intestine without perforation or abscess without bleeding  K57.32 562.11                PT Rehab Goals       Row Name 24 0839             Bed Mobility Goal 1 (PT)    Activity/Assistive Device (Bed Mobility Goal 1, PT) rolling to left;rolling to right;sit to supine;supine to sit  -MF      [place] Level/Cues Needed (Bed Mobility Goal 1, PT) standby assist  -MF      Time Frame (Bed Mobility Goal 1, PT) long term goal (LTG);10 days  -MF      Strategies/Barriers (Bed Mobility Goal 1, PT) pain less than 4/10  -MF      Progress/Outcomes (Bed Mobility Goal 1, PT) goal met  -MF         Transfer Goal 1 (PT)    Activity/Assistive Device (Transfer Goal 1, PT) sit-to-stand/stand-to-sit;bed-to-chair/chair-to-bed;toilet;transfers, all  -MF      [place] Level/Cues Needed (Transfer Goal 1, PT) independent  -MF      Time Frame (Transfer Goal 1, PT) long term goal (LTG);10 days  -MF      Progress/Outcome (Transfer Goal 1, PT) goal not met  -MF         Gait Training Goal 1 (PT)    Activity/Assistive Device (Gait Training Goal 1, PT) gait (walking locomotion);decrease asymmetrical patterns;decrease fall risk;diminish gait deviation;forward stepping;improve balance and speed;increase endurance/gait distance;increase energy conservation;assistive device use;walker, rolling  -MF      [place] Level (Gait Training Goal 1, PT) standby assist  -MF      Distance (Gait Training Goal 1, PT) 250' with RPE 4/10 and pain 4/10 or less  -MF      Time Frame (Gait Training Goal 1, PT) long term goal (LTG);10 days  -MF      Progress/Outcome (Gait Training Goal 1, PT) goal met  -MF         Stairs Goal 1  (PT)    Activity/Assistive Device (Stairs Goal 1, PT) ascending stairs;descending stairs;using handrail, left;step-to-step;decrease fall risk;improve balance and speed  -      [place] Level/Cues Needed (Stairs Goal 1, PT) independent  -      Number of Stairs (Stairs Goal 1, PT) 4 as needed for home safety and pain less than 4/10  -      Time Frame (Stairs Goal 1, PT) long term goal (LTG);10 days  -      Progress/Outcome (Stairs Goal 1, PT) goal not met  -                User Key  (r) = Recorded By, (t) = Taken By, (c) = Cosigned By      Initials Name Provider Type Discipline     Maricarmen Ford, PTA Physical Therapist Assistant PT                        PT Discharge Summary  Anticipated Discharge Disposition (PT): home  Reason for Discharge: Discharge from facility  Outcomes Achieved: Patient able to partially acheive established goals  Discharge Destination: Home      Maricarmen Ford PTA   8/25/2024

## 2024-08-29 ENCOUNTER — OFFICE VISIT (OUTPATIENT)
Dept: SURGERY | Facility: CLINIC | Age: 74
End: 2024-08-29
Payer: MEDICARE

## 2024-08-29 VITALS
DIASTOLIC BLOOD PRESSURE: 79 MMHG | SYSTOLIC BLOOD PRESSURE: 127 MMHG | HEART RATE: 106 BPM | OXYGEN SATURATION: 93 % | BODY MASS INDEX: 33.12 KG/M2 | HEIGHT: 64 IN

## 2024-08-29 DIAGNOSIS — K57.32 DIVERTICULITIS OF LARGE INTESTINE WITHOUT PERFORATION OR ABSCESS WITHOUT BLEEDING: ICD-10-CM

## 2024-08-29 DIAGNOSIS — Z90.49 S/P PARTIAL COLECTOMY: Primary | ICD-10-CM

## 2024-08-29 PROCEDURE — 1159F MED LIST DOCD IN RCRD: CPT

## 2024-08-29 PROCEDURE — 1160F RVW MEDS BY RX/DR IN RCRD: CPT

## 2024-08-29 PROCEDURE — 3074F SYST BP LT 130 MM HG: CPT

## 2024-08-29 PROCEDURE — 3078F DIAST BP <80 MM HG: CPT

## 2024-08-29 PROCEDURE — 99024 POSTOP FOLLOW-UP VISIT: CPT

## 2024-08-29 RX ORDER — METRONIDAZOLE 500 MG/1
500 TABLET ORAL 3 TIMES DAILY
Qty: 21 TABLET | Refills: 0 | Status: SHIPPED | OUTPATIENT
Start: 2024-08-29 | End: 2024-09-05

## 2024-08-29 NOTE — PROGRESS NOTES
Enter Query Response Below      Query Response: yes pathology finding is consistent with clinical impression and treatment plan     Electronically signed by Jovanna Curtis MD, 24, 9:29 AM CDT.             If applicable, please update the problem list.     Patient: Christiana Hendrix        : 1950  Account: [id number]           Admit Date: 2024        How to Respond to this query:       a. Click New Note     b. Answer query within the yellow box.                c. Update the Problem List, if applicable.      If you have any questions about this query contact me at: [email or web address]     Dr. Curtis      Based on inpatient coding guidelines, [facility] are not allowed to use diagnoses from pathology reports as the sole basis for billing.  Any findings noted on a pathology report must be affirmed by the treating physician before billing.     The pathologist reported that the specimen shows:    Final Diagnosis -- -- --  [facility]  Result:  1.  Anastomotic donuts, resection:  Unremarkable segments of colon.     2.  Sigmoid colon, resection:  Acute diverticulitis with pericolonic abscess.  Viable resection margins.  Electronically signed by Vipin Cazares MD on 2024 at 1442         Is this finding consistent with your clinical impression and treatment plan for this patient?  - yes  - no  - other explanation for clinical impression and treatment plan_________  - unable to determine        By submitting this query, we are merely seeking further clarification of documentation to accurately reflect all conditions that you are monitoring, evaluating, treating or that extend the hospitalization or utilize additional resources of care. Please utilize your independent clinical judgment when addressing the question(s) above.     This query and your response, once completed, will be entered into the legal medical record.    Sincerely,  Elvira Farris [facility]  Clinical  Documentation Integrity Program

## 2024-08-29 NOTE — PROGRESS NOTES
"Patient: Christiana Hendrix    YOB: 1950    Date: 08/29/2024    Primary Care Provider: Petar Cummings MD    Vital Signs:   Vitals:    08/29/24 1321   BP: 127/79   BP Location: Left arm   Patient Position: Sitting   Cuff Size: Adult   Pulse: 106   SpO2: 93%   Height: 163 cm (64.17\")       Overall doing well s/p laparoscopic robotic assisted sigmoid colon resection by Dr. Curtis on 8/20/24. No fevers. Tolerating diet. Energy improving. Pain improving. Bowels moving ok. Wounds healing well. No significant abdominal tenderness on exam.    She does complain of pain around her extraction site. Upon physical exam, it appears that she has some erythema surrounding the incision concerning for infection. Abscess does not appear to be present at this time.     Results Review:   Pathology and operative findings reviewed with patient.    Tissue Pathology Exam (08/20/2024 18:35)   1.  Anastomotic donuts, resection:  Unremarkable segments of colon.     2.  Sigmoid colon, resection:  Acute diverticulitis with pericolonic abscess.  Viable resection margins.    Assessment / Plan:    Diagnoses and all orders for this visit:    1. S/P partial colectomy (Primary)    2. Diverticulitis of large intestine without perforation or abscess without bleeding    Other orders  -     metroNIDAZOLE (Flagyl) 500 MG tablet; Take 1 tablet by mouth 3 (Three) Times a Day for 7 days.  Dispense: 21 tablet; Refill: 0        Christiana Hendrix is 1 week s/p laparoscopic sigmoid colon resection. She is overall doing well. For the infection of the extraction site incision, I discussed opening it up so that it can drain versus antibiotic management with return to clinic on Monday if not improved. She states that she would like to try antibiotic management first.  I instructed the patient that at this time, she cannot lift more than 25 lbs for the first 4 weeks. This is around 9/17/24. She voiced understanding of this. I have scheduled them a " follow up appointment next week to see Dr. Curtis.  She will call for an appointment if she has any new problems or concerns. She is agreeable to the plan.     FOLLOW UP:     Return in about 1 week (around 9/5/2024) for 2 week post op with Dr. Curtis.      Electronically signed by Yu Sampson PA-C  08/29/24  20:19 CDT

## 2024-09-03 ENCOUNTER — TRANSCRIBE ORDERS (OUTPATIENT)
Dept: ADMINISTRATIVE | Facility: HOSPITAL | Age: 74
End: 2024-09-03
Payer: MEDICARE

## 2024-09-03 DIAGNOSIS — Z12.31 ENCOUNTER FOR SCREENING MAMMOGRAM FOR MALIGNANT NEOPLASM OF BREAST: Primary | ICD-10-CM

## 2024-09-04 ENCOUNTER — OFFICE VISIT (OUTPATIENT)
Dept: SURGERY | Facility: CLINIC | Age: 74
End: 2024-09-04
Payer: MEDICARE

## 2024-09-04 VITALS
DIASTOLIC BLOOD PRESSURE: 90 MMHG | WEIGHT: 194 LBS | SYSTOLIC BLOOD PRESSURE: 160 MMHG | OXYGEN SATURATION: 98 % | HEIGHT: 64 IN | BODY MASS INDEX: 33.12 KG/M2 | HEART RATE: 78 BPM

## 2024-09-04 DIAGNOSIS — E66.09 CLASS 1 OBESITY DUE TO EXCESS CALORIES WITH BODY MASS INDEX (BMI) OF 34.0 TO 34.9 IN ADULT, UNSPECIFIED WHETHER SERIOUS COMORBIDITY PRESENT: ICD-10-CM

## 2024-09-04 DIAGNOSIS — Z90.49 S/P PARTIAL COLECTOMY: Primary | ICD-10-CM

## 2024-09-04 PROCEDURE — 3080F DIAST BP >= 90 MM HG: CPT | Performed by: STUDENT IN AN ORGANIZED HEALTH CARE EDUCATION/TRAINING PROGRAM

## 2024-09-04 PROCEDURE — 99024 POSTOP FOLLOW-UP VISIT: CPT | Performed by: STUDENT IN AN ORGANIZED HEALTH CARE EDUCATION/TRAINING PROGRAM

## 2024-09-04 PROCEDURE — 3077F SYST BP >= 140 MM HG: CPT | Performed by: STUDENT IN AN ORGANIZED HEALTH CARE EDUCATION/TRAINING PROGRAM

## 2024-09-04 PROCEDURE — 1160F RVW MEDS BY RX/DR IN RCRD: CPT | Performed by: STUDENT IN AN ORGANIZED HEALTH CARE EDUCATION/TRAINING PROGRAM

## 2024-09-04 PROCEDURE — 1159F MED LIST DOCD IN RCRD: CPT | Performed by: STUDENT IN AN ORGANIZED HEALTH CARE EDUCATION/TRAINING PROGRAM

## 2024-09-04 RX ORDER — DOCUSATE SODIUM 100 MG/1
100 CAPSULE, LIQUID FILLED ORAL 2 TIMES DAILY
Qty: 60 CAPSULE | Refills: 0 | Status: SHIPPED | OUTPATIENT
Start: 2024-09-04 | End: 2024-10-04

## 2024-09-04 NOTE — PATIENT INSTRUCTIONS

## 2024-09-04 NOTE — PROGRESS NOTES
"Patient: Christiana Hendrix    YOB: 1950    Date: 09/04/2024    Primary Care Provider: Petar Cummings MD    Vital Signs:   Vitals:    09/04/24 0923   BP: 160/90   BP Location: Left arm   Patient Position: Sitting   Cuff Size: Adult   Pulse: 78   SpO2: 98%   Weight: 88 kg (194 lb)   Height: 162.6 cm (64\")       Overall doing well. No fevers. Tolerating diet. Energy improving. Pain improving. Bowels moving ok. Wounds healing well. No significant abdominal tenderness on exam.    Results Review:   Pathology and operative findings reviewed with patient.        Assessment / Plan:    Diagnoses and all orders for this visit:    1. S/P partial colectomy (Primary)    2. Class 1 obesity due to excess calories with body mass index (BMI) of 34.0 to 34.9 in adult, unspecified whether serious comorbidity present    Other orders  -     docusate sodium (Colace) 100 MG capsule; Take 1 capsule by mouth 2 (Two) Times a Day for 30 days.  Dispense: 60 capsule; Refill: 0      Follow up in 3 months. Restart stool softeners.     Electronically signed by Jovanna Curtis MD  09/04/24  09:37 CDT                  "

## 2024-09-18 ENCOUNTER — HOSPITAL ENCOUNTER (EMERGENCY)
Facility: HOSPITAL | Age: 74
Discharge: HOME | End: 2024-09-18
Attending: FAMILY MEDICINE | Admitting: FAMILY MEDICINE
Payer: MEDICARE

## 2024-09-18 VITALS
BODY MASS INDEX: 33.12 KG/M2 | OXYGEN SATURATION: 98 % | RESPIRATION RATE: 18 BRPM | HEART RATE: 74 BPM | HEIGHT: 64 IN | TEMPERATURE: 98.2 F | WEIGHT: 194 LBS | DIASTOLIC BLOOD PRESSURE: 83 MMHG | SYSTOLIC BLOOD PRESSURE: 127 MMHG

## 2024-09-18 DIAGNOSIS — I48.91 ATRIAL FIBRILLATION WITH RAPID VENTRICULAR RESPONSE: Primary | ICD-10-CM

## 2024-09-18 DIAGNOSIS — R06.02 SHORTNESS OF BREATH: ICD-10-CM

## 2024-09-18 LAB
ALBUMIN SERPL-MCNC: 4 G/DL (ref 3.5–5.2)
ALBUMIN/GLOB SERPL: 1.5 G/DL
ALP SERPL-CCNC: 161 U/L (ref 39–117)
ALT SERPL W P-5'-P-CCNC: 12 U/L (ref 1–33)
ANION GAP SERPL CALCULATED.3IONS-SCNC: 9 MMOL/L (ref 5–15)
APTT PPP: 34.6 SECONDS (ref 24.5–36)
AST SERPL-CCNC: 18 U/L (ref 1–32)
BASOPHILS # BLD AUTO: 0.01 10*3/MM3 (ref 0–0.2)
BASOPHILS NFR BLD AUTO: 0.2 % (ref 0–1.5)
BILIRUB SERPL-MCNC: 0.7 MG/DL (ref 0–1.2)
BUN SERPL-MCNC: 18 MG/DL (ref 8–23)
BUN/CREAT SERPL: 25.4 (ref 7–25)
CALCIUM SPEC-SCNC: 9.3 MG/DL (ref 8.6–10.5)
CHLORIDE SERPL-SCNC: 108 MMOL/L (ref 98–107)
CO2 SERPL-SCNC: 28 MMOL/L (ref 22–29)
CREAT SERPL-MCNC: 0.71 MG/DL (ref 0.57–1)
D DIMER PPP FEU-MCNC: <0.27 MCGFEU/ML (ref 0–0.73)
DEPRECATED RDW RBC AUTO: 53.6 FL (ref 37–54)
EGFRCR SERPLBLD CKD-EPI 2021: 89.9 ML/MIN/1.73
EOSINOPHIL # BLD AUTO: 0.12 10*3/MM3 (ref 0–0.4)
EOSINOPHIL NFR BLD AUTO: 1.9 % (ref 0.3–6.2)
ERYTHROCYTE [DISTWIDTH] IN BLOOD BY AUTOMATED COUNT: 16.7 % (ref 12.3–15.4)
GEN 5 2HR TROPONIN T REFLEX: 11 NG/L
GLOBULIN UR ELPH-MCNC: 2.6 GM/DL
GLUCOSE SERPL-MCNC: 146 MG/DL (ref 65–99)
HCT VFR BLD AUTO: 42.9 % (ref 34–46.6)
HGB BLD-MCNC: 12.9 G/DL (ref 12–15.9)
HOLD SPECIMEN: NORMAL
HOLD SPECIMEN: NORMAL
IMM GRANULOCYTES # BLD AUTO: 0.02 10*3/MM3 (ref 0–0.05)
IMM GRANULOCYTES NFR BLD AUTO: 0.3 % (ref 0–0.5)
INR PPP: 1.36 (ref 0.91–1.09)
LYMPHOCYTES # BLD AUTO: 1.44 10*3/MM3 (ref 0.7–3.1)
LYMPHOCYTES NFR BLD AUTO: 22.8 % (ref 19.6–45.3)
MAGNESIUM SERPL-MCNC: 2.1 MG/DL (ref 1.6–2.4)
MCH RBC QN AUTO: 26.5 PG (ref 26.6–33)
MCHC RBC AUTO-ENTMCNC: 30.1 G/DL (ref 31.5–35.7)
MCV RBC AUTO: 88.3 FL (ref 79–97)
MONOCYTES # BLD AUTO: 0.53 10*3/MM3 (ref 0.1–0.9)
MONOCYTES NFR BLD AUTO: 8.4 % (ref 5–12)
NEUTROPHILS NFR BLD AUTO: 4.19 10*3/MM3 (ref 1.7–7)
NEUTROPHILS NFR BLD AUTO: 66.4 % (ref 42.7–76)
NRBC BLD AUTO-RTO: 0 /100 WBC (ref 0–0.2)
NT-PROBNP SERPL-MCNC: 1684 PG/ML (ref 0–900)
PLATELET # BLD AUTO: 231 10*3/MM3 (ref 140–450)
PMV BLD AUTO: 11.3 FL (ref 6–12)
POTASSIUM SERPL-SCNC: 4.3 MMOL/L (ref 3.5–5.2)
PROT SERPL-MCNC: 6.6 G/DL (ref 6–8.5)
PROTHROMBIN TIME: 17.3 SECONDS (ref 11.8–14.8)
RBC # BLD AUTO: 4.86 10*6/MM3 (ref 3.77–5.28)
SODIUM SERPL-SCNC: 145 MMOL/L (ref 136–145)
TROPONIN T DELTA: -1 NG/L
TROPONIN T SERPL HS-MCNC: 12 NG/L
WBC NRBC COR # BLD AUTO: 6.31 10*3/MM3 (ref 3.4–10.8)
WHOLE BLOOD HOLD COAG: NORMAL
WHOLE BLOOD HOLD SPECIMEN: NORMAL

## 2024-09-18 PROCEDURE — 99285 EMERGENCY DEPT VISIT HI MDM: CPT

## 2024-09-18 PROCEDURE — 83735 ASSAY OF MAGNESIUM: CPT | Performed by: FAMILY MEDICINE

## 2024-09-18 PROCEDURE — 85610 PROTHROMBIN TIME: CPT | Performed by: FAMILY MEDICINE

## 2024-09-18 PROCEDURE — 25010000002 PROPOFOL 10 MG/ML EMULSION: Performed by: FAMILY MEDICINE

## 2024-09-18 PROCEDURE — 96374 THER/PROPH/DIAG INJ IV PUSH: CPT

## 2024-09-18 PROCEDURE — 85730 THROMBOPLASTIN TIME PARTIAL: CPT | Performed by: FAMILY MEDICINE

## 2024-09-18 PROCEDURE — 84484 ASSAY OF TROPONIN QUANT: CPT | Performed by: FAMILY MEDICINE

## 2024-09-18 PROCEDURE — 83880 ASSAY OF NATRIURETIC PEPTIDE: CPT | Performed by: FAMILY MEDICINE

## 2024-09-18 PROCEDURE — 93010 ELECTROCARDIOGRAM REPORT: CPT | Performed by: INTERNAL MEDICINE

## 2024-09-18 PROCEDURE — 85379 FIBRIN DEGRADATION QUANT: CPT | Performed by: FAMILY MEDICINE

## 2024-09-18 PROCEDURE — 93005 ELECTROCARDIOGRAM TRACING: CPT | Performed by: FAMILY MEDICINE

## 2024-09-18 PROCEDURE — 36415 COLL VENOUS BLD VENIPUNCTURE: CPT

## 2024-09-18 PROCEDURE — 85025 COMPLETE CBC W/AUTO DIFF WBC: CPT | Performed by: FAMILY MEDICINE

## 2024-09-18 PROCEDURE — 80053 COMPREHEN METABOLIC PANEL: CPT | Performed by: FAMILY MEDICINE

## 2024-09-18 PROCEDURE — 25810000003 SODIUM CHLORIDE 0.9 % SOLUTION: Performed by: FAMILY MEDICINE

## 2024-09-18 PROCEDURE — 92960 CARDIOVERSION ELECTRIC EXT: CPT

## 2024-09-18 RX ORDER — PROPOFOL 10 MG/ML
50 VIAL (ML) INTRAVENOUS ONCE
Status: COMPLETED | OUTPATIENT
Start: 2024-09-18 | End: 2024-09-18

## 2024-09-18 RX ORDER — PROPOFOL 10 MG/ML
20 VIAL (ML) INTRAVENOUS ONCE
Status: COMPLETED | OUTPATIENT
Start: 2024-09-18 | End: 2024-09-18

## 2024-09-18 RX ORDER — DILTIAZEM HYDROCHLORIDE 5 MG/ML
20 INJECTION INTRAVENOUS ONCE
Status: COMPLETED | OUTPATIENT
Start: 2024-09-18 | End: 2024-09-18

## 2024-09-18 RX ADMIN — PROPOFOL 20 MG: 10 INJECTION, EMULSION INTRAVENOUS at 17:14

## 2024-09-18 RX ADMIN — DILTIAZEM HYDROCHLORIDE 20 MG: 5 INJECTION, SOLUTION INTRAVENOUS at 15:26

## 2024-09-18 RX ADMIN — SODIUM CHLORIDE 500 ML: 9 INJECTION, SOLUTION INTRAVENOUS at 15:25

## 2024-09-18 RX ADMIN — PROPOFOL 50 MG: 10 INJECTION, EMULSION INTRAVENOUS at 17:12

## 2024-09-20 LAB
QT INTERVAL: 292 MS
QT INTERVAL: 372 MS
QTC INTERVAL: 442 MS
QTC INTERVAL: 459 MS

## 2024-09-24 ENCOUNTER — OFFICE VISIT (OUTPATIENT)
Dept: CARDIOLOGY | Facility: CLINIC | Age: 74
End: 2024-09-24
Payer: MEDICARE

## 2024-09-24 VITALS
HEIGHT: 64 IN | BODY MASS INDEX: 32.78 KG/M2 | OXYGEN SATURATION: 99 % | HEART RATE: 83 BPM | WEIGHT: 192 LBS | SYSTOLIC BLOOD PRESSURE: 122 MMHG | DIASTOLIC BLOOD PRESSURE: 84 MMHG

## 2024-09-24 DIAGNOSIS — I48.0 PAF (PAROXYSMAL ATRIAL FIBRILLATION): ICD-10-CM

## 2024-09-24 DIAGNOSIS — R00.2 PALPITATIONS: ICD-10-CM

## 2024-09-24 PROCEDURE — 93000 ELECTROCARDIOGRAM COMPLETE: CPT | Performed by: PHYSICIAN ASSISTANT

## 2024-09-24 PROCEDURE — 3074F SYST BP LT 130 MM HG: CPT | Performed by: PHYSICIAN ASSISTANT

## 2024-09-24 PROCEDURE — 99214 OFFICE O/P EST MOD 30 MIN: CPT | Performed by: PHYSICIAN ASSISTANT

## 2024-09-24 PROCEDURE — 3079F DIAST BP 80-89 MM HG: CPT | Performed by: PHYSICIAN ASSISTANT

## 2024-10-08 LAB
NCCN CRITERIA FLAG: ABNORMAL
TYRER CUZICK SCORE: 9.8

## 2024-10-09 NOTE — PROGRESS NOTES
This patient recently took the CARE risk assessment for a mammogram appointment. Based on the patient's responses, NCCN criteria for genetic testing was met.     Navigator follow-up:   The patient had genetic counseling/testing in 2021.  Additional testing is not indicated at this time.

## 2024-11-06 NOTE — PROGRESS NOTES
Subjective    Ms. Hendrix is 74 y.o. female    Chief Complaint: 3-month follow-up chronic cystitis    History of Present Illness    74-year-old female established patient in for 3-month follow-up regarding history of chronic cystitis.  Was started on trimethoprim prophylaxis last visit.  Reports 0 infections and 0 blood in urine until 2 days after completion of trimethoprim.  Remains asymptomatic however reports the blood in urine returned.  Patient remains on Xarelto due to history of cardiac stents.  Reports is currently battling more recent heart issues as patient states in the last 3 months went back into A-fib and actually had an echo done yesterday.  Underwent full hematuria workup earlier this year 3/2024 with Dr. Hendrickson which was unremarkable.  Urine culture positive small amount Proteus.    August of this year underwent colon resection.       right ESWL 2 large 10 and 12 mm right upper pole renal stones by Dr. Hendrickson June 2023.     The following portions of the patient's history were reviewed and updated as appropriate: allergies, current medications, past family history, past medical history, past social history, past surgical history and problem list.    Review of Systems   Constitutional:  Negative for chills and fever.   Gastrointestinal:  Negative for abdominal pain, anal bleeding and blood in stool.   Genitourinary:  Positive for dysuria and hematuria.   Musculoskeletal:  Positive for back pain.         Current Outpatient Medications:     acetaminophen (Tylenol) 325 MG tablet, Take 2 tablets by mouth Every 4 (Four) Hours As Needed for Mild or Moderate Pain., Disp: 100 tablet, Rfl: 2    albuterol sulfate  (90 Base) MCG/ACT inhaler, Inhale 2 puffs Every 6 (Six) Hours As Needed for Wheezing or Shortness of Air., Disp: , Rfl:     amLODIPine (NORVASC) 10 MG tablet, Take 1 tablet by mouth Daily., Disp: , Rfl:     atorvastatin (LIPITOR) 80 MG tablet, Take 1 tablet by mouth Every Night., Disp: , Rfl:      budesonide-formoterol (SYMBICORT) 160-4.5 MCG/ACT inhaler, Inhale 2 puffs 2 (Two) Times a Day., Disp: , Rfl:     Cholecalciferol (VITAMIN D3) 125 MCG (5000 UT) capsule capsule, Take 1 capsule by mouth Daily., Disp: , Rfl:     coenzyme Q10 100 MG capsule, Take 2 capsules by mouth Daily., Disp: , Rfl:     docusate sodium (Colace) 100 MG capsule, Take 1 capsule by mouth 2 (Two) Times a Day., Disp: 60 capsule, Rfl: 1    estradiol (ESTRACE) 0.1 MG/GM vaginal cream, Insert 2 g into the vagina 2 (Two) Times a Week., Disp: 42.5 g, Rfl: 5    ezetimibe (ZETIA) 10 MG tablet, Take 1 tablet by mouth Daily., Disp: , Rfl:     fluocinolone acetonide (DERMOTIC) 0.01 % oil otic oil, Administer 4 drops into the left ear 3 (Three) Times a Week., Disp: , Rfl:     folic acid (FOLVITE) 800 MCG tablet, Take 1 tablet by mouth Daily., Disp: , Rfl:     Glucosamine-Chondroit-Vit C-Mn (GLUCOSAMINE 1500 COMPLEX PO), Take 1 capsule by mouth Daily., Disp: , Rfl:     Inclisiran Sodium (Leqvio) 284 MG/1.5ML solution prefilled syringe, Inject 1 mL under the skin into the appropriate area as directed Every 6 (Six) Months., Disp: 1 mL, Rfl: 1    Klor-Con M20 20 MEQ CR tablet, Take 1 tablet by mouth Daily., Disp: , Rfl:     levocetirizine (XYZAL) 5 MG tablet, Take 1 tablet by mouth Every Night., Disp: , Rfl:     metoprolol succinate XL (TOPROL-XL) 100 MG 24 hr tablet, Take 1 tablet by mouth Every Night., Disp: , Rfl:     montelukast (SINGULAIR) 10 MG tablet, Take 1 tablet by mouth Every Night., Disp: , Rfl:     Multiple Vitamins-Minerals (OCUVITE ADULT 50+ PO), Take 1 tablet by mouth Daily., Disp: , Rfl:     nitroglycerin (NITROSTAT) 0.4 MG SL tablet, Place 1 tablet under the tongue Every 5 (Five) Minutes As Needed for Chest Pain. Take no more than 3 doses in 15 minutes., Disp: 25 tablet, Rfl: 5    ondansetron (Zofran) 4 MG tablet, Take 1 tablet by mouth Every 8 (Eight) Hours As Needed for Nausea or Vomiting., Disp: 15 tablet, Rfl: 0    oxyCODONE  (Roxicodone) 5 MG immediate release tablet, Take 1 tablet by mouth Every 8 (Eight) Hours As Needed for Severe Pain., Disp: 10 tablet, Rfl: 0    polyethylene glycol (MIRALAX) 17 g packet, Take 17 g by mouth Daily., Disp: 60 packet, Rfl: 0    Polyethylene Glycol 3350 (MIRALAX PO), Take 17 g by mouth Daily As Needed (constipation)., Disp: , Rfl:     potassium chloride ER (K-TAB) 20 MEQ tablet controlled-release ER tablet, Take 1 tablet by mouth Daily., Disp: , Rfl:     Probiotic Product (PROBIOTIC BLEND PO), Take 1 capsule by mouth Daily., Disp: , Rfl:     rivaroxaban (Xarelto) 20 MG tablet, Take 1 tablet by mouth Daily., Disp: 90 tablet, Rfl: 3    trimethoprim (TRIMPEX) 100 MG tablet, Take 1 tablet by mouth Every Night., Disp: 90 tablet, Rfl: 0    vitamin A 70709 UNIT capsule, Take 1 capsule by mouth Daily., Disp: , Rfl:   No current facility-administered medications for this visit.    Past Medical History:   Diagnosis Date    Asthma     Atrial fibrillation     CAD in native artery     Chronic UTI     Diverticulitis     Hyperlipidemia     Hypertension     Kidney stone 05/30/2023    Lateral meniscus tear     right    MI, old        Past Surgical History:   Procedure Laterality Date    ABLATION OF DYSRHYTHMIC FOCUS      BLADDER NECK SUSPENSION      BREAST BIOPSY Bilateral     2002    CARDIOVERSION      CATARACT EXTRACTION WITH INTRAOCULAR LENS IMPLANT Bilateral     CHOLECYSTECTOMY      COLON RESECTION N/A 8/20/2024    Procedure: COLON RESECTION LAPAROSCOPIC SIGMOID WITH [employer] ROBOT, INTRA-OPERATIVE FLEXIBLE SIGMOIDOSCOPY, SPLENIC MOBILIZATION.;  Surgeon: Jovanna Curtis MD;  Location: [facility] OR;  Service: Robotics - DaVinci;  Laterality: N/A;    COLONOSCOPY N/A 05/23/2024    Procedure: COLONOSCOPY WITH ANESTHESIA;  Surgeon: Thiago Mensah MD;  Location: [facility] ENDOSCOPY;  Service: Gastroenterology;  Laterality: N/A;  pre: hx polyps  post: polyps. diverticulosis.   mounika schaeffer    COLONOSCOPY W/ BIOPSIES    "   CORONARY ANGIOPLASTY  08/13/2007    had stents 2003    CORONARY STENT PLACEMENT  2002    EXTRACORPOREAL SHOCK WAVE LITHOTRIPSY (ESWL) Right 06/05/2023    Procedure: EXTRACORPOREAL SHOCKWAVE LITHOTRIPSY-right;  Surgeon: Jamar Hendrickson MD;  Location:  [facility] OR;  Service: Urology;  Laterality: Right;    HYSTERECTOMY      KNEE ARTHROSCOPY Right 11/02/2023    Procedure: RIGHT KNEE ARTHROSCOPIC LATERAL MENISCUS ROOT REPAIR, PARTIAL MEDIAL MENISECTOMY;  Surgeon: Raymond Puentes MD;  Location:  [facility] OR;  Service: Orthopedics;  Laterality: Right;    VEIN SURGERY  1988       Social History     Socioeconomic History    Marital status: Single   Tobacco Use    Smoking status: Never    Smokeless tobacco: Never   Vaping Use    Vaping status: Never Used   Substance and Sexual Activity    Alcohol use: Never     Alcohol/week: 3.0 standard drinks of alcohol     Types: 2 Glasses of wine, 1 Shots of liquor per week    Drug use: Never    Sexual activity: Not Currently     Partners: Male     Birth control/protection: None       Family History   Problem Relation Age of Onset    Dementia Mother     Heart attack Sister     Heart disease Sister     Diverticulitis Sister     Asthma Sister     Brain cancer Father     Heart disease Maternal Grandmother     Heart attack Maternal Grandmother     Stroke Maternal Grandfather     No Known Problems Paternal Grandmother     Brain cancer Paternal Grandfather     Breast cancer Sister     Breast cancer Paternal Aunt     Ovarian cancer Neg Hx     Uterine cancer Neg Hx     Colon cancer Neg Hx        Objective    Temp 97.5 °F (36.4 °C)   Ht 162.6 cm (64.02\")   Wt 87.1 kg (192 lb)   BMI 32.94 kg/m²     Physical Exam  Nursing note reviewed.   Constitutional:       General: She is not in acute distress.     Appearance: Normal appearance. She is not ill-appearing.   HENT:      Nose: No congestion.   Abdominal:      Tenderness: There is no right CVA tenderness or left CVA tenderness.      Hernia: No " hernia is present.   Skin:     General: Skin is warm and dry.   Neurological:      Mental Status: She is alert and oriented to person, place, and time.   Psychiatric:         Mood and Affect: Mood normal.         Behavior: Behavior normal.             Results for orders placed or performed in visit on 11/13/24   POC Urinalysis Dipstick, Multipro    Collection Time: 11/13/24 10:56 AM    Specimen: Urine   Result Value Ref Range    Color Yellow Yellow, Straw, Dark Yellow, Beatriz    Clarity, UA Clear Clear    Glucose, UA Negative Negative mg/dL    Bilirubin Negative Negative    Ketones, UA Negative Negative    Specific Gravity  1.020 1.005 - 1.030    Blood, UA Trace (A) Negative    pH, Urine 7.0 5.0 - 8.0    Protein, POC Negative Negative mg/dL    Urobilinogen, UA 0.2 E.U./dL Normal, 0.2 E.U./dL    Nitrite, UA Negative Negative    Leukocytes Negative Negative     Assessment and Plan    Diagnoses and all orders for this visit:    1. Recurrent UTI (Primary)  -     POC Urinalysis Dipstick, Multipro  -     trimethoprim (TRIMPEX) 100 MG tablet; Take 1 tablet by mouth Every Night.  Dispense: 90 tablet; Refill: 0        Urinalysis is clear other than trace blood noted.  At this time do not feel repeat cystoscopy warranted as patient has recently undergone full hematuria workup less than 1 year ago which was unremarkable.  At this time recommend starting back on the trimethoprim prophylaxis    Follow-up 3 months

## 2024-11-07 ENCOUNTER — HOSPITAL ENCOUNTER (OUTPATIENT)
Dept: MAMMOGRAPHY | Facility: HOSPITAL | Age: 74
Discharge: HOME | End: 2024-11-07
Admitting: STUDENT IN AN ORGANIZED HEALTH CARE EDUCATION/TRAINING PROGRAM
Payer: MEDICARE

## 2024-11-07 DIAGNOSIS — Z12.31 ENCOUNTER FOR SCREENING MAMMOGRAM FOR MALIGNANT NEOPLASM OF BREAST: ICD-10-CM

## 2024-11-07 PROCEDURE — 77063 BREAST TOMOSYNTHESIS BI: CPT

## 2024-11-07 PROCEDURE — 77067 SCR MAMMO BI INCL CAD: CPT

## 2024-11-12 ENCOUNTER — HOSPITAL ENCOUNTER (OUTPATIENT)
Dept: CARDIOLOGY | Facility: HOSPITAL | Age: 74
Discharge: HOME | End: 2024-11-12
Admitting: PHYSICIAN ASSISTANT
Payer: MEDICARE

## 2024-11-12 VITALS
BODY MASS INDEX: 32.78 KG/M2 | WEIGHT: 192 LBS | SYSTOLIC BLOOD PRESSURE: 122 MMHG | DIASTOLIC BLOOD PRESSURE: 82 MMHG | HEIGHT: 64 IN

## 2024-11-12 DIAGNOSIS — I48.0 PAF (PAROXYSMAL ATRIAL FIBRILLATION): ICD-10-CM

## 2024-11-12 PROCEDURE — 93356 MYOCRD STRAIN IMG SPCKL TRCK: CPT

## 2024-11-12 PROCEDURE — 93306 TTE W/DOPPLER COMPLETE: CPT

## 2024-11-12 PROCEDURE — 25510000001 PERFLUTREN 6.52 MG/ML SUSPENSION: Performed by: INTERNAL MEDICINE

## 2024-11-12 RX ADMIN — PERFLUTREN 13.04 MG: 6.52 INJECTION, SUSPENSION INTRAVENOUS at 14:29

## 2024-11-13 ENCOUNTER — OFFICE VISIT (OUTPATIENT)
Dept: UROLOGY | Facility: CLINIC | Age: 74
End: 2024-11-13
Payer: MEDICARE

## 2024-11-13 VITALS — TEMPERATURE: 97.5 F | WEIGHT: 192 LBS | HEIGHT: 64 IN | BODY MASS INDEX: 32.78 KG/M2

## 2024-11-13 DIAGNOSIS — N39.0 RECURRENT UTI: Primary | ICD-10-CM

## 2024-11-13 LAB
BILIRUB BLD-MCNC: NEGATIVE MG/DL
CLARITY, POC: CLEAR
COLOR UR: YELLOW
GLUCOSE UR STRIP-MCNC: NEGATIVE MG/DL
KETONES UR QL: NEGATIVE
LEUKOCYTE EST, POC: NEGATIVE
NITRITE UR-MCNC: NEGATIVE MG/ML
PH UR: 7 [PH] (ref 5–8)
PROT UR STRIP-MCNC: NEGATIVE MG/DL
RBC # UR STRIP: ABNORMAL /UL
SP GR UR: 1.02 (ref 1–1.03)
UROBILINOGEN UR QL: ABNORMAL

## 2024-11-13 RX ORDER — TRIMETHOPRIM 100 MG/1
100 TABLET ORAL NIGHTLY
Qty: 90 TABLET | Refills: 0 | Status: SHIPPED | OUTPATIENT
Start: 2024-11-13

## 2024-11-13 RX ORDER — POTASSIUM CHLORIDE 1500 MG/1
1 TABLET, EXTENDED RELEASE ORAL DAILY
COMMUNITY
Start: 2024-10-02

## 2024-11-14 ENCOUNTER — OFFICE VISIT (OUTPATIENT)
Dept: CARDIOLOGY | Facility: CLINIC | Age: 74
End: 2024-11-14
Payer: MEDICARE

## 2024-11-14 VITALS
HEART RATE: 68 BPM | DIASTOLIC BLOOD PRESSURE: 86 MMHG | HEIGHT: 64 IN | OXYGEN SATURATION: 99 % | WEIGHT: 196 LBS | BODY MASS INDEX: 33.46 KG/M2 | SYSTOLIC BLOOD PRESSURE: 132 MMHG

## 2024-11-14 DIAGNOSIS — R00.2 PALPITATIONS: ICD-10-CM

## 2024-11-14 DIAGNOSIS — I48.19 PERSISTENT ATRIAL FIBRILLATION: Primary | ICD-10-CM

## 2024-11-14 LAB
BH CV ECHO LEFT VENTRICLE GLOBAL LONGITUDINAL STRAIN: -15.7 %
BH CV ECHO MEAS - AI P1/2T: 747 MSEC
BH CV ECHO MEAS - AO MAX PG: 9.6 MMHG
BH CV ECHO MEAS - AO MEAN PG: 5.4 MMHG
BH CV ECHO MEAS - AO ROOT DIAM: 3.7 CM
BH CV ECHO MEAS - AO V2 MAX: 155.1 CM/SEC
BH CV ECHO MEAS - AO V2 VTI: 34.1 CM
BH CV ECHO MEAS - AVA(I,D): 2.17 CM2
BH CV ECHO MEAS - EDV(CUBED): 214.3 ML
BH CV ECHO MEAS - EDV(MOD-SP2): 111.3 ML
BH CV ECHO MEAS - EDV(MOD-SP4): 106.8 ML
BH CV ECHO MEAS - EF(MOD-SP2): 54.9 %
BH CV ECHO MEAS - EF(MOD-SP4): 62 %
BH CV ECHO MEAS - ESV(CUBED): 55.5 ML
BH CV ECHO MEAS - ESV(MOD-SP2): 50.2 ML
BH CV ECHO MEAS - ESV(MOD-SP4): 40.5 ML
BH CV ECHO MEAS - FS: 36.2 %
BH CV ECHO MEAS - IVS/LVPW: 1.09 CM
BH CV ECHO MEAS - IVSD: 1.09 CM
BH CV ECHO MEAS - LAT PEAK E' VEL: 14 CM/SEC
BH CV ECHO MEAS - LV DIASTOLIC VOL/BSA (35-75): 55.5 CM2
BH CV ECHO MEAS - LV MASS(C)D: 260.6 GRAMS
BH CV ECHO MEAS - LV MAX PG: 3.4 MMHG
BH CV ECHO MEAS - LV MEAN PG: 2.02 MMHG
BH CV ECHO MEAS - LV SYSTOLIC VOL/BSA (12-30): 21.1 CM2
BH CV ECHO MEAS - LV V1 MAX: 92.4 CM/SEC
BH CV ECHO MEAS - LV V1 VTI: 21 CM
BH CV ECHO MEAS - LVIDD: 6 CM
BH CV ECHO MEAS - LVIDS: 3.8 CM
BH CV ECHO MEAS - LVOT AREA: 3.5 CM2
BH CV ECHO MEAS - LVOT DIAM: 2.12 CM
BH CV ECHO MEAS - LVPWD: 1 CM
BH CV ECHO MEAS - MED PEAK E' VEL: 12 CM/SEC
BH CV ECHO MEAS - MR MAX PG: 108.7 MMHG
BH CV ECHO MEAS - MR MAX VEL: 518.7 CM/SEC
BH CV ECHO MEAS - MR MEAN PG: 96.2 MMHG
BH CV ECHO MEAS - MR MEAN VEL: 479 CM/SEC
BH CV ECHO MEAS - MR VTI: 202.4 CM
BH CV ECHO MEAS - MV A MAX VEL: 83.4 CM/SEC
BH CV ECHO MEAS - MV DEC SLOPE: 742.2 CM/SEC2
BH CV ECHO MEAS - MV DEC TIME: 0.16 SEC
BH CV ECHO MEAS - MV E MAX VEL: 106.5 CM/SEC
BH CV ECHO MEAS - MV E/A: 1.28
BH CV ECHO MEAS - MV MAX PG: 6.2 MMHG
BH CV ECHO MEAS - MV MEAN PG: 2.9 MMHG
BH CV ECHO MEAS - MV V2 VTI: 34.7 CM
BH CV ECHO MEAS - MVA(VTI): 2.14 CM2
BH CV ECHO MEAS - PA V2 MAX: 74 CM/SEC
BH CV ECHO MEAS - RAP SYSTOLE: 8 MMHG
BH CV ECHO MEAS - RVSP: 30.9 MMHG
BH CV ECHO MEAS - SV(LVOT): 74.1 ML
BH CV ECHO MEAS - SV(MOD-SP2): 61 ML
BH CV ECHO MEAS - SV(MOD-SP4): 66.2 ML
BH CV ECHO MEAS - SVI(LVOT): 38.6 ML/M2
BH CV ECHO MEAS - SVI(MOD-SP2): 31.8 ML/M2
BH CV ECHO MEAS - SVI(MOD-SP4): 34.5 ML/M2
BH CV ECHO MEAS - TR MAX PG: 22.9 MMHG
BH CV ECHO MEAS - TR MAX VEL: 229.9 CM/SEC
BH CV ECHO MEASUREMENTS AVERAGE E/E' RATIO: 8.19
LEFT ATRIUM VOLUME INDEX: 71 ML/M2
LEFT ATRIUM VOLUME: 137 ML

## 2024-11-14 PROCEDURE — 3075F SYST BP GE 130 - 139MM HG: CPT | Performed by: PHYSICIAN ASSISTANT

## 2024-11-14 PROCEDURE — 99214 OFFICE O/P EST MOD 30 MIN: CPT | Performed by: PHYSICIAN ASSISTANT

## 2024-11-14 PROCEDURE — 93000 ELECTROCARDIOGRAM COMPLETE: CPT | Performed by: PHYSICIAN ASSISTANT

## 2024-11-14 PROCEDURE — 3079F DIAST BP 80-89 MM HG: CPT | Performed by: PHYSICIAN ASSISTANT

## 2024-11-14 NOTE — PROGRESS NOTES
"[facility]ART GROUP -  CLINIC FOLLOW UP     Patient Care Team:  Petar Cummings MD as PCP - General (Internal Medicine)  Choco Diallo MD (Inactive) as Cardiologist (Cardiology)  Chrissy Allen APRN as Nurse Practitioner (Family Medicine)  Praneeth Diallo MD as Surgeon (Orthopedic Surgery)  Jovanna Curtis MD as Consulting Physician (General Surgery)    Chief Complaint: PAF     Subjective   EP Problems:  1.  Paroxysmal atrial fibrillation  -9/27/21: DCCV   -1/20/2021: Ablation, Fernando  -9/18/24: DCCV, ER        Cardiology Problems:  1.  Hypertension  2.  Hyperlipidemia  3.  CAD status post PCI   - s/p BMS in 2003 and PTCA in 2007.     Medical Problems:  1.  Obesity  -9/2023: BMI 36  2.  Eustachian tube malfunction  3.  Nephrolithiasis      HPI: Today I had the pleasure of seeing Christiana Hendrix in the cardiology clinic for follow up. She is a 73 year old female with a history of PAF with previous ablation in 2021 by Dr. Fernando. She had been doing well this year until she had a nother recurrence in September that prompted ER visit and was found to have recurrent afib. This required ER cardioversion and we placed a monitor and repeated an echo then.     She follows up after wearing a monitor which she does not feel is a good representation of what she had been feeling. Typically, she has \"more arrhythmia at night\". She takes her Toprol XL closer 10pm as well. Review of her holter does not show a high burden of afib, but she has short nonsustained runs of atrial tach, which are overall infrequent. She doesn't feel like the monitor \"caught everything\" though.     Echo is still pending as well, but prelim review of images appear to have normal LVEF. She states in 2003, she had elevated troponins and \"heart attack\" treated by Dr. Diallo. She would not be a candidate for flecainide.     Objective     Visit Vitals  /86   Pulse 68   Ht 162.6 cm (64.02\")   Wt 88.9 kg (196 lb)   SpO2 99%   BMI " 33.63 kg/m²           Vitals reviewed.   Constitutional:       Appearance: Healthy appearance. Not in distress.   Eyes:      Extraocular Movements: Extraocular movements intact.      Conjunctiva/sclera: Conjunctivae normal.      Pupils: Pupils are equal, round, and reactive to light.   HENT:      Head: Normocephalic and atraumatic.      Nose: Nose normal.    Mouth/Throat:      Lips: Pink.      Mouth: Mucous membranes are moist.      Pharynx: Oropharynx is clear.   Neck:      Vascular: No carotid bruit or JVD. JVD normal.   Pulmonary:      Effort: Pulmonary effort is normal.      Breath sounds: Normal breath sounds.   Chest:      Chest wall: Not tender to palpatation.   Cardiovascular:      PMI at left midclavicular line. Normal rate. Regular rhythm. Normal S1. Normal S2.       Murmurs: There is no murmur.      No gallop.  No rub.   Pulses:     Radial: 2+ bilaterally.  Edema:     Peripheral edema absent.   Abdominal:      General: Bowel sounds are normal.      Palpations: Abdomen is soft.   Musculoskeletal: Normal range of motion.      Extremities: No clubbing present.     Cervical back: Normal range of motion. Skin:     General: Skin is warm and dry.   Neurological:      General: No focal deficit present.      Mental Status: Alert and oriented to person, place, and time.   Psychiatric:         Attention and Perception: Attention normal.         Mood and Affect: Affect normal.         Speech: Speech normal.         Behavior: Behavior normal.         Cognition and Memory: Cognition normal.             The following portions of the patient's history were reviewed and updated as appropriate: allergies, current medications, past medical history, past social history, past and problem list.     Review of Systems   Constitutional: Negative.    HENT: Negative.     Eyes: Negative.    Respiratory: Negative.     Cardiovascular:  Positive for palpitations.   Gastrointestinal: Negative.    Endocrine: Negative.    Genitourinary:  Negative.    Musculoskeletal: Negative.    Skin: Negative.    Allergic/Immunologic: Negative.    Neurological: Negative.    Hematological: Negative.    Psychiatric/Behavioral: Negative.              ECG 12 Lead    Date/Time: 11/14/2024 10:43 AM  Performed by: Lakia Ng PA    Authorized by: Lakia Ng PA  Comparison: compared with previous ECG from 9/18/2024  Rhythm: sinus rhythm  Rate: normal  BPM: 88  QRS axis: right    Clinical impression: normal ECG            Medication Review: yes    Current Outpatient Medications:     acetaminophen (Tylenol) 325 MG tablet, Take 2 tablets by mouth Every 4 (Four) Hours As Needed for Mild or Moderate Pain., Disp: 100 tablet, Rfl: 2    albuterol sulfate  (90 Base) MCG/ACT inhaler, Inhale 2 puffs Every 6 (Six) Hours As Needed for Wheezing or Shortness of Air., Disp: , Rfl:     amLODIPine (NORVASC) 10 MG tablet, Take 1 tablet by mouth Daily., Disp: , Rfl:     atorvastatin (LIPITOR) 80 MG tablet, Take 1 tablet by mouth Every Night., Disp: , Rfl:     budesonide-formoterol (SYMBICORT) 160-4.5 MCG/ACT inhaler, Inhale 2 puffs 2 (Two) Times a Day., Disp: , Rfl:     Cholecalciferol (VITAMIN D3) 125 MCG (5000 UT) capsule capsule, Take 1 capsule by mouth Daily., Disp: , Rfl:     coenzyme Q10 100 MG capsule, Take 2 capsules by mouth Daily., Disp: , Rfl:     docusate sodium (Colace) 100 MG capsule, Take 1 capsule by mouth 2 (Two) Times a Day., Disp: 60 capsule, Rfl: 1    estradiol (ESTRACE) 0.1 MG/GM vaginal cream, Insert 2 g into the vagina 2 (Two) Times a Week., Disp: 42.5 g, Rfl: 5    ezetimibe (ZETIA) 10 MG tablet, Take 1 tablet by mouth Daily., Disp: , Rfl:     fluocinolone acetonide (DERMOTIC) 0.01 % oil otic oil, Administer 4 drops into the left ear 3 (Three) Times a Week., Disp: , Rfl:     folic acid (FOLVITE) 800 MCG tablet, Take 1 tablet by mouth Daily., Disp: , Rfl:     Glucosamine-Chondroit-Vit C-Mn (GLUCOSAMINE 1500 COMPLEX PO), Take 1 capsule by mouth  Daily., Disp: , Rfl:     Inclisiran Sodium (Leqvio) 284 MG/1.5ML solution prefilled syringe, Inject 1 mL under the skin into the appropriate area as directed Every 6 (Six) Months., Disp: 1 mL, Rfl: 1    Klor-Con M20 20 MEQ CR tablet, Take 1 tablet by mouth Daily., Disp: , Rfl:     levocetirizine (XYZAL) 5 MG tablet, Take 1 tablet by mouth Every Night., Disp: , Rfl:     metoprolol succinate XL (TOPROL-XL) 100 MG 24 hr tablet, Take 1 tablet by mouth Every Night., Disp: , Rfl:     montelukast (SINGULAIR) 10 MG tablet, Take 1 tablet by mouth Every Night., Disp: , Rfl:     Multiple Vitamins-Minerals (OCUVITE ADULT 50+ PO), Take 1 tablet by mouth Daily., Disp: , Rfl:     nitroglycerin (NITROSTAT) 0.4 MG SL tablet, Place 1 tablet under the tongue Every 5 (Five) Minutes As Needed for Chest Pain. Take no more than 3 doses in 15 minutes., Disp: 25 tablet, Rfl: 5    ondansetron (Zofran) 4 MG tablet, Take 1 tablet by mouth Every 8 (Eight) Hours As Needed for Nausea or Vomiting., Disp: 15 tablet, Rfl: 0    oxyCODONE (Roxicodone) 5 MG immediate release tablet, Take 1 tablet by mouth Every 8 (Eight) Hours As Needed for Severe Pain., Disp: 10 tablet, Rfl: 0    polyethylene glycol (MIRALAX) 17 g packet, Take 17 g by mouth Daily., Disp: 60 packet, Rfl: 0    Polyethylene Glycol 3350 (MIRALAX PO), Take 17 g by mouth Daily As Needed (constipation)., Disp: , Rfl:     potassium chloride ER (K-TAB) 20 MEQ tablet controlled-release ER tablet, Take 1 tablet by mouth Daily., Disp: , Rfl:     Probiotic Product (PROBIOTIC BLEND PO), Take 1 capsule by mouth Daily., Disp: , Rfl:     rivaroxaban (Xarelto) 20 MG tablet, Take 1 tablet by mouth Daily., Disp: 90 tablet, Rfl: 3    trimethoprim (TRIMPEX) 100 MG tablet, Take 1 tablet by mouth Every Night., Disp: 90 tablet, Rfl: 0    vitamin A 46430 UNIT capsule, Take 1 capsule by mouth Daily., Disp: , Rfl:    Allergies   Allergen Reactions    Eliquis [Apixaban] GI Intolerance    Erythromycin Rash     Sulfa Antibiotics Rash       I have reviewed       CBC:  Lab Results - Last 18 Months   Lab Units 09/18/24  1439   WBC 10*3/mm3 6.31   HEMOGLOBIN g/dL 12.9   HEMATOCRIT % 42.9   PLATELETS 10*3/mm3 231      BMP/CMP:  Lab Results - Last 18 Months   Lab Units 09/18/24  1439   SODIUM mmol/L 145   POTASSIUM mmol/L 4.3   CHLORIDE mmol/L 108*   CO2 mmol/L 28.0   GLUCOSE mg/dL 146*   BUN mg/dL 18   CREATININE mg/dL 0.71   CALCIUM mg/dL 9.3     BNP:   Lab Results - Last 18 Months   Lab Units 09/18/24  1439   PROBNP pg/mL 1,684.0*      THYROID:  Lab Results - Last 18 Months   Lab Units 02/14/24  0941   TSH uIU/mL 1.570       Results for orders placed during the hospital encounter of 02/26/21    Adult Transesophageal Echo (KEELEY) W/ Cont if Necessary Per Protocol    Interpretation Summary  · Left ventricular systolic function is normal. Left ventricular ejection fraction appears to be 56 - 60%.  · Normal right ventricular cavity size and systolic function noted.  · No evidence of a left atrial appendage thrombus.  · No significant valvular abnormalities.     Assessment:   Diagnoses and all orders for this visit:    1. Persistent atrial fibrillation (Primary)  -     ECG 12 Lead; Future    2. Palpitations    Palpitaitons: Prelim review of Holter does not show high burden of recurrent afib. We discussed that this isn't necessarily enough to warrant a repeat invasive high risk procedure such as ablation right now, which she agrees with.   -For symptomatic control, we discussed possibly taking her toprol earlier in the evening and see if this improves her palpitations that she feels are more common at night  -Will await official read of echo and holter before deciding on any AAD, like Multaq  -Follow up in 6 weeks     OA: She would like to have knee surgery, but if she wants to wait until after the new year that's fine. I don't suspect she will be planning for an afib ablation so, she may hold her anticoagulation as need BUT MUST  RESTART ASPIRIN 81MG in its place.         I spent 30 minutes caring for Christiana on this date of service. This time includes time spent by me in the following activities:preparing for the visit, reviewing tests, obtaining and/or reviewing a separately obtained history, performing a medically appropriate examination and/or evaluation , counseling and educating the patient/family/caregiver, ordering medications, tests, or procedures, referring and communicating with other health care professionals , documenting information in the medical record, and independently interpreting results and communicating that information with the patient/family/caregiver        Electronically signed by SHEFALI Boyd

## 2024-11-19 ENCOUNTER — OFFICE VISIT (OUTPATIENT)
Age: 74
End: 2024-11-19
Payer: MEDICARE

## 2024-11-19 VITALS — BODY MASS INDEX: 33.8 KG/M2 | WEIGHT: 198 LBS | HEIGHT: 64 IN

## 2024-11-19 DIAGNOSIS — M17.0 BILATERAL PRIMARY OSTEOARTHRITIS OF KNEE: Primary | ICD-10-CM

## 2024-11-19 PROCEDURE — G8417 CALC BMI ABV UP PARAM F/U: HCPCS | Performed by: PHYSICIAN ASSISTANT

## 2024-11-19 PROCEDURE — 1159F MED LIST DOCD IN RCRD: CPT | Performed by: PHYSICIAN ASSISTANT

## 2024-11-19 PROCEDURE — G8427 DOCREV CUR MEDS BY ELIG CLIN: HCPCS | Performed by: PHYSICIAN ASSISTANT

## 2024-11-19 PROCEDURE — 1036F TOBACCO NON-USER: CPT | Performed by: PHYSICIAN ASSISTANT

## 2024-11-19 PROCEDURE — 1123F ACP DISCUSS/DSCN MKR DOCD: CPT | Performed by: PHYSICIAN ASSISTANT

## 2024-11-19 PROCEDURE — 1090F PRES/ABSN URINE INCON ASSESS: CPT | Performed by: PHYSICIAN ASSISTANT

## 2024-11-19 PROCEDURE — 20610 DRAIN/INJ JOINT/BURSA W/O US: CPT | Performed by: PHYSICIAN ASSISTANT

## 2024-11-19 PROCEDURE — 99214 OFFICE O/P EST MOD 30 MIN: CPT | Performed by: PHYSICIAN ASSISTANT

## 2024-11-19 PROCEDURE — G8399 PT W/DXA RESULTS DOCUMENT: HCPCS | Performed by: PHYSICIAN ASSISTANT

## 2024-11-19 PROCEDURE — 3017F COLORECTAL CA SCREEN DOC REV: CPT | Performed by: PHYSICIAN ASSISTANT

## 2024-11-19 PROCEDURE — G8484 FLU IMMUNIZE NO ADMIN: HCPCS | Performed by: PHYSICIAN ASSISTANT

## 2024-11-19 RX ORDER — BUPIVACAINE HYDROCHLORIDE 5 MG/ML
3 INJECTION, SOLUTION EPIDURAL; INTRACAUDAL ONCE
Status: COMPLETED | OUTPATIENT
Start: 2024-11-19 | End: 2024-11-19

## 2024-11-19 RX ORDER — LIDOCAINE HYDROCHLORIDE 10 MG/ML
1 INJECTION, SOLUTION INFILTRATION; PERINEURAL ONCE
Status: COMPLETED | OUTPATIENT
Start: 2024-11-19 | End: 2024-11-19

## 2024-11-19 RX ORDER — BETAMETHASONE SODIUM PHOSPHATE AND BETAMETHASONE ACETATE 3; 3 MG/ML; MG/ML
6 INJECTION, SUSPENSION INTRA-ARTICULAR; INTRALESIONAL; INTRAMUSCULAR; SOFT TISSUE ONCE
Status: COMPLETED | OUTPATIENT
Start: 2024-11-19 | End: 2024-11-19

## 2024-11-19 RX ADMIN — BETAMETHASONE SODIUM PHOSPHATE AND BETAMETHASONE ACETATE 6 MG: 3; 3 INJECTION, SUSPENSION INTRA-ARTICULAR; INTRALESIONAL; INTRAMUSCULAR; SOFT TISSUE at 13:27

## 2024-11-19 RX ADMIN — BUPIVACAINE HYDROCHLORIDE 15 MG: 5 INJECTION, SOLUTION EPIDURAL; INTRACAUDAL at 13:30

## 2024-11-19 RX ADMIN — LIDOCAINE HYDROCHLORIDE 1 ML: 10 INJECTION, SOLUTION INFILTRATION; PERINEURAL at 13:31

## 2024-11-19 RX ADMIN — BETAMETHASONE SODIUM PHOSPHATE AND BETAMETHASONE ACETATE 6 MG: 3; 3 INJECTION, SUSPENSION INTRA-ARTICULAR; INTRALESIONAL; INTRAMUSCULAR; SOFT TISSUE at 13:28

## 2024-11-19 RX ADMIN — BUPIVACAINE HYDROCHLORIDE 15 MG: 5 INJECTION, SOLUTION EPIDURAL; INTRACAUDAL at 13:29

## 2024-11-19 NOTE — PROGRESS NOTES
understanding the risks, benefits and alternatives, 1 mL of 1% lidocaine, 3 mL to 0.5% bupivacaine and 6 milligrams Celestone was injected with 22-1/2 gauge needle to the right inferior lateral entry portal of knee. Hemostasis was achieved and a Band-Aid applied. The patient tolerated the procedure well.       LEFT KNEE INJECTION:   Informed consent obtained from the patient. Risks of injection discussed with the patient to include by are not limited to: incomplete resolution of symptoms, local skin irritation, temporary elevation in blood sugar and blood pressure, tendinopathy, chondrotoxicity.     After sterile prep, understanding the risks, benefits and alternatives, 1 mL of 1% lidocaine, 3 mL to 0.5% bupivacaine and 6 milligrams Celestone was injected with 22-1/2 gauge needle to the left inferior lateral entry portal of knee. Hemostasis was achieved and a Band-Aid applied. The patient tolerated the procedure well.     Greater than 30 minutes were spent with this encounter. Time spent included evaluating the patient's chart prior to arrival.  Evaluating the patient in the office including history, physical examination, imaging reviewing, and counseling on next steps.  Lastly, time was spent discussing orders with my staff as well as providing documentation in the chart.     Return in about 3 months (around 2/19/2025) for bilateral knee .   No orders of the defined types were placed in this encounter.        Electronically signed by Reilly Costa PA-C on 11/19/2024 at 1:22 PM.    Dragon Disclaimer:   This note was dictated with voice recognition software.  Though review and corrections are routine, we apologize for any errors.

## 2024-12-04 ENCOUNTER — OFFICE VISIT (OUTPATIENT)
Dept: SURGERY | Facility: CLINIC | Age: 74
End: 2024-12-04
Payer: MEDICARE

## 2024-12-04 VITALS
SYSTOLIC BLOOD PRESSURE: 162 MMHG | BODY MASS INDEX: 33.32 KG/M2 | DIASTOLIC BLOOD PRESSURE: 88 MMHG | HEIGHT: 65 IN | WEIGHT: 200 LBS

## 2024-12-04 DIAGNOSIS — Z90.49 S/P PARTIAL COLECTOMY: Primary | ICD-10-CM

## 2024-12-04 DIAGNOSIS — E66.811 CLASS 1 OBESITY DUE TO EXCESS CALORIES WITH BODY MASS INDEX (BMI) OF 34.0 TO 34.9 IN ADULT, UNSPECIFIED WHETHER SERIOUS COMORBIDITY PRESENT: ICD-10-CM

## 2024-12-04 DIAGNOSIS — E66.09 CLASS 1 OBESITY DUE TO EXCESS CALORIES WITH BODY MASS INDEX (BMI) OF 34.0 TO 34.9 IN ADULT, UNSPECIFIED WHETHER SERIOUS COMORBIDITY PRESENT: ICD-10-CM

## 2024-12-04 RX ORDER — POLYETHYLENE GLYCOL 3350 17 G/17G
17 POWDER, FOR SOLUTION ORAL DAILY PRN
Qty: 60 EACH | Refills: 2 | Status: SHIPPED | OUTPATIENT
Start: 2024-12-04

## 2024-12-04 NOTE — PATIENT INSTRUCTIONS

## 2024-12-04 NOTE — PROGRESS NOTES
Office Established Patient Note:     Referring Provider: No ref. provider found    Chief Complaint   Patient presents with    Follow-up     3 month follow up        Subjective .     History of present illness:  Christiana Hendrix is a 74 y.o. female s/p robotic sigmoid colectomy in August, 2024.   History of Present Illness  The patient presents for evaluation of constipation.    She reports an improvement in her condition, with no recent episodes of atrial fibrillation. Her abdominal discomfort has lessened, and her bowel movements have returned to normal. However, she still experiences constipation, which causes her pain. Despite taking a daily stool softener, she continues to struggle with constipation and is considering the use of laxatives. She has not had regular bowel movements for several years, typically only having one every three days due to the constipation. She has a scheduled follow-up with a gastroenterologist for a colonoscopy in three years.    She had an ablation procedure previously.       History  Past Medical History:   Diagnosis Date    Asthma     Atrial fibrillation     CAD in native artery     Chronic UTI     Diverticulitis     Hyperlipidemia     Hypertension     Kidney stone 05/30/2023    Lateral meniscus tear     right    MI, old    ,   Past Surgical History:   Procedure Laterality Date    ABLATION OF DYSRHYTHMIC FOCUS      BLADDER NECK SUSPENSION      BREAST BIOPSY Bilateral     2002    CARDIOVERSION      CATARACT EXTRACTION WITH INTRAOCULAR LENS IMPLANT Bilateral     CHOLECYSTECTOMY      COLON RESECTION N/A 8/20/2024    Procedure: COLON RESECTION LAPAROSCOPIC SIGMOID WITH DAVINCI ROBOT, INTRA-OPERATIVE FLEXIBLE SIGMOIDOSCOPY, SPLENIC MOBILIZATION.;  Surgeon: Jovanna Curtis MD;  Location: [facility] OR;  Service: Robotics - DaVinci;  Laterality: N/A;    COLONOSCOPY N/A 05/23/2024    Procedure: COLONOSCOPY WITH ANESTHESIA;  Surgeon: Thiago Mensah MD;  Location: [facility] ENDOSCOPY;   Service: Gastroenterology;  Laterality: N/A;  pre: hx polyps  post: polyps. diverticulosis.   mounika schaeffer    COLONOSCOPY W/ BIOPSIES      CORONARY ANGIOPLASTY  08/13/2007    had stents 2003    CORONARY STENT PLACEMENT  2002    EXTRACORPOREAL SHOCK WAVE LITHOTRIPSY (ESWL) Right 06/05/2023    Procedure: EXTRACORPOREAL SHOCKWAVE LITHOTRIPSY-right;  Surgeon: Jamar Hendrickson MD;  Location:  [facility] OR;  Service: Urology;  Laterality: Right;    HYSTERECTOMY      KNEE ARTHROSCOPY Right 11/02/2023    Procedure: RIGHT KNEE ARTHROSCOPIC LATERAL MENISCUS ROOT REPAIR, PARTIAL MEDIAL MENISECTOMY;  Surgeon: Raymond Puentes MD;  Location:  [facility] OR;  Service: Orthopedics;  Laterality: Right;    VEIN SURGERY  1988   ,   Family History   Problem Relation Age of Onset    Dementia Mother     Heart attack Sister     Heart disease Sister     Diverticulitis Sister     Asthma Sister     Brain cancer Father     Heart disease Maternal Grandmother     Heart attack Maternal Grandmother     Stroke Maternal Grandfather     No Known Problems Paternal Grandmother     Brain cancer Paternal Grandfather     Breast cancer Sister     Breast cancer Paternal Aunt     Ovarian cancer Neg Hx     Uterine cancer Neg Hx     Colon cancer Neg Hx    ,   Social History     Tobacco Use    Smoking status: Never    Smokeless tobacco: Never   Vaping Use    Vaping status: Never Used   Substance Use Topics    Alcohol use: Never     Alcohol/week: 3.0 standard drinks of alcohol     Types: 2 Glasses of wine, 1 Shots of liquor per week    Drug use: Never   , (Not in a hospital admission)   and Allergies:  Eliquis [apixaban], Erythromycin, and Sulfa antibiotics    Current Outpatient Medications:     acetaminophen (Tylenol) 325 MG tablet, Take 2 tablets by mouth Every 4 (Four) Hours As Needed for Mild or Moderate Pain., Disp: 100 tablet, Rfl: 2    albuterol sulfate  (90 Base) MCG/ACT inhaler, Inhale 2 puffs Every 6 (Six) Hours As Needed for Wheezing or Shortness  of Air., Disp: , Rfl:     amLODIPine (NORVASC) 10 MG tablet, Take 1 tablet by mouth Daily., Disp: , Rfl:     atorvastatin (LIPITOR) 80 MG tablet, Take 1 tablet by mouth Every Night., Disp: , Rfl:     budesonide-formoterol (SYMBICORT) 160-4.5 MCG/ACT inhaler, Inhale 2 puffs 2 (Two) Times a Day., Disp: , Rfl:     Cholecalciferol (VITAMIN D3) 125 MCG (5000 UT) capsule capsule, Take 1 capsule by mouth Daily., Disp: , Rfl:     coenzyme Q10 100 MG capsule, Take 2 capsules by mouth Daily., Disp: , Rfl:     docusate sodium (Colace) 100 MG capsule, Take 1 capsule by mouth 2 (Two) Times a Day., Disp: 60 capsule, Rfl: 1    estradiol (ESTRACE) 0.1 MG/GM vaginal cream, Insert 2 g into the vagina 2 (Two) Times a Week., Disp: 42.5 g, Rfl: 5    ezetimibe (ZETIA) 10 MG tablet, Take 1 tablet by mouth Daily., Disp: , Rfl:     fluocinolone acetonide (DERMOTIC) 0.01 % oil otic oil, Administer 4 drops into the left ear 3 (Three) Times a Week., Disp: , Rfl:     folic acid (FOLVITE) 800 MCG tablet, Take 1 tablet by mouth Daily., Disp: , Rfl:     Glucosamine-Chondroit-Vit C-Mn (GLUCOSAMINE 1500 COMPLEX PO), Take 1 capsule by mouth Daily., Disp: , Rfl:     Inclisiran Sodium (Leqvio) 284 MG/1.5ML solution prefilled syringe, Inject 1 mL under the skin into the appropriate area as directed Every 6 (Six) Months., Disp: 1 mL, Rfl: 1    Klor-Con M20 20 MEQ CR tablet, Take 1 tablet by mouth Daily., Disp: , Rfl:     levocetirizine (XYZAL) 5 MG tablet, Take 1 tablet by mouth Every Night., Disp: , Rfl:     metoprolol succinate XL (TOPROL-XL) 100 MG 24 hr tablet, Take 1 tablet by mouth Every Night., Disp: , Rfl:     montelukast (SINGULAIR) 10 MG tablet, Take 1 tablet by mouth Every Night., Disp: , Rfl:     Multiple Vitamins-Minerals (OCUVITE ADULT 50+ PO), Take 1 tablet by mouth Daily., Disp: , Rfl:     nitroglycerin (NITROSTAT) 0.4 MG SL tablet, Place 1 tablet under the tongue Every 5 (Five) Minutes As Needed for Chest Pain. Take no more than 3 doses  "in 15 minutes., Disp: 25 tablet, Rfl: 5    ondansetron (Zofran) 4 MG tablet, Take 1 tablet by mouth Every 8 (Eight) Hours As Needed for Nausea or Vomiting., Disp: 15 tablet, Rfl: 0    oxyCODONE (Roxicodone) 5 MG immediate release tablet, Take 1 tablet by mouth Every 8 (Eight) Hours As Needed for Severe Pain., Disp: 10 tablet, Rfl: 0    polyethylene glycol (MIRALAX) 17 g packet, Take 17 g by mouth Daily., Disp: 60 packet, Rfl: 0    Polyethylene Glycol 3350 (MIRALAX PO), Take 17 g by mouth Daily As Needed (constipation)., Disp: , Rfl:     potassium chloride ER (K-TAB) 20 MEQ tablet controlled-release ER tablet, Take 1 tablet by mouth Daily., Disp: , Rfl:     Probiotic Product (PROBIOTIC BLEND PO), Take 1 capsule by mouth Daily., Disp: , Rfl:     rivaroxaban (Xarelto) 20 MG tablet, Take 1 tablet by mouth Daily., Disp: 90 tablet, Rfl: 3    trimethoprim (TRIMPEX) 100 MG tablet, Take 1 tablet by mouth Every Night., Disp: 90 tablet, Rfl: 0    vitamin A 35559 UNIT capsule, Take 1 capsule by mouth Daily., Disp: , Rfl:     polyethylene glycol (MIRALAX) 17 g packet, Take 17 g by mouth Daily As Needed (constipation)., Disp: 60 each, Rfl: 2      Objective     Vital Signs   /88   Ht 165.1 cm (65\")   Wt 90.7 kg (200 lb)   BMI 33.28 kg/m²      Physical Exam:  General appearance - alert, well appearing, and in no distress  Mental status - alert, oriented to person, place, and time  Eyes - pupils equal and reactive, extraocular eye movements intact  Neck - supple, no significant adenopathy  Abdomen - soft, nontender, nondistended, no masses or organomegaly  Incisions well healed   Neurological - alert, oriented, normal speech, no focal findings or movement disorder noted  Physical Exam         Results Review:     The following data was reviewed by: Jovanna Curtis MD on 12/04/2024:  ED Provider Notes by Braxton Moody Jr., MD (09/18/2024 15:17)     Results         Assessment & Plan       Diagnoses and all orders " for this visit:    1. S/P partial colectomy (Primary)  -     polyethylene glycol (MIRALAX) 17 g packet; Take 17 g by mouth Daily As Needed (constipation).  Dispense: 60 each; Refill: 2    2. Class 1 obesity due to excess calories with body mass index (BMI) of 34.0 to 34.9 in adult, unspecified whether serious comorbidity present       Assessment & Plan  1. Constipation.  MiraLAX was recommended for daily use. A prescription for MiraLAX with refills will be sent to her pharmacy. She was advised to continue with the stool softeners daily and use MiraLAX unless it results in overly loose stools, in which case it should be used as needed. If MiraLAX proves effective, she can purchase it over the counter or have her primary care physician prescribe it.    2. Atrial Fibrillation.  She has not experienced atrial fibrillation since her last episode. It was discussed that while it is not normal to go into atrial fibrillation 4 weeks after surgery, it is not unexpected due to her predisposition and previous ablation. She was advised to monitor her condition and call if needed.       Colonoscopy in 3 years. Follow up with me as needed.     Jovanna Curtis MD  12/04/24  10:46 CST    Patient or patient representative verbalized consent for the use of Ambient Listening during the visit with  Jovanna Curtis MD for chart documentation. 12/4/2024  14:28 CST

## 2025-01-24 ENCOUNTER — OFFICE VISIT (OUTPATIENT)
Dept: CARDIOLOGY | Facility: CLINIC | Age: 75
End: 2025-01-24
Payer: MEDICARE

## 2025-01-24 VITALS
OXYGEN SATURATION: 98 % | DIASTOLIC BLOOD PRESSURE: 76 MMHG | SYSTOLIC BLOOD PRESSURE: 130 MMHG | HEIGHT: 65 IN | BODY MASS INDEX: 32.65 KG/M2 | HEART RATE: 85 BPM | WEIGHT: 196 LBS

## 2025-01-24 DIAGNOSIS — I25.10 CORONARY ARTERY DISEASE INVOLVING NATIVE CORONARY ARTERY OF NATIVE HEART WITHOUT ANGINA PECTORIS: ICD-10-CM

## 2025-01-24 DIAGNOSIS — I48.0 PAF (PAROXYSMAL ATRIAL FIBRILLATION): ICD-10-CM

## 2025-01-24 DIAGNOSIS — R00.2 PALPITATIONS: Primary | ICD-10-CM

## 2025-01-24 PROCEDURE — 3078F DIAST BP <80 MM HG: CPT | Performed by: PHYSICIAN ASSISTANT

## 2025-01-24 PROCEDURE — 99214 OFFICE O/P EST MOD 30 MIN: CPT | Performed by: PHYSICIAN ASSISTANT

## 2025-01-24 PROCEDURE — 3075F SYST BP GE 130 - 139MM HG: CPT | Performed by: PHYSICIAN ASSISTANT

## 2025-01-24 PROCEDURE — 93000 ELECTROCARDIOGRAM COMPLETE: CPT | Performed by: PHYSICIAN ASSISTANT

## 2025-01-24 NOTE — PROGRESS NOTES
"[facility] -  CLINIC FOLLOW UP     Patient Care Team:  Petar Cummings MD as PCP - General (Internal Medicine)  Choco Diallo MD (Inactive) as Cardiologist (Cardiology)  Chrissy Allen APRN as Nurse Practitioner (Family Medicine)  Praneeth Diallo MD as Surgeon (Orthopedic Surgery)  Jovanna Curtis MD as Consulting Physician (General Surgery)    Chief Complaint: foll    Subjective   EP Problems:  1.  Paroxysmal atrial fibrillation  -9/27/21: DCCV   -1/20/2021: Ablation, Fernando  -9/18/24: DCCV, ER        Cardiology Problems:  1.  Hypertension  2.  Hyperlipidemia  3.  CAD status post PCI   - s/p BMS in 2003 and PTCA in 2007.      Medical Problems:  1.  Obesity  -9/2023: BMI 36  2.  Eustachian tube malfunction  3.  Nephrolithiasis    HPI: Today I had the pleasure of seeing Christiana Hendrix in the cardiology clinic for follow up. She is a 73 year old female with a history of PAF with previous ablation in 2021 by Dr. Fernando. She had been doing well this year until she had a nother recurrence in September that prompted ER visit and was found to have recurrent afib. This required ER cardioversion and we placed a monitor, which was not very telling and did not show a high burden of afib. She did not feel the monitor was a good representation of what she had been feeling. However, it didn't show any evidence of afib and occasional runs of SVT. She states since she started taking her toprol earlier in the evening, that has decreased her palpitations very much at night and feels that this is working better for her.     She denies any chest pain suggestive of angina. She has upcoming appt with Cardiology.       Objective     Visit Vitals  /76   Pulse 85   Ht 165.1 cm (65\")   Wt 88.9 kg (196 lb)   SpO2 98%   BMI 32.62 kg/m²           Vitals reviewed.   Constitutional:       Appearance: Healthy appearance. Not in distress.   Eyes:      Extraocular Movements: Extraocular movements intact.     "  Conjunctiva/sclera: Conjunctivae normal.      Pupils: Pupils are equal, round, and reactive to light.   HENT:      Head: Normocephalic and atraumatic.      Nose: Nose normal.    Mouth/Throat:      Lips: Pink.      Mouth: Mucous membranes are moist.      Pharynx: Oropharynx is clear.   Neck:      Vascular: No carotid bruit or JVD. JVD normal.   Pulmonary:      Effort: Pulmonary effort is normal.      Breath sounds: Normal breath sounds.   Chest:      Chest wall: Not tender to palpatation.   Cardiovascular:      PMI at left midclavicular line. Normal rate. Regular rhythm. Normal S1. Normal S2.       Murmurs: There is no murmur.      No gallop.  No rub.   Pulses:     Radial: 2+ bilaterally.  Edema:     Peripheral edema absent.   Abdominal:      General: Bowel sounds are normal.      Palpations: Abdomen is soft.   Musculoskeletal: Normal range of motion.      Extremities: No clubbing present.     Cervical back: Normal range of motion. Skin:     General: Skin is warm and dry.   Neurological:      General: No focal deficit present.      Mental Status: Alert and oriented to person, place, and time.   Psychiatric:         Attention and Perception: Attention normal.         Mood and Affect: Affect normal.         Speech: Speech normal.         Behavior: Behavior normal.         Cognition and Memory: Cognition normal.             The following portions of the patient's history were reviewed and updated as appropriate: allergies, current medications, past medical history, past social history, past and problem list.     Review of Systems   Constitutional: Negative.    HENT: Negative.     Eyes: Negative.    Respiratory: Negative.     Cardiovascular: Negative.    Gastrointestinal: Negative.    Endocrine: Negative.    Genitourinary: Negative.    Musculoskeletal:  Positive for arthralgias.   Skin: Negative.    Allergic/Immunologic: Negative.    Neurological:  Positive for dizziness.   Hematological: Negative.     Psychiatric/Behavioral: Negative.                ECG 12 Lead    Date/Time: 1/24/2025 11:09 AM  Performed by: Lakia Ng PA    Authorized by: Lakia Ng PA  Comparison: compared with previous ECG from 11/14/2024  Rhythm: sinus rhythm  Rate: normal  QRS axis: normal  Other findings: non-specific ST-T wave changes    Clinical impression: normal ECG          Holter Monitor - 72 Hour Up To 15 Days (09/24/2024 15:29)   Interpretation Summary         A relatively benign monitor study.    1.  Predominantly sinus rhythm with average heart rate of 76 bpm 2.  Short runs of paroxysmal SVT up to 12 beats in duration 3.  No sustained arrhythmias otherwise 4.  Single patient triggered event correlating to normal sinus rhythm          Medication Review: yes    Current Outpatient Medications:     acetaminophen (Tylenol) 325 MG tablet, Take 2 tablets by mouth Every 4 (Four) Hours As Needed for Mild or Moderate Pain., Disp: 100 tablet, Rfl: 2    albuterol sulfate  (90 Base) MCG/ACT inhaler, Inhale 2 puffs Every 6 (Six) Hours As Needed for Wheezing or Shortness of Air., Disp: , Rfl:     amLODIPine (NORVASC) 10 MG tablet, Take 1 tablet by mouth Daily., Disp: , Rfl:     atorvastatin (LIPITOR) 80 MG tablet, Take 1 tablet by mouth Every Night., Disp: , Rfl:     budesonide-formoterol (SYMBICORT) 160-4.5 MCG/ACT inhaler, Inhale 2 puffs 2 (Two) Times a Day., Disp: , Rfl:     Cholecalciferol (VITAMIN D3) 125 MCG (5000 UT) capsule capsule, Take 1 capsule by mouth Daily., Disp: , Rfl:     coenzyme Q10 100 MG capsule, Take 2 capsules by mouth Daily., Disp: , Rfl:     docusate sodium (Colace) 100 MG capsule, Take 1 capsule by mouth 2 (Two) Times a Day., Disp: 60 capsule, Rfl: 1    estradiol (ESTRACE) 0.1 MG/GM vaginal cream, Insert 2 g into the vagina 2 (Two) Times a Week., Disp: 42.5 g, Rfl: 5    ezetimibe (ZETIA) 10 MG tablet, Take 1 tablet by mouth Daily., Disp: , Rfl:     fluocinolone acetonide (DERMOTIC) 0.01 % oil otic  oil, Administer 4 drops into the left ear 3 (Three) Times a Week., Disp: , Rfl:     folic acid (FOLVITE) 800 MCG tablet, Take 1 tablet by mouth Daily., Disp: , Rfl:     Glucosamine-Chondroit-Vit C-Mn (GLUCOSAMINE 1500 COMPLEX PO), Take 1 capsule by mouth Daily., Disp: , Rfl:     Inclisiran Sodium (Leqvio) 284 MG/1.5ML solution prefilled syringe, Inject 1 mL under the skin into the appropriate area as directed Every 6 (Six) Months., Disp: 1 mL, Rfl: 1    Klor-Con M20 20 MEQ CR tablet, Take 1 tablet by mouth Daily., Disp: , Rfl:     levocetirizine (XYZAL) 5 MG tablet, Take 1 tablet by mouth Every Night., Disp: , Rfl:     metoprolol succinate XL (TOPROL-XL) 100 MG 24 hr tablet, Take 1 tablet by mouth Every Night., Disp: , Rfl:     montelukast (SINGULAIR) 10 MG tablet, Take 1 tablet by mouth Every Night., Disp: , Rfl:     Multiple Vitamins-Minerals (OCUVITE ADULT 50+ PO), Take 1 tablet by mouth Daily., Disp: , Rfl:     nitroglycerin (NITROSTAT) 0.4 MG SL tablet, Place 1 tablet under the tongue Every 5 (Five) Minutes As Needed for Chest Pain. Take no more than 3 doses in 15 minutes., Disp: 25 tablet, Rfl: 5    ondansetron (Zofran) 4 MG tablet, Take 1 tablet by mouth Every 8 (Eight) Hours As Needed for Nausea or Vomiting., Disp: 15 tablet, Rfl: 0    oxyCODONE (Roxicodone) 5 MG immediate release tablet, Take 1 tablet by mouth Every 8 (Eight) Hours As Needed for Severe Pain., Disp: 10 tablet, Rfl: 0    polyethylene glycol (MIRALAX) 17 g packet, Take 17 g by mouth Daily., Disp: 60 packet, Rfl: 0    polyethylene glycol (MIRALAX) 17 g packet, Take 17 g by mouth Daily As Needed (constipation)., Disp: 60 each, Rfl: 2    Polyethylene Glycol 3350 (MIRALAX PO), Take 17 g by mouth Daily As Needed (constipation)., Disp: , Rfl:     potassium chloride ER (K-TAB) 20 MEQ tablet controlled-release ER tablet, Take 1 tablet by mouth Daily., Disp: , Rfl:     Probiotic Product (PROBIOTIC BLEND PO), Take 1 capsule by mouth Daily., Disp: , Rfl:      rivaroxaban (Xarelto) 20 MG tablet, Take 1 tablet by mouth Daily., Disp: 90 tablet, Rfl: 3    trimethoprim (TRIMPEX) 100 MG tablet, Take 1 tablet by mouth Every Night., Disp: 90 tablet, Rfl: 0    vitamin A 33156 UNIT capsule, Take 1 capsule by mouth Daily., Disp: , Rfl:    Allergies   Allergen Reactions    Eliquis [Apixaban] GI Intolerance    Erythromycin Rash    Sulfa Antibiotics Rash       I have reviewed       CBC:  Lab Results - Last 18 Months   Lab Units 09/18/24  1439   WBC 10*3/mm3 6.31   HEMOGLOBIN g/dL 12.9   HEMATOCRIT % 42.9   PLATELETS 10*3/mm3 231      BMP/CMP:  Lab Results - Last 18 Months   Lab Units 09/18/24  1439   SODIUM mmol/L 145   POTASSIUM mmol/L 4.3   CHLORIDE mmol/L 108*   CO2 mmol/L 28.0   GLUCOSE mg/dL 146*   BUN mg/dL 18   CREATININE mg/dL 0.71   CALCIUM mg/dL 9.3     BNP:   Lab Results - Last 18 Months   Lab Units 09/18/24  1439   PROBNP pg/mL 1,684.0*      THYROID:  Lab Results - Last 18 Months   Lab Units 02/14/24  0941   TSH uIU/mL 1.570       Results for orders placed during the hospital encounter of 11/12/24    Adult Transthoracic Echo Complete w/ Color, Spectral and Contrast if necessary per protocol    Interpretation Summary    Left ventricular systolic function is low normal. Left ventricular ejection fraction appears to be 51 - 55%.    The left ventricular cavity is mildly dilated.    Left ventricular wall thickness is consistent with mild concentric hypertrophy.    Normal right ventricular cavity size and systolic function noted.    No significant valvular abnormalities identified on this study.     Assessment:   Diagnoses and all orders for this visit:    1. Palpitations (Primary)  -     ECG 12 Lead    2. PAF (paroxysmal atrial fibrillation)    3. Coronary artery disease involving native coronary artery of native heart without angina pectoris    PAF: No recurrence of afib on previous monitor. Provided reassurance of only SVT short lived. Continue current rate control  medications. If she were to experience recurrences, can consider Multaq.   -Continue anticoagulation   -If she requires upcoming knee surgery this year, she would be acceptable to hold her anticoagulation     CAD: Continue risk factor modifications and statin therapy. Follow up with cardiology as scheduled.     Follow up with EP in 6 months, sooner if needed.     I spent 30 minutes caring for Christiana on this date of service. This time includes time spent by me in the following activities:preparing for the visit, reviewing tests, obtaining and/or reviewing a separately obtained history, performing a medically appropriate examination and/or evaluation , counseling and educating the patient/family/caregiver, ordering medications, tests, or procedures, referring and communicating with other health care professionals , documenting information in the medical record, and independently interpreting results and communicating that information with the patient/family/caregiver        Electronically signed by SHEFALI Boyd

## 2025-01-27 ENCOUNTER — OFFICE VISIT (OUTPATIENT)
Dept: ENT CLINIC | Age: 75
End: 2025-01-27
Payer: MEDICARE

## 2025-01-27 VITALS
DIASTOLIC BLOOD PRESSURE: 80 MMHG | WEIGHT: 206 LBS | BODY MASS INDEX: 35.17 KG/M2 | SYSTOLIC BLOOD PRESSURE: 132 MMHG | HEIGHT: 64 IN

## 2025-01-27 DIAGNOSIS — H61.22 IMPACTED CERUMEN, LEFT EAR: ICD-10-CM

## 2025-01-27 DIAGNOSIS — H60.542 DERMATITIS OF EAR CANAL, LEFT: Primary | ICD-10-CM

## 2025-01-27 PROCEDURE — 99213 OFFICE O/P EST LOW 20 MIN: CPT | Performed by: OTOLARYNGOLOGY

## 2025-01-27 PROCEDURE — 1159F MED LIST DOCD IN RCRD: CPT | Performed by: OTOLARYNGOLOGY

## 2025-01-27 PROCEDURE — 3017F COLORECTAL CA SCREEN DOC REV: CPT | Performed by: OTOLARYNGOLOGY

## 2025-01-27 PROCEDURE — 1160F RVW MEDS BY RX/DR IN RCRD: CPT | Performed by: OTOLARYNGOLOGY

## 2025-01-27 PROCEDURE — 4130F TOPICAL PREP RX AOE: CPT | Performed by: OTOLARYNGOLOGY

## 2025-01-27 PROCEDURE — 1036F TOBACCO NON-USER: CPT | Performed by: OTOLARYNGOLOGY

## 2025-01-27 PROCEDURE — 1090F PRES/ABSN URINE INCON ASSESS: CPT | Performed by: OTOLARYNGOLOGY

## 2025-01-27 PROCEDURE — G8417 CALC BMI ABV UP PARAM F/U: HCPCS | Performed by: OTOLARYNGOLOGY

## 2025-01-27 PROCEDURE — 1123F ACP DISCUSS/DSCN MKR DOCD: CPT | Performed by: OTOLARYNGOLOGY

## 2025-01-27 PROCEDURE — G8399 PT W/DXA RESULTS DOCUMENT: HCPCS | Performed by: OTOLARYNGOLOGY

## 2025-01-27 PROCEDURE — G8427 DOCREV CUR MEDS BY ELIG CLIN: HCPCS | Performed by: OTOLARYNGOLOGY

## 2025-01-27 PROCEDURE — 69210 REMOVE IMPACTED EAR WAX UNI: CPT | Performed by: OTOLARYNGOLOGY

## 2025-01-27 RX ORDER — FLUOCINOLONE ACETONIDE 0.11 MG/ML
4 OIL AURICULAR (OTIC) 2 TIMES DAILY
Qty: 20 ML | Refills: 4 | Status: SHIPPED | OUTPATIENT
Start: 2025-01-27

## 2025-01-27 ASSESSMENT — ENCOUNTER SYMPTOMS
GASTROINTESTINAL NEGATIVE: 1
ALLERGIC/IMMUNOLOGIC NEGATIVE: 1
EYES NEGATIVE: 1
RESPIRATORY NEGATIVE: 1

## 2025-01-27 NOTE — PROGRESS NOTES
2025    Mindy Brandt (:  1950) is a 74 y.o. female, Established patient, here for evaluation of the following chief complaint(s):  Follow-up (Left ear pain)      Vitals:    25 1101   BP: 132/80   Weight: 93.4 kg (206 lb)   Height: 1.626 m (5' 4\")       Wt Readings from Last 3 Encounters:   25 93.4 kg (206 lb)   24 89.8 kg (198 lb)   23 97.1 kg (214 lb)       BP Readings from Last 3 Encounters:   25 132/80   23 130/80   08/10/23 128/80         SUBJECTIVE/OBJECTIVE:    Patient seen today for her left ear.  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  Her primary care placed on oral antibiotics.  She still has soreness on that side.        Review of Systems   Constitutional: Negative.    HENT:  Positive for ear pain.    Eyes: Negative.    Respiratory: Negative.     Cardiovascular: Negative.    Gastrointestinal: Negative.    Endocrine: Negative.    Musculoskeletal: Negative.    Skin: Negative.    Allergic/Immunologic: Negative.    Neurological: Negative.    Hematological: Negative.    Psychiatric/Behavioral: Negative.          Physical Exam  Vitals reviewed.   Constitutional:       Appearance: Normal appearance. She is normal weight.   HENT:      Head: Normocephalic and atraumatic.      Right Ear: Tympanic membrane, ear canal and external ear normal.      Left Ear: Tympanic membrane, ear canal and external ear normal. There is impacted cerumen.      Nose: Nose normal.      Mouth/Throat:      Mouth: Mucous membranes are moist.      Pharynx: Oropharynx is clear.   Eyes:      Extraocular Movements: Extraocular movements intact.      Pupils: Pupils are equal, round, and reactive to light.   Cardiovascular:      Rate and Rhythm: Normal rate and regular rhythm.   Pulmonary:      Effort: Pulmonary effort is normal.      Breath sounds: Normal breath sounds.   Musculoskeletal:      Cervical

## 2025-02-05 NOTE — PROGRESS NOTES
Subjective    Ms. Hendrix is 74 y.o. female    Chief Complaint: follow up chronic cystitis and hematuria    History of Present Illness    74-year-old female established patient in for 3-month follow-up regarding history of chronic cystitis.  Since being started back on trimethoprim prophylaxis reports 0 infections and 0 blood in urine.  Patient remains on Xarelto due to history of cardiac stents.  Last visit was battling A Fib- had undergone echo which was good per pt report. Underwent full hematuria workup 3/2024 with Dr. Hendrickson which was unremarkable. last Urine culture positive small amount less than 10,000 colony-forming units Proteus 7/2024.     August 2024 underwent colon resection.     Hx of kidney stones right ESWL 2 large 10 and 12 mm right upper pole renal stones by Dr. Hendrickson June 2023.     The following portions of the patient's history were reviewed and updated as appropriate: allergies, current medications, past family history, past medical history, past social history, past surgical history and problem list.    Review of Systems   Constitutional:  Negative for chills and fever.   Gastrointestinal:  Negative for abdominal pain, anal bleeding, blood in stool, nausea and vomiting.   Genitourinary:  Negative for dysuria, flank pain, frequency and hematuria.         Current Outpatient Medications:     acetaminophen (Tylenol) 325 MG tablet, Take 2 tablets by mouth Every 4 (Four) Hours As Needed for Mild or Moderate Pain., Disp: 100 tablet, Rfl: 2    albuterol sulfate  (90 Base) MCG/ACT inhaler, Inhale 2 puffs Every 6 (Six) Hours As Needed for Wheezing or Shortness of Air., Disp: , Rfl:     amLODIPine (NORVASC) 10 MG tablet, Take 1 tablet by mouth Daily., Disp: , Rfl:     atorvastatin (LIPITOR) 80 MG tablet, Take 1 tablet by mouth Every Night., Disp: , Rfl:     budesonide-formoterol (SYMBICORT) 160-4.5 MCG/ACT inhaler, Inhale 2 puffs 2 (Two) Times a Day., Disp: , Rfl:     Cholecalciferol (VITAMIN D3) 125  MCG (5000 UT) capsule capsule, Take 1 capsule by mouth Daily., Disp: , Rfl:     coenzyme Q10 100 MG capsule, Take 2 capsules by mouth Daily., Disp: , Rfl:     docusate sodium (Colace) 100 MG capsule, Take 1 capsule by mouth 2 (Two) Times a Day., Disp: 60 capsule, Rfl: 1    estradiol (ESTRACE) 0.1 MG/GM vaginal cream, Insert 2 g into the vagina 2 (Two) Times a Week., Disp: 42.5 g, Rfl: 5    ezetimibe (ZETIA) 10 MG tablet, Take 1 tablet by mouth Daily., Disp: , Rfl:     fluocinolone acetonide (DERMOTIC) 0.01 % oil otic oil, Administer 4 drops into the left ear 3 (Three) Times a Week., Disp: , Rfl:     folic acid (FOLVITE) 800 MCG tablet, Take 1 tablet by mouth Daily., Disp: , Rfl:     Glucosamine-Chondroit-Vit C-Mn (GLUCOSAMINE 1500 COMPLEX PO), Take 1 capsule by mouth Daily., Disp: , Rfl:     Inclisiran Sodium (Leqvio) 284 MG/1.5ML solution prefilled syringe, Inject 1 mL under the skin into the appropriate area as directed Every 6 (Six) Months., Disp: 1 mL, Rfl: 1    Klor-Con M20 20 MEQ CR tablet, Take 1 tablet by mouth Daily., Disp: , Rfl:     levocetirizine (XYZAL) 5 MG tablet, Take 1 tablet by mouth Every Night., Disp: , Rfl:     metoprolol succinate XL (TOPROL-XL) 100 MG 24 hr tablet, Take 1 tablet by mouth Every Night., Disp: , Rfl:     montelukast (SINGULAIR) 10 MG tablet, Take 1 tablet by mouth Every Night., Disp: , Rfl:     Multiple Vitamins-Minerals (OCUVITE ADULT 50+ PO), Take 1 tablet by mouth Daily., Disp: , Rfl:     nitroglycerin (NITROSTAT) 0.4 MG SL tablet, Place 1 tablet under the tongue Every 5 (Five) Minutes As Needed for Chest Pain. Take no more than 3 doses in 15 minutes., Disp: 25 tablet, Rfl: 5    ondansetron (Zofran) 4 MG tablet, Take 1 tablet by mouth Every 8 (Eight) Hours As Needed for Nausea or Vomiting., Disp: 15 tablet, Rfl: 0    oxyCODONE (Roxicodone) 5 MG immediate release tablet, Take 1 tablet by mouth Every 8 (Eight) Hours As Needed for Severe Pain., Disp: 10 tablet, Rfl: 0     polyethylene glycol (MIRALAX) 17 g packet, Take 17 g by mouth Daily., Disp: 60 packet, Rfl: 0    polyethylene glycol (MIRALAX) 17 g packet, Take 17 g by mouth Daily As Needed (constipation)., Disp: 60 each, Rfl: 2    Polyethylene Glycol 3350 (MIRALAX PO), Take 17 g by mouth Daily As Needed (constipation)., Disp: , Rfl:     potassium chloride ER (K-TAB) 20 MEQ tablet controlled-release ER tablet, Take 1 tablet by mouth Daily., Disp: , Rfl:     Probiotic Product (PROBIOTIC BLEND PO), Take 1 capsule by mouth Daily., Disp: , Rfl:     rivaroxaban (Xarelto) 20 MG tablet, Take 1 tablet by mouth Daily., Disp: 90 tablet, Rfl: 3    trimethoprim (TRIMPEX) 100 MG tablet, Take 1 tablet by mouth Every Night., Disp: 90 tablet, Rfl: 0    vitamin A 28765 UNIT capsule, Take 1 capsule by mouth Daily., Disp: , Rfl:     Past Medical History:   Diagnosis Date    Asthma     Atrial fibrillation     CAD in native artery     Chronic UTI     Diverticulitis     Hyperlipidemia     Hypertension     Kidney stone 05/30/2023    Lateral meniscus tear     right    MI, old        Past Surgical History:   Procedure Laterality Date    ABLATION OF DYSRHYTHMIC FOCUS      BLADDER NECK SUSPENSION      BREAST BIOPSY Bilateral     2002    CARDIOVERSION      CATARACT EXTRACTION WITH INTRAOCULAR LENS IMPLANT Bilateral     CHOLECYSTECTOMY      COLON RESECTION N/A 8/20/2024    Procedure: COLON RESECTION LAPAROSCOPIC SIGMOID WITH [employer] [place] ROBOT, INTRA-OPERATIVE FLEXIBLE SIGMOIDOSCOPY, SPLENIC MOBILIZATION.;  Surgeon: Jovanna Curtis MD;  Location: [facility] OR;  Service: Robotics - DaVinci;  Laterality: N/A;    COLONOSCOPY N/A 05/23/2024    Procedure: COLONOSCOPY WITH ANESTHESIA;  Surgeon: Thiago Mensah MD;  Location: [facility] ENDOSCOPY;  Service: Gastroenterology;  Laterality: N/A;  pre: hx polyps  post: polyps. diverticulosis.   mounika schaeffer    COLONOSCOPY W/ BIOPSIES      CORONARY ANGIOPLASTY  08/13/2007    had stents 2003    CORONARY STENT PLACEMENT   "2002    EXTRACORPOREAL SHOCK WAVE LITHOTRIPSY (ESWL) Right 06/05/2023    Procedure: EXTRACORPOREAL SHOCKWAVE LITHOTRIPSY-right;  Surgeon: Jamar Hendrickson MD;  Location:  [facility] OR;  Service: Urology;  Laterality: Right;    HYSTERECTOMY      KNEE ARTHROSCOPY Right 11/02/2023    Procedure: RIGHT KNEE ARTHROSCOPIC LATERAL MENISCUS ROOT REPAIR, PARTIAL MEDIAL MENISECTOMY;  Surgeon: Raymond Puentes MD;  Location:  [facility] OR;  Service: Orthopedics;  Laterality: Right;    VEIN SURGERY  1988       Social History     Socioeconomic History    Marital status: Single   Tobacco Use    Smoking status: Never    Smokeless tobacco: Never   Vaping Use    Vaping status: Never Used   Substance and Sexual Activity    Alcohol use: Never     Alcohol/week: 3.0 standard drinks of alcohol     Types: 2 Glasses of wine, 1 Shots of liquor per week    Drug use: Never    Sexual activity: Not Currently     Partners: Male     Birth control/protection: None       Family History   Problem Relation Age of Onset    Dementia Mother     Heart attack Sister     Heart disease Sister     Diverticulitis Sister     Asthma Sister     Brain cancer Father     Heart disease Maternal Grandmother     Heart attack Maternal Grandmother     Stroke Maternal Grandfather     No Known Problems Paternal Grandmother     Brain cancer Paternal Grandfather     Breast cancer Sister     Breast cancer Paternal Aunt     Ovarian cancer Neg Hx     Uterine cancer Neg Hx     Colon cancer Neg Hx        Objective    Temp 97.5 °F (36.4 °C)   Ht 165.1 cm (65\")   Wt 93.4 kg (206 lb)   BMI 34.28 kg/m²     Physical Exam  Nursing note reviewed.   Constitutional:       General: She is not in acute distress.     Appearance: Normal appearance. She is not ill-appearing.   HENT:      Nose: No congestion.   Abdominal:      Tenderness: There is no right CVA tenderness or left CVA tenderness.      Hernia: No hernia is present.   Skin:     General: Skin is warm and dry.   Neurological:      " Mental Status: She is alert and oriented to person, place, and time.   Psychiatric:         Mood and Affect: Mood normal.         Behavior: Behavior normal.             Results for orders placed or performed in visit on 02/13/25   POC Urinalysis Dipstick, Multipro    Collection Time: 02/13/25 11:30 AM    Specimen: Urine   Result Value Ref Range    Color Yellow Yellow, Straw, Dark Yellow, Beatriz    Clarity, UA Clear Clear    Glucose, UA Negative Negative mg/dL    Bilirubin Negative Negative    Ketones, UA Negative Negative    Specific Gravity  1.020 1.005 - 1.030    Blood, UA Large (A) Negative    pH, Urine 5.5 5.0 - 8.0    Protein, POC Negative Negative mg/dL    Urobilinogen, UA 0.2 E.U./dL Normal, 0.2 E.U./dL    Nitrite, UA Negative Negative    Leukocytes Negative Negative     Assessment and Plan    Diagnoses and all orders for this visit:    1. Recurrent UTI (Primary)  -     POC Urinalysis Dipstick, Multipro  -     trimethoprim (TRIMPEX) 100 MG tablet; Take 1 tablet by mouth Every Night.  Dispense: 90 tablet; Refill: 0    2. Microscopic hematuria        History of hematuria.  Which is again noted on urinalysis.  Is no longer seeing gross hematuria.  Patient has been doing well since starting back on the trimethoprim prophylaxis and would like to continue for an additional 3 months this patient is asymptomatic and has had 0 infections or gross hematuria.  Episodes since starting the medication.    Will send in refill of trimethoprim and will have return in 3 months for reevaluation as urine does not look suspicious today for bacteria.

## 2025-02-13 ENCOUNTER — OFFICE VISIT (OUTPATIENT)
Dept: UROLOGY | Facility: CLINIC | Age: 75
End: 2025-02-13
Payer: MEDICARE

## 2025-02-13 ENCOUNTER — TRANSCRIBE ORDERS (OUTPATIENT)
Dept: ADMINISTRATIVE | Facility: HOSPITAL | Age: 75
End: 2025-02-13
Payer: MEDICARE

## 2025-02-13 VITALS — BODY MASS INDEX: 34.32 KG/M2 | WEIGHT: 206 LBS | TEMPERATURE: 97.5 F | HEIGHT: 65 IN

## 2025-02-13 DIAGNOSIS — R31.29 MICROSCOPIC HEMATURIA: ICD-10-CM

## 2025-02-13 DIAGNOSIS — I10 ESSENTIAL (PRIMARY) HYPERTENSION: Primary | ICD-10-CM

## 2025-02-13 DIAGNOSIS — R53.83 OTHER FATIGUE: ICD-10-CM

## 2025-02-13 DIAGNOSIS — N39.0 RECURRENT UTI: Primary | ICD-10-CM

## 2025-02-13 DIAGNOSIS — E55.9 VITAMIN D DEFICIENCY, UNSPECIFIED: ICD-10-CM

## 2025-02-13 LAB
BILIRUB BLD-MCNC: NEGATIVE MG/DL
CLARITY, POC: CLEAR
COLOR UR: YELLOW
GLUCOSE UR STRIP-MCNC: NEGATIVE MG/DL
KETONES UR QL: NEGATIVE
LEUKOCYTE EST, POC: NEGATIVE
NITRITE UR-MCNC: NEGATIVE MG/ML
PH UR: 5.5 [PH] (ref 5–8)
PROT UR STRIP-MCNC: NEGATIVE MG/DL
RBC # UR STRIP: ABNORMAL /UL
SP GR UR: 1.02 (ref 1–1.03)
UROBILINOGEN UR QL: ABNORMAL

## 2025-02-13 RX ORDER — TRIMETHOPRIM 100 MG/1
100 TABLET ORAL NIGHTLY
Qty: 90 TABLET | Refills: 0 | Status: SHIPPED | OUTPATIENT
Start: 2025-02-13

## 2025-02-17 ENCOUNTER — LAB (OUTPATIENT)
Dept: LAB | Facility: HOSPITAL | Age: 75
End: 2025-02-17
Payer: MEDICARE

## 2025-02-17 DIAGNOSIS — I10 ESSENTIAL HYPERTENSION: ICD-10-CM

## 2025-02-17 DIAGNOSIS — I25.10 ATHEROSCLEROSIS OF NATIVE CORONARY ARTERY WITHOUT ANGINA PECTORIS, UNSPECIFIED WHETHER NATIVE OR TRANSPLANTED HEART: ICD-10-CM

## 2025-02-17 DIAGNOSIS — I10 ESSENTIAL (PRIMARY) HYPERTENSION: ICD-10-CM

## 2025-02-17 DIAGNOSIS — E55.9 VITAMIN D DEFICIENCY, UNSPECIFIED: ICD-10-CM

## 2025-02-17 DIAGNOSIS — R53.83 OTHER FATIGUE: ICD-10-CM

## 2025-02-17 DIAGNOSIS — I48.91 ATRIAL FIBRILLATION, UNSPECIFIED TYPE: ICD-10-CM

## 2025-02-17 LAB
25(OH)D3 SERPL-MCNC: 93 NG/ML (ref 30–100)
ALBUMIN SERPL-MCNC: 4.1 G/DL (ref 3.5–5.2)
ALBUMIN/GLOB SERPL: 1.5 G/DL
ALP SERPL-CCNC: 142 U/L (ref 39–117)
ALT SERPL W P-5'-P-CCNC: 12 U/L (ref 1–33)
ANION GAP SERPL CALCULATED.3IONS-SCNC: 11 MMOL/L (ref 5–15)
AST SERPL-CCNC: 15 U/L (ref 1–32)
BASOPHILS # BLD AUTO: 0.02 10*3/MM3 (ref 0–0.2)
BASOPHILS NFR BLD AUTO: 0.3 % (ref 0–1.5)
BILIRUB SERPL-MCNC: 1 MG/DL (ref 0–1.2)
BUN SERPL-MCNC: 19 MG/DL (ref 8–23)
BUN/CREAT SERPL: 27.5 (ref 7–25)
CALCIUM SPEC-SCNC: 9.4 MG/DL (ref 8.6–10.5)
CHLORIDE SERPL-SCNC: 106 MMOL/L (ref 98–107)
CHOLEST SERPL-MCNC: 129 MG/DL (ref 0–200)
CO2 SERPL-SCNC: 26 MMOL/L (ref 22–29)
CREAT SERPL-MCNC: 0.69 MG/DL (ref 0.57–1)
DEPRECATED RDW RBC AUTO: 49.8 FL (ref 37–54)
EGFRCR SERPLBLD CKD-EPI 2021: 91.8 ML/MIN/1.73
EOSINOPHIL # BLD AUTO: 0.26 10*3/MM3 (ref 0–0.4)
EOSINOPHIL NFR BLD AUTO: 3.8 % (ref 0.3–6.2)
ERYTHROCYTE [DISTWIDTH] IN BLOOD BY AUTOMATED COUNT: 15.7 % (ref 12.3–15.4)
GLOBULIN UR ELPH-MCNC: 2.8 GM/DL
GLUCOSE SERPL-MCNC: 100 MG/DL (ref 65–99)
HCT VFR BLD AUTO: 42.7 % (ref 34–46.6)
HDLC SERPL-MCNC: 56 MG/DL (ref 40–60)
HGB BLD-MCNC: 13.2 G/DL (ref 12–15.9)
IMM GRANULOCYTES # BLD AUTO: 0.02 10*3/MM3 (ref 0–0.05)
IMM GRANULOCYTES NFR BLD AUTO: 0.3 % (ref 0–0.5)
LDLC SERPL CALC-MCNC: 54 MG/DL (ref 0–100)
LDLC/HDLC SERPL: 0.92 {RATIO}
LYMPHOCYTES # BLD AUTO: 1.29 10*3/MM3 (ref 0.7–3.1)
LYMPHOCYTES NFR BLD AUTO: 18.9 % (ref 19.6–45.3)
MCH RBC QN AUTO: 26.9 PG (ref 26.6–33)
MCHC RBC AUTO-ENTMCNC: 30.9 G/DL (ref 31.5–35.7)
MCV RBC AUTO: 87 FL (ref 79–97)
MONOCYTES # BLD AUTO: 0.65 10*3/MM3 (ref 0.1–0.9)
MONOCYTES NFR BLD AUTO: 9.5 % (ref 5–12)
NEUTROPHILS NFR BLD AUTO: 4.57 10*3/MM3 (ref 1.7–7)
NEUTROPHILS NFR BLD AUTO: 67.2 % (ref 42.7–76)
NRBC BLD AUTO-RTO: 0 /100 WBC (ref 0–0.2)
PLATELET # BLD AUTO: 214 10*3/MM3 (ref 140–450)
PMV BLD AUTO: 10.9 FL (ref 6–12)
POTASSIUM SERPL-SCNC: 4.2 MMOL/L (ref 3.5–5.2)
PROT SERPL-MCNC: 6.9 G/DL (ref 6–8.5)
RBC # BLD AUTO: 4.91 10*6/MM3 (ref 3.77–5.28)
SODIUM SERPL-SCNC: 143 MMOL/L (ref 136–145)
T4 FREE SERPL-MCNC: 1.22 NG/DL (ref 0.93–1.7)
TRIGL SERPL-MCNC: 107 MG/DL (ref 0–150)
TSH SERPL DL<=0.05 MIU/L-ACNC: 1.89 UIU/ML (ref 0.27–4.2)
VLDLC SERPL-MCNC: 19 MG/DL (ref 5–40)
WBC NRBC COR # BLD AUTO: 6.81 10*3/MM3 (ref 3.4–10.8)

## 2025-02-17 PROCEDURE — 36415 COLL VENOUS BLD VENIPUNCTURE: CPT

## 2025-02-17 PROCEDURE — 80061 LIPID PANEL: CPT

## 2025-02-17 PROCEDURE — 80053 COMPREHEN METABOLIC PANEL: CPT

## 2025-02-17 PROCEDURE — 84439 ASSAY OF FREE THYROXINE: CPT

## 2025-02-17 PROCEDURE — 84443 ASSAY THYROID STIM HORMONE: CPT

## 2025-02-17 PROCEDURE — 85025 COMPLETE CBC W/AUTO DIFF WBC: CPT

## 2025-02-17 PROCEDURE — 82306 VITAMIN D 25 HYDROXY: CPT

## 2025-02-20 ENCOUNTER — TELEPHONE (OUTPATIENT)
Dept: CARDIOLOGY | Facility: CLINIC | Age: 75
End: 2025-02-20

## 2025-02-20 ENCOUNTER — OFFICE VISIT (OUTPATIENT)
Age: 75
End: 2025-02-20

## 2025-02-20 DIAGNOSIS — M17.0 BILATERAL PRIMARY OSTEOARTHRITIS OF KNEE: Primary | ICD-10-CM

## 2025-02-20 RX ORDER — BETAMETHASONE SODIUM PHOSPHATE AND BETAMETHASONE ACETATE 3; 3 MG/ML; MG/ML
6 INJECTION, SUSPENSION INTRA-ARTICULAR; INTRALESIONAL; INTRAMUSCULAR; SOFT TISSUE ONCE
Status: COMPLETED | OUTPATIENT
Start: 2025-02-20 | End: 2025-02-20

## 2025-02-20 RX ORDER — BUPIVACAINE HYDROCHLORIDE 5 MG/ML
3 INJECTION, SOLUTION EPIDURAL; INTRACAUDAL ONCE
Status: COMPLETED | OUTPATIENT
Start: 2025-02-20 | End: 2025-02-20

## 2025-02-20 RX ORDER — LIDOCAINE HYDROCHLORIDE 10 MG/ML
1 INJECTION, SOLUTION INFILTRATION; PERINEURAL ONCE
Status: COMPLETED | OUTPATIENT
Start: 2025-02-20 | End: 2025-02-20

## 2025-02-20 RX ADMIN — BUPIVACAINE HYDROCHLORIDE 15 MG: 5 INJECTION, SOLUTION EPIDURAL; INTRACAUDAL at 12:59

## 2025-02-20 RX ADMIN — LIDOCAINE HYDROCHLORIDE 1 ML: 10 INJECTION, SOLUTION INFILTRATION; PERINEURAL at 12:59

## 2025-02-20 RX ADMIN — BUPIVACAINE HYDROCHLORIDE 15 MG: 5 INJECTION, SOLUTION EPIDURAL; INTRACAUDAL at 12:58

## 2025-02-20 RX ADMIN — BETAMETHASONE SODIUM PHOSPHATE AND BETAMETHASONE ACETATE 6 MG: 3; 3 INJECTION, SUSPENSION INTRA-ARTICULAR; INTRALESIONAL; INTRAMUSCULAR; SOFT TISSUE at 12:58

## 2025-02-20 NOTE — PROGRESS NOTES
and 6 milligrams Celestone was injected with 22-1/2 gauge needle to the left inferior lateral entry portal of knee. Hemostasis was achieved and a Band-Aid applied. The patient tolerated the procedure well.     Return in about 3 months (around 5/20/2025) for Bilateral knee pain, scheduled left total knee.   No orders of the defined types were placed in this encounter.     Greater than 20 minutes were spent with this encounter. Time spent included evaluating the patient's chart prior to arrival.  Evaluating the patient in the office including history, physical examination, imaging reviewing, and counseling on next steps.  Lastly, time was spent discussing orders with my staff as well as providing documentation in the chart.      Electronically signed by Reilly Costa PA-C on 2/20/2025 at 12:47 PM.    Dragon Disclaimer:   This note was dictated with voice recognition software.  Though review and corrections are routine, we apologize for any errors.

## 2025-02-20 NOTE — TELEPHONE ENCOUNTER
Caller: Christiana Hendrix    Relationship to patient: Self    Best call back number: [phone]       Type of visit: FOLLOW UP NEW PROVIDER     Requested date: ASAP      If rescheduling, when is the original appointment: 2/20/25     Additional notes:PATIENT ALSO NEEDS INJECTION APPOINTMENT FOR CHOLESTEROL MEDICATION SCHEDULED.

## 2025-02-26 ENCOUNTER — OFFICE VISIT (OUTPATIENT)
Dept: CARDIOLOGY | Facility: CLINIC | Age: 75
End: 2025-02-26
Payer: MEDICARE

## 2025-02-26 VITALS
WEIGHT: 204 LBS | OXYGEN SATURATION: 98 % | HEIGHT: 65 IN | DIASTOLIC BLOOD PRESSURE: 76 MMHG | SYSTOLIC BLOOD PRESSURE: 120 MMHG | BODY MASS INDEX: 33.99 KG/M2 | HEART RATE: 80 BPM

## 2025-02-26 DIAGNOSIS — E78.2 MIXED HYPERLIPIDEMIA: ICD-10-CM

## 2025-02-26 DIAGNOSIS — I48.0 PAF (PAROXYSMAL ATRIAL FIBRILLATION): ICD-10-CM

## 2025-02-26 DIAGNOSIS — I25.10 CORONARY ARTERY DISEASE INVOLVING NATIVE CORONARY ARTERY OF NATIVE HEART WITHOUT ANGINA PECTORIS: Primary | ICD-10-CM

## 2025-02-26 PROCEDURE — 1159F MED LIST DOCD IN RCRD: CPT | Performed by: INTERNAL MEDICINE

## 2025-02-26 PROCEDURE — 93000 ELECTROCARDIOGRAM COMPLETE: CPT | Performed by: INTERNAL MEDICINE

## 2025-02-26 PROCEDURE — 3078F DIAST BP <80 MM HG: CPT | Performed by: INTERNAL MEDICINE

## 2025-02-26 PROCEDURE — 99214 OFFICE O/P EST MOD 30 MIN: CPT | Performed by: INTERNAL MEDICINE

## 2025-02-26 PROCEDURE — 3074F SYST BP LT 130 MM HG: CPT | Performed by: INTERNAL MEDICINE

## 2025-02-26 PROCEDURE — 1160F RVW MEDS BY RX/DR IN RCRD: CPT | Performed by: INTERNAL MEDICINE

## 2025-02-26 NOTE — PROGRESS NOTES
Subjective:     Encounter Date:02/26/2025      Patient ID: Christiana Hendrix is a 74 y.o. female with coronary artery disease with prior PCI, paroxysmal atrial fibrillation with prior cardioversion as well as ablation and subsequent cardioversion, hypertension, hyperlipidemia, presenting today for follow-up.  The patient was previously followed by Dr. Diallo and also follows closely with electrophysiology.    Chief Complaint: Here for follow-up of CAD    History of Present Illness    This patient presents today for routine follow-up.  She does have coronary artery disease with remote history PCI.  She denies having chest pain.  She does have a history of paroxysmal atrial fibrillation.  Occasionally, her heart rate will go up and she will experience some palpitations but generally this is short-lived.  She continues to follow with electrophysiology.  He is anticoagulated and notes no bleeding issues.  Her blood pressure is generally well-controlled.  She denies having lightheadedness, dizziness, syncope, significant lower extremity edema.  Breathing is described as stable.  She is tolerating all of her medicines well with no side effects.      Current Outpatient Medications:     acetaminophen (Tylenol) 325 MG tablet, Take 2 tablets by mouth Every 4 (Four) Hours As Needed for Mild or Moderate Pain., Disp: 100 tablet, Rfl: 2    albuterol sulfate  (90 Base) MCG/ACT inhaler, Inhale 2 puffs Every 6 (Six) Hours As Needed for Wheezing or Shortness of Air., Disp: , Rfl:     amLODIPine (NORVASC) 10 MG tablet, Take 1 tablet by mouth Daily., Disp: , Rfl:     atorvastatin (LIPITOR) 80 MG tablet, Take 1 tablet by mouth Every Night., Disp: , Rfl:     budesonide-formoterol (SYMBICORT) 160-4.5 MCG/ACT inhaler, Inhale 2 puffs 2 (Two) Times a Day., Disp: , Rfl:     Cholecalciferol (VITAMIN D3) 125 MCG (5000 UT) capsule capsule, Take 1 capsule by mouth Daily., Disp: , Rfl:     coenzyme Q10 100 MG capsule, Take 2 capsules by  mouth Daily., Disp: , Rfl:     docusate sodium (Colace) 100 MG capsule, Take 1 capsule by mouth 2 (Two) Times a Day., Disp: 60 capsule, Rfl: 1    estradiol (ESTRACE) 0.1 MG/GM vaginal cream, Insert 2 g into the vagina 2 (Two) Times a Week., Disp: 42.5 g, Rfl: 5    fluocinolone acetonide (DERMOTIC) 0.01 % oil otic oil, Administer 4 drops into the left ear 3 (Three) Times a Week., Disp: , Rfl:     folic acid (FOLVITE) 800 MCG tablet, Take 1 tablet by mouth Daily., Disp: , Rfl:     Glucosamine-Chondroit-Vit C-Mn (GLUCOSAMINE 1500 COMPLEX PO), Take 1 capsule by mouth Daily., Disp: , Rfl:     Inclisiran Sodium (Leqvio) 284 MG/1.5ML solution prefilled syringe, Inject 1 mL under the skin into the appropriate area as directed Every 6 (Six) Months., Disp: 1 mL, Rfl: 1    Klor-Con M20 20 MEQ CR tablet, Take 1 tablet by mouth Daily., Disp: , Rfl:     levocetirizine (XYZAL) 5 MG tablet, Take 1 tablet by mouth Every Night., Disp: , Rfl:     metoprolol succinate XL (TOPROL-XL) 100 MG 24 hr tablet, Take 1 tablet by mouth Every Night., Disp: , Rfl:     montelukast (SINGULAIR) 10 MG tablet, Take 1 tablet by mouth Every Night., Disp: , Rfl:     Multiple Vitamins-Minerals (OCUVITE ADULT 50+ PO), Take 1 tablet by mouth Daily., Disp: , Rfl:     nitroglycerin (NITROSTAT) 0.4 MG SL tablet, Place 1 tablet under the tongue Every 5 (Five) Minutes As Needed for Chest Pain. Take no more than 3 doses in 15 minutes., Disp: 25 tablet, Rfl: 5    ondansetron (Zofran) 4 MG tablet, Take 1 tablet by mouth Every 8 (Eight) Hours As Needed for Nausea or Vomiting., Disp: 15 tablet, Rfl: 0    oxyCODONE (Roxicodone) 5 MG immediate release tablet, Take 1 tablet by mouth Every 8 (Eight) Hours As Needed for Severe Pain., Disp: 10 tablet, Rfl: 0    polyethylene glycol (MIRALAX) 17 g packet, Take 17 g by mouth Daily As Needed (constipation)., Disp: 60 each, Rfl: 2    potassium chloride ER (K-TAB) 20 MEQ tablet controlled-release ER tablet, Take 1 tablet by mouth  Daily., Disp: , Rfl:     Probiotic Product (PROBIOTIC BLEND PO), Take 1 capsule by mouth Daily., Disp: , Rfl:     rivaroxaban (Xarelto) 20 MG tablet, Take 1 tablet by mouth Daily., Disp: 90 tablet, Rfl: 3    trimethoprim (TRIMPEX) 100 MG tablet, Take 1 tablet by mouth Every Night., Disp: 90 tablet, Rfl: 0    vitamin A 34576 UNIT capsule, Take 1 capsule by mouth Daily., Disp: , Rfl:     Allergies   Allergen Reactions    Eliquis [Apixaban] GI Intolerance    Erythromycin Rash    Sulfa Antibiotics Rash     Social History     Tobacco Use    Smoking status: Never    Smokeless tobacco: Never   Substance Use Topics    Alcohol use: Never     Alcohol/week: 3.0 standard drinks of alcohol     Types: 2 Glasses of wine, 1 Shots of liquor per week     Review of Systems   Cardiovascular:  Positive for palpitations. Negative for chest pain, dyspnea on exertion, leg swelling, orthopnea, paroxysmal nocturnal dyspnea and syncope.   Respiratory:  Negative for cough, shortness of breath and wheezing.    Musculoskeletal:  Positive for arthritis and joint pain.   Gastrointestinal:  Negative for abdominal pain, hematemesis, hematochezia, melena, nausea and vomiting.   Neurological:  Negative for dizziness and light-headedness.         ECG 12 Lead    Date/Time: 2/26/2025 1:19 PM  Performed by: Jamar Funk MD    Authorized by: Jamar Funk MD  Comparison: compared with previous ECG   Similar to previous ECG  Rhythm: sinus rhythm  Rate: normal  BPM: 78  Conduction: conduction normal  ST Segments: ST segments normal  T Waves: T waves normal  QRS axis: normal    Clinical impression: normal ECG             Objective:     Vitals reviewed.   Constitutional:       General: Not in acute distress.     Appearance: Well-developed and not in distress.   HENT:      Head: Normocephalic and atraumatic.   Neck:      Vascular: No carotid bruit or JVD.   Pulmonary:      Effort: Pulmonary effort is normal.      Breath sounds: Normal  "breath sounds. No wheezing. No rhonchi. No rales.   Cardiovascular:      Normal rate. Regular rhythm.      Murmurs: There is no murmur.      No gallop.    Pulses:     Intact distal pulses.   Edema:     Peripheral edema absent.   Abdominal:      General: Bowel sounds are normal. There is no distension.      Palpations: Abdomen is soft.      Tenderness: There is no abdominal tenderness.   Skin:     General: Skin is warm and dry. There is no cyanosis.      Findings: No erythema or rash.   Neurological:      Mental Status: Alert. Mental status is at baseline.       /76   Pulse 80   Ht 165.1 cm (65\")   Wt 92.5 kg (204 lb)   SpO2 98%   BMI 33.95 kg/m²     Data/Lab Review:     Lab Results   Component Value Date    CHOL 129 02/17/2025    TRIG 107 02/17/2025    HDL 56 02/17/2025    LDL 54 02/17/2025     Results for orders placed during the hospital encounter of 11/12/24    Adult Transthoracic Echo Complete w/ Color, Spectral and Contrast if necessary per protocol    Interpretation Summary    Left ventricular systolic function is low normal. Left ventricular ejection fraction appears to be 51 - 55%.    The left ventricular cavity is mildly dilated.    Left ventricular wall thickness is consistent with mild concentric hypertrophy.    Normal right ventricular cavity size and systolic function noted.    No significant valvular abnormalities identified on this study.        Assessment:          Diagnosis Plan   1. Coronary artery disease involving native coronary artery of native heart without angina pectoris  ECG 12 Lead      2. Mixed hyperlipidemia        3. PAF (paroxysmal atrial fibrillation)             Plan:       1.  Coronary artery disease: Stable at this time with no anginal symptoms described to the patient is not on aspirin due to being anticoagulated in the setting of atrial fibrillation.  She does remain on beta-blocker therapy and high intensity statin therapy.  No testing indicated at this time as the " patient is stable.  Continue current therapies.    2.  Mixed hyperlipidemia: Recent lipid panel is noted above.  The patient remains on high intensity statin therapy.  She is tolerating this well.  He is also on Leqvio.  LDL cholesterol is at goal. The patient remains on Zetia as well.  I think this can be discontinued at this time as she is also on Lipitor and Leqvio.    3.  Paroxysmal atrial fibrillation: Overall, stable with occasional periods of tachycardia and palpitations but nothing prolonged.  The patient remains anticoagulated and she continues to follow with electrophysiology.    [id number] SCORE   [id number] Score: 4 (2/26/2025  1:08 PM)    I will plan to see the patient again in 1 year unless otherwise needed sooner.

## 2025-02-27 ENCOUNTER — TRANSCRIBE ORDERS (OUTPATIENT)
Dept: ADMINISTRATIVE | Facility: HOSPITAL | Age: 75
End: 2025-02-27
Payer: MEDICARE

## 2025-02-27 DIAGNOSIS — E66.01 MORBID (SEVERE) OBESITY DUE TO EXCESS CALORIES: ICD-10-CM

## 2025-02-27 DIAGNOSIS — E78.5 HYPERLIPIDEMIA, UNSPECIFIED HYPERLIPIDEMIA TYPE: Primary | ICD-10-CM

## 2025-02-27 DIAGNOSIS — E55.9 VITAMIN D DEFICIENCY, UNSPECIFIED: ICD-10-CM

## 2025-02-27 DIAGNOSIS — I70.0 ATHEROSCLEROSIS OF AORTA: ICD-10-CM

## 2025-02-27 DIAGNOSIS — R53.83 OTHER FATIGUE: ICD-10-CM

## 2025-02-28 DIAGNOSIS — E78.2 MIXED HYPERLIPIDEMIA: Primary | ICD-10-CM

## 2025-02-28 RX ORDER — INCLISIRAN 284 MG/1.5ML
284 INJECTION, SOLUTION SUBCUTANEOUS
Start: 2025-02-28

## 2025-03-11 ENCOUNTER — INFUSION (OUTPATIENT)
Dept: ONCOLOGY | Facility: HOSPITAL | Age: 75
End: 2025-03-11
Payer: MEDICARE

## 2025-03-11 VITALS
RESPIRATION RATE: 18 BRPM | TEMPERATURE: 97 F | SYSTOLIC BLOOD PRESSURE: 131 MMHG | BODY MASS INDEX: 34.82 KG/M2 | DIASTOLIC BLOOD PRESSURE: 57 MMHG | WEIGHT: 209 LBS | HEIGHT: 65 IN | OXYGEN SATURATION: 95 % | HEART RATE: 80 BPM

## 2025-03-11 DIAGNOSIS — E78.2 MIXED HYPERLIPIDEMIA: Primary | ICD-10-CM

## 2025-03-11 PROCEDURE — 25010000002 INCLISIRAN SODIUM 284 MG/1.5ML SOLUTION PREFILLED SYRINGE: Performed by: INTERNAL MEDICINE

## 2025-03-11 PROCEDURE — 96372 THER/PROPH/DIAG INJ SC/IM: CPT

## 2025-03-11 RX ADMIN — INCLISIRAN 284 MG: 284 INJECTION, SOLUTION SUBCUTANEOUS at 09:38

## 2025-04-03 ENCOUNTER — OFFICE VISIT (OUTPATIENT)
Dept: UROLOGY | Facility: CLINIC | Age: 75
End: 2025-04-03
Payer: MEDICARE

## 2025-04-03 ENCOUNTER — RESULTS FOLLOW-UP (OUTPATIENT)
Dept: UROLOGY | Facility: CLINIC | Age: 75
End: 2025-04-03

## 2025-04-03 ENCOUNTER — HOSPITAL ENCOUNTER (OUTPATIENT)
Dept: GENERAL RADIOLOGY | Facility: HOSPITAL | Age: 75
Discharge: HOME | End: 2025-04-03
Payer: MEDICARE

## 2025-04-03 VITALS — HEIGHT: 65 IN | TEMPERATURE: 97.8 F | WEIGHT: 209 LBS | BODY MASS INDEX: 34.82 KG/M2

## 2025-04-03 DIAGNOSIS — N39.0 RECURRENT UTI: Primary | ICD-10-CM

## 2025-04-03 DIAGNOSIS — R31.29 MICROSCOPIC HEMATURIA: ICD-10-CM

## 2025-04-03 DIAGNOSIS — R31.0 GROSS HEMATURIA: ICD-10-CM

## 2025-04-03 LAB
BILIRUB BLD-MCNC: NEGATIVE MG/DL
CLARITY, POC: CLEAR
COLOR UR: YELLOW
GLUCOSE UR STRIP-MCNC: NEGATIVE MG/DL
KETONES UR QL: NEGATIVE
LEUKOCYTE EST, POC: ABNORMAL
NITRITE UR-MCNC: NEGATIVE MG/ML
PH UR: 7 [PH] (ref 5–8)
PROT UR STRIP-MCNC: ABNORMAL MG/DL
RBC # UR STRIP: ABNORMAL /UL
SP GR UR: 1.02 (ref 1–1.03)
UROBILINOGEN UR QL: ABNORMAL

## 2025-04-03 PROCEDURE — 74018 RADEX ABDOMEN 1 VIEW: CPT

## 2025-04-03 RX ORDER — CEFDINIR 300 MG/1
300 CAPSULE ORAL 2 TIMES DAILY
Qty: 20 CAPSULE | Refills: 0 | Status: SHIPPED | OUTPATIENT
Start: 2025-04-03

## 2025-04-03 RX ORDER — PHENAZOPYRIDINE HYDROCHLORIDE 100 MG/1
100 TABLET, FILM COATED ORAL 3 TIMES DAILY PRN
Qty: 20 TABLET | Refills: 0 | Status: SHIPPED | OUTPATIENT
Start: 2025-04-03

## 2025-04-03 NOTE — PROGRESS NOTES
Subjective    Ms. Hendrix is 74 y.o. female    Chief Complaint: Return of blood in urine and burning with urination    History of Present Illness    74-year-old female established patient in with complaint of blood in urine and burning with urination.  Patient history of urine culture positive Proteus bacteria for which has remained on trimethoprim prophylaxis.  History of gross hematuria as well for which previously underwent full workup 3/2024 with Dr. Hendrickson which was unremarkable CT urogram and cystoscopy.   Patient remains on Xarelto due to history of cardiac stents.  Last visit was battling A Fib- had undergone echo which was good per pt report.      August 2024 underwent colon resection.     Hx of kidney stones right ESWL 2 large 10 and 12 mm right upper pole renal stones by Dr. Hendrickson June 2023.     The following portions of the patient's history were reviewed and updated as appropriate: allergies, current medications, past family history, past medical history, past social history, past surgical history and problem list.    Review of Systems   Constitutional:  Negative for chills and fever.   Gastrointestinal:  Negative for abdominal pain, anal bleeding and blood in stool.   Genitourinary:  Positive for hematuria. Negative for dysuria.   Musculoskeletal:  Positive for back pain.         Current Outpatient Medications:     acetaminophen (Tylenol) 325 MG tablet, Take 2 tablets by mouth Every 4 (Four) Hours As Needed for Mild or Moderate Pain., Disp: 100 tablet, Rfl: 2    albuterol sulfate  (90 Base) MCG/ACT inhaler, Inhale 2 puffs Every 6 (Six) Hours As Needed for Wheezing or Shortness of Air., Disp: , Rfl:     amLODIPine (NORVASC) 10 MG tablet, Take 1 tablet by mouth Daily., Disp: , Rfl:     atorvastatin (LIPITOR) 80 MG tablet, Take 1 tablet by mouth Every Night., Disp: , Rfl:     budesonide-formoterol (SYMBICORT) 160-4.5 MCG/ACT inhaler, Inhale 2 puffs 2 (Two) Times a Day., Disp: , Rfl:     Cholecalciferol  (VITAMIN D3) 125 MCG (5000 UT) capsule capsule, Take 1 capsule by mouth Daily., Disp: , Rfl:     coenzyme Q10 100 MG capsule, Take 2 capsules by mouth Daily., Disp: , Rfl:     docusate sodium (Colace) 100 MG capsule, Take 1 capsule by mouth 2 (Two) Times a Day., Disp: 60 capsule, Rfl: 1    estradiol (ESTRACE) 0.1 MG/GM vaginal cream, Insert 2 g into the vagina 2 (Two) Times a Week., Disp: 42.5 g, Rfl: 5    fluocinolone acetonide (DERMOTIC) 0.01 % oil otic oil, Administer 4 drops into the left ear 3 (Three) Times a Week., Disp: , Rfl:     folic acid (FOLVITE) 800 MCG tablet, Take 1 tablet by mouth Daily., Disp: , Rfl:     Glucosamine-Chondroit-Vit C-Mn (GLUCOSAMINE 1500 COMPLEX PO), Take 1 capsule by mouth Daily., Disp: , Rfl:     Inclisiran Sodium (Leqvio) 284 MG/1.5ML solution prefilled syringe, Inject 1 mL under the skin into the appropriate area as directed Every 6 (Six) Months., Disp: 1 mL, Rfl: 1    Inclisiran Sodium (Leqvio) 284 MG/1.5ML solution prefilled syringe, Inject 1.5 mL under the skin into the appropriate area as directed Every 6 (Six) Months., Disp: , Rfl:     Klor-Con M20 20 MEQ CR tablet, Take 1 tablet by mouth Daily., Disp: , Rfl:     levocetirizine (XYZAL) 5 MG tablet, Take 1 tablet by mouth Every Night., Disp: , Rfl:     metoprolol succinate XL (TOPROL-XL) 100 MG 24 hr tablet, Take 1 tablet by mouth Every Night., Disp: , Rfl:     montelukast (SINGULAIR) 10 MG tablet, Take 1 tablet by mouth Every Night., Disp: , Rfl:     Multiple Vitamins-Minerals (OCUVITE ADULT 50+ PO), Take 1 tablet by mouth Daily., Disp: , Rfl:     nitroglycerin (NITROSTAT) 0.4 MG SL tablet, Place 1 tablet under the tongue Every 5 (Five) Minutes As Needed for Chest Pain. Take no more than 3 doses in 15 minutes., Disp: 25 tablet, Rfl: 5    ondansetron (Zofran) 4 MG tablet, Take 1 tablet by mouth Every 8 (Eight) Hours As Needed for Nausea or Vomiting., Disp: 15 tablet, Rfl: 0    oxyCODONE (Roxicodone) 5 MG immediate release  tablet, Take 1 tablet by mouth Every 8 (Eight) Hours As Needed for Severe Pain., Disp: 10 tablet, Rfl: 0    polyethylene glycol (MIRALAX) 17 g packet, Take 17 g by mouth Daily As Needed (constipation)., Disp: 60 each, Rfl: 2    potassium chloride ER (K-TAB) 20 MEQ tablet controlled-release ER tablet, Take 1 tablet by mouth Daily., Disp: , Rfl:     Probiotic Product (PROBIOTIC BLEND PO), Take 1 capsule by mouth Daily., Disp: , Rfl:     rivaroxaban (Xarelto) 20 MG tablet, Take 1 tablet by mouth Daily., Disp: 90 tablet, Rfl: 3    trimethoprim (TRIMPEX) 100 MG tablet, Take 1 tablet by mouth Every Night., Disp: 90 tablet, Rfl: 0    vitamin A 62622 UNIT capsule, Take 1 capsule by mouth Daily., Disp: , Rfl:     cefdinir (OMNICEF) 300 MG capsule, Take 1 capsule by mouth 2 (Two) Times a Day., Disp: 20 capsule, Rfl: 0    phenazopyridine (PYRIDIUM) 100 MG tablet, Take 1 tablet by mouth 3 (Three) Times a Day As Needed for Bladder Spasms., Disp: 20 tablet, Rfl: 0    Past Medical History:   Diagnosis Date    Asthma     Atrial fibrillation     CAD in native artery     Chronic UTI     Diverticulitis     Hyperlipidemia     Hypertension     Kidney stone 05/30/2023    Lateral meniscus tear     right    MI, old        Past Surgical History:   Procedure Laterality Date    ABLATION OF DYSRHYTHMIC FOCUS      BLADDER NECK SUSPENSION      BREAST BIOPSY Bilateral     2002    CARDIOVERSION      CATARACT EXTRACTION WITH INTRAOCULAR LENS IMPLANT Bilateral     CHOLECYSTECTOMY      COLON RESECTION N/A 8/20/2024    Procedure: COLON RESECTION LAPAROSCOPIC SIGMOID WITH DAVINCI ROBOT, INTRA-OPERATIVE FLEXIBLE SIGMOIDOSCOPY, SPLENIC MOBILIZATION.;  Surgeon: Jovanna Curtis MD;  Location: [facility] OR;  Service: Robotics - DaVinci;  Laterality: N/A;    COLONOSCOPY N/A 05/23/2024    Procedure: COLONOSCOPY WITH ANESTHESIA;  Surgeon: Thiago Mensah MD;  Location: [facility] ENDOSCOPY;  Service: Gastroenterology;  Laterality: N/A;  pre: hx polyps  post:  "polyps. diverticulosis.   mounika schaeffer    COLONOSCOPY W/ BIOPSIES      CORONARY ANGIOPLASTY  08/13/2007    had stents 2003    CORONARY STENT PLACEMENT  2002    EXTRACORPOREAL SHOCK WAVE LITHOTRIPSY (ESWL) Right 06/05/2023    Procedure: EXTRACORPOREAL SHOCKWAVE LITHOTRIPSY-right;  Surgeon: Jamar Hendrickson MD;  Location:  [facility] OR;  Service: Urology;  Laterality: Right;    HYSTERECTOMY      KNEE ARTHROSCOPY Right 11/02/2023    Procedure: RIGHT KNEE ARTHROSCOPIC LATERAL MENISCUS ROOT REPAIR, PARTIAL MEDIAL MENISECTOMY;  Surgeon: Raymond Puentes MD;  Location:  [facility] OR;  Service: Orthopedics;  Laterality: Right;    VEIN SURGERY  1988       Social History     Socioeconomic History    Marital status: Single   Tobacco Use    Smoking status: Never    Smokeless tobacco: Never   Vaping Use    Vaping status: Never Used   Substance and Sexual Activity    Alcohol use: Never     Alcohol/week: 3.0 standard drinks of alcohol     Types: 2 Glasses of wine, 1 Shots of liquor per week    Drug use: Never    Sexual activity: Not Currently     Partners: Male     Birth control/protection: None       Family History   Problem Relation Age of Onset    Dementia Mother     Heart attack Sister     Heart disease Sister     Diverticulitis Sister     Asthma Sister     Brain cancer Father     Heart disease Maternal Grandmother     Heart attack Maternal Grandmother     Stroke Maternal Grandfather     No Known Problems Paternal Grandmother     Brain cancer Paternal Grandfather     Breast cancer Sister     Breast cancer Paternal Aunt     Ovarian cancer Neg Hx     Uterine cancer Neg Hx     Colon cancer Neg Hx        Objective    Temp 97.8 °F (36.6 °C)   Ht 165.1 cm (65\")   Wt 94.8 kg (209 lb)   BMI 34.78 kg/m²     Physical Exam        Results for orders placed or performed in visit on 04/03/25   POC Urinalysis Dipstick, Multipro    Collection Time: 04/03/25  1:00 PM    Specimen: Urine   Result Value Ref Range    Color Yellow Yellow, Straw, " Dark Yellow, Beatriz    Clarity, UA Clear Clear    Glucose, UA Negative Negative mg/dL    Bilirubin Negative Negative    Ketones, UA Negative Negative    Specific Gravity  1.020 1.005 - 1.030    Blood, UA Large (A) Negative    pH, Urine 7.0 5.0 - 8.0    Protein, POC 30 mg/dL (A) Negative mg/dL    Urobilinogen, UA 0.2 E.U./dL Normal, 0.2 E.U./dL    Nitrite, UA Negative Negative    Leukocytes Trace (A) Negative     Assessment and Plan    Diagnoses and all orders for this visit:    1. Recurrent UTI (Primary)  -     POC Urinalysis Dipstick, Multipro  -     phenazopyridine (PYRIDIUM) 100 MG tablet; Take 1 tablet by mouth 3 (Three) Times a Day As Needed for Bladder Spasms.  Dispense: 20 tablet; Refill: 0  -     cefdinir (OMNICEF) 300 MG capsule; Take 1 capsule by mouth 2 (Two) Times a Day.  Dispense: 20 capsule; Refill: 0    2. Microscopic hematuria  -     POC Urinalysis Dipstick, Multipro  -     cefdinir (OMNICEF) 300 MG capsule; Take 1 capsule by mouth 2 (Two) Times a Day.  Dispense: 20 capsule; Refill: 0      Will send urine out through [facility] to check for infection as the source to patient's new onset blood in urine x 2 days given patient's history of kidney stones would also like for patient to go now for KUB to rule out any obstructing stone.  Will send in as needed Pyridium as well as cefdinir antibiotic.  Have encouraged patient to hold on taking the antibiotic until culture returns if possible however since we are going into a weekend we will send in the medication in case symptoms worsen over weekend.    For now patient to keep scheduled follow-up with me in May

## 2025-04-04 NOTE — TELEPHONE ENCOUNTER
Spoke with patient to let her know  KUB no ureteral stones visualized. Stable appearing bilateral renal stones seen     Patient verbalized understanding.

## 2025-04-08 ENCOUNTER — RESULTS FOLLOW-UP (OUTPATIENT)
Dept: UROLOGY | Facility: CLINIC | Age: 75
End: 2025-04-08
Payer: MEDICARE

## 2025-04-08 DIAGNOSIS — R31.0 GROSS HEMATURIA: Primary | ICD-10-CM

## 2025-04-08 DIAGNOSIS — N30.00 ACUTE CYSTITIS WITHOUT HEMATURIA: Primary | ICD-10-CM

## 2025-04-08 RX ORDER — AMOXICILLIN 500 MG/1
500 CAPSULE ORAL 2 TIMES DAILY
Qty: 20 CAPSULE | Refills: 0 | Status: SHIPPED | OUTPATIENT
Start: 2025-04-08

## 2025-04-09 NOTE — TELEPHONE ENCOUNTER
Spoke with patient to let her know  Urine culture only showing less than 10,000 colony-forming units with 2 different bacteria. Have sent in amoxicillin to patient pharmacy. If patient continues to experience blood in urine recommend full hematuria workup again. I have gone ahead and placed orders for CT urogram given the fact patient's colony growth not consistent with a full confirmed UTI     Patient verbalized understanding.

## 2025-04-28 ENCOUNTER — OFFICE VISIT (OUTPATIENT)
Dept: ENT CLINIC | Age: 75
End: 2025-04-28
Payer: MEDICARE

## 2025-04-28 VITALS
BODY MASS INDEX: 36.37 KG/M2 | HEIGHT: 64 IN | WEIGHT: 213 LBS | SYSTOLIC BLOOD PRESSURE: 134 MMHG | DIASTOLIC BLOOD PRESSURE: 82 MMHG

## 2025-04-28 DIAGNOSIS — H60.542 DERMATITIS OF EAR CANAL, LEFT: Primary | ICD-10-CM

## 2025-04-28 DIAGNOSIS — H90.3 SENSORINEURAL HEARING LOSS (SNHL) OF BOTH EARS: ICD-10-CM

## 2025-04-28 PROCEDURE — 99213 OFFICE O/P EST LOW 20 MIN: CPT | Performed by: OTOLARYNGOLOGY

## 2025-04-28 PROCEDURE — 1090F PRES/ABSN URINE INCON ASSESS: CPT | Performed by: OTOLARYNGOLOGY

## 2025-04-28 PROCEDURE — G8427 DOCREV CUR MEDS BY ELIG CLIN: HCPCS | Performed by: OTOLARYNGOLOGY

## 2025-04-28 PROCEDURE — 1036F TOBACCO NON-USER: CPT | Performed by: OTOLARYNGOLOGY

## 2025-04-28 PROCEDURE — G8417 CALC BMI ABV UP PARAM F/U: HCPCS | Performed by: OTOLARYNGOLOGY

## 2025-04-28 PROCEDURE — G8399 PT W/DXA RESULTS DOCUMENT: HCPCS | Performed by: OTOLARYNGOLOGY

## 2025-04-28 PROCEDURE — 3017F COLORECTAL CA SCREEN DOC REV: CPT | Performed by: OTOLARYNGOLOGY

## 2025-04-28 PROCEDURE — 1159F MED LIST DOCD IN RCRD: CPT | Performed by: OTOLARYNGOLOGY

## 2025-04-28 PROCEDURE — 4130F TOPICAL PREP RX AOE: CPT | Performed by: OTOLARYNGOLOGY

## 2025-04-28 PROCEDURE — 1123F ACP DISCUSS/DSCN MKR DOCD: CPT | Performed by: OTOLARYNGOLOGY

## 2025-04-28 ASSESSMENT — ENCOUNTER SYMPTOMS
EYES NEGATIVE: 1
ALLERGIC/IMMUNOLOGIC NEGATIVE: 1
RESPIRATORY NEGATIVE: 1
GASTROINTESTINAL NEGATIVE: 1

## 2025-04-28 NOTE — PROGRESS NOTES
2025    Mindy Brandt (:  1950) is a 74 y.o. female, Established patient, here for evaluation of the following chief complaint(s):  Follow-up (Left ear)      Vitals:    25 1105   BP: 134/82   Weight: 96.6 kg (213 lb)   Height: 1.626 m (5' 4\")       Wt Readings from Last 3 Encounters:   25 96.6 kg (213 lb)   25 93.4 kg (206 lb)   24 89.8 kg (198 lb)       BP Readings from Last 3 Encounters:   25 134/82   25 132/80   23 130/80         SUBJECTIVE/OBJECTIVE:    Patient seen today for her left ear.  Last MSR put her back on Derm otic for her left ear and she said it is helping.  She is using it every 3 days and she says she start developing pain in the drops help that.        Review of Systems   Constitutional: Negative.    HENT:  Positive for hearing loss.    Eyes: Negative.    Respiratory: Negative.     Cardiovascular: Negative.    Gastrointestinal: Negative.    Endocrine: Negative.    Musculoskeletal: Negative.    Skin: Negative.    Allergic/Immunologic: Negative.    Neurological: Negative.    Hematological: Negative.    Psychiatric/Behavioral: Negative.          Physical Exam  Vitals reviewed.   Constitutional:       Appearance: Normal appearance. She is normal weight.   HENT:      Head: Normocephalic and atraumatic.      Right Ear: Tympanic membrane, ear canal and external ear normal.      Left Ear: Tympanic membrane, ear canal and external ear normal.      Nose: Nose normal.      Mouth/Throat:      Mouth: Mucous membranes are moist.      Pharynx: Oropharynx is clear.   Eyes:      Extraocular Movements: Extraocular movements intact.      Pupils: Pupils are equal, round, and reactive to light.   Cardiovascular:      Rate and Rhythm: Normal rate and regular rhythm.   Pulmonary:      Effort: Pulmonary effort is normal.      Breath sounds: Normal breath sounds.   Musculoskeletal:      Cervical back: Normal range of motion.   Skin:     General: Skin is warm

## 2025-04-29 ENCOUNTER — TELEPHONE (OUTPATIENT)
Dept: UROLOGY | Facility: CLINIC | Age: 75
End: 2025-04-29
Payer: MEDICARE

## 2025-04-29 NOTE — PROGRESS NOTES
Subjective    Ms. Hendrix is 74 y.o. female    Chief Complaint: Left lower quadrant/inguinal pain    History of Present Illness    74-year-old female established patient in with new complaint of left lower quadrant/inguinal pain that started over the last 4-5 days.  Patient states started shortly after a spell with constipation.  Did not know if it was more GI related or possibly more of a female bladder issue.    Followed in our office for gross hematuria for which patient has continued to experience intermittently.  Is generally accompanied with urinary tract infections as patient has frequent recurrence, last with Enterococcus.     history of urine culture positive Proteus bacteria for which has remained on trimethoprim prophylaxis. underwent full workup 3/2024 with Dr. Hendrickson which was unremarkable CT urogram and cystoscopy.   Patient remains on Xarelto due to history of cardiac stents.  Last visit was battling A Fib- had undergone echo which was good per pt report.      August 2024 underwent colon resection.     Hx of kidney stones right ESWL 2 large 10 and 12 mm right upper pole renal stones by Dr. Hendrickson June 2023.     The following portions of the patient's history were reviewed and updated as appropriate: allergies, current medications, past family history, past medical history, past social history, past surgical history and problem list.    Review of Systems   Gastrointestinal:  Positive for abdominal pain.   Genitourinary:  Positive for pelvic pain.         Current Outpatient Medications:     acetaminophen (Tylenol) 325 MG tablet, Take 2 tablets by mouth Every 4 (Four) Hours As Needed for Mild or Moderate Pain., Disp: 100 tablet, Rfl: 2    albuterol sulfate  (90 Base) MCG/ACT inhaler, Inhale 2 puffs Every 6 (Six) Hours As Needed for Wheezing or Shortness of Air., Disp: , Rfl:     amLODIPine (NORVASC) 10 MG tablet, Take 1 tablet by mouth Daily., Disp: , Rfl:     amoxicillin (AMOXIL) 500 MG capsule, Take  1 capsule by mouth 2 (Two) Times a Day for 10 days., Disp: 20 capsule, Rfl: 0    atorvastatin (LIPITOR) 80 MG tablet, Take 1 tablet by mouth Every Night., Disp: , Rfl:     budesonide-formoterol (SYMBICORT) 160-4.5 MCG/ACT inhaler, Inhale 2 puffs 2 (Two) Times a Day., Disp: , Rfl:     Cholecalciferol (VITAMIN D3) 125 MCG (5000 UT) capsule capsule, Take 1 capsule by mouth Daily., Disp: , Rfl:     coenzyme Q10 100 MG capsule, Take 2 capsules by mouth Daily., Disp: , Rfl:     docusate sodium (Colace) 100 MG capsule, Take 1 capsule by mouth 2 (Two) Times a Day., Disp: 60 capsule, Rfl: 1    estradiol (ESTRACE) 0.1 MG/GM vaginal cream, Insert 2 g into the vagina 2 (Two) Times a Week., Disp: 42.5 g, Rfl: 5    fluocinolone acetonide (DERMOTIC) 0.01 % oil otic oil, Administer 4 drops into the left ear 3 (Three) Times a Week., Disp: , Rfl:     folic acid (FOLVITE) 800 MCG tablet, Take 1 tablet by mouth Daily., Disp: , Rfl:     Glucosamine-Chondroit-Vit C-Mn (GLUCOSAMINE 1500 COMPLEX PO), Take 1 capsule by mouth Daily., Disp: , Rfl:     Inclisiran Sodium (Leqvio) 284 MG/1.5ML solution prefilled syringe, Inject 1 mL under the skin into the appropriate area as directed Every 6 (Six) Months., Disp: 1 mL, Rfl: 1    Inclisiran Sodium (Leqvio) 284 MG/1.5ML solution prefilled syringe, Inject 1.5 mL under the skin into the appropriate area as directed Every 6 (Six) Months., Disp: , Rfl:     Klor-Con M20 20 MEQ CR tablet, Take 1 tablet by mouth Daily., Disp: , Rfl:     levocetirizine (XYZAL) 5 MG tablet, Take 1 tablet by mouth Every Night., Disp: , Rfl:     metoprolol succinate XL (TOPROL-XL) 100 MG 24 hr tablet, Take 1 tablet by mouth Every Night., Disp: , Rfl:     montelukast (SINGULAIR) 10 MG tablet, Take 1 tablet by mouth Every Night., Disp: , Rfl:     Multiple Vitamins-Minerals (OCUVITE ADULT 50+ PO), Take 1 tablet by mouth Daily., Disp: , Rfl:     nitroglycerin (NITROSTAT) 0.4 MG SL tablet, Place 1 tablet under the tongue Every 5  (Five) Minutes As Needed for Chest Pain. Take no more than 3 doses in 15 minutes., Disp: 25 tablet, Rfl: 5    ondansetron (Zofran) 4 MG tablet, Take 1 tablet by mouth Every 8 (Eight) Hours As Needed for Nausea or Vomiting., Disp: 15 tablet, Rfl: 0    oxyCODONE (Roxicodone) 5 MG immediate release tablet, Take 1 tablet by mouth Every 8 (Eight) Hours As Needed for Severe Pain., Disp: 10 tablet, Rfl: 0    phenazopyridine (PYRIDIUM) 100 MG tablet, Take 1 tablet by mouth 3 (Three) Times a Day As Needed for Bladder Spasms., Disp: 20 tablet, Rfl: 0    polyethylene glycol (MIRALAX) 17 g packet, Take 17 g by mouth Daily As Needed (constipation)., Disp: 60 each, Rfl: 2    potassium chloride ER (K-TAB) 20 MEQ tablet controlled-release ER tablet, Take 1 tablet by mouth Daily., Disp: , Rfl:     Probiotic Product (PROBIOTIC BLEND PO), Take 1 capsule by mouth Daily., Disp: , Rfl:     rivaroxaban (Xarelto) 20 MG tablet, Take 1 tablet by mouth Daily., Disp: 90 tablet, Rfl: 3    trimethoprim (TRIMPEX) 100 MG tablet, Take 1 tablet by mouth Every Night., Disp: 90 tablet, Rfl: 0    vitamin A 33634 UNIT capsule, Take 1 capsule by mouth Daily., Disp: , Rfl:     Past Medical History:   Diagnosis Date    Asthma     Atrial fibrillation     CAD in native artery     Chronic UTI     Diverticulitis     Hyperlipidemia     Hypertension     Kidney stone 05/30/2023    Lateral meniscus tear     right    MI, old        Past Surgical History:   Procedure Laterality Date    ABLATION OF DYSRHYTHMIC FOCUS      BLADDER NECK SUSPENSION      BREAST BIOPSY Bilateral     2002    CARDIOVERSION      CATARACT EXTRACTION WITH INTRAOCULAR LENS IMPLANT Bilateral     CHOLECYSTECTOMY      COLON RESECTION N/A 8/20/2024    Procedure: COLON RESECTION LAPAROSCOPIC SIGMOID WITH [employer] ROBOT, INTRA-OPERATIVE FLEXIBLE SIGMOIDOSCOPY, SPLENIC MOBILIZATION.;  Surgeon: Jovanna Curtis MD;  Location: [facility];  Service: Robotics - DaVinci;  Laterality: N/A;    COLONOSCOPY  "N/A 05/23/2024    Procedure: COLONOSCOPY WITH ANESTHESIA;  Surgeon: Thiago Mensah MD;  Location:  [facility];  Service: Gastroenterology;  Laterality: N/A;  pre: hx polyps  post: polyps. diverticulosis.   mounika schaeffer    COLONOSCOPY W/ BIOPSIES      CORONARY ANGIOPLASTY  08/13/2007    had stents 2003    CORONARY STENT PLACEMENT  2002    EXTRACORPOREAL SHOCK WAVE LITHOTRIPSY (ESWL) Right 06/05/2023    Procedure: EXTRACORPOREAL SHOCKWAVE LITHOTRIPSY-right;  Surgeon: Jamar Hendrickson MD;  Location:  [facility];  Service: Urology;  Laterality: Right;    HYSTERECTOMY      KNEE ARTHROSCOPY Right 11/02/2023    Procedure: RIGHT KNEE ARTHROSCOPIC LATERAL MENISCUS ROOT REPAIR, PARTIAL MEDIAL MENISECTOMY;  Surgeon: Raymond Puentes MD;  Location:  [facility];  Service: Orthopedics;  Laterality: Right;    VEIN SURGERY  1988       Social History     Socioeconomic History    Marital status: Single   Tobacco Use    Smoking status: Never    Smokeless tobacco: Never   Vaping Use    Vaping status: Never Used   Substance and Sexual Activity    Alcohol use: Never     Alcohol/week: 3.0 standard drinks of alcohol     Types: 2 Glasses of wine, 1 Shots of liquor per week    Drug use: Never    Sexual activity: Not Currently     Partners: Male     Birth control/protection: None       Family History   Problem Relation Age of Onset    Dementia Mother     Heart attack Sister     Heart disease Sister     Diverticulitis Sister     Asthma Sister     Brain cancer Father     Heart disease Maternal Grandmother     Heart attack Maternal Grandmother     Stroke Maternal Grandfather     No Known Problems Paternal Grandmother     Brain cancer Paternal Grandfather     Breast cancer Sister     Breast cancer Paternal Aunt     Ovarian cancer Neg Hx     Uterine cancer Neg Hx     Colon cancer Neg Hx        Objective    Temp 97.8 °F (36.6 °C)   Ht 165.1 cm (65\")   Wt 94.8 kg (209 lb)   BMI 34.78 kg/m²     Physical Exam  Nursing note reviewed. "   Constitutional:       General: She is not in acute distress.     Appearance: Normal appearance. She is not ill-appearing.   HENT:      Nose: No congestion.   Abdominal:      Tenderness: There is abdominal tenderness in the left lower quadrant. There is no right CVA tenderness or left CVA tenderness.      Hernia: No hernia is present.       Skin:     General: Skin is warm and dry.   Neurological:      Mental Status: She is alert and oriented to person, place, and time.   Psychiatric:         Mood and Affect: Mood normal.         Behavior: Behavior normal.             Results for orders placed or performed in visit on 04/30/25   POC Urinalysis Dipstick, Multipro    Collection Time: 04/30/25 10:45 AM    Specimen: Urine   Result Value Ref Range    Color Yellow Yellow, Straw, Dark Yellow, Beatriz    Clarity, UA Clear Clear    Glucose, UA Negative Negative mg/dL    Bilirubin Negative Negative    Ketones, UA Negative Negative    Specific Gravity  1.020 1.005 - 1.030    Blood, UA Trace (A) Negative    pH, Urine 6.0 5.0 - 8.0    Protein, POC Negative Negative mg/dL    Urobilinogen, UA 0.2 E.U./dL Normal, 0.2 E.U./dL    Nitrite, UA Negative Negative    Leukocytes Negative Negative     Assessment and Plan    Diagnoses and all orders for this visit:    1. Microscopic hematuria (Primary)  -     POC Urinalysis Dipstick, Multipro  -     CT Abdomen Pelvis With & Without Contrast    2. Recurrent UTI  -     POC Urinalysis Dipstick, Multipro    3. Left inguinal pain  -     CT Abdomen Pelvis With & Without Contrast      Recommend moving patient's CT urogram appointment up will place order stat, ultimately if no abnormality found feel as though patient may warrant transvaginal ultrasound and further evaluation with OB/GYN

## 2025-04-29 NOTE — TELEPHONE ENCOUNTER
Patient called and stated that she feels her bladder has dropped again and she would like to be seen by Frances. I made an appointment for the patient for tomorrow at 10:45

## 2025-04-30 ENCOUNTER — OFFICE VISIT (OUTPATIENT)
Dept: UROLOGY | Facility: CLINIC | Age: 75
End: 2025-04-30
Payer: MEDICARE

## 2025-04-30 VITALS — TEMPERATURE: 97.8 F | WEIGHT: 209 LBS | HEIGHT: 65 IN | BODY MASS INDEX: 34.82 KG/M2

## 2025-04-30 DIAGNOSIS — N39.0 RECURRENT UTI: ICD-10-CM

## 2025-04-30 DIAGNOSIS — R10.32 LEFT INGUINAL PAIN: ICD-10-CM

## 2025-04-30 DIAGNOSIS — R31.29 MICROSCOPIC HEMATURIA: Primary | ICD-10-CM

## 2025-04-30 LAB
BILIRUB BLD-MCNC: NEGATIVE MG/DL
CLARITY, POC: CLEAR
COLOR UR: YELLOW
GLUCOSE UR STRIP-MCNC: NEGATIVE MG/DL
KETONES UR QL: NEGATIVE
LEUKOCYTE EST, POC: NEGATIVE
NITRITE UR-MCNC: NEGATIVE MG/ML
PH UR: 6 [PH] (ref 5–8)
PROT UR STRIP-MCNC: NEGATIVE MG/DL
RBC # UR STRIP: ABNORMAL /UL
SP GR UR: 1.02 (ref 1–1.03)
UROBILINOGEN UR QL: ABNORMAL

## 2025-05-01 ENCOUNTER — HOSPITAL ENCOUNTER (OUTPATIENT)
Dept: CT IMAGING | Facility: HOSPITAL | Age: 75
Discharge: HOME | End: 2025-05-01
Payer: MEDICARE

## 2025-05-01 LAB — CREAT BLDA-MCNC: 1 MG/DL (ref 0.6–1.3)

## 2025-05-01 PROCEDURE — 82565 ASSAY OF CREATININE: CPT

## 2025-05-01 PROCEDURE — 25510000001 IOPAMIDOL 61 % SOLUTION

## 2025-05-01 PROCEDURE — 74178 CT ABD&PLV WO CNTR FLWD CNTR: CPT

## 2025-05-01 RX ORDER — IOPAMIDOL 612 MG/ML
100 INJECTION, SOLUTION INTRAVASCULAR
Status: COMPLETED | OUTPATIENT
Start: 2025-05-01 | End: 2025-05-01

## 2025-05-01 RX ADMIN — IOPAMIDOL 100 ML: 612 INJECTION, SOLUTION INTRAVENOUS at 14:24

## 2025-05-02 ENCOUNTER — RESULTS FOLLOW-UP (OUTPATIENT)
Dept: UROLOGY | Facility: CLINIC | Age: 75
End: 2025-05-02
Payer: MEDICARE

## 2025-05-02 NOTE — TELEPHONE ENCOUNTER
Spoke with patient to let her know   No urologic finding based on CT urogram to support patient's pain however patient was found to have a right anterior lateral abdominal wall wide necked hernia containing multiple small bowel loops. Again based on this finding should not explain patient's left-sided lower quadrant and inguinal pain. If patient wanting to get further evaluation of the hernia found on CT imaging I would be glad to refer patient back to general surgery     Patient wants the referral and prefers Jovanna Curtis.

## 2025-05-05 ENCOUNTER — TRANSCRIBE ORDERS (OUTPATIENT)
Dept: ADMINISTRATIVE | Facility: HOSPITAL | Age: 75
End: 2025-05-05
Payer: MEDICARE

## 2025-05-05 DIAGNOSIS — K43.9 HERNIA OF ABDOMINAL WALL: Primary | ICD-10-CM

## 2025-05-05 DIAGNOSIS — Z12.31 ENCOUNTER FOR SCREENING MAMMOGRAM FOR MALIGNANT NEOPLASM OF BREAST: Primary | ICD-10-CM

## 2025-05-16 NOTE — PROGRESS NOTES
Subjective    Ms. Hendrix is 75 y.o. female    Chief Complaint: Follow-up to review CT urogram    History of Present Illness    75-year-old female established patient in for follow-up to review CT urogram results and for urine recheck after patient presented within the last month with new complaint of left lower quadrant/inguinal pain that started over the last 4-5 days.  Patient states started shortly after a spell with constipation.  Did not know if it was more GI related or possibly more of a female bladder issue.     Followed in our office for gross hematuria for which patient has continued to experience intermittently.  Is generally accompanied with urinary tract infections as patient has frequent recurrence, last with Enterococcus.      history of urine culture positive Proteus bacteria for which has remained on trimethoprim prophylaxis. underwent full workup 3/2024 with Dr. Hendrickson which was unremarkable CT urogram and cystoscopy.   Patient remains on Xarelto due to history of cardiac stents.  Last visit was battling A Fib- had undergone echo which was good per pt report.      August 2024 underwent colon resection.     Hx of kidney stones right ESWL 2 large 10 and 12 mm right upper pole renal stones by Dr. Hendrickson June 2023.     The following portions of the patient's history were reviewed and updated as appropriate: allergies, current medications, past family history, past medical history, past social history, past surgical history and problem list.    Review of Systems   Constitutional:  Negative for chills and fever.   Gastrointestinal:  Negative for abdominal pain, anal bleeding and blood in stool.   Genitourinary:  Negative for dysuria and hematuria.         Current Outpatient Medications:     acetaminophen (Tylenol) 325 MG tablet, Take 2 tablets by mouth Every 4 (Four) Hours As Needed for Mild or Moderate Pain., Disp: 100 tablet, Rfl: 2    albuterol sulfate  (90 Base) MCG/ACT inhaler, Inhale 2 puffs  Every 6 (Six) Hours As Needed for Wheezing or Shortness of Air., Disp: , Rfl:     amLODIPine (NORVASC) 10 MG tablet, Take 1 tablet by mouth Daily., Disp: , Rfl:     atorvastatin (LIPITOR) 80 MG tablet, Take 1 tablet by mouth Every Night., Disp: , Rfl:     budesonide-formoterol (SYMBICORT) 160-4.5 MCG/ACT inhaler, Inhale 2 puffs 2 (Two) Times a Day., Disp: , Rfl:     Cholecalciferol (VITAMIN D3) 125 MCG (5000 UT) capsule capsule, Take 1 capsule by mouth Daily., Disp: , Rfl:     coenzyme Q10 100 MG capsule, Take 2 capsules by mouth Daily., Disp: , Rfl:     docusate sodium (Colace) 100 MG capsule, Take 1 capsule by mouth 2 (Two) Times a Day., Disp: 60 capsule, Rfl: 1    estradiol (ESTRACE) 0.1 MG/GM vaginal cream, Insert 2 g into the vagina 2 (Two) Times a Week., Disp: 42.5 g, Rfl: 5    fluocinolone acetonide (DERMOTIC) 0.01 % oil otic oil, Administer 4 drops into the left ear 3 (Three) Times a Week., Disp: , Rfl:     folic acid (FOLVITE) 800 MCG tablet, Take 1 tablet by mouth Daily., Disp: , Rfl:     Glucosamine-Chondroit-Vit C-Mn (GLUCOSAMINE 1500 COMPLEX PO), Take 1 capsule by mouth Daily., Disp: , Rfl:     Inclisiran Sodium (Leqvio) 284 MG/1.5ML solution prefilled syringe, Inject 1 mL under the skin into the appropriate area as directed Every 6 (Six) Months., Disp: 1 mL, Rfl: 1    Inclisiran Sodium (Leqvio) 284 MG/1.5ML solution prefilled syringe, Inject 1.5 mL under the skin into the appropriate area as directed Every 6 (Six) Months., Disp: , Rfl:     Klor-Con M20 20 MEQ CR tablet, Take 1 tablet by mouth Daily., Disp: , Rfl:     levocetirizine (XYZAL) 5 MG tablet, Take 1 tablet by mouth Every Night., Disp: , Rfl:     metoprolol succinate XL (TOPROL-XL) 100 MG 24 hr tablet, Take 1 tablet by mouth Every Night., Disp: , Rfl:     montelukast (SINGULAIR) 10 MG tablet, Take 1 tablet by mouth Every Night., Disp: , Rfl:     Multiple Vitamins-Minerals (OCUVITE ADULT 50+ PO), Take 1 tablet by mouth Daily., Disp: , Rfl:      nitroglycerin (NITROSTAT) 0.4 MG SL tablet, Place 1 tablet under the tongue Every 5 (Five) Minutes As Needed for Chest Pain. Take no more than 3 doses in 15 minutes., Disp: 25 tablet, Rfl: 5    ondansetron (Zofran) 4 MG tablet, Take 1 tablet by mouth Every 8 (Eight) Hours As Needed for Nausea or Vomiting., Disp: 15 tablet, Rfl: 0    oxyCODONE (Roxicodone) 5 MG immediate release tablet, Take 1 tablet by mouth Every 8 (Eight) Hours As Needed for Severe Pain., Disp: 10 tablet, Rfl: 0    phenazopyridine (PYRIDIUM) 100 MG tablet, Take 1 tablet by mouth 3 (Three) Times a Day As Needed for Bladder Spasms., Disp: 20 tablet, Rfl: 0    polyethylene glycol (MIRALAX) 17 g packet, Take 17 g by mouth Daily As Needed (constipation)., Disp: 60 each, Rfl: 2    potassium chloride ER (K-TAB) 20 MEQ tablet controlled-release ER tablet, Take 1 tablet by mouth Daily., Disp: , Rfl:     Probiotic Product (PROBIOTIC BLEND PO), Take 1 capsule by mouth Daily., Disp: , Rfl:     rivaroxaban (Xarelto) 20 MG tablet, Take 1 tablet by mouth Daily., Disp: 90 tablet, Rfl: 3    trimethoprim (TRIMPEX) 100 MG tablet, Take 1 tablet by mouth Every Night., Disp: 90 tablet, Rfl: 0    vitamin A 59738 UNIT capsule, Take 1 capsule by mouth Daily., Disp: , Rfl:     amoxicillin (AMOXIL) 500 MG capsule, Take 1 capsule by mouth 2 (Two) Times a Day for 10 days. (Patient not taking: Reported on 6/5/2025), Disp: 20 capsule, Rfl: 0    Past Medical History:   Diagnosis Date    Asthma     Atrial fibrillation     CAD in native artery     Chronic UTI     Diverticulitis     Hyperlipidemia     Hypertension     Kidney stone 05/30/2023    Lateral meniscus tear     right    MI, old        Past Surgical History:   Procedure Laterality Date    ABLATION OF DYSRHYTHMIC FOCUS      BLADDER NECK SUSPENSION      BREAST BIOPSY Bilateral     2002    CARDIOVERSION      CATARACT EXTRACTION WITH INTRAOCULAR LENS IMPLANT Bilateral     CHOLECYSTECTOMY      COLON RESECTION N/A 8/20/2024     Procedure: COLON RESECTION LAPAROSCOPIC SIGMOID WITH [employer] ROBOT, INTRA-OPERATIVE FLEXIBLE SIGMOIDOSCOPY, SPLENIC MOBILIZATION.;  Surgeon: Jovanna Curtis MD;  Location:  [facility];  Service: Robotics - DaVinci;  Laterality: N/A;    COLONOSCOPY N/A 05/23/2024    Procedure: COLONOSCOPY WITH ANESTHESIA;  Surgeon: Thiago Mensah MD;  [facility];  Service: Gastroenterology;  Laterality: N/A;  pre: hx polyps  post: polyps. diverticulosis.   mounika schaeffer    COLONOSCOPY W/ BIOPSIES      CORONARY ANGIOPLASTY  08/13/2007    had stents 2003    CORONARY STENT PLACEMENT  2002    EXTRACORPOREAL SHOCK WAVE LITHOTRIPSY (ESWL) Right 06/05/2023    Procedure: EXTRACORPOREAL SHOCKWAVE LITHOTRIPSY-right;  Surgeon: Jamar Hendrickson MD;  Location:  [facility];  Service: Urology;  Laterality: Right;    HYSTERECTOMY      KNEE ARTHROSCOPY Right 11/02/2023    Procedure: RIGHT KNEE ARTHROSCOPIC LATERAL MENISCUS ROOT REPAIR, PARTIAL MEDIAL MENISECTOMY;  Surgeon: Raymond Puentes MD;  Location:  [facility];  Service: Orthopedics;  Laterality: Right;    VEIN SURGERY  1988       Social History     Socioeconomic History    Marital status: Single   Tobacco Use    Smoking status: Never    Smokeless tobacco: Never   Vaping Use    Vaping status: Never Used   Substance and Sexual Activity    Alcohol use: Never     Alcohol/week: 3.0 standard drinks of alcohol     Types: 2 Glasses of wine, 1 Shots of liquor per week    Drug use: Never    Sexual activity: Not Currently     Partners: Male     Birth control/protection: None       Family History   Problem Relation Age of Onset    Dementia Mother     Heart attack Sister     Heart disease Sister     Diverticulitis Sister     Asthma Sister     Brain cancer Father     Heart disease Maternal Grandmother     Heart attack Maternal Grandmother     Stroke Maternal Grandfather     No Known Problems Paternal Grandmother     Brain cancer Paternal Grandfather     Breast cancer Sister     Breast  "cancer Paternal Aunt     Ovarian cancer Neg Hx     Uterine cancer Neg Hx     Colon cancer Neg Hx        Objective    Temp 96.1 °F (35.6 °C)   Ht 162.6 cm (64\")   Wt 97.3 kg (214 lb 9.6 oz)   BMI 36.84 kg/m²     Physical Exam        Results for orders placed or performed in visit on 05/15/25   POC Urinalysis Dipstick, Multipro    Collection Time: 06/05/25 10:59 AM    Specimen: Urine   Result Value Ref Range    Color Yellow Yellow, Straw, Dark Yellow, Beatriz    Clarity, UA Slightly Cloudy (A) Clear    Glucose, UA Negative Negative mg/dL    Bilirubin Negative Negative    Ketones, UA Negative Negative    Specific Gravity  1.010 1.005 - 1.030    Blood, UA Large (A) Negative    pH, Urine 5.5 5.0 - 8.0    Protein, POC Negative Negative mg/dL    Urobilinogen, UA 0.2 E.U./dL Normal, 0.2 E.U./dL    Nitrite, UA Negative Negative    Leukocytes Negative Negative   CT Abdomen Pelvis With & Without Contrast (05/01/2025 14:23)   Assessment and Plan    Diagnoses and all orders for this visit:    1. Recurrent UTI (Primary)  -     trimethoprim (TRIMPEX) 100 MG tablet; Take 1 tablet by mouth Every Night.  Dispense: 90 tablet; Refill: 0      CT urogram revealed no urologic findings however patient was found to have a right anterior lateral abdominal wall wide necked hernia containing multiple small bowel loops.  Patient with a history of colon resection within the last year.  Therefore recommended patient return back to general surgery for further evaluation.  Patient is now scheduled to undergo hernia repair with Dr. Jovanna Curtis within the next 2 weeks..    Patient urinalysis today looks clear other than the known microscopic hematuria.  Patient has recently ran out of her trimethoprim prophylaxis therefore we will go ahead and restart as I do not want patient to develop an infection that we will hold up her surgery that is already scheduled.  Will have patient return in 6 months          "

## 2025-05-19 ENCOUNTER — OFFICE VISIT (OUTPATIENT)
Dept: SURGERY | Facility: CLINIC | Age: 75
End: 2025-05-19
Payer: MEDICARE

## 2025-05-19 VITALS
HEART RATE: 86 BPM | WEIGHT: 215 LBS | OXYGEN SATURATION: 95 % | SYSTOLIC BLOOD PRESSURE: 166 MMHG | BODY MASS INDEX: 35.82 KG/M2 | HEIGHT: 65 IN | DIASTOLIC BLOOD PRESSURE: 90 MMHG

## 2025-05-19 DIAGNOSIS — K43.2 INCISIONAL HERNIA, WITHOUT OBSTRUCTION OR GANGRENE: Primary | ICD-10-CM

## 2025-05-19 DIAGNOSIS — Z79.01 CHRONIC ANTICOAGULATION: ICD-10-CM

## 2025-05-19 DIAGNOSIS — E66.09 CLASS 1 OBESITY DUE TO EXCESS CALORIES WITH BODY MASS INDEX (BMI) OF 34.0 TO 34.9 IN ADULT, UNSPECIFIED WHETHER SERIOUS COMORBIDITY PRESENT: ICD-10-CM

## 2025-05-19 DIAGNOSIS — E66.811 CLASS 1 OBESITY DUE TO EXCESS CALORIES WITH BODY MASS INDEX (BMI) OF 34.0 TO 34.9 IN ADULT, UNSPECIFIED WHETHER SERIOUS COMORBIDITY PRESENT: ICD-10-CM

## 2025-05-19 RX ORDER — ENOXAPARIN SODIUM 100 MG/ML
40 INJECTION SUBCUTANEOUS DAILY
OUTPATIENT
Start: 2025-05-19

## 2025-05-19 NOTE — PROGRESS NOTES
Office Established Patient Note:     Referring Provider: Frances Burrell AP*    Chief Complaint   Patient presents with    Hernia     Bottom left quad with small bowel loops. Dull/aching pain when needing to have bowel movement or constipation        Subjective .     History of present illness:    History of Present Illness  The patient presents for evaluation of a hernia.    She reports experiencing pain in the right lower abdomen, which she initially attributed to constipation. The pain occasionally extends to the left side. The hernia is located at the site of a previous incision where a colon resection was performed. The patient did not notice any bulging but started experiencing pain, which prompted a visit to her urologist.    She is currently on Xarelto, a medication she had previously discontinued for 2 days during her colon resection procedure. Her cardiologist has recommended the addition of baby aspirin to her regimen.    PAST SURGICAL HISTORY:  Colon resection.       History  Past Medical History:   Diagnosis Date    Asthma     Atrial fibrillation     CAD in native artery     Chronic UTI     Diverticulitis     Hyperlipidemia     Hypertension     Kidney stone 05/30/2023    Lateral meniscus tear     right    MI, old    ,   Past Surgical History:   Procedure Laterality Date    ABLATION OF DYSRHYTHMIC FOCUS      BLADDER NECK SUSPENSION      BREAST BIOPSY Bilateral     2002    CARDIOVERSION      CATARACT EXTRACTION WITH INTRAOCULAR LENS IMPLANT Bilateral     CHOLECYSTECTOMY      COLON RESECTION N/A 8/20/2024    Procedure: COLON RESECTION LAPAROSCOPIC SIGMOID WITH DAVINCI ROBOT, INTRA-OPERATIVE FLEXIBLE SIGMOIDOSCOPY, SPLENIC MOBILIZATION.;  Surgeon: Jovanna Curtis MD;  Location: [facility] OR;  Service: Robotics - DaVinci;  Laterality: N/A;    COLONOSCOPY N/A 05/23/2024    Procedure: COLONOSCOPY WITH ANESTHESIA;  Surgeon: Thiago Mensah MD;  Location: [facility] ENDOSCOPY;  Service: Gastroenterology;   Laterality: N/A;  pre: hx polyps  post: polyps. diverticulosis.   mounika schaeffer    COLONOSCOPY W/ BIOPSIES      CORONARY ANGIOPLASTY  08/13/2007    had stents 2003    CORONARY STENT PLACEMENT  2002    EXTRACORPOREAL SHOCK WAVE LITHOTRIPSY (ESWL) Right 06/05/2023    Procedure: EXTRACORPOREAL SHOCKWAVE LITHOTRIPSY-right;  Surgeon: Jmaar Hendrickson MD;  Location:  [facility] OR;  Service: Urology;  Laterality: Right;    HYSTERECTOMY      KNEE ARTHROSCOPY Right 11/02/2023    Procedure: RIGHT KNEE ARTHROSCOPIC LATERAL MENISCUS ROOT REPAIR, PARTIAL MEDIAL MENISECTOMY;  Surgeon: Raymond Puentes MD;  Location:  [facility] OR;  Service: Orthopedics;  Laterality: Right;    VEIN SURGERY  1988   ,   Family History   Problem Relation Age of Onset    Dementia Mother     Heart attack Sister     Heart disease Sister     Diverticulitis Sister     Asthma Sister     Brain cancer Father     Heart disease Maternal Grandmother     Heart attack Maternal Grandmother     Stroke Maternal Grandfather     No Known Problems Paternal Grandmother     Brain cancer Paternal Grandfather     Breast cancer Sister     Breast cancer Paternal Aunt     Ovarian cancer Neg Hx     Uterine cancer Neg Hx     Colon cancer Neg Hx    ,   Social History     Tobacco Use    Smoking status: Never    Smokeless tobacco: Never   Vaping Use    Vaping status: Never Used   Substance Use Topics    Alcohol use: Never     Alcohol/week: 3.0 standard drinks of alcohol     Types: 2 Glasses of wine, 1 Shots of liquor per week    Drug use: Never   , (Not in a hospital admission)   and Allergies:  Eliquis [apixaban], Erythromycin, and Sulfa antibiotics    Current Outpatient Medications:     acetaminophen (Tylenol) 325 MG tablet, Take 2 tablets by mouth Every 4 (Four) Hours As Needed for Mild or Moderate Pain., Disp: 100 tablet, Rfl: 2    albuterol sulfate  (90 Base) MCG/ACT inhaler, Inhale 2 puffs Every 6 (Six) Hours As Needed for Wheezing or Shortness of Air., Disp: , Rfl:      amLODIPine (NORVASC) 10 MG tablet, Take 1 tablet by mouth Daily., Disp: , Rfl:     amoxicillin (AMOXIL) 500 MG capsule, Take 1 capsule by mouth 2 (Two) Times a Day for 10 days., Disp: 20 capsule, Rfl: 0    atorvastatin (LIPITOR) 80 MG tablet, Take 1 tablet by mouth Every Night., Disp: , Rfl:     budesonide-formoterol (SYMBICORT) 160-4.5 MCG/ACT inhaler, Inhale 2 puffs 2 (Two) Times a Day., Disp: , Rfl:     Cholecalciferol (VITAMIN D3) 125 MCG (5000 UT) capsule capsule, Take 1 capsule by mouth Daily., Disp: , Rfl:     coenzyme Q10 100 MG capsule, Take 2 capsules by mouth Daily., Disp: , Rfl:     docusate sodium (Colace) 100 MG capsule, Take 1 capsule by mouth 2 (Two) Times a Day., Disp: 60 capsule, Rfl: 1    estradiol (ESTRACE) 0.1 MG/GM vaginal cream, Insert 2 g into the vagina 2 (Two) Times a Week., Disp: 42.5 g, Rfl: 5    fluocinolone acetonide (DERMOTIC) 0.01 % oil otic oil, Administer 4 drops into the left ear 3 (Three) Times a Week., Disp: , Rfl:     folic acid (FOLVITE) 800 MCG tablet, Take 1 tablet by mouth Daily., Disp: , Rfl:     Glucosamine-Chondroit-Vit C-Mn (GLUCOSAMINE 1500 COMPLEX PO), Take 1 capsule by mouth Daily., Disp: , Rfl:     Inclisiran Sodium (Leqvio) 284 MG/1.5ML solution prefilled syringe, Inject 1 mL under the skin into the appropriate area as directed Every 6 (Six) Months., Disp: 1 mL, Rfl: 1    Inclisiran Sodium (Leqvio) 284 MG/1.5ML solution prefilled syringe, Inject 1.5 mL under the skin into the appropriate area as directed Every 6 (Six) Months., Disp: , Rfl:     Klor-Con M20 20 MEQ CR tablet, Take 1 tablet by mouth Daily., Disp: , Rfl:     levocetirizine (XYZAL) 5 MG tablet, Take 1 tablet by mouth Every Night., Disp: , Rfl:     metoprolol succinate XL (TOPROL-XL) 100 MG 24 hr tablet, Take 1 tablet by mouth Every Night., Disp: , Rfl:     montelukast (SINGULAIR) 10 MG tablet, Take 1 tablet by mouth Every Night., Disp: , Rfl:     Multiple Vitamins-Minerals (OCUVITE ADULT 50+ PO), Take 1  tablet by mouth Daily., Disp: , Rfl:     nitroglycerin (NITROSTAT) 0.4 MG SL tablet, Place 1 tablet under the tongue Every 5 (Five) Minutes As Needed for Chest Pain. Take no more than 3 doses in 15 minutes., Disp: 25 tablet, Rfl: 5    ondansetron (Zofran) 4 MG tablet, Take 1 tablet by mouth Every 8 (Eight) Hours As Needed for Nausea or Vomiting., Disp: 15 tablet, Rfl: 0    oxyCODONE (Roxicodone) 5 MG immediate release tablet, Take 1 tablet by mouth Every 8 (Eight) Hours As Needed for Severe Pain., Disp: 10 tablet, Rfl: 0    phenazopyridine (PYRIDIUM) 100 MG tablet, Take 1 tablet by mouth 3 (Three) Times a Day As Needed for Bladder Spasms., Disp: 20 tablet, Rfl: 0    polyethylene glycol (MIRALAX) 17 g packet, Take 17 g by mouth Daily As Needed (constipation)., Disp: 60 each, Rfl: 2    potassium chloride ER (K-TAB) 20 MEQ tablet controlled-release ER tablet, Take 1 tablet by mouth Daily., Disp: , Rfl:     Probiotic Product (PROBIOTIC BLEND PO), Take 1 capsule by mouth Daily., Disp: , Rfl:     rivaroxaban (Xarelto) 20 MG tablet, Take 1 tablet by mouth Daily., Disp: 90 tablet, Rfl: 3    trimethoprim (TRIMPEX) 100 MG tablet, Take 1 tablet by mouth Every Night., Disp: 90 tablet, Rfl: 0    vitamin A 66186 UNIT capsule, Take 1 capsule by mouth Daily., Disp: , Rfl:     Review of Systems    Review of Systems - General ROS: negative  ENT ROS: negative  Respiratory ROS: no cough, shortness of breath, or wheezing  Cardiovascular ROS: no chest pain or dyspnea on exertion  Gastrointestinal ROS: positive for abdominal pain and hernia  Genito-Urinary ROS: no dysuria, trouble voiding, or hematuria  Dermatological ROS: negative   Breast ROS: negative for breast lumps  Hematological and Lymphatic ROS: negative  Musculoskeletal ROS: negative   Neurological ROS: no TIA or stroke symptoms    Psychological ROS: negative  Endocrine ROS: negative    Objective     Vital Signs   /90 (BP Location: Left arm, Patient Position: Sitting, Cuff  "Size: Adult)   Pulse 86   Ht 165.1 cm (65\")   Wt 97.5 kg (215 lb)   SpO2 95%   BMI 35.78 kg/m²      Physical Exam:  General appearance - alert, well appearing, and in no distress  Mental status - alert, oriented to person, place, and time  Eyes - pupils equal and reactive, extraocular eye movements intact  Abdomen - soft, nontender, nondistended, no masses or organomegaly  + incisional hernia at right lower quadrant extraction site.   Neurological - alert, oriented, normal speech, no focal findings or movement disorder noted  Physical Exam  Gastrointestinal: Presence of hernia noted at the site of previous colon resection. Hernia contains loops of bowel. No abnormalities noted on the left side of the abdomen.       Results Review:     The following data was reviewed by: Jovanna Curtis MD on 05/19/2025:  CT Abdomen Pelvis With & Without Contrast (05/01/2025 14:23)     Results         Assessment & Plan       Diagnoses and all orders for this visit:    1. Incisional hernia, without obstruction or gangrene (Primary)  -     Case Request; Standing  -     XR chest 1 vw; Future  -     ECG 12 Lead; Future  -     enoxaparin sodium (LOVENOX) syringe 40 mg  -     CBC & Differential; Future  -     Comprehensive Metabolic Panel; Future  -     ceFAZolin (ANCEF) 2,000 mg in sodium chloride 0.9 % 100 mL IVPB  -     Case Request    2. Class 1 obesity due to excess calories with body mass index (BMI) of 34.0 to 34.9 in adult, unspecified whether serious comorbidity present    3. Chronic anticoagulation    Other orders  -     Follow Anesthesia Guidelines / Protocol; Future  -     Follow Anesthesia Guidelines / Protocol; Standing  -     Verify / Perform Chlorhexidine Skin Prep; Standing  -     Provide NPO Instructions to Patient; Future  -     Chlorhexidine Skin Prep; Future  -     Notify physician (specify); Standing  -     Instructions on coughing, deep breathing, and incentive spirometry.; Standing  -     Oxygen Therapy-; " Standing  -     Place Sequential Compression Device; Standing  -     Maintain Sequential Compression Device; Standing       Assessment & Plan  1. Incisional Hernia.  A hernia is present at the site of the previous colon resection extraction site, involving a couple of loops of bowel. The hernia is causing pain on the right side of the abdomen. A robotic surgical intervention is planned to repair the hernia, involving small incisions and the placement of a mesh. The procedure is expected to last about an hour, with discharge on the same day. Postoperative instructions include avoiding lifting more than 25 pounds for a month and refraining from soaking in water for 2 weeks, although showers are permitted. Risks of surgery discussed include bleeding, infection and damage to surrounding structures as well as hernia recurrence. Discontinue Xarelto 2 days prior to the surgery; baby aspirin can be continued as per the cardiologist's recommendation. The surgery is scheduled for 06/17/2025.     This is a chronic problem with progression (bowel involved). I have reviewed the CT above and ordered the above prework. She is at increased risk of perioperative complications 2/2 her elevated BMI and chronic anticoagulation.             Jovanna Curtis MD  05/19/25  13:18 CDT    Patient or patient representative verbalized consent for the use of Ambient Listening during the visit with  Jovanna Curtis MD for chart documentation. 5/22/2025  16:52 CDT

## 2025-05-19 NOTE — H&P (VIEW-ONLY)
Office Established Patient Note:     Referring Provider: Frances Burrell AP*    Chief Complaint   Patient presents with    Hernia     Bottom left quad with small bowel loops. Dull/aching pain when needing to have bowel movement or constipation        Subjective .     History of present illness:    History of Present Illness  The patient presents for evaluation of a hernia.    She reports experiencing pain in the right lower abdomen, which she initially attributed to constipation. The pain occasionally extends to the left side. The hernia is located at the site of a previous incision where a colon resection was performed. The patient did not notice any bulging but started experiencing pain, which prompted a visit to her urologist.    She is currently on Xarelto, a medication she had previously discontinued for 2 days during her colon resection procedure. Her cardiologist has recommended the addition of baby aspirin to her regimen.    PAST SURGICAL HISTORY:  Colon resection.       History  Past Medical History:   Diagnosis Date    Asthma     Atrial fibrillation     CAD in native artery     Chronic UTI     Diverticulitis     Hyperlipidemia     Hypertension     Kidney stone 05/30/2023    Lateral meniscus tear     right    MI, old    ,   Past Surgical History:   Procedure Laterality Date    ABLATION OF DYSRHYTHMIC FOCUS      BLADDER NECK SUSPENSION      BREAST BIOPSY Bilateral     2002    CARDIOVERSION      CATARACT EXTRACTION WITH INTRAOCULAR LENS IMPLANT Bilateral     CHOLECYSTECTOMY      COLON RESECTION N/A 8/20/2024    Procedure: COLON RESECTION LAPAROSCOPIC SIGMOID WITH DAVINCI ROBOT, INTRA-OPERATIVE FLEXIBLE SIGMOIDOSCOPY, SPLENIC MOBILIZATION.;  Surgeon: Jovanna Curtis MD;  Location: [facility] OR;  Service: Robotics - DaVinci;  Laterality: N/A;    COLONOSCOPY N/A 05/23/2024    Procedure: COLONOSCOPY WITH ANESTHESIA;  Surgeon: Thiago Mensah MD;  Location: [facility] ENDOSCOPY;  Service: Gastroenterology;   Laterality: N/A;  pre: hx polyps  post: polyps. diverticulosis.   mounika schaeffer    COLONOSCOPY W/ BIOPSIES      CORONARY ANGIOPLASTY  08/13/2007    had stents 2003    CORONARY STENT PLACEMENT  2002    EXTRACORPOREAL SHOCK WAVE LITHOTRIPSY (ESWL) Right 06/05/2023    Procedure: EXTRACORPOREAL SHOCKWAVE LITHOTRIPSY-right;  Surgeon: Jamar Hendrickson MD;  Location:  [facility] OR;  Service: Urology;  Laterality: Right;    HYSTERECTOMY      KNEE ARTHROSCOPY Right 11/02/2023    Procedure: RIGHT KNEE ARTHROSCOPIC LATERAL MENISCUS ROOT REPAIR, PARTIAL MEDIAL MENISECTOMY;  Surgeon: Raymond Puentes MD;  Location:  [facility] OR;  Service: Orthopedics;  Laterality: Right;    VEIN SURGERY  1988   ,   Family History   Problem Relation Age of Onset    Dementia Mother     Heart attack Sister     Heart disease Sister     Diverticulitis Sister     Asthma Sister     Brain cancer Father     Heart disease Maternal Grandmother     Heart attack Maternal Grandmother     Stroke Maternal Grandfather     No Known Problems Paternal Grandmother     Brain cancer Paternal Grandfather     Breast cancer Sister     Breast cancer Paternal Aunt     Ovarian cancer Neg Hx     Uterine cancer Neg Hx     Colon cancer Neg Hx    ,   Social History     Tobacco Use    Smoking status: Never    Smokeless tobacco: Never   Vaping Use    Vaping status: Never Used   Substance Use Topics    Alcohol use: Never     Alcohol/week: 3.0 standard drinks of alcohol     Types: 2 Glasses of wine, 1 Shots of liquor per week    Drug use: Never   , (Not in a hospital admission)   and Allergies:  Eliquis [apixaban], Erythromycin, and Sulfa antibiotics    Current Outpatient Medications:     acetaminophen (Tylenol) 325 MG tablet, Take 2 tablets by mouth Every 4 (Four) Hours As Needed for Mild or Moderate Pain., Disp: 100 tablet, Rfl: 2    albuterol sulfate  (90 Base) MCG/ACT inhaler, Inhale 2 puffs Every 6 (Six) Hours As Needed for Wheezing or Shortness of Air., Disp: , Rfl:      amLODIPine (NORVASC) 10 MG tablet, Take 1 tablet by mouth Daily., Disp: , Rfl:     amoxicillin (AMOXIL) 500 MG capsule, Take 1 capsule by mouth 2 (Two) Times a Day for 10 days., Disp: 20 capsule, Rfl: 0    atorvastatin (LIPITOR) 80 MG tablet, Take 1 tablet by mouth Every Night., Disp: , Rfl:     budesonide-formoterol (SYMBICORT) 160-4.5 MCG/ACT inhaler, Inhale 2 puffs 2 (Two) Times a Day., Disp: , Rfl:     Cholecalciferol (VITAMIN D3) 125 MCG (5000 UT) capsule capsule, Take 1 capsule by mouth Daily., Disp: , Rfl:     coenzyme Q10 100 MG capsule, Take 2 capsules by mouth Daily., Disp: , Rfl:     docusate sodium (Colace) 100 MG capsule, Take 1 capsule by mouth 2 (Two) Times a Day., Disp: 60 capsule, Rfl: 1    estradiol (ESTRACE) 0.1 MG/GM vaginal cream, Insert 2 g into the vagina 2 (Two) Times a Week., Disp: 42.5 g, Rfl: 5    fluocinolone acetonide (DERMOTIC) 0.01 % oil otic oil, Administer 4 drops into the left ear 3 (Three) Times a Week., Disp: , Rfl:     folic acid (FOLVITE) 800 MCG tablet, Take 1 tablet by mouth Daily., Disp: , Rfl:     Glucosamine-Chondroit-Vit C-Mn (GLUCOSAMINE 1500 COMPLEX PO), Take 1 capsule by mouth Daily., Disp: , Rfl:     Inclisiran Sodium (Leqvio) 284 MG/1.5ML solution prefilled syringe, Inject 1 mL under the skin into the appropriate area as directed Every 6 (Six) Months., Disp: 1 mL, Rfl: 1    Inclisiran Sodium (Leqvio) 284 MG/1.5ML solution prefilled syringe, Inject 1.5 mL under the skin into the appropriate area as directed Every 6 (Six) Months., Disp: , Rfl:     Klor-Con M20 20 MEQ CR tablet, Take 1 tablet by mouth Daily., Disp: , Rfl:     levocetirizine (XYZAL) 5 MG tablet, Take 1 tablet by mouth Every Night., Disp: , Rfl:     metoprolol succinate XL (TOPROL-XL) 100 MG 24 hr tablet, Take 1 tablet by mouth Every Night., Disp: , Rfl:     montelukast (SINGULAIR) 10 MG tablet, Take 1 tablet by mouth Every Night., Disp: , Rfl:     Multiple Vitamins-Minerals (OCUVITE ADULT 50+ PO), Take 1  tablet by mouth Daily., Disp: , Rfl:     nitroglycerin (NITROSTAT) 0.4 MG SL tablet, Place 1 tablet under the tongue Every 5 (Five) Minutes As Needed for Chest Pain. Take no more than 3 doses in 15 minutes., Disp: 25 tablet, Rfl: 5    ondansetron (Zofran) 4 MG tablet, Take 1 tablet by mouth Every 8 (Eight) Hours As Needed for Nausea or Vomiting., Disp: 15 tablet, Rfl: 0    oxyCODONE (Roxicodone) 5 MG immediate release tablet, Take 1 tablet by mouth Every 8 (Eight) Hours As Needed for Severe Pain., Disp: 10 tablet, Rfl: 0    phenazopyridine (PYRIDIUM) 100 MG tablet, Take 1 tablet by mouth 3 (Three) Times a Day As Needed for Bladder Spasms., Disp: 20 tablet, Rfl: 0    polyethylene glycol (MIRALAX) 17 g packet, Take 17 g by mouth Daily As Needed (constipation)., Disp: 60 each, Rfl: 2    potassium chloride ER (K-TAB) 20 MEQ tablet controlled-release ER tablet, Take 1 tablet by mouth Daily., Disp: , Rfl:     Probiotic Product (PROBIOTIC BLEND PO), Take 1 capsule by mouth Daily., Disp: , Rfl:     rivaroxaban (Xarelto) 20 MG tablet, Take 1 tablet by mouth Daily., Disp: 90 tablet, Rfl: 3    trimethoprim (TRIMPEX) 100 MG tablet, Take 1 tablet by mouth Every Night., Disp: 90 tablet, Rfl: 0    vitamin A 45197 UNIT capsule, Take 1 capsule by mouth Daily., Disp: , Rfl:     Review of Systems    Review of Systems - General ROS: negative  ENT ROS: negative  Respiratory ROS: no cough, shortness of breath, or wheezing  Cardiovascular ROS: no chest pain or dyspnea on exertion  Gastrointestinal ROS: positive for abdominal pain and hernia  Genito-Urinary ROS: no dysuria, trouble voiding, or hematuria  Dermatological ROS: negative   Breast ROS: negative for breast lumps  Hematological and Lymphatic ROS: negative  Musculoskeletal ROS: negative   Neurological ROS: no TIA or stroke symptoms    Psychological ROS: negative  Endocrine ROS: negative    Objective     Vital Signs   /90 (BP Location: Left arm, Patient Position: Sitting, Cuff  "Size: Adult)   Pulse 86   Ht 165.1 cm (65\")   Wt 97.5 kg (215 lb)   SpO2 95%   BMI 35.78 kg/m²      Physical Exam:  General appearance - alert, well appearing, and in no distress  Mental status - alert, oriented to person, place, and time  Eyes - pupils equal and reactive, extraocular eye movements intact  Abdomen - soft, nontender, nondistended, no masses or organomegaly  + incisional hernia at right lower quadrant extraction site.   Neurological - alert, oriented, normal speech, no focal findings or movement disorder noted  Physical Exam  Gastrointestinal: Presence of hernia noted at the site of previous colon resection. Hernia contains loops of bowel. No abnormalities noted on the left side of the abdomen.       Results Review:     The following data was reviewed by: Jovanna Curtis MD on 05/19/2025:  CT Abdomen Pelvis With & Without Contrast (05/01/2025 14:23)     Results         Assessment & Plan       Diagnoses and all orders for this visit:    1. Incisional hernia, without obstruction or gangrene (Primary)  -     Case Request; Standing  -     XR chest 1 vw; Future  -     ECG 12 Lead; Future  -     enoxaparin sodium (LOVENOX) syringe 40 mg  -     CBC & Differential; Future  -     Comprehensive Metabolic Panel; Future  -     ceFAZolin (ANCEF) 2,000 mg in sodium chloride 0.9 % 100 mL IVPB  -     Case Request    2. Class 1 obesity due to excess calories with body mass index (BMI) of 34.0 to 34.9 in adult, unspecified whether serious comorbidity present    3. Chronic anticoagulation    Other orders  -     Follow Anesthesia Guidelines / Protocol; Future  -     Follow Anesthesia Guidelines / Protocol; Standing  -     Verify / Perform Chlorhexidine Skin Prep; Standing  -     Provide NPO Instructions to Patient; Future  -     Chlorhexidine Skin Prep; Future  -     Notify physician (specify); Standing  -     Instructions on coughing, deep breathing, and incentive spirometry.; Standing  -     Oxygen Therapy-; " Standing  -     Place Sequential Compression Device; Standing  -     Maintain Sequential Compression Device; Standing       Assessment & Plan  1. Incisional Hernia.  A hernia is present at the site of the previous colon resection extraction site, involving a couple of loops of bowel. The hernia is causing pain on the right side of the abdomen. A robotic surgical intervention is planned to repair the hernia, involving small incisions and the placement of a mesh. The procedure is expected to last about an hour, with discharge on the same day. Postoperative instructions include avoiding lifting more than 25 pounds for a month and refraining from soaking in water for 2 weeks, although showers are permitted. Risks of surgery discussed include bleeding, infection and damage to surrounding structures as well as hernia recurrence. Discontinue Xarelto 2 days prior to the surgery; baby aspirin can be continued as per the cardiologist's recommendation. The surgery is scheduled for 06/17/2025.     This is a chronic problem with progression (bowel involved). I have reviewed the CT above and ordered the above prework. She is at increased risk of perioperative complications 2/2 her elevated BMI and chronic anticoagulation.             Jovanna Curtis MD  05/19/25  13:18 CDT    Patient or patient representative verbalized consent for the use of Ambient Listening during the visit with  Jovanna Curtis MD for chart documentation. 5/22/2025  16:52 CDT

## 2025-05-22 ENCOUNTER — OFFICE VISIT (OUTPATIENT)
Age: 75
End: 2025-05-22

## 2025-05-22 VITALS — WEIGHT: 213 LBS | BODY MASS INDEX: 36.37 KG/M2 | HEIGHT: 64 IN

## 2025-05-22 DIAGNOSIS — M17.0 BILATERAL PRIMARY OSTEOARTHRITIS OF KNEE: Primary | ICD-10-CM

## 2025-05-22 RX ORDER — BUPIVACAINE HYDROCHLORIDE 5 MG/ML
3 INJECTION, SOLUTION EPIDURAL; INTRACAUDAL; PERINEURAL ONCE
Status: COMPLETED | OUTPATIENT
Start: 2025-05-22 | End: 2025-05-22

## 2025-05-22 RX ORDER — BETAMETHASONE SODIUM PHOSPHATE AND BETAMETHASONE ACETATE 3; 3 MG/ML; MG/ML
6 INJECTION, SUSPENSION INTRA-ARTICULAR; INTRALESIONAL; INTRAMUSCULAR; SOFT TISSUE ONCE
Status: COMPLETED | OUTPATIENT
Start: 2025-05-22 | End: 2025-05-22

## 2025-05-22 RX ORDER — LIDOCAINE HYDROCHLORIDE 10 MG/ML
1 INJECTION, SOLUTION INFILTRATION; PERINEURAL ONCE
Status: COMPLETED | OUTPATIENT
Start: 2025-05-22 | End: 2025-05-22

## 2025-05-22 RX ADMIN — BUPIVACAINE HYDROCHLORIDE 15 MG: 5 INJECTION, SOLUTION EPIDURAL; INTRACAUDAL; PERINEURAL at 12:55

## 2025-05-22 RX ADMIN — LIDOCAINE HYDROCHLORIDE 1 ML: 10 INJECTION, SOLUTION INFILTRATION; PERINEURAL at 12:54

## 2025-05-22 RX ADMIN — BUPIVACAINE HYDROCHLORIDE 15 MG: 5 INJECTION, SOLUTION EPIDURAL; INTRACAUDAL; PERINEURAL at 13:13

## 2025-05-22 RX ADMIN — BETAMETHASONE SODIUM PHOSPHATE AND BETAMETHASONE ACETATE 6 MG: 3; 3 INJECTION, SUSPENSION INTRA-ARTICULAR; INTRALESIONAL; INTRAMUSCULAR; SOFT TISSUE at 13:14

## 2025-05-22 RX ADMIN — LIDOCAINE HYDROCHLORIDE 1 ML: 10 INJECTION, SOLUTION INFILTRATION; PERINEURAL at 12:51

## 2025-05-22 NOTE — PATIENT INSTRUCTIONS

## 2025-05-22 NOTE — PROGRESS NOTES
Allergies: Apixaban, Erythromycin, and Sulfa antibiotics    System Neg/Pos Details   Constitutional negative   Fatigue and Fever.   Respiratory negative   Cough and Dyspnea.   Cardio negative   Chest pain.   GI negative   Abdominal pain and Vomiting.    negative   Urinary incontinence.   Neuro negative   Headache.   Psych negative   Psychiatric symptoms.       PHYSICAL EXAM:    Ht 1.626 m (5' 4\")   Wt 96.6 kg (213 lb)   BMI 36.56 kg/m²     General:  Appears stated age, no distress.  Orientation:  Alert and oriented to time, place, and person.  Mood and Affect:  Cooperative and pleasant.    Musculoskeletal:  Looking at her knees bilaterally, no obvious effusion, erythema or palpable warmth.    Radiology:   CT Abdomen Pelvis With & Without Contrast  Result Date: 5/1/2025  1.  Bilateral nonobstructing renal calculi measuring up to 4 mm. No ureteral stone or hydronephrosis. 2.  No solid renal mass or evidence of urothelial lesion. 3 mm simple cyst in the left kidney. 3.  Right anterolateral abdominal wall wide necked hernia containing multiple small bowel loops. This report was signed and finalized on 5/1/2025 2:37 PM by Dr. Geovani Basurto MD.       MRI Result (most recent):  No results found for this or any previous visit from the past 3650 days.       --------------------------------------------------------------------------------------------------------------------    Assessment:   Chronic mild knee pain  Primary osteoarthritis bilateral knees    Plan:    Being that she has an upcoming abdominal surgery we will hold off on any type of total knee replacement.  Patient will undergo bilateral knee injections today.  Will see her back in 3 months for discussion of total knee replacement that time.    Greater than 20 minutes were spent with this encounter. Time spent included evaluating the patient's chart prior to arrival.  Evaluating the patient in the office including history, physical examination, imaging

## 2025-06-03 ENCOUNTER — TRANSCRIBE ORDERS (OUTPATIENT)
Dept: ADMINISTRATIVE | Facility: HOSPITAL | Age: 75
End: 2025-06-03
Payer: MEDICARE

## 2025-06-03 DIAGNOSIS — E11.65 TYPE 2 DIABETES MELLITUS WITH HYPERGLYCEMIA, UNSPECIFIED WHETHER LONG TERM INSULIN USE: Primary | ICD-10-CM

## 2025-06-05 ENCOUNTER — OFFICE VISIT (OUTPATIENT)
Dept: UROLOGY | Facility: CLINIC | Age: 75
End: 2025-06-05
Payer: MEDICARE

## 2025-06-05 VITALS — WEIGHT: 214.6 LBS | BODY MASS INDEX: 36.64 KG/M2 | HEIGHT: 64 IN | TEMPERATURE: 96.1 F

## 2025-06-05 DIAGNOSIS — N39.0 RECURRENT UTI: Primary | ICD-10-CM

## 2025-06-05 RX ORDER — TRIMETHOPRIM 100 MG/1
100 TABLET ORAL NIGHTLY
Qty: 90 TABLET | Refills: 0 | Status: SHIPPED | OUTPATIENT
Start: 2025-06-05

## 2025-06-10 ENCOUNTER — HOSPITAL ENCOUNTER (OUTPATIENT)
Dept: GENERAL RADIOLOGY | Facility: HOSPITAL | Age: 75
Discharge: HOME | End: 2025-06-10
Payer: MEDICARE

## 2025-06-10 ENCOUNTER — PRE-ADMISSION TESTING (OUTPATIENT)
Dept: PREADMISSION TESTING | Facility: HOSPITAL | Age: 75
End: 2025-06-10
Payer: MEDICARE

## 2025-06-10 VITALS
DIASTOLIC BLOOD PRESSURE: 64 MMHG | OXYGEN SATURATION: 97 % | BODY MASS INDEX: 36.89 KG/M2 | HEART RATE: 75 BPM | WEIGHT: 216.05 LBS | HEIGHT: 64 IN | SYSTOLIC BLOOD PRESSURE: 142 MMHG | RESPIRATION RATE: 16 BRPM

## 2025-06-10 DIAGNOSIS — K43.2 INCISIONAL HERNIA, WITHOUT OBSTRUCTION OR GANGRENE: ICD-10-CM

## 2025-06-10 LAB
ALBUMIN SERPL-MCNC: 4.2 G/DL (ref 3.5–5.2)
ALBUMIN/GLOB SERPL: 1.7 G/DL
ALP SERPL-CCNC: 143 U/L (ref 39–117)
ALT SERPL W P-5'-P-CCNC: 12 U/L (ref 1–33)
ANION GAP SERPL CALCULATED.3IONS-SCNC: 10 MMOL/L (ref 5–15)
AST SERPL-CCNC: 16 U/L (ref 1–32)
BASOPHILS # BLD AUTO: 0.02 10*3/MM3 (ref 0–0.2)
BASOPHILS NFR BLD AUTO: 0.3 % (ref 0–1.5)
BILIRUB SERPL-MCNC: 1.2 MG/DL (ref 0–1.2)
BUN SERPL-MCNC: 21.5 MG/DL (ref 8–23)
BUN/CREAT SERPL: 28.3 (ref 7–25)
CALCIUM SPEC-SCNC: 9.4 MG/DL (ref 8.6–10.5)
CHLORIDE SERPL-SCNC: 104 MMOL/L (ref 98–107)
CO2 SERPL-SCNC: 27 MMOL/L (ref 22–29)
CREAT SERPL-MCNC: 0.76 MG/DL (ref 0.57–1)
DEPRECATED RDW RBC AUTO: 47.5 FL (ref 37–54)
EGFRCR SERPLBLD CKD-EPI 2021: 82.3 ML/MIN/1.73
EOSINOPHIL # BLD AUTO: 0.26 10*3/MM3 (ref 0–0.4)
EOSINOPHIL NFR BLD AUTO: 3.5 % (ref 0.3–6.2)
ERYTHROCYTE [DISTWIDTH] IN BLOOD BY AUTOMATED COUNT: 14.7 % (ref 12.3–15.4)
GLOBULIN UR ELPH-MCNC: 2.5 GM/DL
GLUCOSE SERPL-MCNC: 92 MG/DL (ref 65–99)
HCT VFR BLD AUTO: 44.9 % (ref 34–46.6)
HGB BLD-MCNC: 13.9 G/DL (ref 12–15.9)
IMM GRANULOCYTES # BLD AUTO: 0.02 10*3/MM3 (ref 0–0.05)
IMM GRANULOCYTES NFR BLD AUTO: 0.3 % (ref 0–0.5)
LYMPHOCYTES # BLD AUTO: 1.37 10*3/MM3 (ref 0.7–3.1)
LYMPHOCYTES NFR BLD AUTO: 18.4 % (ref 19.6–45.3)
MCH RBC QN AUTO: 27.2 PG (ref 26.6–33)
MCHC RBC AUTO-ENTMCNC: 31 G/DL (ref 31.5–35.7)
MCV RBC AUTO: 87.9 FL (ref 79–97)
MONOCYTES # BLD AUTO: 0.74 10*3/MM3 (ref 0.1–0.9)
MONOCYTES NFR BLD AUTO: 9.9 % (ref 5–12)
NEUTROPHILS NFR BLD AUTO: 5.05 10*3/MM3 (ref 1.7–7)
NEUTROPHILS NFR BLD AUTO: 67.6 % (ref 42.7–76)
NRBC BLD AUTO-RTO: 0 /100 WBC (ref 0–0.2)
PLATELET # BLD AUTO: 189 10*3/MM3 (ref 140–450)
PMV BLD AUTO: 11.4 FL (ref 6–12)
POTASSIUM SERPL-SCNC: 4.8 MMOL/L (ref 3.5–5.2)
PROT SERPL-MCNC: 6.7 G/DL (ref 6–8.5)
RBC # BLD AUTO: 5.11 10*6/MM3 (ref 3.77–5.28)
SODIUM SERPL-SCNC: 141 MMOL/L (ref 136–145)
WBC NRBC COR # BLD AUTO: 7.46 10*3/MM3 (ref 3.4–10.8)

## 2025-06-10 PROCEDURE — 93005 ELECTROCARDIOGRAM TRACING: CPT

## 2025-06-10 PROCEDURE — 80053 COMPREHEN METABOLIC PANEL: CPT

## 2025-06-10 PROCEDURE — 85025 COMPLETE CBC W/AUTO DIFF WBC: CPT

## 2025-06-10 PROCEDURE — 71045 X-RAY EXAM CHEST 1 VIEW: CPT

## 2025-06-10 PROCEDURE — 36415 COLL VENOUS BLD VENIPUNCTURE: CPT

## 2025-06-10 RX ORDER — ANTIOX #8/OM3/DHA/EPA/LUT/ZEAX 250-2.5 MG
1 CAPSULE ORAL 2 TIMES DAILY
COMMUNITY

## 2025-06-10 NOTE — DISCHARGE INSTRUCTIONS

## 2025-06-11 LAB
QT INTERVAL: 374 MS
QTC INTERVAL: 415 MS

## 2025-06-16 ENCOUNTER — TELEPHONE (OUTPATIENT)
Dept: SURGERY | Facility: CLINIC | Age: 75
End: 2025-06-16
Payer: MEDICARE

## 2025-06-16 NOTE — TELEPHONE ENCOUNTER
Called Christiana to confirm her surgery date 06/17/25 with an arrival time of 5.   Reminded her not to eat or drink after midnight.   Let her know to come through the main entrance of the hospital and check in at main registration.     CBC  06/16    Patient confirmed

## 2025-06-17 ENCOUNTER — ANESTHESIA (OUTPATIENT)
Dept: PERIOP | Facility: HOSPITAL | Age: 75
End: 2025-06-17
Payer: MEDICARE

## 2025-06-17 ENCOUNTER — HOSPITAL ENCOUNTER (INPATIENT)
Facility: HOSPITAL | Age: 75
LOS: 1 days | Discharge: HOME | End: 2025-06-17
Attending: STUDENT IN AN ORGANIZED HEALTH CARE EDUCATION/TRAINING PROGRAM | Admitting: STUDENT IN AN ORGANIZED HEALTH CARE EDUCATION/TRAINING PROGRAM
Payer: MEDICARE

## 2025-06-17 ENCOUNTER — ANESTHESIA EVENT (OUTPATIENT)
Dept: PERIOP | Facility: HOSPITAL | Age: 75
End: 2025-06-17
Payer: MEDICARE

## 2025-06-17 VITALS
TEMPERATURE: 97.8 F | OXYGEN SATURATION: 94 % | SYSTOLIC BLOOD PRESSURE: 151 MMHG | DIASTOLIC BLOOD PRESSURE: 76 MMHG | HEART RATE: 82 BPM | RESPIRATION RATE: 18 BRPM

## 2025-06-17 DIAGNOSIS — K43.2 INCISIONAL HERNIA, WITHOUT OBSTRUCTION OR GANGRENE: ICD-10-CM

## 2025-06-17 PROCEDURE — 25010000002 LIDOCAINE 1 % SOLUTION 20 ML VIAL: Performed by: STUDENT IN AN ORGANIZED HEALTH CARE EDUCATION/TRAINING PROGRAM

## 2025-06-17 PROCEDURE — 25010000002 BUPIVACAINE 0.5 % SOLUTION 50 ML VIAL: Performed by: STUDENT IN AN ORGANIZED HEALTH CARE EDUCATION/TRAINING PROGRAM

## 2025-06-17 PROCEDURE — S2900 ROBOTIC SURGICAL SYSTEM: HCPCS | Performed by: STUDENT IN AN ORGANIZED HEALTH CARE EDUCATION/TRAINING PROGRAM

## 2025-06-17 PROCEDURE — 25010000002 DEXAMETHASONE PER 1 MG: Performed by: STUDENT IN AN ORGANIZED HEALTH CARE EDUCATION/TRAINING PROGRAM

## 2025-06-17 PROCEDURE — 25010000002 FENTANYL CITRATE (PF) 100 MCG/2ML SOLUTION

## 2025-06-17 PROCEDURE — 25010000002 PROPOFOL 10 MG/ML EMULSION

## 2025-06-17 PROCEDURE — 8E0W4CZ ROBOTIC ASSISTED PROCEDURE OF TRUNK REGION, PERCUTANEOUS ENDOSCOPIC APPROACH: ICD-10-PCS | Performed by: STUDENT IN AN ORGANIZED HEALTH CARE EDUCATION/TRAINING PROGRAM

## 2025-06-17 PROCEDURE — 25010000002 LIDOCAINE PF 2% 2 % SOLUTION

## 2025-06-17 PROCEDURE — 25810000003 LACTATED RINGERS PER 1000 ML: Performed by: STUDENT IN AN ORGANIZED HEALTH CARE EDUCATION/TRAINING PROGRAM

## 2025-06-17 PROCEDURE — 0WUF4JZ SUPPLEMENT ABDOMINAL WALL WITH SYNTHETIC SUBSTITUTE, PERCUTANEOUS ENDOSCOPIC APPROACH: ICD-10-PCS | Performed by: STUDENT IN AN ORGANIZED HEALTH CARE EDUCATION/TRAINING PROGRAM

## 2025-06-17 PROCEDURE — 25010000002 DEXAMETHASONE PER 1 MG

## 2025-06-17 PROCEDURE — 25010000002 FENTANYL CITRATE (PF) 50 MCG/ML SOLUTION: Performed by: ANESTHESIOLOGY

## 2025-06-17 PROCEDURE — 49594 RPR AA HRN 1ST 3-10 NCR/STRN: CPT | Performed by: STUDENT IN AN ORGANIZED HEALTH CARE EDUCATION/TRAINING PROGRAM

## 2025-06-17 PROCEDURE — 25010000002 ENOXAPARIN PER 10 MG: Performed by: STUDENT IN AN ORGANIZED HEALTH CARE EDUCATION/TRAINING PROGRAM

## 2025-06-17 PROCEDURE — 25010000002 CEFAZOLIN PER 500 MG: Performed by: STUDENT IN AN ORGANIZED HEALTH CARE EDUCATION/TRAINING PROGRAM

## 2025-06-17 PROCEDURE — 25010000002 BUPIVACAINE (PF) 0.25 % SOLUTION 30 ML VIAL: Performed by: STUDENT IN AN ORGANIZED HEALTH CARE EDUCATION/TRAINING PROGRAM

## 2025-06-17 PROCEDURE — 25010000002 ONDANSETRON PER 1 MG

## 2025-06-17 PROCEDURE — C1781 MESH (IMPLANTABLE): HCPCS | Performed by: STUDENT IN AN ORGANIZED HEALTH CARE EDUCATION/TRAINING PROGRAM

## 2025-06-17 PROCEDURE — 25010000002 SUGAMMADEX 200 MG/2ML SOLUTION

## 2025-06-17 PROCEDURE — 25010000002 ONDANSETRON PER 1 MG: Performed by: ANESTHESIOLOGY

## 2025-06-17 PROCEDURE — 25010000002 LIDOCAINE 1% - EPINEPHRINE 1:100000 1 %-1:100000 SOLUTION 20 ML VIAL: Performed by: STUDENT IN AN ORGANIZED HEALTH CARE EDUCATION/TRAINING PROGRAM

## 2025-06-17 PROCEDURE — 25010000002 MORPHINE SULFATE (PF) 2 MG/ML SOLUTION 1 ML CARTRIDGE: Performed by: STUDENT IN AN ORGANIZED HEALTH CARE EDUCATION/TRAINING PROGRAM

## 2025-06-17 DEVICE — DEV WND/CLS CONTRL TISS STRATAFIX SYMM PDS PLS SZ0 45CM VIL: Type: IMPLANTABLE DEVICE | Site: ABDOMEN | Status: FUNCTIONAL

## 2025-06-17 DEVICE — VENTRALIGHT ST MESH, 4" X 6" (10.2 CM X 15.2 CM), ELLIPSE
Type: IMPLANTABLE DEVICE | Site: ABDOMEN | Status: FUNCTIONAL
Brand: VENTRALIGHT

## 2025-06-17 RX ORDER — SODIUM CHLORIDE 0.9 % (FLUSH) 0.9 %
3 SYRINGE (ML) INJECTION AS NEEDED
Status: DISCONTINUED | OUTPATIENT
Start: 2025-06-17 | End: 2025-06-17 | Stop reason: HOSPADM

## 2025-06-17 RX ORDER — FENTANYL CITRATE 50 UG/ML
50 INJECTION, SOLUTION INTRAMUSCULAR; INTRAVENOUS
Status: DISCONTINUED | OUTPATIENT
Start: 2025-06-17 | End: 2025-06-17 | Stop reason: HOSPADM

## 2025-06-17 RX ORDER — SODIUM CHLORIDE, SODIUM LACTATE, POTASSIUM CHLORIDE, CALCIUM CHLORIDE 600; 310; 30; 20 MG/100ML; MG/100ML; MG/100ML; MG/100ML
1000 INJECTION, SOLUTION INTRAVENOUS CONTINUOUS
Status: DISCONTINUED | OUTPATIENT
Start: 2025-06-17 | End: 2025-06-17 | Stop reason: HOSPADM

## 2025-06-17 RX ORDER — ONDANSETRON 2 MG/ML
INJECTION INTRAMUSCULAR; INTRAVENOUS AS NEEDED
Status: DISCONTINUED | OUTPATIENT
Start: 2025-06-17 | End: 2025-06-17 | Stop reason: SURG

## 2025-06-17 RX ORDER — FLUMAZENIL 0.1 MG/ML
0.2 INJECTION INTRAVENOUS AS NEEDED
Status: DISCONTINUED | OUTPATIENT
Start: 2025-06-17 | End: 2025-06-17 | Stop reason: HOSPADM

## 2025-06-17 RX ORDER — ONDANSETRON 4 MG/1
4 TABLET, FILM COATED ORAL EVERY 8 HOURS PRN
Qty: 15 TABLET | Refills: 0 | Status: SHIPPED | OUTPATIENT
Start: 2025-06-17 | End: 2026-06-17

## 2025-06-17 RX ORDER — SODIUM CHLORIDE 0.9 % (FLUSH) 0.9 %
3 SYRINGE (ML) INJECTION EVERY 12 HOURS SCHEDULED
Status: DISCONTINUED | OUTPATIENT
Start: 2025-06-17 | End: 2025-06-17 | Stop reason: HOSPADM

## 2025-06-17 RX ORDER — ACETAMINOPHEN 325 MG/1
975 TABLET ORAL EVERY 8 HOURS
Start: 2025-06-17 | End: 2026-06-17

## 2025-06-17 RX ORDER — IBUPROFEN 600 MG/1
600 TABLET, FILM COATED ORAL EVERY 6 HOURS PRN
Status: DISCONTINUED | OUTPATIENT
Start: 2025-06-17 | End: 2025-06-17 | Stop reason: HOSPADM

## 2025-06-17 RX ORDER — DEXAMETHASONE SODIUM PHOSPHATE 4 MG/ML
INJECTION, SOLUTION INTRA-ARTICULAR; INTRALESIONAL; INTRAMUSCULAR; INTRAVENOUS; SOFT TISSUE AS NEEDED
Status: DISCONTINUED | OUTPATIENT
Start: 2025-06-17 | End: 2025-06-17 | Stop reason: SURG

## 2025-06-17 RX ORDER — SODIUM CHLORIDE 0.9 % (FLUSH) 0.9 %
10 SYRINGE (ML) INJECTION AS NEEDED
Status: DISCONTINUED | OUTPATIENT
Start: 2025-06-17 | End: 2025-06-17 | Stop reason: HOSPADM

## 2025-06-17 RX ORDER — ONDANSETRON 2 MG/ML
4 INJECTION INTRAMUSCULAR; INTRAVENOUS ONCE AS NEEDED
Status: COMPLETED | OUTPATIENT
Start: 2025-06-17 | End: 2025-06-17

## 2025-06-17 RX ORDER — HYDROCODONE BITARTRATE AND ACETAMINOPHEN 10; 325 MG/1; MG/1
1 TABLET ORAL EVERY 4 HOURS PRN
Status: DISCONTINUED | OUTPATIENT
Start: 2025-06-17 | End: 2025-06-17 | Stop reason: HOSPADM

## 2025-06-17 RX ORDER — FENTANYL CITRATE 50 UG/ML
INJECTION, SOLUTION INTRAMUSCULAR; INTRAVENOUS AS NEEDED
Status: DISCONTINUED | OUTPATIENT
Start: 2025-06-17 | End: 2025-06-17 | Stop reason: SURG

## 2025-06-17 RX ORDER — ROCURONIUM BROMIDE 10 MG/ML
INJECTION, SOLUTION INTRAVENOUS AS NEEDED
Status: DISCONTINUED | OUTPATIENT
Start: 2025-06-17 | End: 2025-06-17 | Stop reason: SURG

## 2025-06-17 RX ORDER — HYDROCODONE BITARTRATE AND ACETAMINOPHEN 5; 325 MG/1; MG/1
1 TABLET ORAL EVERY 4 HOURS PRN
Status: DISCONTINUED | OUTPATIENT
Start: 2025-06-17 | End: 2025-06-17 | Stop reason: HOSPADM

## 2025-06-17 RX ORDER — PHENYLEPHRINE HCL IN 0.9% NACL 1 MG/10 ML
SYRINGE (ML) INTRAVENOUS AS NEEDED
Status: DISCONTINUED | OUTPATIENT
Start: 2025-06-17 | End: 2025-06-17 | Stop reason: SURG

## 2025-06-17 RX ORDER — LIDOCAINE HYDROCHLORIDE 20 MG/ML
INJECTION, SOLUTION EPIDURAL; INFILTRATION; INTRACAUDAL; PERINEURAL AS NEEDED
Status: DISCONTINUED | OUTPATIENT
Start: 2025-06-17 | End: 2025-06-17 | Stop reason: SURG

## 2025-06-17 RX ORDER — PROPOFOL 10 MG/ML
VIAL (ML) INTRAVENOUS AS NEEDED
Status: DISCONTINUED | OUTPATIENT
Start: 2025-06-17 | End: 2025-06-17 | Stop reason: SURG

## 2025-06-17 RX ORDER — SODIUM CHLORIDE 0.9 % (FLUSH) 0.9 %
3-10 SYRINGE (ML) INJECTION AS NEEDED
Status: DISCONTINUED | OUTPATIENT
Start: 2025-06-17 | End: 2025-06-17 | Stop reason: HOSPADM

## 2025-06-17 RX ORDER — ENOXAPARIN SODIUM 100 MG/ML
40 INJECTION SUBCUTANEOUS DAILY
Status: DISCONTINUED | OUTPATIENT
Start: 2025-06-17 | End: 2025-06-17 | Stop reason: HOSPADM

## 2025-06-17 RX ORDER — MAGNESIUM HYDROXIDE 1200 MG/15ML
LIQUID ORAL AS NEEDED
Status: DISCONTINUED | OUTPATIENT
Start: 2025-06-17 | End: 2025-06-17 | Stop reason: HOSPADM

## 2025-06-17 RX ORDER — DOCUSATE SODIUM 100 MG/1
100 CAPSULE, LIQUID FILLED ORAL 2 TIMES DAILY
Qty: 60 CAPSULE | Refills: 0 | Status: SHIPPED | OUTPATIENT
Start: 2025-06-17 | End: 2025-07-17

## 2025-06-17 RX ORDER — SODIUM CHLORIDE, SODIUM LACTATE, POTASSIUM CHLORIDE, CALCIUM CHLORIDE 600; 310; 30; 20 MG/100ML; MG/100ML; MG/100ML; MG/100ML
100 INJECTION, SOLUTION INTRAVENOUS CONTINUOUS
Status: DISCONTINUED | OUTPATIENT
Start: 2025-06-17 | End: 2025-06-17 | Stop reason: HOSPADM

## 2025-06-17 RX ORDER — MIDAZOLAM HYDROCHLORIDE 2 MG/2ML
0.5 INJECTION, SOLUTION INTRAMUSCULAR; INTRAVENOUS
Status: DISCONTINUED | OUTPATIENT
Start: 2025-06-17 | End: 2025-06-17 | Stop reason: HOSPADM

## 2025-06-17 RX ORDER — NALOXONE HCL 0.4 MG/ML
0.4 VIAL (ML) INJECTION AS NEEDED
Status: DISCONTINUED | OUTPATIENT
Start: 2025-06-17 | End: 2025-06-17 | Stop reason: HOSPADM

## 2025-06-17 RX ORDER — IBUPROFEN 200 MG
600 TABLET ORAL EVERY 8 HOURS
Start: 2025-06-17 | End: 2026-06-17

## 2025-06-17 RX ORDER — POLYETHYLENE GLYCOL 3350 17 G/17G
17 POWDER, FOR SOLUTION ORAL DAILY
Qty: 14 PACKET | Refills: 0 | Status: SHIPPED | OUTPATIENT
Start: 2025-06-17 | End: 2025-07-01

## 2025-06-17 RX ORDER — SODIUM CHLORIDE 9 MG/ML
40 INJECTION, SOLUTION INTRAVENOUS AS NEEDED
Status: DISCONTINUED | OUTPATIENT
Start: 2025-06-17 | End: 2025-06-17 | Stop reason: HOSPADM

## 2025-06-17 RX ORDER — LABETALOL HYDROCHLORIDE 5 MG/ML
5 INJECTION, SOLUTION INTRAVENOUS
Status: DISCONTINUED | OUTPATIENT
Start: 2025-06-17 | End: 2025-06-17 | Stop reason: HOSPADM

## 2025-06-17 RX ORDER — ACETAMINOPHEN 500 MG
1000 TABLET ORAL ONCE
Status: COMPLETED | OUTPATIENT
Start: 2025-06-17 | End: 2025-06-17

## 2025-06-17 RX ORDER — ASPIRIN 81 MG/1
81 TABLET ORAL DAILY
COMMUNITY
Start: 2025-06-14 | End: 2025-06-17

## 2025-06-17 RX ORDER — OXYCODONE HYDROCHLORIDE 5 MG/1
5 TABLET ORAL EVERY 8 HOURS PRN
Qty: 10 TABLET | Refills: 0 | Status: SHIPPED | OUTPATIENT
Start: 2025-06-17 | End: 2026-06-17

## 2025-06-17 RX ADMIN — HYDROCODONE BITARTRATE AND ACETAMINOPHEN 1 TABLET: 10; 325 TABLET ORAL at 08:25

## 2025-06-17 RX ADMIN — Medication 100 MCG: at 07:22

## 2025-06-17 RX ADMIN — ONDANSETRON 4 MG: 2 INJECTION INTRAMUSCULAR; INTRAVENOUS at 08:00

## 2025-06-17 RX ADMIN — PROPOFOL 200 MG: 10 INJECTION, EMULSION INTRAVENOUS at 07:04

## 2025-06-17 RX ADMIN — ENOXAPARIN SODIUM 40 MG: 100 INJECTION SUBCUTANEOUS at 06:21

## 2025-06-17 RX ADMIN — SUGAMMADEX 200 MG: 100 INJECTION, SOLUTION INTRAVENOUS at 08:04

## 2025-06-17 RX ADMIN — DEXAMETHASONE SODIUM PHOSPHATE 4 MG: 4 INJECTION, SOLUTION INTRA-ARTICULAR; INTRALESIONAL; INTRAMUSCULAR; INTRAVENOUS; SOFT TISSUE at 07:23

## 2025-06-17 RX ADMIN — ONDANSETRON 4 MG: 2 INJECTION INTRAMUSCULAR; INTRAVENOUS at 08:29

## 2025-06-17 RX ADMIN — ACETAMINOPHEN 1000 MG: 500 TABLET, FILM COATED ORAL at 06:40

## 2025-06-17 RX ADMIN — SODIUM CHLORIDE, POTASSIUM CHLORIDE, SODIUM LACTATE AND CALCIUM CHLORIDE 1000 ML: 600; 310; 30; 20 INJECTION, SOLUTION INTRAVENOUS at 06:10

## 2025-06-17 RX ADMIN — CEFAZOLIN 2000 MG: 2 INJECTION, POWDER, FOR SOLUTION INTRAMUSCULAR; INTRAVENOUS at 07:12

## 2025-06-17 RX ADMIN — ROCURONIUM BROMIDE 50 MG: 10 INJECTION, SOLUTION INTRAVENOUS at 07:04

## 2025-06-17 RX ADMIN — FENTANYL CITRATE 50 MCG: 50 INJECTION, SOLUTION INTRAMUSCULAR; INTRAVENOUS at 07:04

## 2025-06-17 RX ADMIN — FENTANYL CITRATE 50 MCG: 50 INJECTION, SOLUTION INTRAMUSCULAR; INTRAVENOUS at 08:04

## 2025-06-17 RX ADMIN — FENTANYL CITRATE 50 MCG: 50 INJECTION, SOLUTION INTRAMUSCULAR; INTRAVENOUS at 08:27

## 2025-06-17 RX ADMIN — LIDOCAINE HYDROCHLORIDE 80 MG: 20 INJECTION, SOLUTION EPIDURAL; INFILTRATION; INTRACAUDAL; PERINEURAL at 07:04

## 2025-06-17 NOTE — ANESTHESIA PROCEDURE NOTES
Airway  Date/Time: 6/17/2025 7:09 AM  Airway not difficult    General Information and Staff    Patient location during procedure: OR  CRNA/CAA: Hermelinda Hendrickson CRNA    Indications and Patient Condition  Indications for airway management: airway protection    Preoxygenated: yes    Mask difficulty assessment: 1 - vent by mask    Final Airway Details    Final airway type: endotracheal airway      Successful airway: ETT  Cuffed: yes   Successful intubation technique: video laryngoscopy  Adjuncts used in placement: intubating stylet  Endotracheal tube insertion site: oral  Blade: Fried  Blade size: 3  ETT size (mm): 7.0  Cormack-Lehane Classification: grade IIa - partial view of glottis  Placement verified by: chest auscultation and capnometry   Cuff volume (mL): 7  Measured from: teeth  ETT/EBT  to teeth (cm): 21  Number of attempts at approach: 1  Assessment: lips, teeth, and gum same as pre-op and atraumatic intubation    Additional Comments  ETT placed by KASH Prabhakar

## 2025-06-17 NOTE — ANESTHESIA POSTPROCEDURE EVALUATION
Patient: Christiana Hendrix    Procedure Summary       Date: 06/17/25 Room / Location:  [facility] OR 06 /  [facility] OR    Anesthesia Start: 0700 Anesthesia Stop: 0813    Procedure: RIGHT LOWER QUADRANT INCISIONAL HERNIA REPAIR LAPAROSCOPIC WITH DAVINCI ROBOT WITH MESH (Abdomen) Diagnosis:       Incisional hernia, without obstruction or gangrene      (Incisional hernia, without obstruction or gangrene [K43.2])    Surgeons: Jovanna Curtis MD Provider: Hermelinda Hendrickson CRNA    Anesthesia Type: general ASA Status: 3            Anesthesia Type: general    Vitals  Vitals Value Taken Time   /66 06/17/25 09:27   Temp 97.8 °F (36.6 °C) 06/17/25 09:05   Pulse 81 06/17/25 09:27   Resp 16 06/17/25 09:27   SpO2 83 % 06/17/25 09:27           Post Anesthesia Care and Evaluation    Patient location during evaluation: PACU  Patient participation: complete - patient participated  Level of consciousness: awake and awake and alert  Pain score: 0  Pain management: adequate    Airway patency: patent  Anesthetic complications: No anesthetic complications  PONV Status: none  Cardiovascular status: acceptable  Respiratory status: acceptable  Hydration status: acceptable    Comments: Patient discharged according to acceptable Hardik score per RN assessment. See nursing records for further information.     Blood pressure 141/68, pulse 88, temperature 97.8 °F (36.6 °C), temperature source Temporal, resp. rate 18, SpO2 92%, not currently breastfeeding.

## 2025-06-17 NOTE — ANESTHESIA PREPROCEDURE EVALUATION
Anesthesia Evaluation     no history of anesthetic complications:   NPO Solid Status: > 8 hours  NPO Liquid Status: > 8 hours           Airway   Mallampati: II  No difficulty expected  Dental      Pulmonary    (+) asthma,  Cardiovascular   Exercise tolerance: poor (<4 METS)    (+) hypertension, past MI , CAD, cardiac stents (1 stent placed 12 years ago) Drug eluting stent more than 12 months ago , dysrhythmias Atrial Fib, hyperlipidemia      Neuro/Psych  (-) seizures, TIA, CVA  GI/Hepatic/Renal/Endo    (+) obesity, renal disease- stones  (-) diabetes    Musculoskeletal     Abdominal    Substance History      OB/GYN          Other   arthritis,                 Anesthesia Plan    ASA 3     general     intravenous induction     Anesthetic plan, risks, benefits, and alternatives have been provided, discussed and informed consent has been obtained with: patient.    CODE STATUS:

## 2025-06-17 NOTE — OP NOTE
"Laparoscopic Robotic-Assisted Incisional Hernia Repair with Mesh:     Patient: Christiana Hendrix  MRN: [id number]    YOB: 1950  Age: 75 y.o.  Sex: female  Unit:  [facility] OR Room/Bed: [facility] OR/MAIN OR Location: [facility]    Admitting Physician: YOLI SOW    Primary Care Physician: Petar Cummings MD             INDICATIONS: This is a 75 y.o. female who presents with a right lower quadrant incisional hernia for repair.      DATE OF OPERATION: 6/17/2025     Surgeons and Role:     * Yoli Sow MD - Primary  Yu Sampson PA-C - assist     ANESTHESIA: General     PREOPERATIVE DIAGNOSIS: Incisional hernia, without obstruction or gangrene [K43.2]    POSTOPERATIVE DIAGNOSIS: Same, 7.5 cm incisional hernia in the right lower quadrant, incarcerated with small bowel     PROCEDURES PERFORMED:    (1) Laparoscopic, robotic-assisted incarcerated 7.5 cm incisional hernia repair with mesh     PROCEDURE DETAILS:   After informed consent was obtained, the patient was brought to the operating room. Preoperative antibiotics and subcutaneous heparin were administered. General anesthesia was induced. The abdomen was prepped with ChloraPrep and draped in a sterile fashion. A time-out was performed verifying the correct patient, procedure, and side. An 8 mm incision was made along the left subcostal margin. The Veress was inserted and the abdomen was insufflated to 15 mmHg. An 8mm trocar was then inserted. A laparoscopic TAP block was performed with my deep local.  Three 8 mm trocars were placed.   The bed was flexed. We then docked the robot in the usual fashion from the left side. There were no adhesions. The peritoneum was incised. The right lower quadrant incisional hernia was incarcerated with fat which was reduced with traction and external force.  This was reduced. The defect was closed with two #1 stratafix starting 4cm above and continuing 4 cm below the defect. A 4 x 6\" Ventralight mesh was " placed into the abdomen with 0-Vicryl sutures at 12 o'clock and 6 o'clock.  It was positioned so that there was 4 cm of overlap of the mesh on all sides of the hernia defect. The 0-vicryl sutures were passed trans-fascial with the laparoscopic suture passer. The mesh was sewn into place circumferentially with 3-0 stratafix sutures.  The needles were removed from the abdomen and needle counts were correct. The robot was undocked, the trocars were removed, and the abdomen was desufflated.  The skin of all three incisions was closed with a 4-0 Monocryl suture. Skin glue was placed to all incisions. The patient was transported to PACU in stable condition.     Yu Sampson PA-C was responsible for performing the following activities: Retraction, Suturing, Closing, Placing Dressing, and exchanging robotic instruments and sutures and their skilled assistance was necessary for the success of this case.       Findings: 7.5 cm incisional hernia in the right lower quadrant, incarcerated with small bowel   Estimated Blood Loss: 25 mL   Complications: None apparent            Specimens: None      Disposition: PACU - hemodynamically stable.           Condition: stable    Jovanna Curtis MD  05/19/2025

## 2025-06-17 NOTE — DISCHARGE INSTRUCTIONS
Wound:   - you have skin glue on your incisions. Okay to shower tomorrow.   - Leave skin glue in place, it should slowly fall off over 2 weeks   - No swimming/soaking/bathing x 2 weeks to allow incisions to heal.     Activity:   - Activity as tolerated. NO heavy lifting x 4 weeks - no more than 25lb at a time.   - No driving or operating machinery on narcotic pain medication.     Pain medication:   - Take 1000mg of tylenol every 8 hours for 3 days. After three days, take it prn.   - Take 600mg of ibuprofen (motrin) every 8 hours for 3 days. After three days, take it prn.   - You have a prescription for a narcotic. It will be roxicodone 5mg tabs. Take these only as needed after you have taken the tylenol and ibuprofen. If you are taking the roxicodone, make sure to take a stool softener (colace) with it as it can cause constipation.   - The narcotic may make you nauseated, you will have a prescription for zofran in case of nausea.     Follow up:   - make an appointment to see Yu Sampson PA-C  in 2 weeks  - If you have any concerns before then, call me office at [phone]

## 2025-06-19 ENCOUNTER — TELEPHONE (OUTPATIENT)
Dept: SURGERY | Facility: CLINIC | Age: 75
End: 2025-06-19
Payer: MEDICARE

## 2025-06-19 NOTE — TELEPHONE ENCOUNTER
Post operative surgery phone call  Type of surgery Incisional Hernia Repair  How are you feeling overall? Fair, just the pain.  Are you having any pain or Nausea? Yes, patient states no pain when sitting but when up and doing she is having a lot of pain. I reminded patient she is only 48 hours out from surgery and we do want her up and walking but not to do to much.  Do you have a normal appetite? Patient states she is not very hungry and has been eating but just lighter than usual.   Are you passing gas or having any BM? No bowel movement but she is passing gas. Doing a stool softener twice a day and started Miralax today.   How is your activity? fair  Any drainage or fever? no fever or chills, no drainage from incision sites.

## 2025-06-24 ENCOUNTER — CLINICAL SUPPORT (OUTPATIENT)
Dept: SURGERY | Facility: CLINIC | Age: 75
End: 2025-06-24
Payer: MEDICARE

## 2025-06-24 DIAGNOSIS — T78.49XA ALLERGIC REACTION TO ADHESIVE: Primary | ICD-10-CM

## 2025-06-24 RX ORDER — METHYLPREDNISOLONE ACETATE 40 MG/ML
40 INJECTION, SUSPENSION INTRA-ARTICULAR; INTRALESIONAL; INTRAMUSCULAR; SOFT TISSUE ONCE
Status: COMPLETED | OUTPATIENT
Start: 2025-06-24 | End: 2025-06-24

## 2025-06-24 RX ORDER — DEXAMETHASONE SODIUM PHOSPHATE 4 MG/ML
4 INJECTION, SOLUTION INTRA-ARTICULAR; INTRALESIONAL; INTRAMUSCULAR; INTRAVENOUS; SOFT TISSUE ONCE
Status: COMPLETED | OUTPATIENT
Start: 2025-06-24 | End: 2025-06-24

## 2025-06-24 RX ORDER — METHYLPREDNISOLONE SODIUM SUCCINATE 40 MG/ML
40 INJECTION, POWDER, LYOPHILIZED, FOR SOLUTION INTRAMUSCULAR; INTRAVENOUS ONCE
Status: SHIPPED | OUTPATIENT
Start: 2025-06-24

## 2025-06-24 RX ADMIN — METHYLPREDNISOLONE ACETATE 40 MG: 40 INJECTION, SUSPENSION INTRA-ARTICULAR; INTRALESIONAL; INTRAMUSCULAR; SOFT TISSUE at 13:57

## 2025-06-24 RX ADMIN — DEXAMETHASONE SODIUM PHOSPHATE 4 MG: 4 INJECTION, SOLUTION INTRA-ARTICULAR; INTRALESIONAL; INTRAMUSCULAR; INTRAVENOUS; SOFT TISSUE at 13:54

## 2025-06-24 NOTE — PROGRESS NOTES
Pt here today for wound check to abdominal incisions. All trochar sites red and swollen. Peeled back adhesive skin glue. Uploaded picture in media. Steroid injection given for allergic reaction. Pt to call the office if symptoms worsen.

## 2025-07-02 ENCOUNTER — OFFICE VISIT (OUTPATIENT)
Dept: SURGERY | Facility: CLINIC | Age: 75
End: 2025-07-02
Payer: MEDICARE

## 2025-07-02 VITALS
WEIGHT: 216 LBS | HEART RATE: 89 BPM | OXYGEN SATURATION: 94 % | DIASTOLIC BLOOD PRESSURE: 87 MMHG | SYSTOLIC BLOOD PRESSURE: 154 MMHG | HEIGHT: 64 IN | BODY MASS INDEX: 36.88 KG/M2

## 2025-07-02 DIAGNOSIS — Z87.19 S/P REPAIR OF VENTRAL HERNIA: Primary | ICD-10-CM

## 2025-07-02 DIAGNOSIS — Z98.890 S/P REPAIR OF VENTRAL HERNIA: Primary | ICD-10-CM

## 2025-07-02 NOTE — PROGRESS NOTES
"Patient: Christiana Hendrix    YOB: 1950    Date: 07/02/2025    Primary Care Provider: Petar Cummings MD    Vital Signs:   Vitals:    07/02/25 0859   BP: 154/87   BP Location: Left arm   Patient Position: Sitting   Cuff Size: Adult   Pulse: 89   SpO2: 94%   Weight: 98 kg (216 lb)   Height: 163 cm (64.17\")       Overall doing well s/p right lower quadrant hernia repair on 6/17/25 by Dr. Curtis. No fevers. Tolerating diet. Energy improving. Pain improving. No troubles with bowel or bladder function. Wounds healing well. Repair intact.    Results Review:   Pathology and operative findings reviewed with patient.    Op Note by Jovanna Curtis MD (06/17/2025 07:18)   POSTOPERATIVE DIAGNOSIS: Same, 7.5 cm incisional hernia in the right lower quadrant, incarcerated with small bowel     Assessment / Plan:  It has been reiterated that the patient should limit strenuous activity during the post op period and limit lifting to <35 pounds for the first four weeks post op then slowly return to normal.    Diagnoses and all orders for this visit:    1. S/P repair of ventral hernia (Primary)        Christiana Hendrix is 2 weeks s/p right lower quadrant incisional hernia repair. She is overall doing well. At this time, she is able to return to normal activity at 4 weeks post op. This is around 7/15/25. She voiced understanding of this. She will call for an appointment if she has any new problems or concerns. She is agreeable to the plan.     Follow up:     Return if symptoms worsen or fail to improve.      Electronically signed by Yu Smapson PA-C  07/02/25  09:17 CDT                  "

## 2025-07-29 ENCOUNTER — OFFICE VISIT (OUTPATIENT)
Dept: CARDIOLOGY | Facility: CLINIC | Age: 75
End: 2025-07-29
Payer: MEDICARE

## 2025-07-29 VITALS
BODY MASS INDEX: 36.7 KG/M2 | SYSTOLIC BLOOD PRESSURE: 136 MMHG | OXYGEN SATURATION: 97 % | DIASTOLIC BLOOD PRESSURE: 78 MMHG | WEIGHT: 215 LBS | HEART RATE: 83 BPM | HEIGHT: 64 IN

## 2025-07-29 DIAGNOSIS — R00.2 PALPITATIONS: ICD-10-CM

## 2025-07-29 DIAGNOSIS — I48.0 PAF (PAROXYSMAL ATRIAL FIBRILLATION): Primary | ICD-10-CM

## 2025-07-29 DIAGNOSIS — I25.10 CORONARY ARTERY DISEASE INVOLVING NATIVE CORONARY ARTERY OF NATIVE HEART WITHOUT ANGINA PECTORIS: ICD-10-CM

## 2025-07-29 PROCEDURE — 3078F DIAST BP <80 MM HG: CPT

## 2025-07-29 PROCEDURE — 3075F SYST BP GE 130 - 139MM HG: CPT

## 2025-07-29 PROCEDURE — 93000 ELECTROCARDIOGRAM COMPLETE: CPT

## 2025-07-29 PROCEDURE — 99214 OFFICE O/P EST MOD 30 MIN: CPT

## 2025-07-29 NOTE — PROGRESS NOTES
[facility] Electrophysiology   Reason For Visit:  Atrial Fibrillation     Subjective        EP Problems:  1.  Paroxysmal atrial fibrillation  -9/27/21: DCCV   -1/20/2021: Ablation, Fernando  -9/18/24: DCCV, ER        Cardiology Problems:  1.  Hypertension  2.  Hyperlipidemia  3.  CAD status post PCI   - s/p BMS in 2003 and PTCA in 2007.      Medical Problems:  1.  Obesity  -9/2023: BMI 36  2.  Eustachian tube malfunction  3.  Nephrolithiasis      Christiana Hendrix is a 75 y.o. female with above pertinent PMH who presents for follow-up of PAF.    Patient last seen in January 2025.  She was doing reasonably well at that time with brief episodes of SVT.  Multaq was discussed, however was held off at that time.  No adjustments were made.    Today the patient is doing well.  She does report that she has 2-3 episodes a day of tachycardia.  This causes palpitations and slight discomfort.  These typically last 2 to 3 minutes and resolve on their own.  She feels like these are slightly different than her atrial fibrillation.  Does have trace blood in her urine, however has not had any anemia associated with this      ROS: Pertinent findings as noted above        Pertinent past medical, surgical, family, and social history were reviewed.      Current Outpatient Medications:     acetaminophen (Tylenol) 325 MG tablet, Take 3 tablets by mouth Every 8 (Eight) Hours. Take every 8 hours for 3 days then take prn as needed., Disp: , Rfl:     albuterol sulfate  (90 Base) MCG/ACT inhaler, Inhale 2 puffs Every 6 (Six) Hours As Needed for Wheezing or Shortness of Air., Disp: , Rfl:     amLODIPine (NORVASC) 10 MG tablet, Take 1 tablet by mouth Daily., Disp: , Rfl:     atorvastatin (LIPITOR) 80 MG tablet, Take 1 tablet by mouth Every Night., Disp: , Rfl:     budesonide-formoterol (SYMBICORT) 160-4.5 MCG/ACT inhaler, Inhale 2 puffs 2 (Two) Times a Day., Disp: , Rfl:     Cholecalciferol (VITAMIN D3) 125 MCG (5000 UT) capsule capsule,  Take 1 capsule by mouth Daily., Disp: , Rfl:     coenzyme Q10 100 MG capsule, Take 2 capsules by mouth Daily., Disp: , Rfl:     docusate sodium (Colace) 100 MG capsule, Take 1 capsule by mouth 2 (Two) Times a Day., Disp: 60 capsule, Rfl: 1    estradiol (ESTRACE) 0.1 MG/GM vaginal cream, Insert 2 g into the vagina 2 (Two) Times a Week., Disp: 42.5 g, Rfl: 5    fluocinolone acetonide (DERMOTIC) 0.01 % oil otic oil, Administer 4 drops into the left ear 3 (Three) Times a Week., Disp: , Rfl:     folic acid (FOLVITE) 800 MCG tablet, Take 1 tablet by mouth Daily., Disp: , Rfl:     Glucosamine-Chondroit-Vit C-Mn (GLUCOSAMINE 1500 COMPLEX PO), Take 1 capsule by mouth Daily., Disp: , Rfl:     Inclisiran Sodium (Leqvio) 284 MG/1.5ML solution prefilled syringe, Inject 1 mL under the skin into the appropriate area as directed Every 6 (Six) Months., Disp: 1 mL, Rfl: 1    Klor-Con M20 20 MEQ CR tablet, Take 1 tablet by mouth Daily., Disp: , Rfl:     levocetirizine (XYZAL) 5 MG tablet, Take 1 tablet by mouth Every Night., Disp: , Rfl:     metoprolol succinate XL (TOPROL-XL) 100 MG 24 hr tablet, Take 1 tablet by mouth Every Night., Disp: , Rfl:     montelukast (SINGULAIR) 10 MG tablet, Take 1 tablet by mouth Every Night., Disp: , Rfl:     multivitamins-minerals (PRESERVISION AREDS 2) capsule capsule, Take 1 capsule by mouth 2 (Two) Times a Day., Disp: , Rfl:     nitroglycerin (NITROSTAT) 0.4 MG SL tablet, Place 1 tablet under the tongue Every 5 (Five) Minutes As Needed for Chest Pain. Take no more than 3 doses in 15 minutes., Disp: 25 tablet, Rfl: 5    ondansetron (Zofran) 4 MG tablet, Take 1 tablet by mouth Every 8 (Eight) Hours As Needed for Nausea or Vomiting., Disp: 15 tablet, Rfl: 0    polyethylene glycol (MIRALAX) 17 g packet, Take 17 g by mouth Daily As Needed (constipation)., Disp: 60 each, Rfl: 2    Probiotic Product (PROBIOTIC BLEND PO), Take 1 capsule by mouth Daily., Disp: , Rfl:     rivaroxaban (Xarelto) 20 MG tablet,  "Take 1 tablet by mouth Daily., Disp: 90 tablet, Rfl: 3    trimethoprim (TRIMPEX) 100 MG tablet, Take 1 tablet by mouth Every Night., Disp: 90 tablet, Rfl: 0    vitamin A 54878 UNIT capsule, Take 1 capsule by mouth Daily., Disp: , Rfl:     dronedarone (Multaq) 400 MG tablet, Take 1 tablet by mouth 2 (Two) Times a Day With Meals., Disp: 60 tablet, Rfl: 11    Current Facility-Administered Medications:     methylPREDNISolone sodium succinate (SOLU-Medrol) injection 40 mg, 40 mg, Intramuscular, Once, Jovanna Curtis MD     Objective   Vital Signs:  /78   Pulse 83   Ht 163 cm (64.17\")   Wt 97.5 kg (215 lb)   SpO2 97%   BMI 36.71 kg/m²   Estimated body mass index is 36.71 kg/m² as calculated from the following:    Height as of this encounter: 163 cm (64.17\").    Weight as of this encounter: 97.5 kg (215 lb).      Constitutional:       Appearance: Healthy appearance. Not in distress.   Pulmonary:      Effort: Pulmonary effort is normal.      Breath sounds: Normal breath sounds.   Cardiovascular:      PMI at left midclavicular line. Normal rate. Regular rhythm. Normal S1. Normal S2.       Murmurs: There is no murmur.      No gallop.  No click. No rub.   Pulses:     Intact distal pulses.   Edema:     Peripheral edema absent.   Neurological:      Mental Status: Alert and oriented to person, place and time.        Result Review :           ECG 12 Lead    Date/Time: 7/29/2025 11:59 AM  Performed by: Charlie Minor APRN    Authorized by: Charlie Minor APRN  Comparison: compared with previous ECG from 6/10/2025  Similar to previous ECG  Comparison to previous ECG: Normal sinus rhythm with rightward axis  Rhythm: sinus rhythm  Rate: normal  QRS axis: right    Clinical impression: non-specific ECG            Assessment and Plan   Diagnoses and all orders for this visit:    1. PAF (paroxysmal atrial fibrillation) (Primary)  2. Palpitations  3. Coronary artery disease involving native coronary artery of native " heart without angina pectoris  -Sinus rhythm on EKG today  - Continue Xarelto 20 mg daily  - Continue Toprol- mg daily  - Will add Multaq to her regimen as a rhythm suppression modality.  This is mainly to reduce her risk of atrial fibrillation, however she may have an SVT benefit as well.  She will get an EKG in 1 week in the heart center.  I told the patient lets give her a month on this medication to see if it improves her symptoms, if it does not we will plan to discontinue  - Follow-up in the EP clinic in 6 months                     I spent 2 minutes on the separately reported service of EKG interpretation. This time is not included in the time used to support the E/M service also reported today.      Follow Up   Return in about 6 months (around 1/29/2026).  Patient was given instructions and counseling regarding her condition or for health maintenance advice. Please see specific information pulled into the AVS if appropriate.       Part of this note may be an electronic transcription/translation of spoken language to printed text using the Dragon Dictation System.

## 2025-08-06 ENCOUNTER — HOSPITAL ENCOUNTER (OUTPATIENT)
Dept: CARDIOLOGY | Facility: HOSPITAL | Age: 75
Discharge: HOME | End: 2025-08-06
Payer: MEDICARE

## 2025-08-06 DIAGNOSIS — I48.0 PAF (PAROXYSMAL ATRIAL FIBRILLATION): ICD-10-CM

## 2025-08-06 PROCEDURE — 93005 ELECTROCARDIOGRAM TRACING: CPT

## 2025-08-07 LAB
QT INTERVAL: 378 MS
QTC INTERVAL: 419 MS

## 2025-08-26 ENCOUNTER — OFFICE VISIT (OUTPATIENT)
Age: 75
End: 2025-08-26

## 2025-08-26 VITALS — BODY MASS INDEX: 34.99 KG/M2 | HEIGHT: 65 IN | WEIGHT: 210 LBS

## 2025-08-26 DIAGNOSIS — G89.29 CHRONIC PAIN OF LEFT KNEE: ICD-10-CM

## 2025-08-26 DIAGNOSIS — M25.562 CHRONIC PAIN OF LEFT KNEE: ICD-10-CM

## 2025-08-26 DIAGNOSIS — M17.12 PRIMARY OSTEOARTHRITIS OF LEFT KNEE: Primary | ICD-10-CM

## 2025-08-26 DIAGNOSIS — M17.11 PRIMARY OSTEOARTHRITIS OF RIGHT KNEE: ICD-10-CM

## 2025-08-26 DIAGNOSIS — M25.561 CHRONIC PAIN OF RIGHT KNEE: ICD-10-CM

## 2025-08-26 DIAGNOSIS — G89.29 CHRONIC PAIN OF RIGHT KNEE: ICD-10-CM

## 2025-08-26 RX ORDER — LIDOCAINE HYDROCHLORIDE 10 MG/ML
1 INJECTION, SOLUTION INFILTRATION; PERINEURAL ONCE
Status: COMPLETED | OUTPATIENT
Start: 2025-08-26 | End: 2025-08-26

## 2025-08-26 RX ORDER — BUPIVACAINE HYDROCHLORIDE 5 MG/ML
3 INJECTION, SOLUTION EPIDURAL; INTRACAUDAL; PERINEURAL ONCE
Status: COMPLETED | OUTPATIENT
Start: 2025-08-26 | End: 2025-08-26

## 2025-08-26 RX ORDER — BETAMETHASONE SODIUM PHOSPHATE AND BETAMETHASONE ACETATE 3; 3 MG/ML; MG/ML
6 INJECTION, SUSPENSION INTRA-ARTICULAR; INTRALESIONAL; INTRAMUSCULAR; SOFT TISSUE ONCE
Status: COMPLETED | OUTPATIENT
Start: 2025-08-26 | End: 2025-08-26

## 2025-08-26 RX ADMIN — BETAMETHASONE SODIUM PHOSPHATE AND BETAMETHASONE ACETATE 6 MG: 3; 3 INJECTION, SUSPENSION INTRA-ARTICULAR; INTRALESIONAL; INTRAMUSCULAR; SOFT TISSUE at 13:08

## 2025-08-26 RX ADMIN — BUPIVACAINE HYDROCHLORIDE 15 MG: 5 INJECTION, SOLUTION EPIDURAL; INTRACAUDAL; PERINEURAL at 13:08

## 2025-08-26 RX ADMIN — LIDOCAINE HYDROCHLORIDE 1 ML: 10 INJECTION, SOLUTION INFILTRATION; PERINEURAL at 13:08

## 2025-08-28 ENCOUNTER — OFFICE VISIT (OUTPATIENT)
Dept: ENT CLINIC | Age: 75
End: 2025-08-28
Payer: MEDICARE

## 2025-08-28 VITALS
BODY MASS INDEX: 36.15 KG/M2 | WEIGHT: 217 LBS | DIASTOLIC BLOOD PRESSURE: 82 MMHG | HEIGHT: 65 IN | SYSTOLIC BLOOD PRESSURE: 124 MMHG

## 2025-08-28 DIAGNOSIS — H61.22 IMPACTED CERUMEN, LEFT EAR: ICD-10-CM

## 2025-08-28 DIAGNOSIS — H90.3 SENSORINEURAL HEARING LOSS (SNHL) OF BOTH EARS: ICD-10-CM

## 2025-08-28 DIAGNOSIS — H60.542 DERMATITIS OF EAR CANAL, LEFT: Primary | ICD-10-CM

## 2025-08-28 PROCEDURE — G8427 DOCREV CUR MEDS BY ELIG CLIN: HCPCS | Performed by: OTOLARYNGOLOGY

## 2025-08-28 PROCEDURE — 1159F MED LIST DOCD IN RCRD: CPT | Performed by: OTOLARYNGOLOGY

## 2025-08-28 PROCEDURE — 69210 REMOVE IMPACTED EAR WAX UNI: CPT | Performed by: OTOLARYNGOLOGY

## 2025-08-28 PROCEDURE — 1090F PRES/ABSN URINE INCON ASSESS: CPT | Performed by: OTOLARYNGOLOGY

## 2025-08-28 PROCEDURE — 3017F COLORECTAL CA SCREEN DOC REV: CPT | Performed by: OTOLARYNGOLOGY

## 2025-08-28 PROCEDURE — 1123F ACP DISCUSS/DSCN MKR DOCD: CPT | Performed by: OTOLARYNGOLOGY

## 2025-08-28 PROCEDURE — 99213 OFFICE O/P EST LOW 20 MIN: CPT | Performed by: OTOLARYNGOLOGY

## 2025-08-28 PROCEDURE — G8417 CALC BMI ABV UP PARAM F/U: HCPCS | Performed by: OTOLARYNGOLOGY

## 2025-08-28 PROCEDURE — 1036F TOBACCO NON-USER: CPT | Performed by: OTOLARYNGOLOGY

## 2025-08-28 PROCEDURE — 4130F TOPICAL PREP RX AOE: CPT | Performed by: OTOLARYNGOLOGY

## 2025-08-28 PROCEDURE — G8399 PT W/DXA RESULTS DOCUMENT: HCPCS | Performed by: OTOLARYNGOLOGY

## 2025-08-28 ASSESSMENT — ENCOUNTER SYMPTOMS
ALLERGIC/IMMUNOLOGIC NEGATIVE: 1
EYES NEGATIVE: 1
RESPIRATORY NEGATIVE: 1
GASTROINTESTINAL NEGATIVE: 1

## 2025-09-03 DIAGNOSIS — Z29.9 PROPHYLACTIC MEASURE: Primary | ICD-10-CM

## 2025-09-03 RX ORDER — MUPIROCIN 2 %
OINTMENT (GRAM) TOPICAL
Qty: 1 EACH | Refills: 0 | Status: SHIPPED | OUTPATIENT
Start: 2025-09-03

## (undated) DEVICE — SYR LL TP 10ML STRL

## (undated) DEVICE — PATIENT RETURN ELECTRODE, SINGLE-USE, CONTACT QUALITY MONITORING, ADULT, WITH 9FT CORD, FOR PATIENTS WEIGING OVER 33LBS. (15KG): Brand: MEGADYNE

## (undated) DEVICE — MASK,OXYGEN,MED CONC,ADLT,7' TUB, UC: Brand: PENDING

## (undated) DEVICE — ELECTRD BLD EZ CLN MOD XLNG 2.75IN

## (undated) DEVICE — NEEDLE,22GX1.5",REG,BEVEL: Brand: MEDLINE

## (undated) DEVICE — ACUFEX TRUNAV RETROGRADE DRILL 6 MM: Brand: ACUFEX

## (undated) DEVICE — ST TBG AIRSEAL FLTR TRI LUM

## (undated) DEVICE — ENDOPATH PNEUMONEEDLE INSUFFLATION NEEDLES WITH LUER LOCK CONNECTORS 120MM: Brand: ENDOPATH

## (undated) DEVICE — GLV SURG SENSICARE W/ALOE PF LF 6.5 STRL

## (undated) DEVICE — ARM DRAPE

## (undated) DEVICE — KT CLN CLEANOR SCPE

## (undated) DEVICE — SYR LUERLOK 30CC

## (undated) DEVICE — DRSNG GZ CURAD XEROFORM NONADHS 5X9IN STRL

## (undated) DEVICE — SEAL

## (undated) DEVICE — YANKAUER,BULB TIP WITH VENT: Brand: ARGYLE

## (undated) DEVICE — CUFF,BP,DISP,1 TUBE,ADULT,HP: Brand: MEDLINE

## (undated) DEVICE — UNDERCAST PADDING: Brand: DEROYAL

## (undated) DEVICE — REDUCER: Brand: ENDOWRIST

## (undated) DEVICE — ADHS SKIN PREMIERPRO EXOFIN TOPICAL HI/VISC .5ML

## (undated) DEVICE — THE SINGLE USE ETRAP – POLYP TRAP IS USED FOR SUCTION RETRIEVAL OF ENDOSCOPICALLY REMOVED POLYPS.: Brand: ETRAP

## (undated) DEVICE — SUCTION IRRIGATOR: Brand: ENDOWRIST

## (undated) DEVICE — TBG INSUFFLATION LUER LOCK: Brand: MEDLINE INDUSTRIES, INC.

## (undated) DEVICE — TIP COVER ACCESSORY

## (undated) DEVICE — [TOMCAT CUTTER, ARTHROSCOPIC SHAVER BLADE,  DO NOT RESTERILIZE,  DO NOT USE IF PACKAGE IS DAMAGED,  KEEP DRY,  KEEP AWAY FROM SUNLIGHT]: Brand: FORMULA

## (undated) DEVICE — ACCESS PLATFORM FOR MINIMALLY INVASIVE SURGERY: Brand: GELPOINT®  MINI ADVANCED ACCESS PLATFORM

## (undated) DEVICE — BNDR ABD 4PANEL 12IN 46 TO 62IN

## (undated) DEVICE — ANTIBACTERIAL UNDYED BRAIDED (POLYGLACTIN 910), SYNTHETIC ABSORBABLE SUTURE: Brand: COATED VICRYL

## (undated) DEVICE — GLV SURG DERMASSURE GRN LF PF 7.0

## (undated) DEVICE — TROC BLADLES ANCHORPORT/OPTI LP 8X120MM 1P/U

## (undated) DEVICE — GLOVE,SURG,SENSICARE,ALOE,LF,PF,6: Brand: MEDLINE

## (undated) DEVICE — PK KN ARTHSCP 30

## (undated) DEVICE — SUCTION MAT (LOW PROFILE), 50X34: Brand: NEPTUNE

## (undated) DEVICE — DISPOSABLE TOURNIQUET CUFF 34"X4", 1-LINE, BLUE, STERILE, 1EA/PK, 10PK/CS: Brand: ASP MEDICAL

## (undated) DEVICE — FIRSTPASS MINI STRAIGHT SUTURE PASSER: Brand: FIRSTPASS

## (undated) DEVICE — DAVINCI: Brand: MEDLINE INDUSTRIES, INC.

## (undated) DEVICE — CVR BRD ARM 13X30

## (undated) DEVICE — Device: Brand: DEFENDO AIR/WATER/SUCTION AND BIOPSY VALVE

## (undated) DEVICE — TBG ARTHRO FLOWSTEADY/ST DISP

## (undated) DEVICE — PROB ABLAT SERFAS ENERGY 90S 3.5MM

## (undated) DEVICE — 4-PORT MANIFOLD: Brand: NEPTUNE 2

## (undated) DEVICE — GLV SURG TRIUMPH MICRO PF LTX 7.5 STRL

## (undated) DEVICE — GAUZE,SPONGE,4"X4",12PLY,STERILE,LF,2'S: Brand: MEDLINE

## (undated) DEVICE — SUT VIC 0 UR6 27IN VCP603H

## (undated) DEVICE — MICRO HVTSA, 0.5G AND HVTSA SOURCEMARK PRODUCT CODE M1206 AND M1206-01: Brand: EXOFIN MICRO HVTSA, 0.5G

## (undated) DEVICE — VESSEL SEALER EXTEND: Brand: ENDOWRIST

## (undated) DEVICE — GOWN,PREVENTION PLUS,XLNG/XXLARGE,STRL: Brand: MEDLINE

## (undated) DEVICE — DRSNG SURESITE WNDW 4X4.5

## (undated) DEVICE — THE CHANNEL CLEANING BRUSH IS A NYLON FLEXI BRUSH ATTACHED TO A FLEXIBLE PLASTIC SHEATH DESIGNED TO SAFELY REMOVE DEBRIS FROM FLEXIBLE ENDOSCOPES.

## (undated) DEVICE — BLADELESS OBTURATOR: Brand: WECK VISTA

## (undated) DEVICE — 3M™ IOBAN™ 2 ANTIMICROBIAL INCISE DRAPE 6650EZ: Brand: IOBAN™ 2

## (undated) DEVICE — GLV SURG DERMASSURE GRN LF PF 8.0

## (undated) DEVICE — SUT MNCRYL 4/0 PS2 27IN UD MCP426H

## (undated) DEVICE — DEV SUT GRSPR CLOSUR 15CM 14G

## (undated) DEVICE — GLV SURG TRIUMPH MICRO PF LTX 6.5 STRL

## (undated) DEVICE — THE EXACTO COLD SNARE IS INTENDED TO BE USED WITHOUT DIATHERMIC ENERGY FOR THE ENDOSCOPIC RESECTION OF POLYP TISSUE IN THE GASTROINTESTINAL TRACT.: Brand: EXACTO

## (undated) DEVICE — STAPLER 60: Brand: SUREFORM

## (undated) DEVICE — BAPTIST TURNOVER KIT: Brand: MEDLINE INDUSTRIES, INC.

## (undated) DEVICE — GLV SURG TRIUMPH MICRO PF LTX 7 STRL

## (undated) DEVICE — SENSR O2 OXIMAX FNGR A/ 18IN NONSTR

## (undated) DEVICE — SUT VIC 3/0 SH 27IN J416H

## (undated) DEVICE — SUT PDS O CT1 CR/8 18IN Z740D